# Patient Record
Sex: MALE | Race: WHITE | NOT HISPANIC OR LATINO | Employment: OTHER | URBAN - METROPOLITAN AREA
[De-identification: names, ages, dates, MRNs, and addresses within clinical notes are randomized per-mention and may not be internally consistent; named-entity substitution may affect disease eponyms.]

---

## 2017-02-09 PROBLEM — E11.9 CONTROLLED TYPE 2 DIABETES MELLITUS WITHOUT COMPLICATION: Status: ACTIVE | Noted: 2017-02-09

## 2017-02-09 PROBLEM — M19.072 DEGENERATIVE ARTHRITIS OF LEFT ANKLE: Status: ACTIVE | Noted: 2017-02-09

## 2017-02-09 PROBLEM — E78.2 MIXED HYPERLIPIDEMIA: Status: ACTIVE | Noted: 2017-02-09

## 2017-02-09 PROBLEM — I25.10 CORONARY ARTERY DISEASE INVOLVING NATIVE CORONARY ARTERY WITHOUT ANGINA PECTORIS: Status: ACTIVE | Noted: 2017-02-09

## 2017-02-09 PROBLEM — E03.9 HYPOTHYROIDISM: Status: ACTIVE | Noted: 2017-02-09
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
## 2017-07-10 PROBLEM — T17.320A CHOKING DUE TO FOOD (REGURGITATED): Status: ACTIVE | Noted: 2017-07-10

## 2017-07-10 PROBLEM — R05.9 COUGH: Status: ACTIVE | Noted: 2017-07-10

## 2017-08-24 PROBLEM — R13.12 OROPHARYNGEAL DYSPHAGIA: Status: ACTIVE | Noted: 2017-07-10

## 2017-08-24 PROBLEM — R54 ADVANCED AGE: Status: ACTIVE | Noted: 2017-08-24

## 2017-10-02 PROBLEM — G30.1 LATE ONSET ALZHEIMER'S DISEASE WITHOUT BEHAVIORAL DISTURBANCE: Status: ACTIVE | Noted: 2017-10-02

## 2017-10-02 PROBLEM — F02.80 LATE ONSET ALZHEIMER'S DISEASE WITHOUT BEHAVIORAL DISTURBANCE: Status: ACTIVE | Noted: 2017-10-02

## 2018-07-16 PROBLEM — I35.0 NONRHEUMATIC AORTIC VALVE STENOSIS: Status: ACTIVE | Noted: 2018-07-16

## 2018-07-26 PROBLEM — Z86.010 HISTORY OF COLON POLYPS: Status: ACTIVE | Noted: 2018-07-26

## 2018-08-31 PROBLEM — I35.0 CRITICAL AORTIC VALVE STENOSIS: Status: ACTIVE | Noted: 2018-08-31

## 2018-08-31 PROBLEM — I50.32 CHRONIC DIASTOLIC HEART FAILURE, NYHA CLASS 2: Status: ACTIVE | Noted: 2018-08-31

## 2018-10-30 PROBLEM — I35.0 NON-RHEUMATIC AORTIC STENOSIS: Status: ACTIVE | Noted: 2018-10-30

## 2019-03-06 PROBLEM — R91.1 SOLITARY PULMONARY NODULE: Status: ACTIVE | Noted: 2019-03-06

## 2020-03-09 PROBLEM — R91.8 PULMONARY NODULES: Status: ACTIVE | Noted: 2020-03-09

## 2020-03-16 PROBLEM — R91.1 LUNG NODULE: Status: ACTIVE | Noted: 2020-03-16

## 2020-06-12 PROBLEM — J69.1: Status: ACTIVE | Noted: 2020-06-12

## 2020-06-12 PROBLEM — R91.8 PULMONARY NODULES: Status: RESOLVED | Noted: 2020-03-09 | Resolved: 2020-06-12

## 2020-06-12 PROBLEM — R91.1 LUNG NODULE: Status: RESOLVED | Noted: 2020-03-16 | Resolved: 2020-06-12

## 2020-11-17 PROBLEM — I65.22 CAROTID STENOSIS, LEFT: Status: ACTIVE | Noted: 2020-11-17

## 2021-08-19 ENCOUNTER — TELEPHONE (OUTPATIENT)
Dept: OTOLARYNGOLOGY | Facility: CLINIC | Age: 86
End: 2021-08-19

## 2021-08-24 PROBLEM — I65.29 CAROTID ARTERY STENOSIS: Status: ACTIVE | Noted: 2021-08-24

## 2021-10-20 ENCOUNTER — OFFICE VISIT (OUTPATIENT)
Dept: OTOLARYNGOLOGY | Facility: CLINIC | Age: 86
End: 2021-10-20
Payer: MEDICARE

## 2021-10-20 ENCOUNTER — CLINICAL SUPPORT (OUTPATIENT)
Dept: AUDIOLOGY | Facility: CLINIC | Age: 86
End: 2021-10-20
Payer: MEDICARE

## 2021-10-20 VITALS — BODY MASS INDEX: 25.31 KG/M2 | WEIGHT: 161.63 LBS

## 2021-10-20 DIAGNOSIS — R42 DIZZINESS, NONSPECIFIC: ICD-10-CM

## 2021-10-20 DIAGNOSIS — H91.8X3 ASYMMETRICAL HEARING LOSS: Primary | ICD-10-CM

## 2021-10-20 DIAGNOSIS — H90.3 SENSORINEURAL HEARING LOSS, BILATERAL: Primary | ICD-10-CM

## 2021-10-20 PROCEDURE — 99999 PR PBB SHADOW E&M-EST. PATIENT-LVL III: ICD-10-PCS | Mod: PBBFAC,,, | Performed by: OTOLARYNGOLOGY

## 2021-10-20 PROCEDURE — 99213 OFFICE O/P EST LOW 20 MIN: CPT | Mod: PBBFAC | Performed by: OTOLARYNGOLOGY

## 2021-10-20 PROCEDURE — 92557 COMPREHENSIVE HEARING TEST: CPT | Mod: PBBFAC | Performed by: AUDIOLOGIST

## 2021-10-20 PROCEDURE — 99203 OFFICE O/P NEW LOW 30 MIN: CPT | Mod: S$PBB,,, | Performed by: OTOLARYNGOLOGY

## 2021-10-20 PROCEDURE — 92567 TYMPANOMETRY: CPT | Mod: PBBFAC | Performed by: AUDIOLOGIST

## 2021-10-20 PROCEDURE — 99999 PR PBB SHADOW E&M-EST. PATIENT-LVL III: CPT | Mod: PBBFAC,,, | Performed by: OTOLARYNGOLOGY

## 2021-10-20 PROCEDURE — 99203 PR OFFICE/OUTPT VISIT, NEW, LEVL III, 30-44 MIN: ICD-10-PCS | Mod: S$PBB,,, | Performed by: OTOLARYNGOLOGY

## 2021-11-08 ENCOUNTER — CLINICAL SUPPORT (OUTPATIENT)
Dept: AUDIOLOGY | Facility: CLINIC | Age: 86
End: 2021-11-08
Payer: MEDICARE

## 2021-11-08 ENCOUNTER — OFFICE VISIT (OUTPATIENT)
Dept: OTOLARYNGOLOGY | Facility: CLINIC | Age: 86
End: 2021-11-08
Payer: MEDICARE

## 2021-11-08 VITALS — WEIGHT: 164.44 LBS | BODY MASS INDEX: 25.76 KG/M2

## 2021-11-08 DIAGNOSIS — R94.113 NONSPECIFIC ABNORMAL FINDING ON OCULOMOTOR STUDY: Primary | ICD-10-CM

## 2021-11-08 DIAGNOSIS — I67.9 VERTIGO DUE TO CEREBROVASCULAR DISEASE: ICD-10-CM

## 2021-11-08 DIAGNOSIS — H91.8X3 ASYMMETRICAL HEARING LOSS: Primary | ICD-10-CM

## 2021-11-08 DIAGNOSIS — H81.8X9 OTHER DISORDERS OF VESTIBULAR FUNCTION, UNSPECIFIED EAR: ICD-10-CM

## 2021-11-08 DIAGNOSIS — R26.89 IMBALANCE: ICD-10-CM

## 2021-11-08 DIAGNOSIS — R42 VERTIGO DUE TO CEREBROVASCULAR DISEASE: ICD-10-CM

## 2021-11-08 PROCEDURE — 92540 BASIC VESTIBULAR EVALUATION: CPT | Mod: PBBFAC | Performed by: AUDIOLOGIST

## 2021-11-08 PROCEDURE — 92537 PR CALORIC VSTBLR TEST W/REC BITHERMAL: ICD-10-PCS | Mod: 26,52,S$PBB, | Performed by: AUDIOLOGIST

## 2021-11-08 PROCEDURE — 99213 OFFICE O/P EST LOW 20 MIN: CPT | Mod: PBBFAC,25 | Performed by: OTOLARYNGOLOGY

## 2021-11-08 PROCEDURE — 99999 PR PBB SHADOW E&M-EST. PATIENT-LVL III: CPT | Mod: PBBFAC,,, | Performed by: OTOLARYNGOLOGY

## 2021-11-08 PROCEDURE — 92540 PR VESTIBULAR EVAL NYSTAG FOVL&PERPH STIM OSCIL TRACKING: ICD-10-PCS | Mod: 26,S$PBB,, | Performed by: AUDIOLOGIST

## 2021-11-08 PROCEDURE — 99999 PR PBB SHADOW E&M-EST. PATIENT-LVL III: ICD-10-PCS | Mod: PBBFAC,,, | Performed by: OTOLARYNGOLOGY

## 2021-11-08 PROCEDURE — 92537 CALORIC VSTBLR TEST W/REC: CPT | Mod: 26,52,S$PBB, | Performed by: AUDIOLOGIST

## 2021-11-08 PROCEDURE — 99214 PR OFFICE/OUTPT VISIT, EST, LEVL IV, 30-39 MIN: ICD-10-PCS | Mod: S$PBB,,, | Performed by: OTOLARYNGOLOGY

## 2021-11-08 PROCEDURE — 99214 OFFICE O/P EST MOD 30 MIN: CPT | Mod: S$PBB,,, | Performed by: OTOLARYNGOLOGY

## 2021-11-08 PROCEDURE — 92540 BASIC VESTIBULAR EVALUATION: CPT | Mod: 26,S$PBB,, | Performed by: AUDIOLOGIST

## 2021-11-08 PROCEDURE — 92537 CALORIC VSTBLR TEST W/REC: CPT | Mod: 52,PBBFAC | Performed by: AUDIOLOGIST

## 2021-11-08 RX ORDER — SOLIFENACIN SUCCINATE 5 MG/1
5 TABLET, FILM COATED ORAL DAILY
COMMUNITY
Start: 2021-10-26 | End: 2022-01-18 | Stop reason: ALTCHOICE

## 2021-11-08 RX ORDER — MECLIZINE HCL 12.5 MG 12.5 MG/1
25 TABLET ORAL 3 TIMES DAILY
Qty: 45 TABLET | Refills: 3 | Status: ON HOLD | OUTPATIENT
Start: 2021-11-08 | End: 2023-01-01 | Stop reason: HOSPADM

## 2021-12-03 PROBLEM — Z98.890 S/P CAROTID ENDARTERECTOMY: Status: ACTIVE | Noted: 2021-12-03

## 2021-12-04 PROBLEM — R63.8 ALTERATION IN NUTRITION: Status: ACTIVE | Noted: 2021-12-04

## 2022-01-01 ENCOUNTER — HOSPITAL ENCOUNTER (OUTPATIENT)
Dept: RADIOLOGY | Facility: HOSPITAL | Age: 87
Discharge: HOME | End: 2022-09-21
Attending: FAMILY MEDICINE
Payer: MEDICARE

## 2022-01-01 ENCOUNTER — HOSPITAL ENCOUNTER (INPATIENT)
Facility: HOSPITAL | Age: 87
LOS: 13 days | Discharge: ANOTHER HOSPITAL | DRG: 056 | End: 2023-01-03
Attending: FAMILY MEDICINE | Admitting: STUDENT IN AN ORGANIZED HEALTH CARE EDUCATION/TRAINING PROGRAM
Payer: MEDICARE

## 2022-01-01 ENCOUNTER — HOSPITAL ENCOUNTER (INPATIENT)
Facility: HOSPITAL | Age: 87
LOS: 7 days | Discharge: NURSING, REHAB OR LONG-TERM CARE | DRG: 065 | End: 2022-12-21
Attending: EMERGENCY MEDICINE | Admitting: EMERGENCY MEDICINE
Payer: MEDICARE

## 2022-01-01 ENCOUNTER — TELEPHONE (OUTPATIENT)
Dept: PHARMACY | Facility: CLINIC | Age: 87
End: 2022-01-01
Payer: MEDICARE

## 2022-01-01 ENCOUNTER — HOSPITAL ENCOUNTER (OUTPATIENT)
Dept: RADIOLOGY | Facility: HOSPITAL | Age: 87
Discharge: HOME | End: 2022-12-05
Payer: MEDICARE

## 2022-01-01 ENCOUNTER — HOSPITAL ENCOUNTER (EMERGENCY)
Facility: HOSPITAL | Age: 87
Discharge: ANOTHER HOSPITAL | End: 2022-12-14
Attending: SURGERY
Payer: MEDICARE

## 2022-01-01 VITALS
BODY MASS INDEX: 23.54 KG/M2 | OXYGEN SATURATION: 98 % | TEMPERATURE: 98 F | HEIGHT: 67 IN | HEART RATE: 77 BPM | DIASTOLIC BLOOD PRESSURE: 79 MMHG | SYSTOLIC BLOOD PRESSURE: 140 MMHG | WEIGHT: 150 LBS | RESPIRATION RATE: 18 BRPM

## 2022-01-01 VITALS
HEART RATE: 76 BPM | HEIGHT: 67 IN | RESPIRATION RATE: 18 BRPM | WEIGHT: 152 LBS | SYSTOLIC BLOOD PRESSURE: 128 MMHG | DIASTOLIC BLOOD PRESSURE: 59 MMHG | BODY MASS INDEX: 23.86 KG/M2 | TEMPERATURE: 98 F | OXYGEN SATURATION: 90 %

## 2022-01-01 DIAGNOSIS — I63.9 STROKE: ICD-10-CM
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
## 2022-01-01 DIAGNOSIS — M54.50 LOW BACK PAIN: ICD-10-CM

## 2022-01-01 DIAGNOSIS — R53.1 LEFT-SIDED WEAKNESS: ICD-10-CM

## 2022-01-01 DIAGNOSIS — I63.311 CEREBROVASCULAR ACCIDENT (CVA) DUE TO THROMBOSIS OF RIGHT MIDDLE CEREBRAL ARTERY: Primary | ICD-10-CM

## 2022-01-01 DIAGNOSIS — M25.552 LEFT HIP PAIN: ICD-10-CM

## 2022-01-01 DIAGNOSIS — I63.9 CVA (CEREBRAL VASCULAR ACCIDENT): ICD-10-CM

## 2022-01-01 DIAGNOSIS — M51.36 OTHER INTERVERTEBRAL DISC DEGENERATION, LUMBAR REGION: ICD-10-CM

## 2022-01-01 DIAGNOSIS — I63.9 CEREBROVASCULAR ACCIDENT (CVA), UNSPECIFIED MECHANISM: Primary | ICD-10-CM

## 2022-01-01 LAB
ABO + RH BLD: NORMAL
ALBUMIN SERPL BCP-MCNC: 2.7 G/DL (ref 3.5–5.2)
ALBUMIN SERPL BCP-MCNC: 2.8 G/DL (ref 3.5–5.2)
ALBUMIN SERPL BCP-MCNC: 2.9 G/DL (ref 3.5–5.2)
ALBUMIN SERPL BCP-MCNC: 3 G/DL (ref 3.5–5.2)
ALBUMIN SERPL BCP-MCNC: 3.1 G/DL (ref 3.5–5.2)
ALBUMIN SERPL BCP-MCNC: 3.1 G/DL (ref 3.5–5.2)
ALBUMIN SERPL BCP-MCNC: 3.8 G/DL (ref 3.5–5.2)
ALP SERPL-CCNC: 69 U/L (ref 55–135)
ALP SERPL-CCNC: 72 U/L (ref 55–135)
ALP SERPL-CCNC: 76 U/L (ref 55–135)
ALP SERPL-CCNC: 77 U/L (ref 55–135)
ALP SERPL-CCNC: 78 U/L (ref 55–135)
ALP SERPL-CCNC: 79 U/L (ref 55–135)
ALP SERPL-CCNC: 81 U/L (ref 55–135)
ALP SERPL-CCNC: 82 U/L (ref 55–135)
ALP SERPL-CCNC: 83 U/L (ref 55–135)
ALP SERPL-CCNC: 83 U/L (ref 55–135)
ALP SERPL-CCNC: 86 U/L (ref 55–135)
ALP SERPL-CCNC: 87 U/L (ref 55–135)
ALP SERPL-CCNC: 92 U/L (ref 55–135)
ALT SERPL W/O P-5'-P-CCNC: 13 U/L (ref 10–44)
ALT SERPL W/O P-5'-P-CCNC: 14 U/L (ref 10–44)
ALT SERPL W/O P-5'-P-CCNC: 15 U/L (ref 10–44)
ALT SERPL W/O P-5'-P-CCNC: 16 U/L (ref 10–44)
ALT SERPL W/O P-5'-P-CCNC: 16 U/L (ref 10–44)
ALT SERPL W/O P-5'-P-CCNC: 17 U/L (ref 10–44)
ALT SERPL W/O P-5'-P-CCNC: 17 U/L (ref 10–44)
ALT SERPL W/O P-5'-P-CCNC: 20 U/L (ref 10–44)
ALT SERPL W/O P-5'-P-CCNC: 20 U/L (ref 10–44)
ALT SERPL W/O P-5'-P-CCNC: 21 U/L (ref 10–44)
ALT SERPL W/O P-5'-P-CCNC: 24 U/L (ref 10–44)
ALT SERPL W/O P-5'-P-CCNC: 34 U/L (ref 10–44)
ALT SERPL W/O P-5'-P-CCNC: 35 U/L (ref 10–44)
ANION GAP SERPL CALC-SCNC: 10 MMOL/L (ref 8–16)
ANION GAP SERPL CALC-SCNC: 11 MMOL/L (ref 8–16)
ANION GAP SERPL CALC-SCNC: 5 MMOL/L (ref 8–16)
ANION GAP SERPL CALC-SCNC: 6 MMOL/L (ref 8–16)
ANION GAP SERPL CALC-SCNC: 7 MMOL/L (ref 8–16)
ANION GAP SERPL CALC-SCNC: 7 MMOL/L (ref 8–16)
ANION GAP SERPL CALC-SCNC: 8 MMOL/L (ref 8–16)
ANION GAP SERPL CALC-SCNC: 8 MMOL/L (ref 8–16)
ANION GAP SERPL CALC-SCNC: 9 MMOL/L (ref 8–16)
ASCENDING AORTA: 2.66 CM
AST SERPL-CCNC: 10 U/L (ref 10–40)
AST SERPL-CCNC: 14 U/L (ref 10–40)
AST SERPL-CCNC: 16 U/L (ref 10–40)
AST SERPL-CCNC: 16 U/L (ref 10–40)
AST SERPL-CCNC: 17 U/L (ref 10–40)
AST SERPL-CCNC: 18 U/L (ref 10–40)
AST SERPL-CCNC: 21 U/L (ref 10–40)
AST SERPL-CCNC: 23 U/L (ref 10–40)
AST SERPL-CCNC: 27 U/L (ref 10–40)
AV INDEX (PROSTH): 0.52
AV MEAN GRADIENT: 8 MMHG
AV PEAK GRADIENT: 13 MMHG
AV VALVE AREA: 1.52 CM2
AV VELOCITY RATIO: 0.44
BASOPHILS # BLD AUTO: 0.01 K/UL (ref 0–0.2)
BASOPHILS NFR BLD: 0.1 % (ref 0–1.9)
BASOPHILS NFR BLD: 0.2 % (ref 0–1.9)
BILIRUB SERPL-MCNC: 0.6 MG/DL (ref 0.1–1)
BILIRUB SERPL-MCNC: 0.7 MG/DL (ref 0.1–1)
BILIRUB SERPL-MCNC: 0.8 MG/DL (ref 0.1–1)
BILIRUB SERPL-MCNC: 1 MG/DL (ref 0.1–1)
BILIRUB SERPL-MCNC: 1.2 MG/DL (ref 0.1–1)
BILIRUB SERPL-MCNC: 1.2 MG/DL (ref 0.1–1)
BLD GP AB SCN CELLS X3 SERPL QL: NORMAL
BNP SERPL-MCNC: 102 PG/ML (ref 0–99)
BSA FOR ECHO PROCEDURE: 1.81 M2
BUN SERPL-MCNC: 19 MG/DL (ref 8–23)
BUN SERPL-MCNC: 19 MG/DL (ref 8–23)
BUN SERPL-MCNC: 21 MG/DL (ref 8–23)
BUN SERPL-MCNC: 21 MG/DL (ref 8–23)
BUN SERPL-MCNC: 22 MG/DL (ref 8–23)
BUN SERPL-MCNC: 23 MG/DL (ref 8–23)
BUN SERPL-MCNC: 24 MG/DL (ref 8–23)
BUN SERPL-MCNC: 24 MG/DL (ref 8–23)
BUN SERPL-MCNC: 27 MG/DL (ref 8–23)
BUN SERPL-MCNC: 28 MG/DL (ref 8–23)
BUN SERPL-MCNC: 30 MG/DL (ref 8–23)
BUN SERPL-MCNC: 30 MG/DL (ref 8–23)
CALCIUM SERPL-MCNC: 8.5 MG/DL (ref 8.7–10.5)
CALCIUM SERPL-MCNC: 8.7 MG/DL (ref 8.7–10.5)
CALCIUM SERPL-MCNC: 8.7 MG/DL (ref 8.7–10.5)
CALCIUM SERPL-MCNC: 8.9 MG/DL (ref 8.7–10.5)
CALCIUM SERPL-MCNC: 9 MG/DL (ref 8.7–10.5)
CALCIUM SERPL-MCNC: 9.1 MG/DL (ref 8.7–10.5)
CALCIUM SERPL-MCNC: 9.1 MG/DL (ref 8.7–10.5)
CALCIUM SERPL-MCNC: 9.2 MG/DL (ref 8.7–10.5)
CALCIUM SERPL-MCNC: 9.2 MG/DL (ref 8.7–10.5)
CALCIUM SERPL-MCNC: 9.4 MG/DL (ref 8.7–10.5)
CALCIUM SERPL-MCNC: 9.5 MG/DL (ref 8.7–10.5)
CALCIUM SERPL-MCNC: 9.6 MG/DL (ref 8.7–10.5)
CHLORIDE SERPL-SCNC: 100 MMOL/L (ref 95–110)
CHLORIDE SERPL-SCNC: 102 MMOL/L (ref 95–110)
CHLORIDE SERPL-SCNC: 103 MMOL/L (ref 95–110)
CHLORIDE SERPL-SCNC: 103 MMOL/L (ref 95–110)
CHLORIDE SERPL-SCNC: 104 MMOL/L (ref 95–110)
CHLORIDE SERPL-SCNC: 104 MMOL/L (ref 95–110)
CHLORIDE SERPL-SCNC: 105 MMOL/L (ref 95–110)
CHLORIDE SERPL-SCNC: 106 MMOL/L (ref 95–110)
CHLORIDE SERPL-SCNC: 98 MMOL/L (ref 95–110)
CHLORIDE SERPL-SCNC: 98 MMOL/L (ref 95–110)
CHLORIDE SERPL-SCNC: 99 MMOL/L (ref 95–110)
CHOLEST SERPL-MCNC: 177 MG/DL (ref 120–199)
CHOLEST/HDLC SERPL: 5.5 {RATIO} (ref 2–5)
CO2 SERPL-SCNC: 25 MMOL/L (ref 23–29)
CO2 SERPL-SCNC: 26 MMOL/L (ref 23–29)
CO2 SERPL-SCNC: 27 MMOL/L (ref 23–29)
CO2 SERPL-SCNC: 28 MMOL/L (ref 23–29)
CO2 SERPL-SCNC: 31 MMOL/L (ref 23–29)
CREAT SERPL-MCNC: 1.3 MG/DL (ref 0.5–1.4)
CREAT SERPL-MCNC: 1.4 MG/DL (ref 0.5–1.4)
CREAT SERPL-MCNC: 1.5 MG/DL (ref 0.5–1.4)
CREAT SERPL-MCNC: 1.6 MG/DL (ref 0.5–1.4)
CREAT SERPL-MCNC: 1.8 MG/DL (ref 0.5–1.4)
CREAT SERPL-MCNC: 1.9 MG/DL (ref 0.5–1.4)
CV ECHO LV RWT: 0.5 CM
DIFFERENTIAL METHOD: ABNORMAL
DOP CALC AO PEAK VEL: 1.8 M/S
DOP CALC AO VTI: 33.26 CM
DOP CALC LVOT AREA: 2.9 CM2
DOP CALC LVOT DIAMETER: 1.92 CM
DOP CALC LVOT PEAK VEL: 0.79 M/S
DOP CALC LVOT STROKE VOLUME: 50.41 CM3
DOP CALCLVOT PEAK VEL VTI: 17.42 CM
E WAVE DECELERATION TIME: 424.89 MSEC
E/A RATIO: 0.61
E/E' RATIO: 19.2 M/S
ECHO LV POSTERIOR WALL: 1.06 CM (ref 0.6–1.1)
EJECTION FRACTION: 68 %
EOSINOPHIL # BLD AUTO: 0 K/UL (ref 0–0.5)
EOSINOPHIL # BLD AUTO: 0.1 K/UL (ref 0–0.5)
EOSINOPHIL # BLD AUTO: 0.2 K/UL (ref 0–0.5)
EOSINOPHIL # BLD AUTO: 0.2 K/UL (ref 0–0.5)
EOSINOPHIL # BLD AUTO: 0.3 K/UL (ref 0–0.5)
EOSINOPHIL NFR BLD: 0.6 % (ref 0–8)
EOSINOPHIL NFR BLD: 0.8 % (ref 0–8)
EOSINOPHIL NFR BLD: 0.9 % (ref 0–8)
EOSINOPHIL NFR BLD: 1.3 % (ref 0–8)
EOSINOPHIL NFR BLD: 1.4 % (ref 0–8)
EOSINOPHIL NFR BLD: 1.6 % (ref 0–8)
EOSINOPHIL NFR BLD: 1.6 % (ref 0–8)
EOSINOPHIL NFR BLD: 1.7 % (ref 0–8)
EOSINOPHIL NFR BLD: 2.4 % (ref 0–8)
EOSINOPHIL NFR BLD: 2.5 % (ref 0–8)
EOSINOPHIL NFR BLD: 3 % (ref 0–8)
EOSINOPHIL NFR BLD: 3.3 % (ref 0–8)
EOSINOPHIL NFR BLD: 4 % (ref 0–8)
EOSINOPHIL NFR BLD: 5.6 % (ref 0–8)
ERYTHROCYTE [DISTWIDTH] IN BLOOD BY AUTOMATED COUNT: 12.9 % (ref 11.5–14.5)
ERYTHROCYTE [DISTWIDTH] IN BLOOD BY AUTOMATED COUNT: 13 % (ref 11.5–14.5)
ERYTHROCYTE [DISTWIDTH] IN BLOOD BY AUTOMATED COUNT: 13 % (ref 11.5–14.5)
ERYTHROCYTE [DISTWIDTH] IN BLOOD BY AUTOMATED COUNT: 13.1 % (ref 11.5–14.5)
ERYTHROCYTE [DISTWIDTH] IN BLOOD BY AUTOMATED COUNT: 13.2 % (ref 11.5–14.5)
ERYTHROCYTE [DISTWIDTH] IN BLOOD BY AUTOMATED COUNT: 13.2 % (ref 11.5–14.5)
ERYTHROCYTE [DISTWIDTH] IN BLOOD BY AUTOMATED COUNT: 13.3 % (ref 11.5–14.5)
ERYTHROCYTE [DISTWIDTH] IN BLOOD BY AUTOMATED COUNT: 13.6 % (ref 11.5–14.5)
EST. GFR  (NO RACE VARIABLE): 34 ML/MIN/1.73 M^2
EST. GFR  (NO RACE VARIABLE): 36 ML/MIN/1.73 M^2
EST. GFR  (NO RACE VARIABLE): 41 ML/MIN/1.73 M^2
EST. GFR  (NO RACE VARIABLE): 44.8 ML/MIN/1.73 M^2
EST. GFR  (NO RACE VARIABLE): 45 ML/MIN/1.73 M^2
EST. GFR  (NO RACE VARIABLE): 45 ML/MIN/1.73 M^2
EST. GFR  (NO RACE VARIABLE): 48.6 ML/MIN/1.73 M^2
EST. GFR  (NO RACE VARIABLE): 49 ML/MIN/1.73 M^2
EST. GFR  (NO RACE VARIABLE): 49 ML/MIN/1.73 M^2
EST. GFR  (NO RACE VARIABLE): 53.2 ML/MIN/1.73 M^2
ESTIMATED AVG GLUCOSE: 146 MG/DL (ref 68–131)
FRACTIONAL SHORTENING: 31 % (ref 28–44)
GLUCOSE SERPL-MCNC: 112 MG/DL (ref 70–110)
GLUCOSE SERPL-MCNC: 117 MG/DL (ref 70–110)
GLUCOSE SERPL-MCNC: 146 MG/DL (ref 70–110)
GLUCOSE SERPL-MCNC: 147 MG/DL (ref 70–110)
GLUCOSE SERPL-MCNC: 149 MG/DL (ref 70–110)
GLUCOSE SERPL-MCNC: 162 MG/DL (ref 70–110)
GLUCOSE SERPL-MCNC: 172 MG/DL (ref 70–110)
GLUCOSE SERPL-MCNC: 183 MG/DL (ref 70–110)
GLUCOSE SERPL-MCNC: 204 MG/DL (ref 70–110)
GLUCOSE SERPL-MCNC: 205 MG/DL (ref 70–110)
GLUCOSE SERPL-MCNC: 210 MG/DL (ref 70–110)
GLUCOSE SERPL-MCNC: 212 MG/DL (ref 70–110)
GLUCOSE SERPL-MCNC: 85 MG/DL (ref 70–110)
GLUCOSE SERPL-MCNC: 87 MG/DL (ref 70–110)
GLUCOSE SERPL-MCNC: 96 MG/DL (ref 70–110)
HBA1C MFR BLD: 6.7 % (ref 4–5.6)
HCT VFR BLD AUTO: 31.8 % (ref 40–54)
HCT VFR BLD AUTO: 33 % (ref 40–54)
HCT VFR BLD AUTO: 34.5 % (ref 40–54)
HCT VFR BLD AUTO: 34.6 % (ref 40–54)
HCT VFR BLD AUTO: 34.9 % (ref 40–54)
HCT VFR BLD AUTO: 35.8 % (ref 40–54)
HCT VFR BLD AUTO: 37 % (ref 40–54)
HCT VFR BLD AUTO: 37.4 % (ref 40–54)
HCT VFR BLD AUTO: 37.6 % (ref 40–54)
HCT VFR BLD AUTO: 37.8 % (ref 40–54)
HCT VFR BLD AUTO: 38.6 % (ref 40–54)
HCT VFR BLD AUTO: 39.2 % (ref 40–54)
HCT VFR BLD AUTO: 40.8 % (ref 40–54)
HCT VFR BLD AUTO: 47.4 % (ref 40–54)
HDLC SERPL-MCNC: 32 MG/DL (ref 40–75)
HDLC SERPL: 18.1 % (ref 20–50)
HGB BLD-MCNC: 10.7 G/DL (ref 14–18)
HGB BLD-MCNC: 11 G/DL (ref 14–18)
HGB BLD-MCNC: 11.3 G/DL (ref 14–18)
HGB BLD-MCNC: 11.4 G/DL (ref 14–18)
HGB BLD-MCNC: 11.5 G/DL (ref 14–18)
HGB BLD-MCNC: 11.6 G/DL (ref 14–18)
HGB BLD-MCNC: 12.1 G/DL (ref 14–18)
HGB BLD-MCNC: 12.4 G/DL (ref 14–18)
HGB BLD-MCNC: 12.5 G/DL (ref 14–18)
HGB BLD-MCNC: 12.6 G/DL (ref 14–18)
HGB BLD-MCNC: 12.7 G/DL (ref 14–18)
HGB BLD-MCNC: 12.7 G/DL (ref 14–18)
HGB BLD-MCNC: 12.9 G/DL (ref 14–18)
HGB BLD-MCNC: 15.3 G/DL (ref 14–18)
IMM GRANULOCYTES # BLD AUTO: 0.02 K/UL (ref 0–0.04)
IMM GRANULOCYTES # BLD AUTO: 0.03 K/UL (ref 0–0.04)
IMM GRANULOCYTES # BLD AUTO: 0.04 K/UL (ref 0–0.04)
IMM GRANULOCYTES # BLD AUTO: 0.05 K/UL (ref 0–0.04)
IMM GRANULOCYTES # BLD AUTO: 0.06 K/UL (ref 0–0.04)
IMM GRANULOCYTES # BLD AUTO: 0.06 K/UL (ref 0–0.04)
IMM GRANULOCYTES # BLD AUTO: 0.07 K/UL (ref 0–0.04)
IMM GRANULOCYTES NFR BLD AUTO: 0.4 % (ref 0–0.5)
IMM GRANULOCYTES NFR BLD AUTO: 0.5 % (ref 0–0.5)
IMM GRANULOCYTES NFR BLD AUTO: 0.6 % (ref 0–0.5)
IMM GRANULOCYTES NFR BLD AUTO: 0.6 % (ref 0–0.5)
IMM GRANULOCYTES NFR BLD AUTO: 0.7 % (ref 0–0.5)
IMM GRANULOCYTES NFR BLD AUTO: 0.8 % (ref 0–0.5)
IMM GRANULOCYTES NFR BLD AUTO: 1 % (ref 0–0.5)
IMM GRANULOCYTES NFR BLD AUTO: 1 % (ref 0–0.5)
IMM GRANULOCYTES NFR BLD AUTO: 1.1 % (ref 0–0.5)
IMM GRANULOCYTES NFR BLD AUTO: 1.1 % (ref 0–0.5)
IMM GRANULOCYTES NFR BLD AUTO: 1.2 % (ref 0–0.5)
IMM GRANULOCYTES NFR BLD AUTO: 1.5 % (ref 0–0.5)
INR PPP: 1.1 (ref 0.8–1.2)
INTERVENTRICULAR SEPTUM: 1.29 CM (ref 0.6–1.1)
LA MAJOR: 5.71 CM
LA MINOR: 6.2 CM
LA WIDTH: 3.25 CM
LDLC SERPL CALC-MCNC: 128.6 MG/DL (ref 63–159)
LEFT ATRIUM SIZE: 3.44 CM
LEFT ATRIUM VOLUME INDEX: 31.4 ML/M2
LEFT ATRIUM VOLUME: 56.49 CM3
LEFT INTERNAL DIMENSION IN SYSTOLE: 2.9 CM (ref 2.1–4)
LEFT VENTRICLE DIASTOLIC VOLUME INDEX: 43.88 ML/M2
LEFT VENTRICLE DIASTOLIC VOLUME: 78.98 ML
LEFT VENTRICLE MASS INDEX: 96 G/M2
LEFT VENTRICLE SYSTOLIC VOLUME INDEX: 17.9 ML/M2
LEFT VENTRICLE SYSTOLIC VOLUME: 32.27 ML
LEFT VENTRICULAR INTERNAL DIMENSION IN DIASTOLE: 4.21 CM (ref 3.5–6)
LEFT VENTRICULAR MASS: 173.39 G
LV LATERAL E/E' RATIO: 19.2 M/S
LV SEPTAL E/E' RATIO: 19.2 M/S
LYMPHOCYTES # BLD AUTO: 0.5 K/UL (ref 1–4.8)
LYMPHOCYTES # BLD AUTO: 0.6 K/UL (ref 1–4.8)
LYMPHOCYTES # BLD AUTO: 0.7 K/UL (ref 1–4.8)
LYMPHOCYTES # BLD AUTO: 0.8 K/UL (ref 1–4.8)
LYMPHOCYTES # BLD AUTO: 0.8 K/UL (ref 1–4.8)
LYMPHOCYTES # BLD AUTO: 0.9 K/UL (ref 1–4.8)
LYMPHOCYTES NFR BLD: 10.5 % (ref 18–48)
LYMPHOCYTES NFR BLD: 10.8 % (ref 18–48)
LYMPHOCYTES NFR BLD: 10.8 % (ref 18–48)
LYMPHOCYTES NFR BLD: 11 % (ref 18–48)
LYMPHOCYTES NFR BLD: 11.1 % (ref 18–48)
LYMPHOCYTES NFR BLD: 12.2 % (ref 18–48)
LYMPHOCYTES NFR BLD: 12.8 % (ref 18–48)
LYMPHOCYTES NFR BLD: 13.3 % (ref 18–48)
LYMPHOCYTES NFR BLD: 13.5 % (ref 18–48)
LYMPHOCYTES NFR BLD: 15 % (ref 18–48)
LYMPHOCYTES NFR BLD: 15.8 % (ref 18–48)
LYMPHOCYTES NFR BLD: 17.5 % (ref 18–48)
LYMPHOCYTES NFR BLD: 9.7 % (ref 18–48)
LYMPHOCYTES NFR BLD: 9.8 % (ref 18–48)
MAGNESIUM SERPL-MCNC: 1.7 MG/DL (ref 1.6–2.6)
MAGNESIUM SERPL-MCNC: 1.8 MG/DL (ref 1.6–2.6)
MAGNESIUM SERPL-MCNC: 1.8 MG/DL (ref 1.6–2.6)
MAGNESIUM SERPL-MCNC: 1.9 MG/DL (ref 1.6–2.6)
MAGNESIUM SERPL-MCNC: 1.9 MG/DL (ref 1.6–2.6)
MAGNESIUM SERPL-MCNC: 2.1 MG/DL (ref 1.6–2.6)
MAGNESIUM SERPL-MCNC: 2.2 MG/DL (ref 1.6–2.6)
MCH RBC QN AUTO: 31.6 PG (ref 27–31)
MCH RBC QN AUTO: 32.1 PG (ref 27–31)
MCH RBC QN AUTO: 32.3 PG (ref 27–31)
MCH RBC QN AUTO: 32.3 PG (ref 27–31)
MCH RBC QN AUTO: 32.7 PG (ref 27–31)
MCH RBC QN AUTO: 32.8 PG (ref 27–31)
MCH RBC QN AUTO: 32.9 PG (ref 27–31)
MCH RBC QN AUTO: 33 PG (ref 27–31)
MCH RBC QN AUTO: 33.2 PG (ref 27–31)
MCH RBC QN AUTO: 33.3 PG (ref 27–31)
MCH RBC QN AUTO: 33.4 PG (ref 27–31)
MCH RBC QN AUTO: 33.4 PG (ref 27–31)
MCH RBC QN AUTO: 33.8 PG (ref 27–31)
MCH RBC QN AUTO: 34.3 PG (ref 27–31)
MCHC RBC AUTO-ENTMCNC: 31.6 G/DL (ref 32–36)
MCHC RBC AUTO-ENTMCNC: 31.6 G/DL (ref 32–36)
MCHC RBC AUTO-ENTMCNC: 32.3 G/DL (ref 32–36)
MCHC RBC AUTO-ENTMCNC: 32.4 G/DL (ref 32–36)
MCHC RBC AUTO-ENTMCNC: 32.6 G/DL (ref 32–36)
MCHC RBC AUTO-ENTMCNC: 32.7 G/DL (ref 32–36)
MCHC RBC AUTO-ENTMCNC: 33 G/DL (ref 32–36)
MCHC RBC AUTO-ENTMCNC: 33 G/DL (ref 32–36)
MCHC RBC AUTO-ENTMCNC: 33.1 G/DL (ref 32–36)
MCHC RBC AUTO-ENTMCNC: 33.2 G/DL (ref 32–36)
MCHC RBC AUTO-ENTMCNC: 33.3 G/DL (ref 32–36)
MCHC RBC AUTO-ENTMCNC: 33.5 G/DL (ref 32–36)
MCHC RBC AUTO-ENTMCNC: 33.6 G/DL (ref 32–36)
MCHC RBC AUTO-ENTMCNC: 33.8 G/DL (ref 32–36)
MCV RBC AUTO: 100 FL (ref 82–98)
MCV RBC AUTO: 100 FL (ref 82–98)
MCV RBC AUTO: 101 FL (ref 82–98)
MCV RBC AUTO: 102 FL (ref 82–98)
MCV RBC AUTO: 104 FL (ref 82–98)
MCV RBC AUTO: 98 FL (ref 82–98)
MCV RBC AUTO: 98 FL (ref 82–98)
MCV RBC AUTO: 99 FL (ref 82–98)
MONOCYTES # BLD AUTO: 0.6 K/UL (ref 0.3–1)
MONOCYTES # BLD AUTO: 0.6 K/UL (ref 0.3–1)
MONOCYTES # BLD AUTO: 0.7 K/UL (ref 0.3–1)
MONOCYTES # BLD AUTO: 0.8 K/UL (ref 0.3–1)
MONOCYTES # BLD AUTO: 0.9 K/UL (ref 0.3–1)
MONOCYTES NFR BLD: 10 % (ref 4–15)
MONOCYTES NFR BLD: 11.3 % (ref 4–15)
MONOCYTES NFR BLD: 12.4 % (ref 4–15)
MONOCYTES NFR BLD: 12.8 % (ref 4–15)
MONOCYTES NFR BLD: 13.3 % (ref 4–15)
MONOCYTES NFR BLD: 13.8 % (ref 4–15)
MONOCYTES NFR BLD: 13.9 % (ref 4–15)
MONOCYTES NFR BLD: 14.2 % (ref 4–15)
MONOCYTES NFR BLD: 14.6 % (ref 4–15)
MONOCYTES NFR BLD: 14.8 % (ref 4–15)
MONOCYTES NFR BLD: 15 % (ref 4–15)
MONOCYTES NFR BLD: 15.2 % (ref 4–15)
MONOCYTES NFR BLD: 16.5 % (ref 4–15)
MONOCYTES NFR BLD: 18 % (ref 4–15)
MV PEAK A VEL: 1.57 M/S
MV PEAK E VEL: 0.96 M/S
MV STENOSIS PRESSURE HALF TIME: 123.22 MS
MV VALVE AREA P 1/2 METHOD: 1.79 CM2
NEUTROPHILS # BLD AUTO: 2.6 K/UL (ref 1.8–7.7)
NEUTROPHILS # BLD AUTO: 3.1 K/UL (ref 1.8–7.7)
NEUTROPHILS # BLD AUTO: 3.1 K/UL (ref 1.8–7.7)
NEUTROPHILS # BLD AUTO: 3.2 K/UL (ref 1.8–7.7)
NEUTROPHILS # BLD AUTO: 3.4 K/UL (ref 1.8–7.7)
NEUTROPHILS # BLD AUTO: 3.4 K/UL (ref 1.8–7.7)
NEUTROPHILS # BLD AUTO: 3.5 K/UL (ref 1.8–7.7)
NEUTROPHILS # BLD AUTO: 3.6 K/UL (ref 1.8–7.7)
NEUTROPHILS # BLD AUTO: 3.7 K/UL (ref 1.8–7.7)
NEUTROPHILS # BLD AUTO: 3.8 K/UL (ref 1.8–7.7)
NEUTROPHILS # BLD AUTO: 4 K/UL (ref 1.8–7.7)
NEUTROPHILS # BLD AUTO: 4.5 K/UL (ref 1.8–7.7)
NEUTROPHILS # BLD AUTO: 4.8 K/UL (ref 1.8–7.7)
NEUTROPHILS # BLD AUTO: 5.2 K/UL (ref 1.8–7.7)
NEUTROPHILS NFR BLD: 59.8 % (ref 38–73)
NEUTROPHILS NFR BLD: 64.5 % (ref 38–73)
NEUTROPHILS NFR BLD: 66.4 % (ref 38–73)
NEUTROPHILS NFR BLD: 67.6 % (ref 38–73)
NEUTROPHILS NFR BLD: 67.9 % (ref 38–73)
NEUTROPHILS NFR BLD: 68.3 % (ref 38–73)
NEUTROPHILS NFR BLD: 68.7 % (ref 38–73)
NEUTROPHILS NFR BLD: 70.4 % (ref 38–73)
NEUTROPHILS NFR BLD: 73.8 % (ref 38–73)
NEUTROPHILS NFR BLD: 74.3 % (ref 38–73)
NEUTROPHILS NFR BLD: 74.4 % (ref 38–73)
NEUTROPHILS NFR BLD: 74.5 % (ref 38–73)
NEUTROPHILS NFR BLD: 76.9 % (ref 38–73)
NEUTROPHILS NFR BLD: 78.2 % (ref 38–73)
NONHDLC SERPL-MCNC: 145 MG/DL
NRBC BLD-RTO: 0 /100 WBC
PHOSPHATE SERPL-MCNC: 2.3 MG/DL (ref 2.7–4.5)
PHOSPHATE SERPL-MCNC: 2.5 MG/DL (ref 2.7–4.5)
PHOSPHATE SERPL-MCNC: 2.6 MG/DL (ref 2.7–4.5)
PHOSPHATE SERPL-MCNC: 3 MG/DL (ref 2.7–4.5)
PHOSPHATE SERPL-MCNC: 3 MG/DL (ref 2.7–4.5)
PHOSPHATE SERPL-MCNC: 3.2 MG/DL (ref 2.7–4.5)
PHOSPHATE SERPL-MCNC: 3.4 MG/DL (ref 2.7–4.5)
PISA TR MAX VEL: 3.61 M/S
PLATELET # BLD AUTO: 152 K/UL (ref 150–450)
PLATELET # BLD AUTO: 153 K/UL (ref 150–450)
PLATELET # BLD AUTO: 159 K/UL (ref 150–450)
PLATELET # BLD AUTO: 160 K/UL (ref 150–450)
PLATELET # BLD AUTO: 169 K/UL (ref 150–450)
PLATELET # BLD AUTO: 181 K/UL (ref 150–450)
PLATELET # BLD AUTO: 184 K/UL (ref 150–450)
PLATELET # BLD AUTO: 193 K/UL (ref 150–450)
PLATELET # BLD AUTO: 199 K/UL (ref 150–450)
PLATELET # BLD AUTO: 209 K/UL (ref 150–450)
PLATELET # BLD AUTO: 216 K/UL (ref 150–450)
PLATELET # BLD AUTO: 253 K/UL (ref 150–450)
PLATELET # BLD AUTO: 318 K/UL (ref 150–450)
PLATELET # BLD AUTO: 362 K/UL (ref 150–450)
PMV BLD AUTO: 10 FL (ref 9.2–12.9)
PMV BLD AUTO: 10.1 FL (ref 9.2–12.9)
PMV BLD AUTO: 10.1 FL (ref 9.2–12.9)
PMV BLD AUTO: 10.2 FL (ref 9.2–12.9)
PMV BLD AUTO: 10.3 FL (ref 9.2–12.9)
PMV BLD AUTO: 9.5 FL (ref 9.2–12.9)
PMV BLD AUTO: 9.5 FL (ref 9.2–12.9)
PMV BLD AUTO: 9.6 FL (ref 9.2–12.9)
PMV BLD AUTO: 9.7 FL (ref 9.2–12.9)
PMV BLD AUTO: 9.8 FL (ref 9.2–12.9)
PMV BLD AUTO: 9.8 FL (ref 9.2–12.9)
PMV BLD AUTO: 9.9 FL (ref 9.2–12.9)
POCT GLUCOSE: 111 MG/DL (ref 70–110)
POCT GLUCOSE: 128 MG/DL (ref 70–110)
POCT GLUCOSE: 133 MG/DL (ref 70–110)
POCT GLUCOSE: 133 MG/DL (ref 70–110)
POCT GLUCOSE: 134 MG/DL (ref 70–110)
POCT GLUCOSE: 136 MG/DL (ref 70–110)
POCT GLUCOSE: 136 MG/DL (ref 70–110)
POCT GLUCOSE: 138 MG/DL (ref 70–110)
POCT GLUCOSE: 139 MG/DL (ref 70–110)
POCT GLUCOSE: 141 MG/DL (ref 70–110)
POCT GLUCOSE: 146 MG/DL (ref 70–110)
POCT GLUCOSE: 151 MG/DL (ref 70–110)
POCT GLUCOSE: 152 MG/DL (ref 70–110)
POCT GLUCOSE: 156 MG/DL (ref 70–110)
POCT GLUCOSE: 158 MG/DL (ref 70–110)
POCT GLUCOSE: 159 MG/DL (ref 70–110)
POCT GLUCOSE: 171 MG/DL (ref 70–110)
POCT GLUCOSE: 175 MG/DL (ref 70–110)
POCT GLUCOSE: 175 MG/DL (ref 70–110)
POCT GLUCOSE: 177 MG/DL (ref 70–110)
POCT GLUCOSE: 179 MG/DL (ref 70–110)
POCT GLUCOSE: 180 MG/DL (ref 70–110)
POCT GLUCOSE: 180 MG/DL (ref 70–110)
POCT GLUCOSE: 182 MG/DL (ref 70–110)
POCT GLUCOSE: 185 MG/DL (ref 70–110)
POCT GLUCOSE: 189 MG/DL (ref 70–110)
POCT GLUCOSE: 189 MG/DL (ref 70–110)
POCT GLUCOSE: 192 MG/DL (ref 70–110)
POCT GLUCOSE: 193 MG/DL (ref 70–110)
POCT GLUCOSE: 198 MG/DL (ref 70–110)
POCT GLUCOSE: 199 MG/DL (ref 70–110)
POCT GLUCOSE: 202 MG/DL (ref 70–110)
POCT GLUCOSE: 205 MG/DL (ref 70–110)
POCT GLUCOSE: 209 MG/DL (ref 70–110)
POCT GLUCOSE: 209 MG/DL (ref 70–110)
POCT GLUCOSE: 214 MG/DL (ref 70–110)
POCT GLUCOSE: 214 MG/DL (ref 70–110)
POCT GLUCOSE: 232 MG/DL (ref 70–110)
POCT GLUCOSE: 237 MG/DL (ref 70–110)
POCT GLUCOSE: 243 MG/DL (ref 70–110)
POCT GLUCOSE: 244 MG/DL (ref 70–110)
POCT GLUCOSE: 247 MG/DL (ref 70–110)
POCT GLUCOSE: 257 MG/DL (ref 70–110)
POCT GLUCOSE: 264 MG/DL (ref 70–110)
POCT GLUCOSE: 278 MG/DL (ref 70–110)
POCT GLUCOSE: 280 MG/DL (ref 70–110)
POCT GLUCOSE: 282 MG/DL (ref 70–110)
POCT GLUCOSE: 284 MG/DL (ref 70–110)
POCT GLUCOSE: 285 MG/DL (ref 70–110)
POCT GLUCOSE: 286 MG/DL (ref 70–110)
POCT GLUCOSE: 288 MG/DL (ref 70–110)
POCT GLUCOSE: 290 MG/DL (ref 70–110)
POCT GLUCOSE: 291 MG/DL (ref 70–110)
POCT GLUCOSE: 293 MG/DL (ref 70–110)
POCT GLUCOSE: 300 MG/DL (ref 70–110)
POCT GLUCOSE: 305 MG/DL (ref 70–110)
POCT GLUCOSE: 322 MG/DL (ref 70–110)
POCT GLUCOSE: 327 MG/DL (ref 70–110)
POCT GLUCOSE: 342 MG/DL (ref 70–110)
POCT GLUCOSE: 77 MG/DL (ref 70–110)
POCT GLUCOSE: 78 MG/DL (ref 70–110)
POCT GLUCOSE: 91 MG/DL (ref 70–110)
POCT GLUCOSE: 92 MG/DL (ref 70–110)
POCT GLUCOSE: 96 MG/DL (ref 70–110)
POTASSIUM SERPL-SCNC: 3.8 MMOL/L (ref 3.5–5.1)
POTASSIUM SERPL-SCNC: 3.9 MMOL/L (ref 3.5–5.1)
POTASSIUM SERPL-SCNC: 3.9 MMOL/L (ref 3.5–5.1)
POTASSIUM SERPL-SCNC: 4 MMOL/L (ref 3.5–5.1)
POTASSIUM SERPL-SCNC: 4.1 MMOL/L (ref 3.5–5.1)
POTASSIUM SERPL-SCNC: 4.1 MMOL/L (ref 3.5–5.1)
POTASSIUM SERPL-SCNC: 4.4 MMOL/L (ref 3.5–5.1)
POTASSIUM SERPL-SCNC: 4.5 MMOL/L (ref 3.5–5.1)
POTASSIUM SERPL-SCNC: 4.6 MMOL/L (ref 3.5–5.1)
POTASSIUM SERPL-SCNC: 4.8 MMOL/L (ref 3.5–5.1)
POTASSIUM SERPL-SCNC: 4.9 MMOL/L (ref 3.5–5.1)
PROT SERPL-MCNC: 5.3 G/DL (ref 6–8.4)
PROT SERPL-MCNC: 5.3 G/DL (ref 6–8.4)
PROT SERPL-MCNC: 5.4 G/DL (ref 6–8.4)
PROT SERPL-MCNC: 5.7 G/DL (ref 6–8.4)
PROT SERPL-MCNC: 5.7 G/DL (ref 6–8.4)
PROT SERPL-MCNC: 5.8 G/DL (ref 6–8.4)
PROT SERPL-MCNC: 5.9 G/DL (ref 6–8.4)
PROT SERPL-MCNC: 5.9 G/DL (ref 6–8.4)
PROT SERPL-MCNC: 6 G/DL (ref 6–8.4)
PROT SERPL-MCNC: 6.1 G/DL (ref 6–8.4)
PROT SERPL-MCNC: 7.1 G/DL (ref 6–8.4)
PROTHROMBIN TIME: 11.4 SEC (ref 9–12.5)
RA MAJOR: 5.1 CM
RA PRESSURE: 3 MMHG
RA WIDTH: 3.15 CM
RBC # BLD AUTO: 3.2 M/UL (ref 4.6–6.2)
RBC # BLD AUTO: 3.33 M/UL (ref 4.6–6.2)
RBC # BLD AUTO: 3.47 M/UL (ref 4.6–6.2)
RBC # BLD AUTO: 3.51 M/UL (ref 4.6–6.2)
RBC # BLD AUTO: 3.52 M/UL (ref 4.6–6.2)
RBC # BLD AUTO: 3.55 M/UL (ref 4.6–6.2)
RBC # BLD AUTO: 3.63 M/UL (ref 4.6–6.2)
RBC # BLD AUTO: 3.64 M/UL (ref 4.6–6.2)
RBC # BLD AUTO: 3.71 M/UL (ref 4.6–6.2)
RBC # BLD AUTO: 3.76 M/UL (ref 4.6–6.2)
RBC # BLD AUTO: 3.8 M/UL (ref 4.6–6.2)
RBC # BLD AUTO: 3.93 M/UL (ref 4.6–6.2)
RBC # BLD AUTO: 4 M/UL (ref 4.6–6.2)
RBC # BLD AUTO: 4.84 M/UL (ref 4.6–6.2)
RIGHT VENTRICULAR END-DIASTOLIC DIMENSION: 3.42 CM
SINUS: 2.15 CM
SODIUM SERPL-SCNC: 135 MMOL/L (ref 136–145)
SODIUM SERPL-SCNC: 136 MMOL/L (ref 136–145)
SODIUM SERPL-SCNC: 137 MMOL/L (ref 136–145)
SODIUM SERPL-SCNC: 138 MMOL/L (ref 136–145)
SODIUM SERPL-SCNC: 139 MMOL/L (ref 136–145)
SODIUM SERPL-SCNC: 140 MMOL/L (ref 136–145)
SODIUM SERPL-SCNC: 144 MMOL/L (ref 136–145)
STJ: 2.4 CM
T4 FREE SERPL-MCNC: 0.95 NG/DL (ref 0.71–1.51)
TDI LATERAL: 0.05 M/S
TDI SEPTAL: 0.05 M/S
TDI: 0.05 M/S
TR MAX PG: 52 MMHG
TRIGL SERPL-MCNC: 82 MG/DL (ref 30–150)
TROPONIN I SERPL DL<=0.01 NG/ML-MCNC: 0.01 NG/ML (ref 0–0.03)
TSH SERPL DL<=0.005 MIU/L-ACNC: 4.4 UIU/ML (ref 0.4–4)
TV REST PULMONARY ARTERY PRESSURE: 55 MMHG
WBC # BLD AUTO: 4.05 K/UL (ref 3.9–12.7)
WBC # BLD AUTO: 4.52 K/UL (ref 3.9–12.7)
WBC # BLD AUTO: 4.74 K/UL (ref 3.9–12.7)
WBC # BLD AUTO: 4.84 K/UL (ref 3.9–12.7)
WBC # BLD AUTO: 4.86 K/UL (ref 3.9–12.7)
WBC # BLD AUTO: 4.99 K/UL (ref 3.9–12.7)
WBC # BLD AUTO: 5.07 K/UL (ref 3.9–12.7)
WBC # BLD AUTO: 5.08 K/UL (ref 3.9–12.7)
WBC # BLD AUTO: 5.14 K/UL (ref 3.9–12.7)
WBC # BLD AUTO: 5.35 K/UL (ref 3.9–12.7)
WBC # BLD AUTO: 5.6 K/UL (ref 3.9–12.7)
WBC # BLD AUTO: 6.04 K/UL (ref 3.9–12.7)
WBC # BLD AUTO: 6.2 K/UL (ref 3.9–12.7)
WBC # BLD AUTO: 6.67 K/UL (ref 3.9–12.7)

## 2022-01-01 PROCEDURE — 36415 COLL VENOUS BLD VENIPUNCTURE: CPT | Performed by: NURSE PRACTITIONER

## 2022-01-01 PROCEDURE — 92507 TX SP LANG VOICE COMM INDIV: CPT

## 2022-01-01 PROCEDURE — 99291 CRITICAL CARE FIRST HOUR: CPT

## 2022-01-01 PROCEDURE — 84100 ASSAY OF PHOSPHORUS: CPT | Performed by: STUDENT IN AN ORGANIZED HEALTH CARE EDUCATION/TRAINING PROGRAM

## 2022-01-01 PROCEDURE — 94761 N-INVAS EAR/PLS OXIMETRY MLT: CPT

## 2022-01-01 PROCEDURE — 97530 THERAPEUTIC ACTIVITIES: CPT

## 2022-01-01 PROCEDURE — 11000001 HC ACUTE MED/SURG PRIVATE ROOM

## 2022-01-01 PROCEDURE — 25000242 PHARM REV CODE 250 ALT 637 W/ HCPCS: Performed by: STUDENT IN AN ORGANIZED HEALTH CARE EDUCATION/TRAINING PROGRAM

## 2022-01-01 PROCEDURE — 99233 PR SUBSEQUENT HOSPITAL CARE,LEVL III: ICD-10-PCS | Mod: ,,, | Performed by: PSYCHIATRY & NEUROLOGY

## 2022-01-01 PROCEDURE — 25000003 PHARM REV CODE 250

## 2022-01-01 PROCEDURE — 25000003 PHARM REV CODE 250: Performed by: INTERNAL MEDICINE

## 2022-01-01 PROCEDURE — 99308 PR NURSING FAC CARE, SUBSEQ, MINOR COMPLIC: ICD-10-PCS | Mod: ,,, | Performed by: FAMILY MEDICINE

## 2022-01-01 PROCEDURE — 92526 ORAL FUNCTION THERAPY: CPT

## 2022-01-01 PROCEDURE — 97530 THERAPEUTIC ACTIVITIES: CPT | Mod: CQ

## 2022-01-01 PROCEDURE — 36415 COLL VENOUS BLD VENIPUNCTURE: CPT | Performed by: PHYSICIAN ASSISTANT

## 2022-01-01 PROCEDURE — 85025 COMPLETE CBC W/AUTO DIFF WBC: CPT

## 2022-01-01 PROCEDURE — 99308 SBSQ NF CARE LOW MDM 20: CPT | Mod: ,,, | Performed by: FAMILY MEDICINE

## 2022-01-01 PROCEDURE — 25000003 PHARM REV CODE 250: Performed by: FAMILY MEDICINE

## 2022-01-01 PROCEDURE — 99233 PR SUBSEQUENT HOSPITAL CARE,LEVL III: ICD-10-PCS | Mod: GC,,, | Performed by: PSYCHIATRY & NEUROLOGY

## 2022-01-01 PROCEDURE — 85025 COMPLETE CBC W/AUTO DIFF WBC: CPT | Performed by: STUDENT IN AN ORGANIZED HEALTH CARE EDUCATION/TRAINING PROGRAM

## 2022-01-01 PROCEDURE — 63600175 PHARM REV CODE 636 W HCPCS: Mod: JW | Performed by: SURGERY

## 2022-01-01 PROCEDURE — 25000003 PHARM REV CODE 250: Performed by: PHYSICIAN ASSISTANT

## 2022-01-01 PROCEDURE — 63600175 PHARM REV CODE 636 W HCPCS

## 2022-01-01 PROCEDURE — 99304 PR NURSING FACILITY CARE, INIT, LOW SEVERITY: ICD-10-PCS | Mod: AI,,, | Performed by: STUDENT IN AN ORGANIZED HEALTH CARE EDUCATION/TRAINING PROGRAM

## 2022-01-01 PROCEDURE — 84100 ASSAY OF PHOSPHORUS: CPT

## 2022-01-01 PROCEDURE — 11000004 HC SNF PRIVATE

## 2022-01-01 PROCEDURE — 25500020 PHARM REV CODE 255: Performed by: EMERGENCY MEDICINE

## 2022-01-01 PROCEDURE — 99900035 HC TECH TIME PER 15 MIN (STAT)

## 2022-01-01 PROCEDURE — 63600175 PHARM REV CODE 636 W HCPCS: Performed by: INTERNAL MEDICINE

## 2022-01-01 PROCEDURE — 94760 N-INVAS EAR/PLS OXIMETRY 1: CPT

## 2022-01-01 PROCEDURE — 99291 CRITICAL CARE FIRST HOUR: CPT | Mod: GC,,, | Performed by: INTERNAL MEDICINE

## 2022-01-01 PROCEDURE — 25000003 PHARM REV CODE 250: Performed by: STUDENT IN AN ORGANIZED HEALTH CARE EDUCATION/TRAINING PROGRAM

## 2022-01-01 PROCEDURE — 94640 AIRWAY INHALATION TREATMENT: CPT

## 2022-01-01 PROCEDURE — 27000221 HC OXYGEN, UP TO 24 HOURS

## 2022-01-01 PROCEDURE — 25000003 PHARM REV CODE 250: Performed by: NURSE PRACTITIONER

## 2022-01-01 PROCEDURE — 97162 PT EVAL MOD COMPLEX 30 MIN: CPT

## 2022-01-01 PROCEDURE — 97535 SELF CARE MNGMENT TRAINING: CPT | Mod: CO

## 2022-01-01 PROCEDURE — 72100 XR LUMBAR SPINE AP AND LATERAL: ICD-10-PCS | Mod: 26,,, | Performed by: RADIOLOGY

## 2022-01-01 PROCEDURE — 72100 X-RAY EXAM L-S SPINE 2/3 VWS: CPT | Mod: TC

## 2022-01-01 PROCEDURE — 83735 ASSAY OF MAGNESIUM: CPT

## 2022-01-01 PROCEDURE — 72158 MRI LUMBAR SPINE W WO CONTRAST: ICD-10-PCS | Mod: 26,,, | Performed by: RADIOLOGY

## 2022-01-01 PROCEDURE — 80053 COMPREHEN METABOLIC PANEL: CPT

## 2022-01-01 PROCEDURE — 97116 GAIT TRAINING THERAPY: CPT

## 2022-01-01 PROCEDURE — 36415 COLL VENOUS BLD VENIPUNCTURE: CPT

## 2022-01-01 PROCEDURE — 96375 TX/PRO/DX INJ NEW DRUG ADDON: CPT

## 2022-01-01 PROCEDURE — 85025 COMPLETE CBC W/AUTO DIFF WBC: CPT | Performed by: PHYSICIAN ASSISTANT

## 2022-01-01 PROCEDURE — 99291 PR CRITICAL CARE, E/M 30-74 MINUTES: ICD-10-PCS | Mod: ,,, | Performed by: PHYSICIAN ASSISTANT

## 2022-01-01 PROCEDURE — 99233 SBSQ HOSP IP/OBS HIGH 50: CPT | Mod: ,,, | Performed by: PSYCHIATRY & NEUROLOGY

## 2022-01-01 PROCEDURE — 97110 THERAPEUTIC EXERCISES: CPT | Mod: CO

## 2022-01-01 PROCEDURE — 80053 COMPREHEN METABOLIC PANEL: CPT | Performed by: PHYSICIAN ASSISTANT

## 2022-01-01 PROCEDURE — 82962 GLUCOSE BLOOD TEST: CPT | Mod: 91

## 2022-01-01 PROCEDURE — 99305 PR NURSING FACILITY CARE, INIT, MOD SEVERITY: ICD-10-PCS | Mod: ,,, | Performed by: INTERNAL MEDICINE

## 2022-01-01 PROCEDURE — 80048 BASIC METABOLIC PNL TOTAL CA: CPT | Performed by: NURSE PRACTITIONER

## 2022-01-01 PROCEDURE — 20000000 HC ICU ROOM

## 2022-01-01 PROCEDURE — 99291 PR CRITICAL CARE, E/M 30-74 MINUTES: ICD-10-PCS | Mod: GC,,, | Performed by: INTERNAL MEDICINE

## 2022-01-01 PROCEDURE — G0508 CRIT CARE TELEHEA CONSULT 60: HCPCS | Mod: 95,G0,, | Performed by: PSYCHIATRY & NEUROLOGY

## 2022-01-01 PROCEDURE — 99291 CRITICAL CARE FIRST HOUR: CPT | Mod: ,,, | Performed by: PHYSICIAN ASSISTANT

## 2022-01-01 PROCEDURE — 97165 OT EVAL LOW COMPLEX 30 MIN: CPT

## 2022-01-01 PROCEDURE — 92523 SPEECH SOUND LANG COMPREHEN: CPT

## 2022-01-01 PROCEDURE — 97535 SELF CARE MNGMENT TRAINING: CPT

## 2022-01-01 PROCEDURE — 99233 PR SUBSEQUENT HOSPITAL CARE,LEVL III: ICD-10-PCS | Mod: ,,, | Performed by: STUDENT IN AN ORGANIZED HEALTH CARE EDUCATION/TRAINING PROGRAM

## 2022-01-01 PROCEDURE — 83880 ASSAY OF NATRIURETIC PEPTIDE: CPT | Performed by: SURGERY

## 2022-01-01 PROCEDURE — 80061 LIPID PANEL: CPT | Performed by: SURGERY

## 2022-01-01 PROCEDURE — 99304 1ST NF CARE SF/LOW MDM 25: CPT | Mod: AI,,, | Performed by: STUDENT IN AN ORGANIZED HEALTH CARE EDUCATION/TRAINING PROGRAM

## 2022-01-01 PROCEDURE — 80053 COMPREHEN METABOLIC PANEL: CPT | Performed by: STUDENT IN AN ORGANIZED HEALTH CARE EDUCATION/TRAINING PROGRAM

## 2022-01-01 PROCEDURE — 80053 COMPREHEN METABOLIC PANEL: CPT | Performed by: SURGERY

## 2022-01-01 PROCEDURE — G0508 PR CRITICAL CARE TELEHLTH INITIAL CONSULT 60MIN: ICD-10-PCS | Mod: 95,G0,, | Performed by: PSYCHIATRY & NEUROLOGY

## 2022-01-01 PROCEDURE — 84484 ASSAY OF TROPONIN QUANT: CPT | Performed by: SURGERY

## 2022-01-01 PROCEDURE — 97116 GAIT TRAINING THERAPY: CPT | Mod: CQ

## 2022-01-01 PROCEDURE — 99233 SBSQ HOSP IP/OBS HIGH 50: CPT | Mod: GC,,, | Performed by: PSYCHIATRY & NEUROLOGY

## 2022-01-01 PROCEDURE — 83735 ASSAY OF MAGNESIUM: CPT | Performed by: STUDENT IN AN ORGANIZED HEALTH CARE EDUCATION/TRAINING PROGRAM

## 2022-01-01 PROCEDURE — 63600175 PHARM REV CODE 636 W HCPCS: Performed by: PHYSICIAN ASSISTANT

## 2022-01-01 PROCEDURE — 99233 SBSQ HOSP IP/OBS HIGH 50: CPT | Mod: ,,, | Performed by: STUDENT IN AN ORGANIZED HEALTH CARE EDUCATION/TRAINING PROGRAM

## 2022-01-01 PROCEDURE — 36415 COLL VENOUS BLD VENIPUNCTURE: CPT | Performed by: SURGERY

## 2022-01-01 PROCEDURE — 51798 US URINE CAPACITY MEASURE: CPT

## 2022-01-01 PROCEDURE — 97530 THERAPEUTIC ACTIVITIES: CPT | Mod: CO

## 2022-01-01 PROCEDURE — 93010 ELECTROCARDIOGRAM REPORT: CPT | Mod: ,,, | Performed by: INTERNAL MEDICINE

## 2022-01-01 PROCEDURE — 93010 EKG 12-LEAD: ICD-10-PCS | Mod: ,,, | Performed by: INTERNAL MEDICINE

## 2022-01-01 PROCEDURE — 84443 ASSAY THYROID STIM HORMONE: CPT | Performed by: SURGERY

## 2022-01-01 PROCEDURE — 99305 1ST NF CARE MODERATE MDM 35: CPT | Mod: ,,, | Performed by: INTERNAL MEDICINE

## 2022-01-01 PROCEDURE — 97110 THERAPEUTIC EXERCISES: CPT | Mod: CQ

## 2022-01-01 PROCEDURE — 99309 PR NURSING FAC CARE, SUBSEQ, SIGNIF COMPLIC: ICD-10-PCS | Mod: ,,, | Performed by: INTERNAL MEDICINE

## 2022-01-01 PROCEDURE — 99309 SBSQ NF CARE MODERATE MDM 30: CPT | Mod: ,,, | Performed by: INTERNAL MEDICINE

## 2022-01-01 PROCEDURE — 25000242 PHARM REV CODE 250 ALT 637 W/ HCPCS

## 2022-01-01 PROCEDURE — A9585 GADOBUTROL INJECTION: HCPCS

## 2022-01-01 PROCEDURE — 86850 RBC ANTIBODY SCREEN: CPT | Performed by: STUDENT IN AN ORGANIZED HEALTH CARE EDUCATION/TRAINING PROGRAM

## 2022-01-01 PROCEDURE — 85610 PROTHROMBIN TIME: CPT | Performed by: SURGERY

## 2022-01-01 PROCEDURE — 85025 COMPLETE CBC W/AUTO DIFF WBC: CPT | Performed by: SURGERY

## 2022-01-01 PROCEDURE — 82962 GLUCOSE BLOOD TEST: CPT

## 2022-01-01 PROCEDURE — 96374 THER/PROPH/DIAG INJ IV PUSH: CPT

## 2022-01-01 PROCEDURE — 93005 ELECTROCARDIOGRAM TRACING: CPT

## 2022-01-01 PROCEDURE — 99233 SBSQ HOSP IP/OBS HIGH 50: CPT | Mod: GC,,, | Performed by: STUDENT IN AN ORGANIZED HEALTH CARE EDUCATION/TRAINING PROGRAM

## 2022-01-01 PROCEDURE — 99285 EMERGENCY DEPT VISIT HI MDM: CPT | Mod: 25

## 2022-01-01 PROCEDURE — 99900031 HC PATIENT EDUCATION (STAT)

## 2022-01-01 PROCEDURE — 84439 ASSAY OF FREE THYROXINE: CPT | Performed by: SURGERY

## 2022-01-01 PROCEDURE — 72158 MRI LUMBAR SPINE W/O & W/DYE: CPT | Mod: 26,,, | Performed by: RADIOLOGY

## 2022-01-01 PROCEDURE — 72158 MRI LUMBAR SPINE W/O & W/DYE: CPT | Mod: TC

## 2022-01-01 PROCEDURE — 63600175 PHARM REV CODE 636 W HCPCS: Performed by: STUDENT IN AN ORGANIZED HEALTH CARE EDUCATION/TRAINING PROGRAM

## 2022-01-01 PROCEDURE — 92610 EVALUATE SWALLOWING FUNCTION: CPT

## 2022-01-01 PROCEDURE — 83036 HEMOGLOBIN GLYCOSYLATED A1C: CPT | Performed by: INTERNAL MEDICINE

## 2022-01-01 PROCEDURE — 99238 HOSP IP/OBS DSCHRG MGMT 30/<: CPT | Mod: ,,, | Performed by: PSYCHIATRY & NEUROLOGY

## 2022-01-01 PROCEDURE — 72100 X-RAY EXAM L-S SPINE 2/3 VWS: CPT | Mod: 26,,, | Performed by: RADIOLOGY

## 2022-01-01 PROCEDURE — 25500020 PHARM REV CODE 255

## 2022-01-01 PROCEDURE — 99233 PR SUBSEQUENT HOSPITAL CARE,LEVL III: ICD-10-PCS | Mod: GC,,, | Performed by: STUDENT IN AN ORGANIZED HEALTH CARE EDUCATION/TRAINING PROGRAM

## 2022-01-01 PROCEDURE — 97166 OT EVAL MOD COMPLEX 45 MIN: CPT

## 2022-01-01 PROCEDURE — 99238 PR HOSPITAL DISCHARGE DAY,<30 MIN: ICD-10-PCS | Mod: ,,, | Performed by: PSYCHIATRY & NEUROLOGY

## 2022-01-01 PROCEDURE — 85025 COMPLETE CBC W/AUTO DIFF WBC: CPT | Mod: 91 | Performed by: STUDENT IN AN ORGANIZED HEALTH CARE EDUCATION/TRAINING PROGRAM

## 2022-01-01 RX ORDER — LACTULOSE 10 G/15ML
20 SOLUTION ORAL DAILY
Status: DISCONTINUED | OUTPATIENT
Start: 2022-01-01 | End: 2022-01-01

## 2022-01-01 RX ORDER — IPRATROPIUM BROMIDE AND ALBUTEROL SULFATE 2.5; .5 MG/3ML; MG/3ML
3 SOLUTION RESPIRATORY (INHALATION) EVERY 6 HOURS
Status: DISCONTINUED | OUTPATIENT
Start: 2022-01-01 | End: 2022-01-01

## 2022-01-01 RX ORDER — ELECTROLYTES/DEXTROSE
SOLUTION, ORAL ORAL 2 TIMES DAILY
Status: DISCONTINUED | OUTPATIENT
Start: 2022-01-01 | End: 2023-01-01 | Stop reason: HOSPADM

## 2022-01-01 RX ORDER — SODIUM,POTASSIUM PHOSPHATES 280-250MG
2 POWDER IN PACKET (EA) ORAL
Status: DISCONTINUED | OUTPATIENT
Start: 2022-01-01 | End: 2022-01-01

## 2022-01-01 RX ORDER — AMOXICILLIN 250 MG
1 CAPSULE ORAL 2 TIMES DAILY
Status: DISCONTINUED | OUTPATIENT
Start: 2022-01-01 | End: 2022-01-01 | Stop reason: HOSPADM

## 2022-01-01 RX ORDER — MORPHINE SULFATE 2 MG/ML
2 INJECTION, SOLUTION INTRAMUSCULAR; INTRAVENOUS
Status: COMPLETED | OUTPATIENT
Start: 2022-01-01 | End: 2022-01-01

## 2022-01-01 RX ORDER — LACTULOSE 10 G/15ML
20 SOLUTION ORAL DAILY
Status: DISCONTINUED | OUTPATIENT
Start: 2022-01-01 | End: 2023-01-01

## 2022-01-01 RX ORDER — MEMANTINE HYDROCHLORIDE 10 MG/1
10 TABLET ORAL DAILY
Status: DISCONTINUED | OUTPATIENT
Start: 2022-01-01 | End: 2022-01-01 | Stop reason: HOSPADM

## 2022-01-01 RX ORDER — NAPROXEN SODIUM 220 MG/1
81 TABLET, FILM COATED ORAL DAILY
Status: CANCELLED | OUTPATIENT
Start: 2022-01-01

## 2022-01-01 RX ORDER — NAPROXEN SODIUM 220 MG/1
81 TABLET, FILM COATED ORAL DAILY
Refills: 0
Start: 2022-01-01 | End: 2023-12-22

## 2022-01-01 RX ORDER — NAPROXEN SODIUM 220 MG/1
81 TABLET, FILM COATED ORAL DAILY
Status: DISCONTINUED | OUTPATIENT
Start: 2022-01-01 | End: 2022-01-01 | Stop reason: HOSPADM

## 2022-01-01 RX ORDER — IBUPROFEN 200 MG
24 TABLET ORAL
Status: DISCONTINUED | OUTPATIENT
Start: 2022-01-01 | End: 2023-01-01 | Stop reason: HOSPADM

## 2022-01-01 RX ORDER — SODIUM CHLORIDE 0.9 % (FLUSH) 0.9 %
10 SYRINGE (ML) INJECTION
Status: DISCONTINUED | OUTPATIENT
Start: 2022-01-01 | End: 2022-01-01 | Stop reason: HOSPADM

## 2022-01-01 RX ORDER — IBUPROFEN 200 MG
24 TABLET ORAL
Status: DISCONTINUED | OUTPATIENT
Start: 2022-01-01 | End: 2022-01-01 | Stop reason: SDUPTHER

## 2022-01-01 RX ORDER — CLOPIDOGREL BISULFATE 75 MG/1
75 TABLET ORAL DAILY
Status: DISCONTINUED | OUTPATIENT
Start: 2022-01-01 | End: 2022-01-01 | Stop reason: HOSPADM

## 2022-01-01 RX ORDER — LACTULOSE 10 G/15ML
20 SOLUTION ORAL ONCE
Status: COMPLETED | OUTPATIENT
Start: 2022-01-01 | End: 2022-01-01

## 2022-01-01 RX ORDER — ATORVASTATIN CALCIUM 40 MG/1
40 TABLET, FILM COATED ORAL DAILY
Qty: 90 TABLET | Refills: 3
Start: 2022-01-01 | End: 2023-12-22

## 2022-01-01 RX ORDER — SODIUM,POTASSIUM PHOSPHATES 280-250MG
2 POWDER IN PACKET (EA) ORAL EVERY 4 HOURS
Status: COMPLETED | OUTPATIENT
Start: 2022-01-01 | End: 2022-01-01

## 2022-01-01 RX ORDER — CLOPIDOGREL BISULFATE 75 MG/1
75 TABLET ORAL DAILY
Status: DISCONTINUED | OUTPATIENT
Start: 2022-01-01 | End: 2023-01-01 | Stop reason: HOSPADM

## 2022-01-01 RX ORDER — ENOXAPARIN SODIUM 100 MG/ML
30 INJECTION SUBCUTANEOUS EVERY 24 HOURS
Status: DISCONTINUED | OUTPATIENT
Start: 2022-01-01 | End: 2023-01-01

## 2022-01-01 RX ORDER — HEPARIN SODIUM 5000 [USP'U]/ML
5000 INJECTION, SOLUTION INTRAVENOUS; SUBCUTANEOUS EVERY 8 HOURS
Status: DISCONTINUED | OUTPATIENT
Start: 2022-01-01 | End: 2022-01-01 | Stop reason: HOSPADM

## 2022-01-01 RX ORDER — MUPIROCIN 20 MG/G
OINTMENT TOPICAL 2 TIMES DAILY
Status: CANCELLED | OUTPATIENT
Start: 2022-01-01 | End: 2022-01-01

## 2022-01-01 RX ORDER — TORSEMIDE 5 MG/1
10 TABLET ORAL DAILY
Status: DISCONTINUED | OUTPATIENT
Start: 2022-01-01 | End: 2022-01-01

## 2022-01-01 RX ORDER — MUPIROCIN 20 MG/G
OINTMENT TOPICAL DAILY
Status: DISCONTINUED | OUTPATIENT
Start: 2022-01-01 | End: 2022-01-01 | Stop reason: HOSPADM

## 2022-01-01 RX ORDER — NAPROXEN SODIUM 220 MG/1
81 TABLET, FILM COATED ORAL DAILY
Status: DISCONTINUED | OUTPATIENT
Start: 2022-01-01 | End: 2023-01-01 | Stop reason: HOSPADM

## 2022-01-01 RX ORDER — INSULIN ASPART 100 [IU]/ML
0-5 INJECTION, SOLUTION INTRAVENOUS; SUBCUTANEOUS
Status: DISCONTINUED | OUTPATIENT
Start: 2022-01-01 | End: 2022-01-01 | Stop reason: HOSPADM

## 2022-01-01 RX ORDER — GLUCAGON 1 MG
1 KIT INJECTION
Status: DISCONTINUED | OUTPATIENT
Start: 2022-01-01 | End: 2022-01-01 | Stop reason: HOSPADM

## 2022-01-01 RX ORDER — SODIUM CHLORIDE 9 MG/ML
INJECTION, SOLUTION INTRAVENOUS CONTINUOUS
Status: DISCONTINUED | OUTPATIENT
Start: 2022-01-01 | End: 2022-01-01

## 2022-01-01 RX ORDER — METOPROLOL TARTRATE 25 MG/1
12.5 TABLET ORAL EVERY 12 HOURS
Status: DISCONTINUED | OUTPATIENT
Start: 2022-01-01 | End: 2022-01-01

## 2022-01-01 RX ORDER — TAMSULOSIN HYDROCHLORIDE 0.4 MG/1
0.4 CAPSULE ORAL DAILY
Status: DISCONTINUED | OUTPATIENT
Start: 2022-01-01 | End: 2023-01-01 | Stop reason: HOSPADM

## 2022-01-01 RX ORDER — TAMSULOSIN HYDROCHLORIDE 0.4 MG/1
0.4 CAPSULE ORAL DAILY
Status: DISCONTINUED | OUTPATIENT
Start: 2022-01-01 | End: 2022-01-01

## 2022-01-01 RX ORDER — CLOPIDOGREL BISULFATE 75 MG/1
75 TABLET ORAL DAILY
Qty: 30 TABLET | Refills: 0
Start: 2022-01-01 | End: 2023-12-22

## 2022-01-01 RX ORDER — MEMANTINE HYDROCHLORIDE 10 MG/1
10 TABLET ORAL DAILY
Status: DISCONTINUED | OUTPATIENT
Start: 2022-01-01 | End: 2023-01-01 | Stop reason: HOSPADM

## 2022-01-01 RX ORDER — ACETAMINOPHEN 325 MG/1
650 TABLET ORAL EVERY 6 HOURS PRN
Status: DISCONTINUED | OUTPATIENT
Start: 2022-01-01 | End: 2022-01-01

## 2022-01-01 RX ORDER — ATORVASTATIN CALCIUM 20 MG/1
40 TABLET, FILM COATED ORAL DAILY
Status: CANCELLED | OUTPATIENT
Start: 2022-01-01

## 2022-01-01 RX ORDER — ACETAMINOPHEN 325 MG/1
650 TABLET ORAL EVERY 6 HOURS PRN
Status: DISCONTINUED | OUTPATIENT
Start: 2022-01-01 | End: 2023-01-01

## 2022-01-01 RX ORDER — HYDROCODONE BITARTRATE AND ACETAMINOPHEN 5; 325 MG/1; MG/1
1 TABLET ORAL EVERY 4 HOURS PRN
Status: DISCONTINUED | OUTPATIENT
Start: 2022-01-01 | End: 2023-01-01

## 2022-01-01 RX ORDER — TORSEMIDE 10 MG/1
10 TABLET ORAL DAILY
Status: ON HOLD
Start: 2022-01-01 | End: 2023-01-01 | Stop reason: HOSPADM

## 2022-01-01 RX ORDER — POLYETHYLENE GLYCOL 3350 17 G/17G
17 POWDER, FOR SOLUTION ORAL 2 TIMES DAILY
Status: DISCONTINUED | OUTPATIENT
Start: 2022-01-01 | End: 2022-01-01 | Stop reason: HOSPADM

## 2022-01-01 RX ORDER — MORPHINE SULFATE 2 MG/ML
1 INJECTION, SOLUTION INTRAMUSCULAR; INTRAVENOUS EVERY 4 HOURS PRN
Status: DISCONTINUED | OUTPATIENT
Start: 2022-01-01 | End: 2022-01-01

## 2022-01-01 RX ORDER — IBUPROFEN 200 MG
16 TABLET ORAL
Status: DISCONTINUED | OUTPATIENT
Start: 2022-01-01 | End: 2022-01-01 | Stop reason: SDUPTHER

## 2022-01-01 RX ORDER — HYDRALAZINE HYDROCHLORIDE 20 MG/ML
10 INJECTION INTRAMUSCULAR; INTRAVENOUS EVERY 6 HOURS PRN
Status: DISCONTINUED | OUTPATIENT
Start: 2022-01-01 | End: 2022-01-01

## 2022-01-01 RX ORDER — CLOPIDOGREL BISULFATE 75 MG/1
75 TABLET ORAL DAILY
Status: CANCELLED | OUTPATIENT
Start: 2022-01-01

## 2022-01-01 RX ORDER — TAMSULOSIN HYDROCHLORIDE 0.4 MG/1
0.4 CAPSULE ORAL DAILY
Status: CANCELLED | OUTPATIENT
Start: 2022-01-01

## 2022-01-01 RX ORDER — ATORVASTATIN CALCIUM 40 MG/1
40 TABLET, FILM COATED ORAL DAILY
Status: DISCONTINUED | OUTPATIENT
Start: 2022-01-01 | End: 2023-01-01 | Stop reason: HOSPADM

## 2022-01-01 RX ORDER — IBUPROFEN 200 MG
16 TABLET ORAL
Status: DISCONTINUED | OUTPATIENT
Start: 2022-01-01 | End: 2023-01-01 | Stop reason: HOSPADM

## 2022-01-01 RX ORDER — METOPROLOL SUCCINATE 25 MG/1
25 TABLET, EXTENDED RELEASE ORAL NIGHTLY
Status: ON HOLD
Start: 2022-01-01 | End: 2023-01-01 | Stop reason: HOSPADM

## 2022-01-01 RX ORDER — ATORVASTATIN CALCIUM 20 MG/1
40 TABLET, FILM COATED ORAL DAILY
Status: DISCONTINUED | OUTPATIENT
Start: 2022-01-01 | End: 2022-01-01 | Stop reason: HOSPADM

## 2022-01-01 RX ORDER — LANOLIN ALCOHOL/MO/W.PET/CERES
800 CREAM (GRAM) TOPICAL
Status: DISCONTINUED | OUTPATIENT
Start: 2022-01-01 | End: 2022-01-01

## 2022-01-01 RX ORDER — ENOXAPARIN SODIUM 100 MG/ML
40 INJECTION SUBCUTANEOUS EVERY 24 HOURS
Status: DISCONTINUED | OUTPATIENT
Start: 2022-01-01 | End: 2022-01-01

## 2022-01-01 RX ORDER — MUPIROCIN 20 MG/G
OINTMENT TOPICAL 2 TIMES DAILY
Status: DISCONTINUED | OUTPATIENT
Start: 2022-01-01 | End: 2022-01-01 | Stop reason: HOSPADM

## 2022-01-01 RX ORDER — INSULIN ASPART 100 [IU]/ML
1-10 INJECTION, SOLUTION INTRAVENOUS; SUBCUTANEOUS
Status: DISCONTINUED | OUTPATIENT
Start: 2022-01-01 | End: 2022-01-01 | Stop reason: SDUPTHER

## 2022-01-01 RX ORDER — GADOBUTROL 604.72 MG/ML
7 INJECTION INTRAVENOUS
Status: COMPLETED | OUTPATIENT
Start: 2022-01-01 | End: 2022-01-01

## 2022-01-01 RX ORDER — INSULIN ASPART 100 [IU]/ML
0-5 INJECTION, SOLUTION INTRAVENOUS; SUBCUTANEOUS
Status: DISCONTINUED | OUTPATIENT
Start: 2022-01-01 | End: 2023-01-01 | Stop reason: HOSPADM

## 2022-01-01 RX ORDER — ACETAMINOPHEN 325 MG/1
650 TABLET ORAL EVERY 6 HOURS PRN
Status: DISCONTINUED | OUTPATIENT
Start: 2022-01-01 | End: 2022-01-01 | Stop reason: HOSPADM

## 2022-01-01 RX ORDER — LABETALOL HCL 20 MG/4 ML
10 SYRINGE (ML) INTRAVENOUS EVERY 6 HOURS PRN
Status: DISCONTINUED | OUTPATIENT
Start: 2022-01-01 | End: 2022-01-01

## 2022-01-01 RX ORDER — POLYETHYLENE GLYCOL 3350 17 G/17G
17 POWDER, FOR SOLUTION ORAL 2 TIMES DAILY PRN
Status: DISCONTINUED | OUTPATIENT
Start: 2022-01-01 | End: 2022-01-01

## 2022-01-01 RX ORDER — TAMSULOSIN HYDROCHLORIDE 0.4 MG/1
0.4 CAPSULE ORAL DAILY
Status: DISCONTINUED | OUTPATIENT
Start: 2022-01-01 | End: 2022-01-01 | Stop reason: HOSPADM

## 2022-01-01 RX ORDER — GLUCAGON 1 MG
1 KIT INJECTION
Status: DISCONTINUED | OUTPATIENT
Start: 2022-01-01 | End: 2023-01-01 | Stop reason: HOSPADM

## 2022-01-01 RX ORDER — POLYETHYLENE GLYCOL 3350 17 G/17G
17 POWDER, FOR SOLUTION ORAL DAILY
Status: DISCONTINUED | OUTPATIENT
Start: 2022-01-01 | End: 2023-01-01 | Stop reason: HOSPADM

## 2022-01-01 RX ORDER — GLUCAGON 1 MG
1 KIT INJECTION
Status: DISCONTINUED | OUTPATIENT
Start: 2022-01-01 | End: 2022-01-01 | Stop reason: SDUPTHER

## 2022-01-01 RX ORDER — ATORVASTATIN CALCIUM 40 MG/1
40 TABLET, FILM COATED ORAL DAILY
Status: DISCONTINUED | OUTPATIENT
Start: 2022-01-01 | End: 2022-01-01

## 2022-01-01 RX ORDER — ONDANSETRON 2 MG/ML
4 INJECTION INTRAMUSCULAR; INTRAVENOUS
Status: COMPLETED | OUTPATIENT
Start: 2022-01-01 | End: 2022-01-01

## 2022-01-01 RX ORDER — MEMANTINE HYDROCHLORIDE 10 MG/1
10 TABLET ORAL DAILY
Status: CANCELLED | OUTPATIENT
Start: 2022-01-01

## 2022-01-01 RX ADMIN — INSULIN ASPART 4 UNITS: 100 INJECTION, SOLUTION INTRAVENOUS; SUBCUTANEOUS at 11:12

## 2022-01-01 RX ADMIN — SODIUM CHLORIDE 500 ML: 0.9 INJECTION, SOLUTION INTRAVENOUS at 08:12

## 2022-01-01 RX ADMIN — MEMANTINE 10 MG: 10 TABLET ORAL at 09:12

## 2022-01-01 RX ADMIN — ATORVASTATIN CALCIUM 40 MG: 40 TABLET, FILM COATED ORAL at 08:12

## 2022-01-01 RX ADMIN — MEMANTINE 10 MG: 10 TABLET ORAL at 08:12

## 2022-01-01 RX ADMIN — ENOXAPARIN SODIUM 40 MG: 40 INJECTION SUBCUTANEOUS at 05:12

## 2022-01-01 RX ADMIN — IPRATROPIUM BROMIDE AND ALBUTEROL SULFATE 3 ML: 2.5; .5 SOLUTION RESPIRATORY (INHALATION) at 07:12

## 2022-01-01 RX ADMIN — CLOPIDOGREL BISULFATE 75 MG: 75 TABLET ORAL at 09:12

## 2022-01-01 RX ADMIN — METOPROLOL TARTRATE 12.5 MG: 25 TABLET, FILM COATED ORAL at 08:12

## 2022-01-01 RX ADMIN — TAMSULOSIN HYDROCHLORIDE 0.4 MG: 0.4 CAPSULE ORAL at 08:12

## 2022-01-01 RX ADMIN — HEPARIN SODIUM 5000 UNITS: 5000 INJECTION INTRAVENOUS; SUBCUTANEOUS at 09:12

## 2022-01-01 RX ADMIN — HYDROCORTISONE: 1 CREAM TOPICAL at 08:12

## 2022-01-01 RX ADMIN — HYDROCODONE BITARTRATE AND ACETAMINOPHEN 1 TABLET: 5; 325 TABLET ORAL at 08:12

## 2022-01-01 RX ADMIN — INSULIN ASPART 2 UNITS: 100 INJECTION, SOLUTION INTRAVENOUS; SUBCUTANEOUS at 08:12

## 2022-01-01 RX ADMIN — INSULIN DETEMIR 10 UNITS: 100 INJECTION, SOLUTION SUBCUTANEOUS at 09:12

## 2022-01-01 RX ADMIN — INSULIN ASPART 3 UNITS: 100 INJECTION, SOLUTION INTRAVENOUS; SUBCUTANEOUS at 04:12

## 2022-01-01 RX ADMIN — POLYETHYLENE GLYCOL 3350 17 G: 17 POWDER, FOR SOLUTION ORAL at 08:12

## 2022-01-01 RX ADMIN — HEPARIN SODIUM 5000 UNITS: 5000 INJECTION INTRAVENOUS; SUBCUTANEOUS at 05:12

## 2022-01-01 RX ADMIN — POLYETHYLENE GLYCOL 3350 17 G: 17 POWDER, FOR SOLUTION ORAL at 09:12

## 2022-01-01 RX ADMIN — INSULIN ASPART 2 UNITS: 100 INJECTION, SOLUTION INTRAVENOUS; SUBCUTANEOUS at 06:12

## 2022-01-01 RX ADMIN — TAMSULOSIN HYDROCHLORIDE 0.4 MG: 0.4 CAPSULE ORAL at 10:12

## 2022-01-01 RX ADMIN — LACTULOSE 20 G: 20 SOLUTION ORAL at 08:12

## 2022-01-01 RX ADMIN — HEPARIN SODIUM 5000 UNITS: 5000 INJECTION INTRAVENOUS; SUBCUTANEOUS at 02:12

## 2022-01-01 RX ADMIN — POLYETHYLENE GLYCOL (3350) 17 G: 17 POWDER, FOR SOLUTION ORAL at 08:12

## 2022-01-01 RX ADMIN — HYDROCODONE BITARTRATE AND ACETAMINOPHEN 1 TABLET: 5; 325 TABLET ORAL at 09:12

## 2022-01-01 RX ADMIN — ATORVASTATIN CALCIUM 40 MG: 40 TABLET, FILM COATED ORAL at 09:12

## 2022-01-01 RX ADMIN — POTASSIUM & SODIUM PHOSPHATES POWDER PACK 280-160-250 MG 2 PACKET: 280-160-250 PACK at 06:12

## 2022-01-01 RX ADMIN — CLOPIDOGREL BISULFATE 75 MG: 75 TABLET ORAL at 08:12

## 2022-01-01 RX ADMIN — INSULIN ASPART 3 UNITS: 100 INJECTION, SOLUTION INTRAVENOUS; SUBCUTANEOUS at 11:12

## 2022-01-01 RX ADMIN — IPRATROPIUM BROMIDE AND ALBUTEROL SULFATE 3 ML: 2.5; .5 SOLUTION RESPIRATORY (INHALATION) at 01:12

## 2022-01-01 RX ADMIN — HYDROCORTISONE: 1 CREAM TOPICAL at 09:12

## 2022-01-01 RX ADMIN — SENNOSIDES AND DOCUSATE SODIUM 1 TABLET: 50; 8.6 TABLET ORAL at 08:12

## 2022-01-01 RX ADMIN — ASPIRIN 81 MG: 81 TABLET, CHEWABLE ORAL at 08:12

## 2022-01-01 RX ADMIN — MUPIROCIN: 20 OINTMENT TOPICAL at 09:12

## 2022-01-01 RX ADMIN — IPRATROPIUM BROMIDE AND ALBUTEROL SULFATE 3 ML: 2.5; .5 SOLUTION RESPIRATORY (INHALATION) at 12:12

## 2022-01-01 RX ADMIN — INSULIN ASPART 1 UNITS: 100 INJECTION, SOLUTION INTRAVENOUS; SUBCUTANEOUS at 09:12

## 2022-01-01 RX ADMIN — TORSEMIDE 10 MG: 5 TABLET ORAL at 11:12

## 2022-01-01 RX ADMIN — TAMSULOSIN HYDROCHLORIDE 0.4 MG: 0.4 CAPSULE ORAL at 09:12

## 2022-01-01 RX ADMIN — INSULIN ASPART 3 UNITS: 100 INJECTION, SOLUTION INTRAVENOUS; SUBCUTANEOUS at 12:12

## 2022-01-01 RX ADMIN — MORPHINE SULFATE 2 MG: 2 INJECTION, SOLUTION INTRAMUSCULAR; INTRAVENOUS at 01:12

## 2022-01-01 RX ADMIN — IPRATROPIUM BROMIDE AND ALBUTEROL SULFATE 3 ML: 2.5; .5 SOLUTION RESPIRATORY (INHALATION) at 08:12

## 2022-01-01 RX ADMIN — HYDROCODONE BITARTRATE AND ACETAMINOPHEN 1 TABLET: 5; 325 TABLET ORAL at 11:12

## 2022-01-01 RX ADMIN — HEPARIN SODIUM 5000 UNITS: 5000 INJECTION INTRAVENOUS; SUBCUTANEOUS at 10:12

## 2022-01-01 RX ADMIN — ASPIRIN 81 MG: 81 TABLET, CHEWABLE ORAL at 09:12

## 2022-01-01 RX ADMIN — ACETAMINOPHEN 650 MG: 325 TABLET ORAL at 04:12

## 2022-01-01 RX ADMIN — IOHEXOL 100 ML: 350 INJECTION, SOLUTION INTRAVENOUS at 12:12

## 2022-01-01 RX ADMIN — INSULIN ASPART 3 UNITS: 100 INJECTION, SOLUTION INTRAVENOUS; SUBCUTANEOUS at 08:12

## 2022-01-01 RX ADMIN — SODIUM CHLORIDE 250 ML: 9 INJECTION, SOLUTION INTRAVENOUS at 08:12

## 2022-01-01 RX ADMIN — Medication 1 ENEMA: at 07:12

## 2022-01-01 RX ADMIN — ENOXAPARIN SODIUM 30 MG: 30 INJECTION SUBCUTANEOUS at 04:12

## 2022-01-01 RX ADMIN — LACTULOSE 20 G: 20 SOLUTION ORAL at 11:12

## 2022-01-01 RX ADMIN — IPRATROPIUM BROMIDE AND ALBUTEROL SULFATE 3 ML: 2.5; .5 SOLUTION RESPIRATORY (INHALATION) at 02:12

## 2022-01-01 RX ADMIN — METOPROLOL TARTRATE 12.5 MG: 25 TABLET, FILM COATED ORAL at 09:12

## 2022-01-01 RX ADMIN — ENOXAPARIN SODIUM 30 MG: 30 INJECTION SUBCUTANEOUS at 05:12

## 2022-01-01 RX ADMIN — INSULIN ASPART 3 UNITS: 100 INJECTION, SOLUTION INTRAVENOUS; SUBCUTANEOUS at 05:12

## 2022-01-01 RX ADMIN — HEPARIN SODIUM 5000 UNITS: 5000 INJECTION INTRAVENOUS; SUBCUTANEOUS at 01:12

## 2022-01-01 RX ADMIN — ENOXAPARIN SODIUM 40 MG: 40 INJECTION SUBCUTANEOUS at 04:12

## 2022-01-01 RX ADMIN — ACETAMINOPHEN 650 MG: 325 TABLET ORAL at 08:12

## 2022-01-01 RX ADMIN — HYDROCODONE BITARTRATE AND ACETAMINOPHEN 1 TABLET: 5; 325 TABLET ORAL at 02:12

## 2022-01-01 RX ADMIN — HYDROCORTISONE: 1 CREAM TOPICAL at 11:12

## 2022-01-01 RX ADMIN — POTASSIUM & SODIUM PHOSPHATES POWDER PACK 280-160-250 MG 2 PACKET: 280-160-250 PACK at 11:12

## 2022-01-01 RX ADMIN — GADOBUTROL 7 ML: 604.72 INJECTION INTRAVENOUS at 12:12

## 2022-01-01 RX ADMIN — HEPARIN SODIUM 5000 UNITS: 5000 INJECTION INTRAVENOUS; SUBCUTANEOUS at 08:12

## 2022-01-01 RX ADMIN — POTASSIUM & SODIUM PHOSPHATES POWDER PACK 280-160-250 MG 2 PACKET: 280-160-250 PACK at 12:12

## 2022-01-01 RX ADMIN — SENNOSIDES AND DOCUSATE SODIUM 1 TABLET: 50; 8.6 TABLET ORAL at 09:12

## 2022-01-01 RX ADMIN — INSULIN DETEMIR 10 UNITS: 100 INJECTION, SOLUTION SUBCUTANEOUS at 08:12

## 2022-01-01 RX ADMIN — TORSEMIDE 10 MG: 5 TABLET ORAL at 08:12

## 2022-01-01 RX ADMIN — INSULIN ASPART 2 UNITS: 100 INJECTION, SOLUTION INTRAVENOUS; SUBCUTANEOUS at 12:12

## 2022-01-01 RX ADMIN — INSULIN ASPART 1 UNITS: 100 INJECTION, SOLUTION INTRAVENOUS; SUBCUTANEOUS at 08:12

## 2022-01-01 RX ADMIN — ASPIRIN 81 MG CHEWABLE TABLET 81 MG: 81 TABLET CHEWABLE at 08:12

## 2022-01-01 RX ADMIN — HEPARIN SODIUM 5000 UNITS: 5000 INJECTION INTRAVENOUS; SUBCUTANEOUS at 06:12

## 2022-01-01 RX ADMIN — HYDROCODONE BITARTRATE AND ACETAMINOPHEN 1 TABLET: 5; 325 TABLET ORAL at 12:12

## 2022-01-01 RX ADMIN — MEMANTINE 10 MG: 10 TABLET ORAL at 10:12

## 2022-01-01 RX ADMIN — INSULIN ASPART 2 UNITS: 100 INJECTION, SOLUTION INTRAVENOUS; SUBCUTANEOUS at 02:12

## 2022-01-01 RX ADMIN — ONDANSETRON 4 MG: 2 INJECTION INTRAMUSCULAR; INTRAVENOUS at 01:12

## 2022-01-01 RX ADMIN — MUPIROCIN: 20 OINTMENT TOPICAL at 11:12

## 2022-01-01 RX ADMIN — TAMSULOSIN HYDROCHLORIDE 0.4 MG: 0.4 CAPSULE ORAL at 03:12

## 2022-01-01 RX ADMIN — MEMANTINE 10 MG: 10 TABLET ORAL at 03:12

## 2022-01-01 RX ADMIN — POTASSIUM & SODIUM PHOSPHATES POWDER PACK 280-160-250 MG 2 PACKET: 280-160-250 PACK at 03:12

## 2022-01-01 RX ADMIN — Medication 17 MG: at 10:12

## 2022-01-01 RX ADMIN — CLOPIDOGREL BISULFATE 75 MG: 75 TABLET ORAL at 10:12

## 2022-01-01 RX ADMIN — HYDROCODONE BITARTRATE AND ACETAMINOPHEN 1 TABLET: 5; 325 TABLET ORAL at 05:12

## 2022-01-01 RX ADMIN — ASPIRIN 81 MG CHEWABLE TABLET 81 MG: 81 TABLET CHEWABLE at 09:12

## 2022-01-01 RX ADMIN — INSULIN ASPART 2 UNITS: 100 INJECTION, SOLUTION INTRAVENOUS; SUBCUTANEOUS at 09:12

## 2022-01-01 RX ADMIN — ATORVASTATIN CALCIUM 40 MG: 40 TABLET, FILM COATED ORAL at 10:12

## 2022-01-01 RX ADMIN — MUPIROCIN: 20 OINTMENT TOPICAL at 08:12

## 2022-01-01 RX ADMIN — ASPIRIN 81 MG: 81 TABLET, CHEWABLE ORAL at 10:12

## 2022-01-01 RX ADMIN — LACTULOSE 20 G: 20 SOLUTION ORAL at 07:12

## 2022-01-01 RX ADMIN — ACETAMINOPHEN 650 MG: 325 TABLET ORAL at 09:12

## 2022-01-01 RX ADMIN — SODIUM CHLORIDE: 0.9 INJECTION, SOLUTION INTRAVENOUS at 09:12

## 2022-01-01 RX ADMIN — ATORVASTATIN CALCIUM 40 MG: 40 TABLET, FILM COATED ORAL at 03:12

## 2022-01-01 RX ADMIN — INSULIN ASPART 2 UNITS: 100 INJECTION, SOLUTION INTRAVENOUS; SUBCUTANEOUS at 05:12

## 2022-01-01 RX ADMIN — INSULIN ASPART 2 UNITS: 100 INJECTION, SOLUTION INTRAVENOUS; SUBCUTANEOUS at 07:12

## 2022-01-01 RX ADMIN — HEPARIN SODIUM 5000 UNITS: 5000 INJECTION INTRAVENOUS; SUBCUTANEOUS at 04:12

## 2022-01-01 RX ADMIN — POLYETHYLENE GLYCOL (3350) 17 G: 17 POWDER, FOR SOLUTION ORAL at 12:12

## 2022-01-01 RX ADMIN — INSULIN ASPART 2 UNITS: 100 INJECTION, SOLUTION INTRAVENOUS; SUBCUTANEOUS at 04:12

## 2022-01-01 RX ADMIN — HEPARIN SODIUM 5000 UNITS: 5000 INJECTION INTRAVENOUS; SUBCUTANEOUS at 03:12

## 2022-01-01 RX ADMIN — LACTULOSE 20 G: 20 SOLUTION ORAL at 09:12

## 2022-10-06 PROBLEM — C82.90 FOLLICULAR LYMPHOMA: Status: ACTIVE | Noted: 2022-01-01

## 2022-10-06 PROBLEM — Z71.89 ENCOUNTER TO DISCUSS TREATMENT OPTIONS: Status: ACTIVE | Noted: 2022-01-01

## 2022-10-06 PROBLEM — Z71.2 ENCOUNTER TO DISCUSS TEST RESULTS: Status: ACTIVE | Noted: 2022-01-01

## 2022-10-19 PROBLEM — D63.0 ANEMIA IN NEOPLASTIC DISEASE: Status: ACTIVE | Noted: 2022-01-01

## 2022-10-19 PROBLEM — Z51.11 ENCOUNTER FOR ANTINEOPLASTIC CHEMOTHERAPY: Status: ACTIVE | Noted: 2022-01-01

## 2022-12-14 PROBLEM — I63.9 ACUTE ISCHEMIC STROKE: Status: ACTIVE | Noted: 2022-01-01

## 2022-12-14 PROBLEM — C82.90 FOLLICULAR LYMPHOMA: Chronic | Status: ACTIVE | Noted: 2022-01-01

## 2022-12-14 PROBLEM — N40.0 BPH (BENIGN PROSTATIC HYPERPLASIA): Status: ACTIVE | Noted: 2022-01-01

## 2022-12-14 PROBLEM — E78.2 MIXED HYPERLIPIDEMIA: Status: RESOLVED | Noted: 2017-02-09 | Resolved: 2022-01-01

## 2022-12-14 PROBLEM — I35.0 NONRHEUMATIC AORTIC VALVE STENOSIS: Status: RESOLVED | Noted: 2018-07-16 | Resolved: 2022-01-01

## 2022-12-14 PROBLEM — N40.0 BPH (BENIGN PROSTATIC HYPERPLASIA): Chronic | Status: ACTIVE | Noted: 2022-01-01

## 2022-12-14 PROBLEM — E11.9 CONTROLLED TYPE 2 DIABETES MELLITUS WITHOUT COMPLICATION: Chronic | Status: ACTIVE | Noted: 2017-02-09

## 2022-12-14 PROBLEM — I50.32 CHRONIC DIASTOLIC HEART FAILURE, NYHA CLASS 2: Chronic | Status: ACTIVE | Noted: 2018-08-31

## 2022-12-14 PROBLEM — F03.90 DEMENTIA: Chronic | Status: ACTIVE | Noted: 2022-01-01

## 2022-12-14 PROBLEM — E03.9 HYPOTHYROIDISM: Chronic | Status: ACTIVE | Noted: 2017-02-09

## 2022-12-14 NOTE — ED NOTES
PT belongings bag with shirt and wallet handed to Rekha Garza pt aware of belongings given to Medic. Pt alert and oriented x4, even and unlabored breathing, NADN.

## 2022-12-14 NOTE — ASSESSMENT & PLAN NOTE
Stroke risk factor    H/o high grade stenosis in the left internal carotid artery (now s/p L carotid endarterectomy in 12/3/2021; Carotid US 12/22/2021 without evidence of hemodynamically significant stenosis)

## 2022-12-14 NOTE — ASSESSMENT & PLAN NOTE
Recent Labs     12/14/22  1009   CREATININE 1.9*     -- monitor creatinine  -- avoid nephrotoxic agents

## 2022-12-14 NOTE — SUBJECTIVE & OBJECTIVE
Woke up with symptoms?: no    Recent bleeding noted: no  Does the patient take any Blood Thinners? no  Medications: Antiplatelets:  aspirin and clopidogrel/Plavix    Past Medical History:   Past Medical History:   Diagnosis Date    Abnormal barium swallow     Alzheimer's dementia, late onset     Anal stenosis     Anticoagulant long-term use     Aortic stenosis     Arthritis     Aspiration pneumonitis     Benign prostatic hyperplasia     Carotid artery disease     CHF (congestive heart failure)     Chronic diastolic heart failure     Chronic kidney disease (CKD), stage III (moderate)     CKD (chronic kidney disease)     Colon polyp     COPD (chronic obstructive pulmonary disease)     Coronary artery disease     Diabetes mellitus, type 2     Diverticulosis     Dysphagia     Hearing aid worn 01/23/2020    Received bilateral hearing aides today    Heart disease     History of CEA (carotid endarterectomy)     [place] (hard of hearing)     Hyperlipidemia     Hypertension     Hypertensive heart disease     Hypothyroidism     Joint disease     Memory loss     PAD (peripheral artery disease)     Presence of drug coated stent in right coronary artery     Pulmonary hypertension     S/P TAVR (transcatheter aortic valve replacement)     Small bowel obstruction     Wears dentures     UPPER AND LOWER    Wears glasses        Past Surgical History: no major surgeries within the last 2 weeks    Family History: no relevant history    Social History: no smoking, no drinking, no drugs    Allergies: Sulfur  Penicillins No relevant allergies    Review of Systems   Constitutional: Negative for appetite change, chills and fever.   HENT: Negative for congestion and sore throat.    Eyes: Negative for discharge and itching.   Respiratory: Negative for apnea and shortness of breath.    Cardiovascular: Negative for chest pain and palpitations.   Gastrointestinal: Negative for abdominal pain and anal bleeding.    Endocrine: Negative for cold intolerance and polydipsia.   Genitourinary: Negative for dysuria and hematuria.   Musculoskeletal: Negative for joint swelling and myalgias.   Skin: Negative for color change and rash.   Neurological: Negative for tremors.   Psychiatric/Behavioral: Negative for hallucinations and self-injury.     Objective:   Vitals:  /101 mmHg RR 15, HR 75, O2 98    CT READ: Yes  No hemmorhage. No mass effect. No early infarct signs.     Physical Exam  Constitutional:       General: He is not in acute distress.     Appearance: He is well-nourished.   HENT:      Head: Atraumatic.      Right Ear: External ear normal.      Left Ear: External ear normal.   Eyes:      General: No scleral icterus.     Conjunctiva/sclera: Conjunctivae normal.   Pulmonary:      Effort: Pulmonary effort is normal.   Abdominal:      General: There is no distension.      Tenderness: There is no guarding.   Musculoskeletal:         General: No deformity. Normal range of motion.      Cervical back: Normal range of motion.   Skin:     General: Skin is warm and dry.   Neurological:      Mental Status: He is alert.   Psychiatric:         Mood and Affect: Mood and affect normal.

## 2022-12-14 NOTE — ASSESSMENT & PLAN NOTE
Baseline Cr 1.5-1.9  - monitor Cr  - dose medications for renal clearance  - avoid nephrotoxic medication

## 2022-12-14 NOTE — ED TRIAGE NOTES
Vega Kohler, a 87 y.o. male presents to the ED as a transfer from [facility] for neuro/ stroke consult. Pt sustained a fall this morning around 8am, uses a cane to ambulate. Drift noted on left arm, pt is AAOx4    Triage note:  Chief Complaint   Patient presents with    Cerebrovascular Accident     Transfer from [facility]     Review of patient's allergies indicates:   Allergen Reactions    Sulfur Itching    Penicillins Swelling and Rash     Past Medical History:   Diagnosis Date    Abnormal barium swallow     Alzheimer's dementia, late onset     Anal stenosis     Anticoagulant long-term use     Aortic stenosis     Arthritis     Aspiration pneumonitis     Benign prostatic hyperplasia     Carotid artery disease     CHF (congestive heart failure)     Chronic diastolic heart failure     Chronic kidney disease (CKD), stage III (moderate)     CKD (chronic kidney disease)     Colon polyp     COPD (chronic obstructive pulmonary disease)     Coronary artery disease     Diabetes mellitus, type 2     Diverticulosis     Dysphagia     Hearing aid worn 01/23/2020    Received bilateral hearing aides today    Heart disease     History of CEA (carotid endarterectomy)     [place] (hard of hearing)     Hyperlipidemia     Hypertension     Hypertensive heart disease     Hypothyroidism     Joint disease     Memory loss     PAD (peripheral artery disease)     Presence of drug coated stent in right coronary artery     Pulmonary hypertension     S/P TAVR (transcatheter aortic valve replacement)     Small bowel obstruction     Wears dentures     UPPER AND LOWER    Wears glasses

## 2022-12-14 NOTE — HPI
Mr Kolher is a pleasant 86 yo M for whom vascular neurology is consulted for ischemic stroke with initial CC disabling left hemiplegia s/p TNK. Patient was in his normal state of health when he woke up today. He was watching a game when he decided to go to the bathroom today 12/14/2022 @ 8:30 AM at which time he noted difficulty using the left side of his body. He managed to use a walker to ambulate to the bathroom, but on the way back, he had a fall, without head injury or LOC. CTH at OSH was negative for acute intracranial evidence of stroke or bleed, however, given clinical evidence of stroke, TNK was administered at 10:19 AM 12/14 and pt transferred to [facility] for post-thrombolytic care.    He has a PMHx of high grade stenosis in the left internal carotid artery (now s/p L carotid endarterectomy in 12/3/2021; CTA 12/14/2022 without evidence of significant carotid stenosis), follicular lymphoma, alzheimers, COPD, aortic valve stenosis, heart failure, renal insufficiency, PVD, DM, pulmonary HTN, dysphagia, lipoid pneumonia.     Initial telestroke NIHSS 9 prior to arrival; On arrival, post-TNK NIHSS of 1 on my evaluation for LUE drift. Patient is independent at baseline with mRS 0.

## 2022-12-14 NOTE — ED NOTES
Pt brought via [facility] - Stroke Code initiated. Pt brought straight to CT scan with EMS & RN present.

## 2022-12-14 NOTE — ED PROVIDER NOTES
Encounter Date: 12/14/2022       History     Chief Complaint   Patient presents with    Cerebrovascular Accident     Transfer from [facility]     The history is provided by the patient, the EMS personnel and medical records.     Vega Kohler is a 87 y.o. male with medical history of T2DM, Hypothyroidism, HLD, CAD, COPD, Diastolic HF, Plavix therapy presenting to the ED via stroke transfer from [place].     Difficult historian due to dementia. Reportedly woke up normal today and experienced a fall around 8:30am after standing up from a chair due to left sided weakness. Seen at OSH and administered tenecteplase for stroke concerns. Transferred to [facility] [place] evaluation. Reports his strength in his left arm has improved since ED arrival. Reports having left gluteal pain on exam that he contributes to his fall. Denies headache, neck pain, chest pain, abdominal pain, numbness.       Review of patient's allergies indicates:   Allergen Reactions    Sulfur Itching    Penicillins Swelling and Rash     Past Medical History:   Diagnosis Date    Abnormal barium swallow     Alzheimer's dementia, late onset     Anal stenosis     Anticoagulant long-term use     Aortic stenosis     Arthritis     Aspiration pneumonitis     Benign prostatic hyperplasia     Carotid artery disease     CHF (congestive heart failure)     Chronic diastolic heart failure     Chronic kidney disease (CKD), stage III (moderate)     CKD (chronic kidney disease)     Colon polyp     COPD (chronic obstructive pulmonary disease)     Coronary artery disease     Diabetes mellitus, type 2     Diverticulosis     Dysphagia     Hearing aid worn 01/23/2020    Received bilateral hearing aides today    Heart disease     History of CEA (carotid endarterectomy)     [place] IRA (hard of hearing)     Hyperlipidemia     Hypertension     Hypertensive heart disease     Hypothyroidism     Joint disease     Memory loss     PAD (peripheral artery disease)     Presence of drug  coated stent in right coronary artery     Pulmonary hypertension     S/P TAVR (transcatheter aortic valve replacement)     Small bowel obstruction     Wears dentures     UPPER AND LOWER    Wears glasses      Past Surgical History:   Procedure Laterality Date    anal fissure repair      ANKLE FUSION Left 08/15/2016    X 2    APPENDECTOMY      BACK SURGERY      X 4    CAROTID ENDARTERECTOMY Right     CAROTID ENDARTERECTOMY Left     CAROTID ENDARTERECTOMY Left 12/3/2021    Procedure: ENDARTERECTOMY-CAROTID;  Surgeon: Tim Castillo MD;  Location: [facility] OR;  Service: Cardiology;  Laterality: Left;  pt unsure if he stopped plavix, Dr. Castillo ok to proceed    CARPAL TUNNEL RELEASE Right     CHOLECYSTECTOMY      COLONOSCOPY N/A 7/26/2018    Procedure: HIGH RISK SCREENING COLONOSCOPY;  Surgeon: Connor Murrell MD;  Location: [facility] ENDO;  Service: Endoscopy;  Laterality: N/A;    CORONARY ANGIOPLASTY WITH STENT PLACEMENT      ELBOW SURGERY Bilateral     EXCISIONAL HEMORRHOIDECTOMY      x3    EXPLORATORY LAPAROTOMY W/ BOWEL RESECTION  11/17/2011    EYE SURGERY      cataract bilaterally    history of bowel resection      KNEE SURGERY Left     LAMINECTOMY AND MICRODISCECTOMY LUMBAR SPINE  07/21/2011    L5-S1    LEFT HEART CATHETERIZATION Left 8/31/2018    Procedure: Left heart cath;  Surgeon: Rex Chris MD;  Location: [facility] CATH;  Service: Cardiology;  Laterality: Left;    LUMBAR FUSION  11/09/2011    Anterior approach--L5-S1    LUNG BIOPSY Right 07/21/2009    VATS with wedge ofr right lower lobe    LUNG BIOPSY Left 3/16/2020    Procedure: BIOPSY, LUNG;  Surgeon: [facility];  Location: [facility] OR;  Service: General;  Laterality: Left;    PERCUTANEOUS TRANSCATHETER AORTIC VALVE REPLACEMENT (TAVR) Left 10/30/2018    Procedure: REPLACEMENT, AORTIC VALVE, PERCUTANEOUS, TRANSCATHETER;  Surgeon: Rex Chris MD;  Location: [facility] OR;  Service: Cardiology;  Laterality: Left;    PERCUTANEOUS  TRANSCATHETER AORTIC VALVE REPLACEMENT (TAVR)  10/30/2018    Procedure: REPLACEMENT, AORTIC VALVE, PERCUTANEOUS, TRANSCATHETER;  Surgeon: Tim Castillo MD;  Location: [facility] OR;  Service: Cardiovascular;;    RIGHT HEART CATHETERIZATION Right 2018    Procedure: INSERTION, CATHETER, RIGHT HEART;  Surgeon: Rex Chris MD;  Location: [facility] CATH;  Service: Cardiology;  Laterality: Right;    ROTATOR CUFF REPAIR Left     SINUS SURGERY      SPINE SURGERY      back surgery    TENDON RELEASE Bilateral     TONSILLECTOMY       Family History   Problem Relation Age of Onset    Cancer Mother     Heart disease Father     Heart disease Brother     Diabetes Brother      Social History     Tobacco Use    Smoking status: Former     Packs/day: 1.50     Years: 20.00     Pack years: 30.00     Types: Cigarettes     Start date:      Quit date:      Years since quittin.9    Smokeless tobacco: Never   Substance Use Topics    Alcohol use: No    Drug use: No     Review of Systems   Constitutional:  Negative for fever.   HENT:  Negative for sore throat.    Eyes:  Negative for pain.   Respiratory:  Negative for shortness of breath.    Cardiovascular:  Negative for chest pain.   Gastrointestinal:  Negative for nausea.   Genitourinary:  Negative for dysuria.   Musculoskeletal:  Positive for arthralgias. Negative for back pain.   Skin:  Negative for rash.   Neurological:  Positive for weakness.   Hematological:  Does not bruise/bleed easily.     Physical Exam     Initial Vitals   BP Pulse Resp Temp SpO2   22 1331 22 1331 22 1331 22 1442 22 1331   (!) 141/72 93 16 97.9 °F (36.6 °C) (!) 85 %      MAP       --                Physical Exam    Constitutional: He appears well-developed and well-nourished. He is not diaphoretic. He is easily aroused.   HENT:   Head: Normocephalic and atraumatic.   Mouth/Throat: Oropharynx is clear and moist. No oropharyngeal exudate.   Eyes: EOM and lids are  normal. Pupils are equal, round, and reactive to light. No scleral icterus.   Neck: Phonation normal. Neck supple. No stridor present.   Normal range of motion.  Cardiovascular:  Normal rate, regular rhythm and intact distal pulses.           +1 DP pulses BLE. Intact doppler PT and DP signals BLE   Pulmonary/Chest: Breath sounds normal. No stridor. No respiratory distress. He has no wheezes. He has no rales.   Abdominal: Abdomen is soft. He exhibits no distension. There is no abdominal tenderness. There is no rebound.   Musculoskeletal:         General: Tenderness present. No edema. Normal range of motion.      Cervical back: Normal range of motion and neck supple.      Comments: TTP L gluteal region and L lateral thigh  No midline spinal tenderness.     Neurological: He is alert, oriented to person, place, and time and easily aroused. He has normal strength. No sensory deficit.   4/5 strength LUE/LLE   Skin: Skin is warm and dry. No rash noted. No erythema.   Psychiatric: He has a normal mood and affect. His speech is normal.       ED Course   Critical Care    Date/Time: 12/14/2022 2:57 PM  Performed by: Reji Springer PA-C  Authorized by: Félix Dubose MD   Direct patient critical care time: 15 minutes  Additional history critical care time: 6 minutes  Ordering / reviewing critical care time: 6 minutes  Documentation critical care time: 5 minutes  Consulting other physicians critical care time: 4 minutes  Total critical care time (exclusive of procedural time) : 36 minutes  Critical care was necessary to treat or prevent imminent or life-threatening deterioration of the following conditions: CNS failure or compromise.  Critical care was time spent personally by me on the following activities: discussions with consultants, interpretation of cardiac output measurements, evaluation of patient's response to treatment, examination of patient, obtaining history from patient or surrogate, ordering and performing  treatments and interventions, ordering and review of radiographic studies, re-evaluation of patient's condition and review of old charts.      Labs Reviewed   HEMOGLOBIN A1C   TYPE & SCREEN   POCT GLUCOSE   POCT GLUCOSE MONITORING CONTINUOUS     EKG Readings: (Independently Interpreted)   Initial Reading: No STEMI. Rhythm: Normal Sinus Rhythm. Heart Rate: 98. Conduction: RBBB. Clinical Impression: Normal Sinus Rhythm     Imaging Results              X-Ray Femur Ap/Lat Left (Final result)  Result time 12/14/22 14:09:37      Final result by Talat Ponce MD (12/14/22 14:09:37)                   Impression:      No fracture dislocation.      Electronically signed by: Talat Ponce MD  Date:    12/14/2022  Time:    14:09               Narrative:    EXAMINATION:  XR FEMUR 2 VIEW LEFT    CLINICAL HISTORY:  Pain in left hip    TECHNIQUE:  AP and lateral views of the left femur were performed.    COMPARISON:  None}    FINDINGS:  Residual contrast within bladder from previous intravenous contrast injection same day, postop surgical changes S1-S2, mild DJD left hip joint.  Hypertrophic change greater trochanter chondrocalcinosis, tiny joint foreign bodies posterior to knee joint, mild tricompartment DJD change particularly medial compartment.                                       X-Ray Chest AP Portable (Final result)  Result time 12/14/22 14:01:25      Final result by Haroldo Jackson MD (12/14/22 14:01:25)                   Impression:      See above      Electronically signed by: Haroldo Jackson MD  Date:    12/14/2022  Time:    14:01               Narrative:    EXAMINATION:  XR CHEST AP PORTABLE    CLINICAL HISTORY:  Pain in left hip    TECHNIQUE:  Single frontal view of the chest was performed.    COMPARISON:  None 12/14/2022    FINDINGS:  Mild cardiomegaly.  Accentuation of the basilar interstitial markings can not subsegmental atelectatic changes at the lung bases.  The right hemidiaphragm is slightly  elevated.  No significant pleural effusion.  The lung apices are clear.  Postoperative changes noted in the cervical spine                                       X-Ray Pelvis Routine AP (Final result)  Result time 12/14/22 14:06:56   Procedure changed from X-Ray Hip 2 or 3 views Left (with Pelvis when performed)     Final result by Haroldo Jackson MD (12/14/22 14:06:56)                   Impression:      See above      Electronically signed by: Haroldo Jackson MD  Date:    12/14/2022  Time:    14:06               Narrative:    EXAMINATION:  XR PELVIS ROUTINE AP    CLINICAL HISTORY:  FALL;  Pain in left hip    TECHNIQUE:  AP view of the pelvis was performed.    COMPARISON:  None.    FINDINGS:  Mild DJD.  No fracture or dislocation.  No bone destruction identified.  Contrast material identified in the urinary bladder.  Postoperative changes noted in the lower lumbar spine and sacrum.                                       CTA STROKE MULTI-PHASE (Final result)  Result time 12/14/22 13:38:58      Final result by Chris Dugan MD (12/14/22 13:38:58)                   Impression:      CT head shows no acute intracranial abnormalities.    CTA head neck shows no intracranial large vessel occlusion or aneurysm.    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    Atherosclerosis at the carotid bifurcations with evidence of prior endarterectomy.  No high-grade stenosis.      Electronically signed by: Chris Dugan MD  Date:    12/14/2022  Time:    13:38               Narrative:    EXAMINATION:  CTA STROKE MULTI-PHASE    CLINICAL HISTORY:  Neuro deficit, acute, stroke suspected;    TECHNIQUE:  Non contrast low dose axial images were obtained thought the head. CT angiogram was performed from the level of the scarlett to the top of the head following the IV administration of 100mL of Omnipaque 350.   Sagittal and coronal reconstructions and maximum intensity projection  reconstructions were performed. Arterial stenosis percentages are based on NASCET measurement criteria.  Two additional phases of immediate post-contrast CTA images were performed through the head alone.    COMPARISON:  CT head earlier in the day of 12/14/2022    FINDINGS:  CT head:    No midline shift, hydrocephalus or mass effect.  There are chronic microvascular ischemic changes.  Remote infarct in the right cerebellum.  No acute intracranial hemorrhage or CT evidence of acute major vascular territory infarct.  No abnormal extra-axial fluid collections.    CTA head neck: Aortic arch calcifications.  Major aortic branch vessels are patent.  Postop changes suggesting prior carotid endarterectomy.  There is atherosclerosis at the carotid bifurcation.  There is beam hardening artifact from adjacent spinal hardware.  Mild stenosis at the left internal carotid artery.  No high-grade stenosis.  There are calcifications within the bilateral cavernous segments without significant stenosis.  Mild irregularity of the intracranial vasculature suggesting atherosclerotic disease.  No large vessel occlusion.    Right vertebral artery is dominant and patent throughout its course.  Left vertebral artery is not well visualized proximally and may be chronically occluded with reconstitution in the V4 segment.  Basilar and major posterior cerebral arteries are patent.  Left P1 segment is hypoplastic with a dominant left posterior communicating artery.    Nonvascular structures: Paranasal sinuses and mastoid air cells are essentially clear.  Bones show degenerative changes of the cervical spine with postop changes of anterior cervical fusion.  Lung apices show emphysematous changes.  Visualized soft tissue structures in the neck show no significant abnormalities.                                       Medications   sodium chloride 0.9% flush 10 mL (has no administration in time range)   senna-docusate 8.6-50 mg per tablet 1 tablet (has  no administration in time range)   polyethylene glycol packet 17 g (has no administration in time range)   atorvastatin tablet 40 mg (has no administration in time range)   memantine tablet 10 mg (has no administration in time range)   metoprolol succinate (TOPROL-XL) 24 hr tablet 25 mg (has no administration in time range)   tamsulosin 24 hr capsule 0.4 mg (has no administration in time range)   albuterol-ipratropium 2.5 mg-0.5 mg/3 mL nebulizer solution 3 mL (3 mLs Nebulization Given 12/14/22 1350)   labetalol 20 mg/4 mL (5 mg/mL) IV syring (has no administration in time range)   hydrALAZINE injection 10 mg (has no administration in time range)   insulin aspart U-100 pen 0-5 Units (has no administration in time range)   glucagon (human recombinant) injection 1 mg (has no administration in time range)   dextrose 10% bolus 125 mL 125 mL (has no administration in time range)   dextrose 10% bolus 250 mL 250 mL (has no administration in time range)   iohexoL (OMNIPAQUE 350) injection 100 mL (100 mLs Intravenous Given 12/14/22 1255)   morphine injection 2 mg (2 mg Intravenous Given 12/14/22 1359)   ondansetron injection 4 mg (4 mg Intravenous Given 12/14/22 1359)     Medical Decision Making:   History:   Old Medical Records: I decided to obtain old medical records.  Old Records Summarized: records from clinic visits and records from previous admission(s).  Independently Interpreted Test(s):   I have ordered and independently interpreted EKG Reading(s) - see summary below       <> Summary of EKG Reading(s): Sinus rhythm 98 bpm. RBBB similar to previous. No STEMI  Clinical Tests:   Lab Tests: Ordered and Reviewed  Radiological Study: Ordered and Reviewed  Medical Tests: Ordered and Reviewed  Other:   I have discussed this case with another health care provider.       <> Summary of the Discussion: Vascular Neurology, Neuro ICU     APC / Resident Notes:   87 y.o. male with medical history of T2DM, Hypothyroidism, HLD, CAD,  COPD, Diastolic HF, Plavix therapy presenting to the ED via stroke transfer from [place] s/p tenecteplase. Fell from a chair due to L-sided weakness around 8:30am today. DDx includes but not limited to ischemic stroke, TIA, intracranial hemorrhage, SDH, brain mass. Stroke team and Neuro ICU notified of patient's arrival. Will check x-rays for L gluteal pain to evaluate for fracture. Intact distal pulses. No abdominal pain or tenderness on exam. Do not suspect acute aortic injury or lower extremity arterial occlusion.     CTA without acute occlusion or aneurysm. No infarction. X-rays negative for fracture. Degenerative changes noted. Stroke team and Neuro ICU evaluated at bedside. Not an IR candidate. Patient admitted to Neuro ICU for ongoing management. Patient expresses understanding and agreeable to the plan. I have discussed the care of this patient with my supervising physician.      Attending Attestation:     Physician Attestation Statement for NP/PA:   I have conducted a face to face encounter with this patient in addition to the NP/PA, due to Medical Complexity    Other NP/PA Attestation Additions:      Medical Decision Makin yo female transferred from outside hospital for vascular neurology evaluation.  Received tenecteplase.   Reports left-sided weakness this morning around 8:30 a.m. that resulted in a fall.  Patient reports his left-sided weakness is improved after tonight to place, but he is experiencing left hip pain.    No spinal TTP, left hip w/o expanding hematoma or instability.  No chest wall or abdominal TTP.   Low oxygen saturation noted, patient is on home oxygen and was placed on oxygen, denies shortness of breath    Intact distal pulses  Considered aortic pathology, but lower clinical suspicion given intact and equal distal pulses.  Appreciate vascular neurology evaluation of the patient.  Patient admitted to neuro intensive care unit for further management.  MRI and CT csp pending at  admission.                        Clinical Impression:   Final diagnoses:  [M25.552] Left hip pain  [W19.XXXA] Fall, initial encounter  [I63.9] Cerebrovascular accident (CVA), unspecified mechanism (Primary)  [R53.1] Left-sided weakness        ED Disposition Condition    Admit                 Reji Springer PA-C  12/14/22 [id number]       Félix Dubose MD  12/14/22 [id number]

## 2022-12-14 NOTE — CONSULTS
[facility]  Vascular Neurology  Comprehensive Stroke Center  TeleVascular Neurology Acute Consultation Note      Consult to Telemedicine - Acute Stroke  Consult performed by: Anshul Sam MD  Consult ordered by: Alexei Hunt MD        Consulting Provider: ALEXEI HUNT.  Current Providers  No providers found    Patient Location:  [facility] EMERGENCY DEPARTMENT Emergency Department  Spoke hospital nurse at bedside with patient assisting consultant.     Patient information was obtained from patient and EMS personnel.         Assessment/Plan:       Diagnoses:   Acute ischemic stroke  Acute ischemic stroke present, severe disabling left hemiplegia within window and no clear contraindications to lytic therapy. Hx of lymphoma however PET CT completed in 10/2022 w/o brain involvement, CT scan not showing any lesions or high risk features for lytic therapy.    Antithrombotics for secondary stroke prevention: Antiplatelets: None: Hold all Antithrombotics x 24 hours after IV Thrombolytic therapy administration    Statins for secondary stroke prevention and hyperlipidemia, if present:   Statins: Atorvastatin- 40 mg daily    Aggressive risk factor modification: HTN     Rehab efforts: The patient has been evaluated by a stroke team provider and the therapy needs have been fully considered based off the presenting complaints and exam findings. The following therapy evaluations are needed: PT evaluate and treat, OT evaluate and treat, SLP evaluate and treat    Diagnostics ordered/pending: CTA Head to assess vasculature , CTA Neck/Arch to assess vasculature, HgbA1C to assess blood glucose levels, Lipid Profile to assess cholesterol levels, MRI head without contrast to assess brain parenchyma, TTE to assess cardiac function/status     VTE prophylaxis: None: Reason for No Pharmacological VTE Prophylaxis: Holding x 24 hours s/p treatment with Thrombolytic therapy    BP parameters: Infarct: Post  Thrombolytic therapy, SBP <180            STROKE DOCUMENTATION     Acute Stroke Times:   Acute Stroke Times   Last Known Normal Date: 12/14/22  Last Known Normal Time: 0830  Stroke Team Called Time: 0948  Stroke Team Arrival Time: 0953  CT Interpretation Time: 0953  Thrombolytic Therapy Recommended: Yes  Decision to Treat Time for Tenecteplase: 1003    NIH Scale:  1a. Level of Consciousness: 0-->Alert, keenly responsive  1b. LOC Questions: 0-->Answers both questions correctly  1c. LOC Commands: 0-->Performs both tasks correctly  2. Best Gaze: 0-->Normal  3. Visual: 0-->No visual loss  4. Facial Palsy: 2-->Partial paralysis (total or near-total paralysis of lower face)  5a. Motor Arm, Left: 3-->No effort against gravity, limb falls  5b. Motor Arm, Right: 0-->No drift, limb holds 90 (or 45) degrees for full 10 secs  6a. Motor Leg, Left: 3-->No effort against gravity, leg falls to bed immediately  6b. Motor Leg, Right: 0-->No drift, leg holds 30 degree position for full 5 secs  7. Limb Ataxia: 0-->Absent  8. Sensory: 0-->Normal, no sensory loss  9. Best Language: 0-->No aphasia, normal  10. Dysarthria: 1-->Mild-to-moderate dysarthria, patient slurs at least some words and, at worst, can be understood with some difficulty  11. Extinction and Inattention (formerly Neglect): 0-->No abnormality  Total (NIH Stroke Scale): 9     Modified [place]    Martinez Coma Scale:    ABCD2 Score:    [id number] Score:   HAS -BLED Score:   ICH Score:   Hunt & Rae Classification:       There were no vitals taken for this visit.  Eligible for thrombolytic therapy?: Yes  Thrombolytic therapy recomended: Recommend IV Tenecteplase 0.25mg/kg IV push (max dose 25mg); If Tenecteplase is not available use Alteplase 0.9mg/kg IV bolus followed by infusion (max dose 90mg)     Additional Recommendations:   Neurological assessment and vital signs (except temperature) every 15 minutes x 2 hours, then every 30 minutes x 6 hours, then every hour x 16  hours..  Frequency of BP assessments may need to be increased if systolic BP stays >= 180 mm Hg or diastolic BP stays >= 105 mm Hg. Administer antihypertensive meds as ordered  Temperature every 4 hours or as required.  Follow hospital protocol for further orders re: post thrombolytic therapy patient management.  No antithrombotics or anticoagulants (including but not limited to: heparin, warfarin, aspirin, clopidigrel, or dipyridamole) for 24 hours, then start antithrombotics as ordered by treating physician    Adapted from the American Heart Association/American Stroke Association (AHA/ASA) and American Association of Neuroscience Nurses (AANN) Guidelines.   Possible Interventional Revascularization Candidate? No; at this time symptoms not suggestive of large vessel occlusion    Disposition Recommendation: transfer to [facility] by  ground  stat    Subjective:     History of Present Illness:  87M w/ symptoms of left sided weakness, fall from chair at 0830 this AM. Woke up normal      Woke up with symptoms?: no    Recent bleeding noted: no  Does the patient take any Blood Thinners? no  Medications: Antiplatelets:  aspirin and clopidogrel/Plavix    Past Medical History:   Past Medical History:   Diagnosis Date    Abnormal barium swallow     Alzheimer's dementia, late onset     Anal stenosis     Anticoagulant long-term use     Aortic stenosis     Arthritis     Aspiration pneumonitis     Benign prostatic hyperplasia     Carotid artery disease     CHF (congestive heart failure)     Chronic diastolic heart failure     Chronic kidney disease (CKD), stage III (moderate)     CKD (chronic kidney disease)     Colon polyp     COPD (chronic obstructive pulmonary disease)     Coronary artery disease     Diabetes mellitus, type 2     Diverticulosis     Dysphagia     Hearing aid worn 01/23/2020    Received bilateral hearing aides today    Heart disease     History of CEA (carotid endarterectomy)     [place] (hard of hearing)      Hyperlipidemia     Hypertension     Hypertensive heart disease     Hypothyroidism     Joint disease     Memory loss     PAD (peripheral artery disease)     Presence of drug coated stent in right coronary artery     Pulmonary hypertension     S/P TAVR (transcatheter aortic valve replacement)     Small bowel obstruction     Wears dentures     UPPER AND LOWER    Wears glasses        Past Surgical History: no major surgeries within the last 2 weeks    Family History: no relevant history    Social History: no smoking, no drinking, no drugs    Allergies: Sulfur  Penicillins No relevant allergies    Review of Systems   Constitutional: Negative for appetite change, chills and fever.   HENT: Negative for congestion and sore throat.    Eyes: Negative for discharge and itching.   Respiratory: Negative for apnea and shortness of breath.    Cardiovascular: Negative for chest pain and palpitations.   Gastrointestinal: Negative for abdominal pain and anal bleeding.   Endocrine: Negative for cold intolerance and polydipsia.   Genitourinary: Negative for dysuria and hematuria.   Musculoskeletal: Negative for joint swelling and myalgias.   Skin: Negative for color change and rash.   Neurological: Negative for tremors.   Psychiatric/Behavioral: Negative for hallucinations and self-injury.     Objective:   Vitals:  /101 mmHg RR 15, HR 75, O2 98    CT READ: Yes  No hemmorhage. No mass effect. No early infarct signs.     Physical Exam  Constitutional:       General: He is not in acute distress.     Appearance: He is well-nourished.   HENT:      Head: Atraumatic.      Right Ear: External ear normal.      Left Ear: External ear normal.   Eyes:      General: No scleral icterus.     Conjunctiva/sclera: Conjunctivae normal.   Pulmonary:      Effort: Pulmonary effort is normal.   Abdominal:      General: There is no distension.      Tenderness: There is no guarding.   Musculoskeletal:         General: No deformity. Normal range of  motion.      Cervical back: Normal range of motion.   Skin:     General: Skin is warm and dry.   Neurological:      Mental Status: He is alert.   Psychiatric:         Mood and Affect: Mood and affect normal.             Recommended the emergency room physician to have a brief discussion with the patient and/or family if available regarding the  risks and benefits of treatment, and to briefly document the occurrence of that discussion in his clinical encounter note.     The attending portion of this evaluation, treatment, and documentation was performed per Anshul Sam MD via audiovisual.    Billing code:  (time dependent stroke, complex case, unstable patient, hemorrhages, any intervention, some mimics)    This patient has a very critical neurological condition/illness, with very high morbidity and mortality.  There is a very high probability for acute neurological change leading to clinical and possibly life-threatening deterioration requiring highest level of physician preparedness for urgent intervention.  There is possibility that this condition will require treatment with high risk medications as quickly as possible.  There is also a possibility that the patient may benefit from further, more advance complex therapies (e.g. endovascular therapy) that will require prompt diagnosis and care.  Care was coordinated with other physicians involved in the patient's care.  Radiologic studies and laboratory data were reviewed and interpreted, and plan of care was re-assessed based on the results.  Diagnosis, treatment options and prognosis may have been discussed with the patient and/or family members or caregiver.  Further advanced medical management and further evaluation is warranted for his care.    In your opinion, this was a: Tier 1 Van Negative    Consult End Time: 10:04 AM     Anshul Sam MD  [facility] Stroke Center  Vascular Neurology   [facility]

## 2022-12-14 NOTE — PROGRESS NOTES
"Admitted From: [facility] s/p TNK > EMS > [facility] ED > [facility]    Admit Time/Date: 2022 at 1800    Admit Diagnosis: Acute Ischemic Stroke s/p TNK    TNK End Time: 1019    Temperature: 98.1 (oral)    CB    Weight: 69.2kg (bed scale)    Height: 5'7" (stated)    Wounds: None observed    Skin Assessment/Interventions: HAPI in place    Personal Belongings: White briefs, gold colored watch, black life alert with cord around neck, brown slippers, white undershirt, blue pants, tan colored shirt, , black wallet    NCC called  and MD Teodoro notified     Bed in lowest position, wheels locked, call light in reach, bed alarm set, will continue to monitor.        "

## 2022-12-14 NOTE — ASSESSMENT & PLAN NOTE
88 yo M with initial CC disabling left hemiplegia now  s/p TNK. Patient was in his normal state of health when he woke up today. He was watching a game when he decided to go to the bathroom today 12/14/2022 @ 8:30 AM at which time he noted difficulty using the left side of his body. He managed to use a walker to ambulate to the bathroom, but on the way back, he had a fall, without head injury or LOC. CTH at OSH was negative for acute intracranial evidence of stroke or bleed, however, given clinical evidence of stroke, TNK was administered at 10:19 AM 12/14 and [place] [facility] [place].    Initial telestroke NIHSS 9 prior to arrival; On arrival, post-TNK NIHSS of 1 on my evaluation for LUE drift. Patient is independent at baseline with mRS 0.    Etiology: ESUS    Antithrombotics for secondary stroke prevention: HOLD for 24hrs post-thrombolysis;  Antiplatelets: Aspirin: 81 mg daily  Clopidogrel: 75 mg daily    Statins for secondary stroke prevention and hyperlipidemia, if present:   Statins: Atorvastatin- 40 mg daily    Aggressive risk factor modification: HTN, HLD, CAD, Carotid artery disease     Rehab efforts: The patient has been evaluated by a stroke team provider and the therapy needs have been fully considered based off the presenting complaints and exam findings. The following therapy evaluations are needed: PT evaluate and treat, OT evaluate and treat, SLP evaluate and treat, PM&R evaluate for appropriate placement    Diagnostics ordered/pending: HgbA1C to assess blood glucose levels, TTE to assess cardiac function/status     VTE prophylaxis: Mechanical prophylaxis: Place SCDs  None: Reason for No Pharmacological VTE Prophylaxis: Holding x 24 hours s/p treatment with Thrombolytic therapy    BP parameters: Infarct: Post Thrombolytic therapy, SBP <180

## 2022-12-14 NOTE — HPI
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 Woke up 12/14 without symptoms around 0700. Sat down to watch TV, around 0830 noticed that he had difficulty extending his left fingers and then difficulty walking to the bathroom due to left sided weakness with subsequent fall. Tenecteplase started at 1019 prior to transfer. Did not undergo thrombectomy. On initial evaluation, patient reports that his left sided weakness has improved about "50%." Reports some left hip/buttock pain related to fall. Has chronic L ankle/lower leg pain due to injury in his 20s.    At baseline ambulates with cane/walker. Lives alone per patient.  "

## 2022-12-14 NOTE — H&P
"[facility] - Emergency Dept  Neurocritical Care  History & Physical    Admit Date: 12/14/2022  Service Date: 12/14/2022  Length of Stay: 0    Subjective:     Chief Complaint: Acute ischemic stroke    History of Present Illness: Vega Kohler is an 87 year old man with history of HTN, T2DM, HLD, CAD, COPD, CKD, CHF, Alzheimer's, follicular lymphoma on chemo (last rituxan 11/17) transferred to [facility] for post-thrombolytic monitoring. Woke up 12/14 without symptoms around 0700. Sat down to watch TV, around 0830 noticed that he had difficulty extending his left fingers and then difficulty walking to the bathroom due to left sided weakness with subsequent fall. Tenecteplase started at 1019 prior to transfer. Did not undergo thrombectomy. On initial evaluation, patient reports that his left sided weakness has improved about "50%." Reports some left hip/buttock pain related to fall. Has chronic L ankle/lower leg pain due to injury in his 20s.    At baseline ambulates with cane/walker. Lives alone per patient.      Past Medical History:   Diagnosis Date    Abnormal barium swallow     Alzheimer's dementia, late onset     Anal stenosis     Anticoagulant long-term use     Aortic stenosis     Arthritis     Aspiration pneumonitis     Benign prostatic hyperplasia     Carotid artery disease     CHF (congestive heart failure)     Chronic diastolic heart failure     Chronic kidney disease (CKD), stage III (moderate)     CKD (chronic kidney disease)     Colon polyp     COPD (chronic obstructive pulmonary disease)     Coronary artery disease     Diabetes mellitus, type 2     Diverticulosis     Dysphagia     Hearing aid worn 01/23/2020    Received bilateral hearing aides today    Heart disease     History of CEA (carotid endarterectomy)     [place] (hard of hearing)     Hyperlipidemia     Hypertension     Hypertensive heart disease     Hypothyroidism     Joint disease     Memory loss     PAD (peripheral artery " disease)     Presence of drug coated stent in right coronary artery     Pulmonary hypertension     S/P TAVR (transcatheter aortic valve replacement)     Small bowel obstruction     Wears dentures     UPPER AND LOWER    Wears glasses      Past Surgical History:   Procedure Laterality Date    anal fissure repair      ANKLE FUSION Left 08/15/2016    X 2    APPENDECTOMY      BACK SURGERY      X 4    CAROTID ENDARTERECTOMY Right     CAROTID ENDARTERECTOMY Left     CAROTID ENDARTERECTOMY Left 12/3/2021    Procedure: ENDARTERECTOMY-CAROTID;  Surgeon: Tim Castillo MD;  Location: [facility] OR;  Service: Cardiology;  Laterality: Left;  pt unsure if he stopped plavix, Dr. Castillo ok to proceed    CARPAL TUNNEL RELEASE Right     CHOLECYSTECTOMY      COLONOSCOPY N/A 7/26/2018    Procedure: HIGH RISK SCREENING COLONOSCOPY;  Surgeon: Connor Murrell MD;  Location: [facility] ENDO;  Service: Endoscopy;  Laterality: N/A;    CORONARY ANGIOPLASTY WITH STENT PLACEMENT      ELBOW SURGERY Bilateral     EXCISIONAL HEMORRHOIDECTOMY      x3    EXPLORATORY LAPAROTOMY W/ BOWEL RESECTION  11/17/2011    EYE SURGERY      cataract bilaterally    history of bowel resection      KNEE SURGERY Left     LAMINECTOMY AND MICRODISCECTOMY LUMBAR SPINE  07/21/2011    L5-S1    LEFT HEART CATHETERIZATION Left 8/31/2018    Procedure: Left heart cath;  Surgeon: Rex Chris MD;  Location: [facility] CATH;  Service: Cardiology;  Laterality: Left;    LUMBAR FUSION  11/09/2011    Anterior approach--L5-S1    LUNG BIOPSY Right 07/21/2009    VATS with wedge ofr right lower lobe    LUNG BIOPSY Left 3/16/2020    Procedure: BIOPSY, LUNG;  Surgeon: [facility] Diagnostic Provider;  Location: [facility] OR;  Service: General;  Laterality: Left;    PERCUTANEOUS TRANSCATHETER AORTIC VALVE REPLACEMENT (TAVR) Left 10/30/2018    Procedure: REPLACEMENT, AORTIC VALVE, PERCUTANEOUS, TRANSCATHETER;  Surgeon: Rex Chris MD;  Location: [facility] OR;   Service: Cardiology;  Laterality: Left;    PERCUTANEOUS TRANSCATHETER AORTIC VALVE REPLACEMENT (TAVR)  10/30/2018    Procedure: REPLACEMENT, AORTIC VALVE, PERCUTANEOUS, TRANSCATHETER;  Surgeon: Tim Castillo MD;  Location: [facility] OR;  Service: Cardiovascular;;    RIGHT HEART CATHETERIZATION Right 8/31/2018    Procedure: INSERTION, CATHETER, RIGHT HEART;  Surgeon: Rex Chris MD;  Location: [facility] CATH;  Service: Cardiology;  Laterality: Right;    ROTATOR CUFF REPAIR Left     SINUS SURGERY      SPINE SURGERY      back surgery    TENDON RELEASE Bilateral     TONSILLECTOMY        Current Facility-Administered Medications on File Prior to Encounter   Medication Dose Route Frequency Provider Last Rate Last Admin    [COMPLETED] tenecteplase (TNKase) IV KIT 17 mg  0.25 mg/kg Intravenous ED 1 Time Alexei Blackwell MD   17 mg at 12/14/22 1019     Current Outpatient Medications on File Prior to Encounter   Medication Sig Dispense Refill    albuterol (VENTOLIN HFA) 90 mcg/actuation inhaler Inhale 1-2 puffs into the lungs every 6 (six) hours as needed for Wheezing or Shortness of Breath. Rescue 18 g 1    aspirin (ECOTRIN) 81 MG EC tablet Take 1 tablet by mouth every morning.       atorvastatin (LIPITOR) 40 MG tablet Take 40 mg by mouth every evening.      clopidogreL (PLAVIX) 75 mg tablet Take 1 tablet by mouth every morning.       ipratropium (ATROVENT) 42 mcg (0.06 %) nasal spray ipratropium bromide 42 mcg (0.06 %) nasal spray      meclizine (ANTIVERT) 12.5 mg tablet Take 2 tablets (25 mg total) by mouth 3 (three) times daily. 45 tablet 3    memantine (NAMENDA) 10 MG Tab Take 10 mg by mouth once daily.      metoprolol succinate (TOPROL-XL) 25 MG 24 hr tablet Take 25 mg by mouth every evening.      MYRBETRIQ 25 mg Tb24 ER tablet Take 25 mg by mouth once daily. 11/23/20      nitroGLYCERIN (NITROSTAT) 0.4 MG SL tablet Place 1 tablet (0.4 mg total) under the tongue every 5 (five) minutes as needed  "for Chest pain. 60 tablet 3    ondansetron (ZOFRAN-ODT) 8 MG TbDL Take 1 tablet (8 mg total) by mouth 3 (three) times daily as needed (for nausea). 40 tablet 2    pen needle, diabetic 32 gauge x 5/32" Ndle BD Adrienne 2nd Gen Pen Needle 32 gauge x 5/32"   USE 1 PEN NEEDLE ONCE DAILY USE AS DIRECTED      SOLIQUA 100/33 100 unit-33 mcg/mL InPn pen INJECT 30 UNITS SUBCUTANEOUSLY ONCE DAILY 3 pen 3    tamsulosin (FLOMAX) 0.4 mg Cap Take 0.4 mg by mouth once daily.      torsemide (DEMADEX) 10 MG Tab Take 10 mg by mouth once daily.      UNABLE TO FIND medication name: prevagen extra strenght daily        Allergies: Sulfur and Penicillins    Family History   Problem Relation Age of Onset    Cancer Mother     Heart disease Father     Heart disease Brother     Diabetes Brother      Social History     Tobacco Use    Smoking status: Former     Packs/day: 1.50     Years: 20.00     Pack years: 30.00     Types: Cigarettes     Start date:      Quit date:      Years since quittin.9    Smokeless tobacco: Never   Substance Use Topics    Alcohol use: No    Drug use: No     Review of Systems   Constitutional:  Negative for chills and fever.   HENT:  Negative for rhinorrhea and sneezing.    Eyes:  Negative for pain and redness.   Respiratory:  Negative for cough and shortness of breath.    Gastrointestinal:  Negative for abdominal pain and nausea.   Genitourinary:  Negative for difficulty urinating and dysuria.   Musculoskeletal:  Positive for arthralgias, gait problem and myalgias. Negative for back pain and neck pain.   Neurological:  Positive for weakness. Negative for tremors and speech difficulty.   Objective:     Vitals:    Pulse: 93  BP: (!) 141/72  MAP (mmHg): 101  Resp: 16  SpO2: 95 %    Temp  Min: 97.8 °F (36.6 °C)  Max: 97.8 °F (36.6 °C)  Pulse  Min: 77  Max: 93  BP  Min: 140/79  Max: 180/81  MAP (mmHg)  Min: 96  Max: 149  Resp  Min: 12  Max: 20  SpO2  Min: 85 %  Max: 100 %    No intake/output data " recorded.           Physical Exam  Vitals and nursing note reviewed.   Constitutional:       General: He is not in acute distress.  HENT:      Head: Normocephalic and atraumatic.      Ears:      Comments: Hard of hearing     Nose: Nose normal. No rhinorrhea.   Eyes:      Conjunctiva/sclera: Conjunctivae normal.      Pupils: Pupils are equal, round, and reactive to light.   Cardiovascular:      Rate and Rhythm: Normal rate and regular rhythm.      Heart sounds: No murmur heard.  Pulmonary:      Effort: Pulmonary effort is normal. No respiratory distress.      Breath sounds: Normal breath sounds.   Abdominal:      Palpations: Abdomen is soft.      Tenderness: There is no abdominal tenderness.   Musculoskeletal:         General: Tenderness (Left hip) present.      Right lower leg: No edema.      Left lower leg: No edema.   Skin:     General: Skin is warm and dry.      Capillary Refill: Capillary refill takes less than 2 seconds.   Neurological:      Mental Status: He is alert.      Comments: A&Ox3  No facial weakness, sensory deficit, or visual field deficit. Somewhat dysarthric speech, which per patient is close to baseline.  Strength 5/5 in BUE, 2/5 in LLE. LUE drift present. Sensation in extremities equal bilaterally.         Today I personally reviewed pertinent medications, lines/drains/airways, imaging, cardiology results, laboratory results, microbiology results, notably:  CTA no LVO  Cr at baseline      Assessment/Plan:     Neuro  * Acute ischemic stroke  87 year old man with HTN, HLD, T2DM, CKD, CAD, s/p TAVR for aortic stenosis, s/p bilateral CEA for carotid stenosis (left 12/2021), COPD, dementia, BPH, follicular lymphoma on chemo admitted to [facility] for post-thrombolytic monitoring. Initially presented to OSH for left sided weakness. CTH negative for acute process,  received tenecteplase at 1019 on 12/14. CTA on arrival negative for LVO, no IR intervention.    - neuro checks q1h  - goal SBP <180  - PRN  hydralazine, labetalol  - TTE, A1c  - MRI Brain w/o contrast tomorrow  - DVT ppx held x24 hrs s/p TNK  - PT/OT/SLP evaluation    Dementia  - continue home memantine 10 mg QHS    Pulmonary  COPD, moderate  Per Pulmonology note 9/2022, sats 81% on room air and has home oxygen, however does not use it.  - scheduled duonebs  - PRN O2 with goal SpO2 88-92%    Cardiac/Vascular  Aortic stenosis s/p TAVR  TAVR in 10/2018.    Chronic diastolic heart failure, NYHA class 2  Taking torsemide 10 mg QD and metoprolol succinate 25 mg QHS prior to admission  - continue metoprolol  - hold torsemide for now, restart as appropriate    Hypertension  Not on anti-hypertensive prior to admission.  - SBP goal <180  - PRN labetalol, hydralazine    Carotid artery disease  S/p PCI and MILI in 2018  - ASA 81 mg and clopidogrel per Cardiology (last evaluation 12/2021)    Hyperlipidemia    - continue atorvastatin 40 mg    Renal/  BPH (benign prostatic hyperplasia)  - continue home tamsulosin    Chronic kidney disease, stage 3 (moderate)  Baseline Cr 1.5-1.9  - monitor Cr  - dose medications for renal clearance  - avoid nephrotoxic medication    Oncology  Follicular lymphoma  Stage I grade 3a follicular lymphoma. Diagnosed in 9/2022. Has been receiving rituxan, last dose 11/17/22.    Endocrine  Hypothyroidism  Not on levothyroxine prior to admission. TSH 4.4 with fT4 WNL.    Type 2 diabetes mellitus  - check A1c  - goal inpatient -180  - LDSSI and accuchecks  - diabetic diet when appropriate          The patient is being Prophylaxed for:  Venous Thromboembolism with: None - <24 hrs from tenecteplase  Stress Ulcer with: Not Applicable   Ventilator Pneumonia with: not applicable    Activity Orders          Turn patient starting at 12/14 1400    Elevate HOB starting at 12/14 1334    Diet NPO: NPO starting at 12/14 1334        Full Code    Mishel Valerio MD  Neurocritical Care  [facility] - Emergency Dept

## 2022-12-14 NOTE — SUBJECTIVE & OBJECTIVE
Past Medical History:   Diagnosis Date    Abnormal barium swallow     Alzheimer's dementia, late onset     Anal stenosis     Anticoagulant long-term use     Aortic stenosis     Arthritis     Aspiration pneumonitis     Benign prostatic hyperplasia     Carotid artery disease     CHF (congestive heart failure)     Chronic diastolic heart failure     Chronic kidney disease (CKD), stage III (moderate)     CKD (chronic kidney disease)     Colon polyp     COPD (chronic obstructive pulmonary disease)     Coronary artery disease     Diabetes mellitus, type 2     Diverticulosis     Dysphagia     Hearing aid worn 01/23/2020    Received bilateral hearing aides today    Heart disease     History of CEA (carotid endarterectomy)     [place] (hard of hearing)     Hyperlipidemia     Hypertension     Hypertensive heart disease     Hypothyroidism     Joint disease     Memory loss     PAD (peripheral artery disease)     Presence of drug coated stent in right coronary artery     Pulmonary hypertension     S/P TAVR (transcatheter aortic valve replacement)     Small bowel obstruction     Wears dentures     UPPER AND LOWER    Wears glasses      Past Surgical History:   Procedure Laterality Date    anal fissure repair      ANKLE FUSION Left 08/15/2016    X 2    APPENDECTOMY      BACK SURGERY      X 4    CAROTID ENDARTERECTOMY Right     CAROTID ENDARTERECTOMY Left     CAROTID ENDARTERECTOMY Left 12/3/2021    Procedure: ENDARTERECTOMY-CAROTID;  Surgeon: Tim Castillo MD;  Location: [facility] OR;  Service: Cardiology;  Laterality: Left;  pt unsure if he stopped plavix, Dr. Castillo ok to proceed    CARPAL TUNNEL RELEASE Right     CHOLECYSTECTOMY      COLONOSCOPY N/A 7/26/2018    Procedure: HIGH RISK SCREENING COLONOSCOPY;  Surgeon: Connor Murrell MD;  Location: [facility] ENDO;  Service: Endoscopy;  Laterality: N/A;    CORONARY ANGIOPLASTY WITH STENT PLACEMENT      ELBOW SURGERY Bilateral     EXCISIONAL HEMORRHOIDECTOMY      x3    EXPLORATORY  LAPAROTOMY W/ BOWEL RESECTION  11/17/2011    EYE SURGERY      cataract bilaterally    history of bowel resection      KNEE SURGERY Left     LAMINECTOMY AND MICRODISCECTOMY LUMBAR SPINE  07/21/2011    L5-S1    LEFT HEART CATHETERIZATION Left 8/31/2018    Procedure: Left heart cath;  Surgeon: Rex Chris MD;  Location: [facility] CATH;  Service: Cardiology;  Laterality: Left;    LUMBAR FUSION  11/09/2011    Anterior approach--L5-S1    LUNG BIOPSY Right 07/21/2009    VATS with wedge ofr right lower lobe    LUNG BIOPSY Left 3/16/2020    Procedure: BIOPSY, LUNG;  Surgeon: [facility] Diagnostic Provider;  Location: [facility] OR;  Service: General;  Laterality: Left;    PERCUTANEOUS TRANSCATHETER AORTIC VALVE REPLACEMENT (TAVR) Left 10/30/2018    Procedure: REPLACEMENT, AORTIC VALVE, PERCUTANEOUS, TRANSCATHETER;  Surgeon: Rex Chris MD;  Location: [facility] OR;  Service: Cardiology;  Laterality: Left;    PERCUTANEOUS TRANSCATHETER AORTIC VALVE REPLACEMENT (TAVR)  10/30/2018    Procedure: REPLACEMENT, AORTIC VALVE, PERCUTANEOUS, TRANSCATHETER;  Surgeon: Tim Castillo MD;  Location: [facility] OR;  Service: Cardiovascular;;    RIGHT HEART CATHETERIZATION Right 8/31/2018    Procedure: INSERTION, CATHETER, RIGHT HEART;  Surgeon: Rex Chris MD;  Location: [facility] CATH;  Service: Cardiology;  Laterality: Right;    ROTATOR CUFF REPAIR Left     SINUS SURGERY      SPINE SURGERY      back surgery    TENDON RELEASE Bilateral     TONSILLECTOMY        Current Facility-Administered Medications on File Prior to Encounter   Medication Dose Route Frequency Provider Last Rate Last Admin    [COMPLETED] tenecteplase (TNKase) IV KIT 17 mg  0.25 mg/kg Intravenous ED 1 Time Alexei Blackwell MD   17 mg at 12/14/22 1019     Current Outpatient Medications on File Prior to Encounter   Medication Sig Dispense Refill    albuterol (VENTOLIN HFA) 90 mcg/actuation inhaler Inhale 1-2 puffs into the lungs every 6 (six) hours as needed for Wheezing or  "Shortness of Breath. Rescue 18 g 1    aspirin (ECOTRIN) 81 MG EC tablet Take 1 tablet by mouth every morning.       atorvastatin (LIPITOR) 40 MG tablet Take 40 mg by mouth every evening.      clopidogreL (PLAVIX) 75 mg tablet Take 1 tablet by mouth every morning.       ipratropium (ATROVENT) 42 mcg (0.06 %) nasal spray ipratropium bromide 42 mcg (0.06 %) nasal spray      meclizine (ANTIVERT) 12.5 mg tablet Take 2 tablets (25 mg total) by mouth 3 (three) times daily. 45 tablet 3    memantine (NAMENDA) 10 MG Tab Take 10 mg by mouth once daily.      metoprolol succinate (TOPROL-XL) 25 MG 24 hr tablet Take 25 mg by mouth every evening.      MYRBETRIQ 25 mg Tb24 ER tablet Take 25 mg by mouth once daily. 20      nitroGLYCERIN (NITROSTAT) 0.4 MG SL tablet Place 1 tablet (0.4 mg total) under the tongue every 5 (five) minutes as needed for Chest pain. 60 tablet 3    ondansetron (ZOFRAN-ODT) 8 MG TbDL Take 1 tablet (8 mg total) by mouth 3 (three) times daily as needed (for nausea). 40 tablet 2    pen needle, diabetic 32 gauge x 5/32" Ndle BD Adrienne 2nd Gen Pen Needle 32 gauge x 5/32"   USE 1 PEN NEEDLE ONCE DAILY USE AS DIRECTED      SOLIQUA 100/33 100 unit-33 mcg/mL InPn pen INJECT 30 UNITS SUBCUTANEOUSLY ONCE DAILY 3 pen 3    tamsulosin (FLOMAX) 0.4 mg Cap Take 0.4 mg by mouth once daily.      torsemide (DEMADEX) 10 MG Tab Take 10 mg by mouth once daily.      UNABLE TO FIND medication name: prevagen extra strenght daily        Allergies: Sulfur and Penicillins    Family History   Problem Relation Age of Onset    Cancer Mother     Heart disease Father     Heart disease Brother     Diabetes Brother      Social History     Tobacco Use    Smoking status: Former     Packs/day: 1.50     Years: 20.00     Pack years: 30.00     Types: Cigarettes     Start date:      Quit date:      Years since quittin.9    Smokeless tobacco: Never   Substance Use Topics    Alcohol use: No    Drug use: No     Review of Systems "   Constitutional:  Negative for chills and fever.   HENT:  Negative for rhinorrhea and sneezing.    Eyes:  Negative for pain and redness.   Respiratory:  Negative for cough and shortness of breath.    Gastrointestinal:  Negative for abdominal pain and nausea.   Genitourinary:  Negative for difficulty urinating and dysuria.   Musculoskeletal:  Positive for arthralgias, gait problem and myalgias. Negative for back pain and neck pain.   Neurological:  Positive for weakness. Negative for tremors and speech difficulty.   Objective:     Vitals:    Pulse: 93  BP: (!) 141/72  MAP (mmHg): 101  Resp: 16  SpO2: 95 %    Temp  Min: 97.8 °F (36.6 °C)  Max: 97.8 °F (36.6 °C)  Pulse  Min: 77  Max: 93  BP  Min: 140/79  Max: 180/81  MAP (mmHg)  Min: 96  Max: 149  Resp  Min: 12  Max: 20  SpO2  Min: 85 %  Max: 100 %    No intake/output data recorded.           Physical Exam  Vitals and nursing note reviewed.   Constitutional:       General: He is not in acute distress.  HENT:      Head: Normocephalic and atraumatic.      Ears:      Comments: Hard of hearing     Nose: Nose normal. No rhinorrhea.   Eyes:      Conjunctiva/sclera: Conjunctivae normal.      Pupils: Pupils are equal, round, and reactive to light.   Cardiovascular:      Rate and Rhythm: Normal rate and regular rhythm.      Heart sounds: No murmur heard.  Pulmonary:      Effort: Pulmonary effort is normal. No respiratory distress.      Breath sounds: Normal breath sounds.   Abdominal:      Palpations: Abdomen is soft.      Tenderness: There is no abdominal tenderness.   Musculoskeletal:         General: Tenderness (Left hip) present.      Right lower leg: No edema.      Left lower leg: No edema.   Skin:     General: Skin is warm and dry.      Capillary Refill: Capillary refill takes less than 2 seconds.   Neurological:      Mental Status: He is alert.      Comments: A&Ox3  No facial weakness, sensory deficit, or visual field deficit. Somewhat dysarthric speech, which per  patient is close to baseline.  Strength 5/5 in BUE, 2/5 in LLE. LUE drift present. Sensation in extremities equal bilaterally.         Today I personally reviewed pertinent medications, lines/drains/airways, imaging, cardiology results, laboratory results, microbiology results, notably:  CTA no LVO  Cr at baseline

## 2022-12-14 NOTE — ED NOTES
MD at bedside, verbal consent given for tenecteplase. MD states to give tenecteplase now - pt does take plavix. MD aware as well as vascular neurologist. Witnessed by MELISSA Murphy RN.

## 2022-12-14 NOTE — ASSESSMENT & PLAN NOTE
Taking torsemide 10 mg QD and metoprolol succinate 25 mg QHS prior to admission  - continue metoprolol  - hold torsemide for now, restart as appropriate

## 2022-12-14 NOTE — ASSESSMENT & PLAN NOTE
Stage I grade 3a follicular lymphoma. Diagnosed in 9/2022. Has been receiving rituxan, last dose 11/17/22.

## 2022-12-14 NOTE — ASSESSMENT & PLAN NOTE
Acute ischemic stroke present, severe disabling left hemiplegia within window and no clear contraindications to lytic therapy. Hx of lymphoma however PET CT completed in 10/2022 w/o brain involvement, CT scan not showing any lesions or high risk features for lytic therapy.    Antithrombotics for secondary stroke prevention: Antiplatelets: None: Hold all Antithrombotics x 24 hours after IV Thrombolytic therapy administration    Statins for secondary stroke prevention and hyperlipidemia, if present:   Statins: Atorvastatin- 40 mg daily    Aggressive risk factor modification: HTN     Rehab efforts: The patient has been evaluated by a stroke team provider and the therapy needs have been fully considered based off the presenting complaints and exam findings. The following therapy evaluations are needed: PT evaluate and treat, OT evaluate and treat, SLP evaluate and treat    Diagnostics ordered/pending: CTA Head to assess vasculature , CTA Neck/Arch to assess vasculature, HgbA1C to assess blood glucose levels, Lipid Profile to assess cholesterol levels, MRI head without contrast to assess brain parenchyma, TTE to assess cardiac function/status     VTE prophylaxis: None: Reason for No Pharmacological VTE Prophylaxis: Holding x 24 hours s/p treatment with Thrombolytic therapy    BP parameters: Infarct: Post Thrombolytic therapy, SBP <180

## 2022-12-14 NOTE — ASSESSMENT & PLAN NOTE
H/o follicular lymphoma per chart (grade 3a, stage 1); receiving rituxan (cycle 3 in 11/2022), 10/2022 PET scan positive for 3 cm lymphadenopathy in left axillary region otherwise HUSSEIN.

## 2022-12-14 NOTE — SUBJECTIVE & OBJECTIVE
Past Medical History:   Diagnosis Date    Abnormal barium swallow     Alzheimer's dementia, late onset     Anal stenosis     Anticoagulant long-term use     Aortic stenosis     Arthritis     Aspiration pneumonitis     Benign prostatic hyperplasia     Carotid artery disease     CHF (congestive heart failure)     Chronic diastolic heart failure     Chronic kidney disease (CKD), stage III (moderate)     CKD (chronic kidney disease)     Colon polyp     COPD (chronic obstructive pulmonary disease)     Coronary artery disease     Diabetes mellitus, type 2     Diverticulosis     Dysphagia     Hearing aid worn 01/23/2020    Received bilateral hearing aides today    Heart disease     History of CEA (carotid endarterectomy)     [place] (hard of hearing)     Hyperlipidemia     Hypertension     Hypertensive heart disease     Hypothyroidism     Joint disease     Memory loss     PAD (peripheral artery disease)     Presence of drug coated stent in right coronary artery     Pulmonary hypertension     S/P TAVR (transcatheter aortic valve replacement)     Small bowel obstruction     Wears dentures     UPPER AND LOWER    Wears glasses      Past Surgical History:   Procedure Laterality Date    anal fissure repair      ANKLE FUSION Left 08/15/2016    X 2    APPENDECTOMY      BACK SURGERY      X 4    CAROTID ENDARTERECTOMY Right     CAROTID ENDARTERECTOMY Left     CAROTID ENDARTERECTOMY Left 12/3/2021    Procedure: ENDARTERECTOMY-CAROTID;  Surgeon: Tim Castillo MD;  Location: [facility] OR;  Service: Cardiology;  Laterality: Left;  pt unsure if he stopped plavix, Dr. Castillo ok to proceed    CARPAL TUNNEL RELEASE Right     CHOLECYSTECTOMY      COLONOSCOPY N/A 7/26/2018    Procedure: HIGH RISK SCREENING COLONOSCOPY;  Surgeon: Connor Murrell MD;  Location: [facility] ENDO;  Service: Endoscopy;  Laterality: N/A;    CORONARY ANGIOPLASTY WITH STENT PLACEMENT      ELBOW SURGERY Bilateral     EXCISIONAL HEMORRHOIDECTOMY      x3     EXPLORATORY LAPAROTOMY W/ BOWEL RESECTION  2011    EYE SURGERY      cataract bilaterally    history of bowel resection      KNEE SURGERY Left     LAMINECTOMY AND MICRODISCECTOMY LUMBAR SPINE  2011    L5-S1    LEFT HEART CATHETERIZATION Left 2018    Procedure: Left heart cath;  Surgeon: Rex Chris MD;  Location: [facility] CATH;  Service: Cardiology;  Laterality: Left;    LUMBAR FUSION  2011    Anterior approach--L5-S1    LUNG BIOPSY Right 2009    VATS with wedge ofr right lower lobe    LUNG BIOPSY Left 3/16/2020    Procedure: BIOPSY, LUNG;  Surgeon: [facility] Diagnostic Provider;  Location: [facility] OR;  Service: General;  Laterality: Left;    PERCUTANEOUS TRANSCATHETER AORTIC VALVE REPLACEMENT (TAVR) Left 10/30/2018    Procedure: REPLACEMENT, AORTIC VALVE, PERCUTANEOUS, TRANSCATHETER;  Surgeon: Rex Chris MD;  Location: [facility] OR;  Service: Cardiology;  Laterality: Left;    PERCUTANEOUS TRANSCATHETER AORTIC VALVE REPLACEMENT (TAVR)  10/30/2018    Procedure: REPLACEMENT, AORTIC VALVE, PERCUTANEOUS, TRANSCATHETER;  Surgeon: Tim Castillo MD;  Location: [facility] OR;  Service: Cardiovascular;;    RIGHT HEART CATHETERIZATION Right 2018    Procedure: INSERTION, CATHETER, RIGHT HEART;  Surgeon: Rex Chris MD;  Location: [facility] CATH;  Service: Cardiology;  Laterality: Right;    ROTATOR CUFF REPAIR Left     SINUS SURGERY      SPINE SURGERY      back surgery    TENDON RELEASE Bilateral     TONSILLECTOMY       Family History   Problem Relation Age of Onset    Cancer Mother     Heart disease Father     Heart disease Brother     Diabetes Brother      Social History     Tobacco Use    Smoking status: Former     Packs/day: 1.50     Years: 20.00     Pack years: 30.00     Types: Cigarettes     Start date:      Quit date:      Years since quittin.9    Smokeless tobacco: Never   Substance Use Topics    Alcohol use: No    Drug use: No     Review of patient's allergies  indicates:   Allergen Reactions    Sulfur Itching    Penicillins Swelling and Rash       Medications: I have reviewed the current medication administration record.    (Not in a hospital admission)      Review of Systems   Constitutional:  Negative for chills and fever.   HENT:  Negative for rhinorrhea and sneezing.    Eyes:  Negative for pain and redness.   Respiratory:  Negative for cough and shortness of breath.    Gastrointestinal:  Negative for abdominal pain and nausea.   Genitourinary:  Negative for difficulty urinating and dysuria.   Musculoskeletal:  Positive for arthralgias, gait problem and myalgias. Negative for back pain and neck pain.   Neurological:  Positive for weakness. Negative for tremors and speech difficulty.   Objective:     Vital Signs (Most Recent):  Pulse: 86 (12/14/22 1350)  Resp: 18 (12/14/22 1359)  BP: (!) 165/69 (12/14/22 1350)  SpO2: 95 % (12/14/22 1350)    Vital Signs Range (Last 24H):  Temp:  [97.8 °F (36.6 °C)]   Pulse:  [77-93]   Resp:  [12-20]   BP: (140-180)/()   SpO2:  [85 %-100 %]     Physical Exam  Vitals and nursing note reviewed.   Constitutional:       General: He is not in acute distress.  HENT:      Head: Normocephalic and atraumatic.      Nose: Nose normal. No rhinorrhea.   Eyes:      Conjunctiva/sclera: Conjunctivae normal.      Pupils: Pupils are equal, round, and reactive to light.   Cardiovascular:      Rate and Rhythm: Normal rate and regular rhythm.      Heart sounds: No murmur heard.  Pulmonary:      Effort: Pulmonary effort is normal. No respiratory distress.      Breath sounds: Normal breath sounds.   Abdominal:      Palpations: Abdomen is soft.      Tenderness: There is no abdominal tenderness.   Musculoskeletal:         General: Tenderness (LLE above the knee) present.      Right lower leg: No edema.      Left lower leg: No edema.   Skin:     General: Skin is warm and dry.      Capillary Refill: Capillary refill takes less than 2 seconds.   Neurological:       Mental Status: He is alert.       Neurological Exam:   LOC: alert  Attention Span: Good   Language: No aphasia  Articulation: No dysarthria  Orientation: Person, Place, Time   Visual Fields: Full  EOM (CN III, IV, VI): Full/intact  Pupils (CN II, III): PERRL  Facial Sensation (CN V): Normal  Facial Movement (CN VII): Symmetric facial expression    Reflexes: 2+ throughout  Motor: Arm left  Normal 5/5  Leg left  Normal 5/5  Arm right  Normal 5/5  Leg right Paresis: 4-/5  Cerebellum: No evidence of appendicular or axial ataxia  Sensation: Tactile extinction to bilateral simultaneous stimulation   Tone: Normal tone throughout        Laboratory:  CMP:   Recent Labs   Lab 12/14/22  1009   CALCIUM 9.6   ALBUMIN 3.8   PROT 7.1      K 4.8   CO2 31*      BUN 23   CREATININE 1.9*   ALKPHOS 92   ALT 34   AST 23   BILITOT 0.6     CBC:   Recent Labs   Lab 12/14/22  1009   WBC 6.67   RBC 4.84   HGB 15.3   HCT 47.4      MCV 98   MCH 31.6*   MCHC 32.3     Lipid Panel:   Recent Labs   Lab 12/14/22  1009   CHOL 177   LDLCALC 128.6   HDL 32*   TRIG 82     Coagulation:   Recent Labs   Lab 12/14/22  1009   INR 1.1     Hgb A1C: No results for input(s): HGBA1C in the last 168 hours.  TSH:   Recent Labs   Lab 12/14/22  1009   TSH 4.401*       Diagnostic Results:      Brain imaging:    CT Head 12/14/2022   No acute intracranial findings.     Age-appropriate cerebral volume loss with moderate patchy decreased attenuation supratentorial white matter while nonspecific suggestive for chronic ischemic change.     No evidence for acute intracranial hemorrhage.  Clinical correlation and further evaluation as warranted.      Vessel Imaging:    CTA multiphase 12/14/2022   CT head shows no acute intracranial abnormalities.     CTA head neck shows no intracranial large vessel occlusion or aneurysm.     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     Atherosclerosis  at the carotid bifurcations with evidence of prior endarterectomy.  No high-grade stenosis.      Cardiac Evaluation:     Echo 10/06/2022  1. The study quality is below average.   2. The left ventricle is decreased in size. Global left ventricular   systolic function is normal. The left ventricular ejection fraction is   63%. Noted left ventricular hypertrophy. It is mild.     3. A bioprosthetic vavle is noted in the aortic position.  No   significant central or paravalular AI.   4. Mild calcification of the mitral valve is noted.   5. Mild (1+) tricuspid regurgitation.

## 2022-12-14 NOTE — ASSESSMENT & PLAN NOTE
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 Initially presented to OSH for left sided weakness. CTH negative for acute process,  received tenecteplase at 1019 on 12/14. CTA on arrival negative for LVO, no IR intervention.    - neuro checks q1h  - goal SBP <180  - PRN hydralazine, labetalol  - TTE, A1c  - MRI Brain w/o contrast tomorrow  - DVT ppx held x24 hrs s/p TNK  - PT/OT/SLP evaluation

## 2022-12-14 NOTE — ASSESSMENT & PLAN NOTE
Per Pulmonology note 9/2022, sats 81% on room air and has home oxygen, however does not use it.  - scheduled duonebs  - PRN O2 with goal SpO2 88-92%

## 2022-12-14 NOTE — ED PROVIDER NOTES
Encounter Date: 12/14/2022       History     Chief Complaint   Patient presents with    Extremity Weakness     Pt arrives per [facility] with c/o left arm weakness that started this am. Pt also c/o fall from chair this am. Pt denies LOC.     87-year-old male presents with left-sided weakness for last hour prior to arrival  Patient with stroke-like symptoms with previous CVA noted in his chart on review  Patient has Alzheimer's & is a poor historian, had carotid surgery in 2021 on chart  Patient immediately came with obvious stroke-like symptoms, CT scanner ASAP  Patient with immediate tele stroke Neurology consult for stroke intervention      Review of patient's allergies indicates:   Allergen Reactions    Sulfur Itching    Penicillins Swelling and Rash     Past Medical History:   Diagnosis Date    Abnormal barium swallow     Alzheimer's dementia, late onset     Anal stenosis     Anticoagulant long-term use     Aortic stenosis     Arthritis     Aspiration pneumonitis     Benign prostatic hyperplasia     Carotid artery disease     CHF (congestive heart failure)     Chronic diastolic heart failure     Chronic kidney disease (CKD), stage III (moderate)     CKD (chronic kidney disease)     Colon polyp     COPD (chronic obstructive pulmonary disease)     Coronary artery disease     Diabetes mellitus, type 2     Diverticulosis     Dysphagia     Hearing aid worn 01/23/2020    Received bilateral hearing aides today    Heart disease     History of CEA (carotid endarterectomy)     [place] (hard of hearing)     Hyperlipidemia     Hypertension     Hypertensive heart disease     Hypothyroidism     Joint disease     Memory loss     PAD (peripheral artery disease)     Presence of drug coated stent in right coronary artery     Pulmonary hypertension     S/P TAVR (transcatheter aortic valve replacement)     Small bowel obstruction     Wears dentures     UPPER AND LOWER    Wears glasses      Past Surgical History:   Procedure  Laterality Date    anal fissure repair      ANKLE FUSION Left 08/15/2016    X 2    APPENDECTOMY      BACK SURGERY      X 4    CAROTID ENDARTERECTOMY Right     CAROTID ENDARTERECTOMY Left     CAROTID ENDARTERECTOMY Left 12/3/2021    Procedure: ENDARTERECTOMY-CAROTID;  Surgeon: Tim Castillo MD;  Location: [facility] OR;  Service: Cardiology;  Laterality: Left;  pt unsure if he stopped plavix, Dr. Castillo ok to proceed    CARPAL TUNNEL RELEASE Right     CHOLECYSTECTOMY      COLONOSCOPY N/A 7/26/2018    Procedure: HIGH RISK SCREENING COLONOSCOPY;  Surgeon: Connor Murrell MD;  Location: [facility] ENDO;  Service: Endoscopy;  Laterality: N/A;    CORONARY ANGIOPLASTY WITH STENT PLACEMENT      ELBOW SURGERY Bilateral     EXCISIONAL HEMORRHOIDECTOMY      x3    EXPLORATORY LAPAROTOMY W/ BOWEL RESECTION  11/17/2011    EYE SURGERY      cataract bilaterally    history of bowel resection      KNEE SURGERY Left     LAMINECTOMY AND MICRODISCECTOMY LUMBAR SPINE  07/21/2011    L5-S1    LEFT HEART CATHETERIZATION Left 8/31/2018    Procedure: Left heart cath;  Surgeon: Rex Chris MD;  Location: [facility] CATH;  Service: Cardiology;  Laterality: Left;    LUMBAR FUSION  11/09/2011    Anterior approach--L5-S1    LUNG BIOPSY Right 07/21/2009    VATS with wedge ofr right lower lobe    LUNG BIOPSY Left 3/16/2020    Procedure: BIOPSY, LUNG;  Surgeon: [facility] Diagnostic Provider;  Location: [facility] OR;  Service: General;  Laterality: Left;    PERCUTANEOUS TRANSCATHETER AORTIC VALVE REPLACEMENT (TAVR) Left 10/30/2018    Procedure: REPLACEMENT, AORTIC VALVE, PERCUTANEOUS, TRANSCATHETER;  Surgeon: Rex Chris MD;  Location: [facility] OR;  Service: Cardiology;  Laterality: Left;    PERCUTANEOUS TRANSCATHETER AORTIC VALVE REPLACEMENT (TAVR)  10/30/2018    Procedure: REPLACEMENT, AORTIC VALVE, PERCUTANEOUS, TRANSCATHETER;  Surgeon: Tim Castillo MD;  Location: [facility] OR;  Service: Cardiovascular;;    RIGHT HEART CATHETERIZATION Right  2018    Procedure: INSERTION, CATHETER, RIGHT HEART;  Surgeon: Rex Chris MD;  Location: [facility] CATH;  Service: Cardiology;  Laterality: Right;    ROTATOR CUFF REPAIR Left     SINUS SURGERY      SPINE SURGERY      back surgery    TENDON RELEASE Bilateral     TONSILLECTOMY       Family History   Problem Relation Age of Onset    Cancer Mother     Heart disease Father     Heart disease Brother     Diabetes Brother      Social History     Tobacco Use    Smoking status: Former     Packs/day: 1.50     Years: 20.00     Pack years: 30.00     Types: Cigarettes     Start date:      Quit date:      Years since quittin.9    Smokeless tobacco: Never   Substance Use Topics    Alcohol use: No    Drug use: No     Review of Systems   Unable to perform ROS: Acuity of condition   Constitutional:  Negative for fever.   HENT:  Negative for sore throat.    Respiratory:  Negative for shortness of breath.    Cardiovascular:  Negative for chest pain.   Gastrointestinal:  Negative for nausea.   Genitourinary:  Negative for dysuria.   Musculoskeletal:  Negative for back pain.   Skin:  Negative for rash.   Neurological:  Negative for weakness.        (+) weakness in the left upper extremity, 1/5 strength with no profound    Hematological:  Does not bruise/bleed easily.     Physical Exam     Initial Vitals   BP Pulse Resp Temp SpO2   22 0953 22 0953 22 0953 22 0955 22 0953   (!) 180/81 82 20 97.8 °F (36.6 °C) 98 %      MAP       --                Physical Exam    Nursing note and vitals reviewed.  Constitutional: Vital signs are normal. He appears well-developed and well-nourished. He is cooperative.   HENT:   Head: Normocephalic and atraumatic.   Right Ear: Hearing, tympanic membrane, external ear and ear canal normal.   Left Ear: Hearing, tympanic membrane, external ear and ear canal normal.   Nose: Nose normal.   Mouth/Throat: Uvula is midline, oropharynx is clear and moist and  mucous membranes are normal.   Eyes: Conjunctivae, EOM and lids are normal. Pupils are equal, round, and reactive to light.   Neck: Neck supple. No JVD present.   Normal range of motion.   Full passive range of motion without pain.     Cardiovascular:  Normal rate, regular rhythm, S1 normal, S2 normal, normal heart sounds, intact distal pulses and normal pulses.           Pulmonary/Chest: Effort normal and breath sounds normal.   Abdominal: Abdomen is soft and flat. Bowel sounds are normal.   Musculoskeletal:         General: Normal range of motion.      Cervical back: Full passive range of motion without pain, normal range of motion and neck supple.     Neurological:   Neurologic deficits left upper & lower extremity   Skin: Skin is warm, dry and intact. Capillary refill takes less than 2 seconds.       ED Course   Procedures  Labs Reviewed   CBC W/ AUTO DIFFERENTIAL - Abnormal; Notable for the following components:       Result Value    MCH 31.6 (*)     Immature Granulocytes 0.7 (*)     Immature Grans (Abs) 0.05 (*)     Lymph # 0.7 (*)     Gran % 78.2 (*)     Lymph % 9.7 (*)     All other components within normal limits   COMPREHENSIVE METABOLIC PANEL - Abnormal; Notable for the following components:    CO2 31 (*)     Creatinine 1.9 (*)     Anion Gap 7 (*)     eGFR 34 (*)     All other components within normal limits   LIPID PANEL - Abnormal; Notable for the following components:    HDL 32 (*)     HDL/Cholesterol Ratio 18.1 (*)     Total Cholesterol/HDL Ratio 5.5 (*)     All other components within normal limits   B-TYPE NATRIURETIC PEPTIDE - Abnormal; Notable for the following components:     (*)     All other components within normal limits   POCT GLUCOSE, HAND-HELD DEVICE - Normal   PROTIME-INR   TROPONIN I   TSH   POCT GLUCOSE     EKG Readings: (Independently Interpreted)   No STEMI  Normal sinus rhythm  No ectopy  Normal conduction  Normal ST segments  Normal T-wave  Normal axis  Heart rate in the 70s    ECG Results              ECG 12 lead (Final result)  Result time 12/14/22 10:58:32      Final result by Interface, Lab In [facility] (12/14/22 10:58:32)                   Narrative:    Test Reason : I63.9    Vent. Rate : 098 BPM     Atrial Rate : 083 BPM     P-R Int : 194 ms          QRS Dur : 130 ms      QT Int : 410 ms       P-R-T Axes : 042 036 031 degrees     QTc Int : 523 ms    Sinus rhythm  Right bundle branch block  Abnormal ECG  When compared with ECG of 02-DEC-2021 12:33,    Vent. rate has increased BY  33 BPM  Confirmed by ANURADHA MORALES MD ([id number]) on 12/14/2022 10:58:26 AM    Referred By: [email or web address]   SELF           Confirmed By:ANURADHA MORALES MD                                  Imaging Results              X-Ray Chest 1 View (Final result)  Result time 12/14/22 10:36:28      Final result by Stephanie Stock MD (12/14/22 10:36:28)                   Impression:      As above.      Electronically signed by: Stephanie Stock MD  Date:    12/14/2022  Time:    10:36               Narrative:    EXAMINATION:  XR CHEST 1 VIEW    CLINICAL HISTORY:  fatigue;    TECHNIQUE:  Single frontal view of the chest was performed.    COMPARISON:  12/03/2021    FINDINGS:  The heart is at the upper limits of normal in size.  Coarse interstitial prominence throughout the lungs suggesting chronic lung change.  Left basilar subsegmental atelectasis.  No consolidation or pleural effusion suspected.  Skeletal structures are intact.                                       CT Head Without Contrast (Final result)  Result time 12/14/22 10:09:58      Final result by Stephanie Stock MD (12/14/22 10:09:58)                   Impression:      No acute intracranial findings.    Age-appropriate cerebral volume loss with moderate patchy decreased attenuation supratentorial white matter while nonspecific suggestive for chronic ischemic change.    No evidence for acute intracranial hemorrhage.  Clinical correlation and further evaluation as  warranted.    Rapid AI was used to detect hemorrhage. Agree with rapid AI findings.      Electronically signed by: Stephanie Stock MD  Date:    12/14/2022  Time:    10:09               Narrative:    EXAMINATION:  CT HEAD WITHOUT CONTRAST    CLINICAL HISTORY:  Neuro deficit, acute, stroke suspected;    TECHNIQUE:  Multiple sequential 5 mm axial images of the head without contrast.  Coronal and sagittal reformatted imaging from the axial acquisition.    COMPARISON:  None    FINDINGS:  There is mild age-appropriate generalized cerebral volume loss.  Compensatory enlargement of the ventricle sulci and cisterns without hydrocephalus.  There is no midline shift or mass effect.  There is moderate ill-defined decreased attenuation in the supratentorial white matter while nonspecific suggestive for chronic ischemic change.  There is no evidence for acute intracranial hemorrhage or sulcal effacement.  There is no midline shift or mass effect.  Prominent vascular calcifications.  Visualized paranasal sinuses and mastoid air cells are clear..                                       Medications   tenecteplase (TNKase) IV KIT 17 mg (17 mg Intravenous Given 12/14/22 1019)     Medical Decision Making:   Acute left-sided weakness 1 hour prior to arrival per ER interview today  Differential includes mechanical fall versus stroke versus other versus encephalopathy  Immediate telemedicine consult with obvious stroke, thrombolytics ordered ASAP  Patient with good airway, answers all questions, no obvious facial droop noted  Metabolic workup, telemedicine recommendation for transfer immediately  Dr. Sam suggest transfer to Stroke Center for further evaluation immediately    Critical Care ED Physician Time (minutes):  -- Performed by: Alexei Blackwell M.D.  -- Date/Time: 11:31 AM 12/14/2022   -- Direct Patient Care (Face Time): 5  -- Additional History from Records or Taking Additional History: 5  -- Ordering, Reviewing, and Interpreting  Diagnostic Studies: 5  -- Total Time in Documentation: 11  -- Consultation with Other Physicians: 5  -- Consultation with Family Related to Condition: 0  -- Total Critical Care Time: 31  -- Critical care was necessary to treat Stroke  -- Critical care was time spent personally by me on the following activities:   -- examination of patient, ordering and performing treatments   -- review of radiographic studies, re-evaluation of pt's condition  -- review of labs and evaluation of response to treatment                           Clinical Impression:   Final diagnoses:  [I63.9] Stroke        ED Disposition Condition    Transfer to Another Facility Stable                     Alexei Blackwell MD  12/30/22 0917

## 2022-12-14 NOTE — CONSULTS
[facility] Dept  Vascular Neurology  [facility]  Consult Note    Inpatient consult to Vascular (Stroke) Neurology  Consult performed by: Jose Mendez MD  Consult ordered by: Reji Springer PA-C      Assessment/Plan:     Patient is a 87 y.o. year old male with:    * Cerebrovascular accident (CVA) due to thrombosis of right middle cerebral artery  86 yo M with initial CC disabling left hemiplegia now  s/p TNK. Patient was in his normal state of health when he woke up today. He was watching a game when he decided to go to the bathroom today 12/14/2022 @ 8:30 AM at which time he noted difficulty using the left side of his body. He managed to use a walker to ambulate to the bathroom, but on the way back, he had a fall, without head injury or LOC. CTH at OSH was negative for acute intracranial evidence of stroke or bleed, however, given clinical evidence of stroke, TNK was administered at 10:19 AM 12/14 and [place] [facility] [place].    Initial telestroke NIHSS 9 prior to arrival; On arrival, post-TNK NIHSS of 1 on my evaluation for LUE drift. Patient is independent at baseline with mRS 0.    Etiology: ESUS    Antithrombotics for secondary stroke prevention: HOLD for 24hrs post-thrombolysis;  Antiplatelets: Aspirin: 81 mg daily  Clopidogrel: 75 mg daily    Statins for secondary stroke prevention and hyperlipidemia, if present:   Statins: Atorvastatin- 40 mg daily    Aggressive risk factor modification: HTN, HLD, CAD, Carotid artery disease     Rehab efforts: The patient has been evaluated by a stroke team provider and the therapy needs have been fully considered based off the presenting complaints and exam findings. The following therapy evaluations are needed: PT evaluate and treat, OT evaluate and treat, SLP evaluate and treat, PM&R evaluate for appropriate placement    Diagnostics ordered/pending: HgbA1C to assess blood glucose levels, TTE to assess cardiac  function/status     VTE prophylaxis: Mechanical prophylaxis: Place SCDs  None: Reason for No Pharmacological VTE Prophylaxis: Holding x 24 hours s/p treatment with Thrombolytic therapy    BP parameters: Infarct: Post Thrombolytic therapy, SBP <180        Follicular lymphoma  H/o follicular lymphoma per chart (grade 3a, stage 1); receiving rituxan (cycle 3 in 11/2022), 10/2022 PET scan positive for 3 cm lymphadenopathy in left axillary region otherwise HUSSEIN.    Aortic stenosis s/p TAVR  History of aortic stenosis    Chronic kidney disease, stage 3 (moderate)  Recent Labs     12/14/22  1009   CREATININE 1.9*     -- monitor creatinine  -- avoid nephrotoxic agents    Hypertension  Stroke risk factor    -- SBP goal < 180  -- on PRN meds    Carotid artery disease  Stroke risk factor    H/o high grade stenosis in the left internal carotid artery (now s/p L carotid endarterectomy in 12/3/2021; Carotid US 12/22/2021 without evidence of hemodynamically significant stenosis)    Hyperlipidemia  Stroke risk factor      - atorvastatin 40mg    Hypothyroidism  Subclinical hypothyroidism; TSH 4.4; fT4 0.95 12/14/2021    Type 2 diabetes mellitus  Stroke risk factor    -- Repeat A1C  -- LDSSI & accuchecks        STROKE DOCUMENTATION     Acute Stroke Times   Last Known Normal Date: 12/14/22  Last Known Normal Time: 0700  Symptom Onset Date: 12/14/22  Symptom Onset Time: 0830  Stroke Team Called Date: 12/14/22  Stroke Team Called Time: 1240  Stroke Team Arrival Date: 12/14/22  Stroke Team Arrival Time: 1243  CTA Interpretation Time: 1314  Thrombectomy Recommended: No    NIH Scale:  Interval: baseline  1a. Level of Consciousness: 0-->Alert, keenly responsive  1b. LOC Questions: 0-->Answers both questions correctly  1c. LOC Commands: 0-->Performs both tasks correctly  2. Best Gaze: 0-->Normal  3. Visual: 0-->No visual loss  4. Facial Palsy: 0-->Normal symmetrical movements  5a. Motor Arm, Left: 1-->Drift, limb holds 90 (or 45)  degrees, but drifts down before full 10 seconds, does not hit bed or other support  5b. Motor Arm, Right: 0-->No drift, limb holds 90 (or 45) degrees for full 10 secs  6a. Motor Leg, Left: 0-->No drift, leg holds 30 degree position for full 5 secs  6b. Motor Leg, Right: 0-->No drift, leg holds 30 degree position for full 5 secs  7. Limb Ataxia: 0-->Absent  8. Sensory: 0-->Normal, no sensory loss  9. Best Language: 0-->No aphasia, normal  10. Dysarthria: 0-->Normal  11. Extinction and Inattention (formerly Neglect): 0-->No abnormality  Total (NIH Stroke Scale): 1    Modified [place] Score: 0  [place] Coma Scale:15   ABCD2 Score:    [id number] Score:   HAS -BLED Score:   ICH Score:   Hunt & Rae Classification:       Thrombolysis Candidate? Yes, given prior to arrival at outside hospital    Delays to Thrombolysis?  No    Interventional Revascularization Candidate?   Is the patient eligible for mechanical endovascular reperfusion (BALA)?  No; No large vessel occlusion identified on imaging  and No; no significant neurologic deficit (NIHSS <6)     Delays to Thrombectomy? Not Applicable    Hemorrhagic change of an Ischemic Stroke: Does this patient have an ischemic stroke with hemorrhagic changes? No     Subjective:     History of Present Illness:  Mr Kohler is a pleasant 88 yo M for whom vascular neurology is consulted for ischemic stroke with initial CC disabling left hemiplegia s/p TNK. Patient was in his normal state of health when he woke up today. He was watching a game when he decided to go to the bathroom today 12/14/2022 @ 8:30 AM at which time he noted difficulty using the left side of his body. He managed to use a walker to ambulate to the bathroom, but on the way back, he had a fall, without head injury or LOC. CTH at OSH was negative for acute intracranial evidence of stroke or bleed, however, given clinical evidence of stroke, TNK was administered at 10:19 AM 12/14 and pt transferred [place] [facility] [place]  post-thrombolytic care.    He has a PMHx of high grade stenosis in the left internal carotid artery (now s/p L carotid endarterectomy in 12/3/2021; CTA 12/14/2022 without evidence of significant carotid stenosis), follicular lymphoma, alzheimers, COPD, aortic valve stenosis, heart failure, renal insufficiency, PVD, DM, pulmonary HTN, dysphagia, lipoid pneumonia.     Initial telestroke NIHSS 9 prior to arrival; On arrival, post-TNK NIHSS of 1 on my evaluation for LUE drift. Patient is independent at baseline with mRS 0.            Past Medical History:   Diagnosis Date    Abnormal barium swallow     Alzheimer's dementia, late onset     Anal stenosis     Anticoagulant long-term use     Aortic stenosis     Arthritis     Aspiration pneumonitis     Benign prostatic hyperplasia     Carotid artery disease     CHF (congestive heart failure)     Chronic diastolic heart failure     Chronic kidney disease (CKD), stage III (moderate)     CKD (chronic kidney disease)     Colon polyp     COPD (chronic obstructive pulmonary disease)     Coronary artery disease     Diabetes mellitus, type 2     Diverticulosis     Dysphagia     Hearing aid worn 01/23/2020    Received bilateral hearing aides today    Heart disease     History of CEA (carotid endarterectomy)     [place] (hard of hearing)     Hyperlipidemia     Hypertension     Hypertensive heart disease     Hypothyroidism     Joint disease     Memory loss     PAD (peripheral artery disease)     Presence of drug coated stent in right coronary artery     Pulmonary hypertension     S/P TAVR (transcatheter aortic valve replacement)     Small bowel obstruction     Wears dentures     UPPER AND LOWER    Wears glasses      Past Surgical History:   Procedure Laterality Date    anal fissure repair      ANKLE FUSION Left 08/15/2016    X 2    APPENDECTOMY      BACK SURGERY      X 4    CAROTID ENDARTERECTOMY Right     CAROTID ENDARTERECTOMY Left     CAROTID ENDARTERECTOMY Left 12/3/2021    Procedure:  ENDARTERECTOMY-CAROTID;  Surgeon: Tim Castillo MD;  Location: [facility] OR;  Service: Cardiology;  Laterality: Left;  pt unsure if he stopped plavix, Dr. Castillo ok to proceed    CARPAL TUNNEL RELEASE Right     CHOLECYSTECTOMY      COLONOSCOPY N/A 7/26/2018    Procedure: HIGH RISK SCREENING COLONOSCOPY;  Surgeon: Connor Murrell MD;  Location: [facility] ENDO;  Service: Endoscopy;  Laterality: N/A;    CORONARY ANGIOPLASTY WITH STENT PLACEMENT      ELBOW SURGERY Bilateral     EXCISIONAL HEMORRHOIDECTOMY      x3    EXPLORATORY LAPAROTOMY W/ BOWEL RESECTION  11/17/2011    EYE SURGERY      cataract bilaterally    history of bowel resection      KNEE SURGERY Left     LAMINECTOMY AND MICRODISCECTOMY LUMBAR SPINE  07/21/2011    L5-S1    LEFT HEART CATHETERIZATION Left 8/31/2018    Procedure: Left heart cath;  Surgeon: Rex Chris MD;  Location: [facility] CATH;  Service: Cardiology;  Laterality: Left;    LUMBAR FUSION  11/09/2011    Anterior approach--L5-S1    LUNG BIOPSY Right 07/21/2009    VATS with wedge ofr right lower lobe    LUNG BIOPSY Left 3/16/2020    Procedure: BIOPSY, LUNG;  Surgeon: [facility] Diagnostic Provider;  Location: [facility] OR;  Service: General;  Laterality: Left;    PERCUTANEOUS TRANSCATHETER AORTIC VALVE REPLACEMENT (TAVR) Left 10/30/2018    Procedure: REPLACEMENT, AORTIC VALVE, PERCUTANEOUS, TRANSCATHETER;  Surgeon: Rex Chris MD;  Location: [facility] OR;  Service: Cardiology;  Laterality: Left;    PERCUTANEOUS TRANSCATHETER AORTIC VALVE REPLACEMENT (TAVR)  10/30/2018    Procedure: REPLACEMENT, AORTIC VALVE, PERCUTANEOUS, TRANSCATHETER;  Surgeon: Tim Castillo MD;  Location: [facility] OR;  Service: Cardiovascular;;    RIGHT HEART CATHETERIZATION Right 8/31/2018    Procedure: INSERTION, CATHETER, RIGHT HEART;  Surgeon: Rex Chris MD;  Location: [facility] CATH;  Service: Cardiology;  Laterality: Right;    ROTATOR CUFF REPAIR Left     SINUS SURGERY      SPINE SURGERY      back surgery     TENDON RELEASE Bilateral     TONSILLECTOMY       Family History   Problem Relation Age of Onset    Cancer Mother     Heart disease Father     Heart disease Brother     Diabetes Brother      Social History     Tobacco Use    Smoking status: Former     Packs/day: 1.50     Years: 20.00     Pack years: 30.00     Types: Cigarettes     Start date:      Quit date:      Years since quittin.9    Smokeless tobacco: Never   Substance Use Topics    Alcohol use: No    Drug use: No     Review of patient's allergies indicates:   Allergen Reactions    Sulfur Itching    Penicillins Swelling and Rash       Medications: I have reviewed the current medication administration record.    (Not in a hospital admission)      Review of Systems   Constitutional:  Negative for chills and fever.   HENT:  Negative for rhinorrhea and sneezing.    Eyes:  Negative for pain and redness.   Respiratory:  Negative for cough and shortness of breath.    Gastrointestinal:  Negative for abdominal pain and nausea.   Genitourinary:  Negative for difficulty urinating and dysuria.   Musculoskeletal:  Positive for arthralgias, gait problem and myalgias. Negative for back pain and neck pain.   Neurological:  Positive for weakness. Negative for tremors and speech difficulty.   Objective:     Vital Signs (Most Recent):  Pulse: 86 (22 1350)  Resp: 18 (22 1359)  BP: (!) 165/69 (22 1350)  SpO2: 95 % (22 1350)    Vital Signs Range (Last 24H):  Temp:  [97.8 °F (36.6 °C)]   Pulse:  [77-93]   Resp:  [12-20]   BP: (140-180)/()   SpO2:  [85 %-100 %]     Physical Exam  Vitals and nursing note reviewed.   Constitutional:       General: He is not in acute distress.  HENT:      Head: Normocephalic and atraumatic.      Nose: Nose normal. No rhinorrhea.   Eyes:      Conjunctiva/sclera: Conjunctivae normal.      Pupils: Pupils are equal, round, and reactive to light.   Cardiovascular:      Rate and Rhythm: Normal rate and regular rhythm.       Heart sounds: No murmur heard.  Pulmonary:      Effort: Pulmonary effort is normal. No respiratory distress.      Breath sounds: Normal breath sounds.   Abdominal:      Palpations: Abdomen is soft.      Tenderness: There is no abdominal tenderness.   Musculoskeletal:         General: Tenderness (LLE above the knee) present.      Right lower leg: No edema.      Left lower leg: No edema.   Skin:     General: Skin is warm and dry.      Capillary Refill: Capillary refill takes less than 2 seconds.   Neurological:      Mental Status: He is alert.       Neurological Exam:   LOC: alert  Attention Span: Good   Language: No aphasia  Articulation: No dysarthria  Orientation: Person, Place, Time   Visual Fields: Full  EOM (CN III, IV, VI): Full/intact  Pupils (CN II, III): PERRL  Facial Sensation (CN V): Normal  Facial Movement (CN VII): Symmetric facial expression    Reflexes: 2+ throughout  Motor: Arm left  Normal 5/5  Leg left  Normal 5/5  Arm right  Normal 5/5  Leg right Paresis: 4-/5  Cerebellum: No evidence of appendicular or axial ataxia  Sensation: Tactile extinction to bilateral simultaneous stimulation   Tone: Normal tone throughout        Laboratory:  CMP:   Recent Labs   Lab 12/14/22  1009   CALCIUM 9.6   ALBUMIN 3.8   PROT 7.1      K 4.8   CO2 31*      BUN 23   CREATININE 1.9*   ALKPHOS 92   ALT 34   AST 23   BILITOT 0.6     CBC:   Recent Labs   Lab 12/14/22  1009   WBC 6.67   RBC 4.84   HGB 15.3   HCT 47.4      MCV 98   MCH 31.6*   MCHC 32.3     Lipid Panel:   Recent Labs   Lab 12/14/22  1009   CHOL 177   LDLCALC 128.6   HDL 32*   TRIG 82     Coagulation:   Recent Labs   Lab 12/14/22  1009   INR 1.1     Hgb A1C: No results for input(s): HGBA1C in the last 168 hours.  TSH:   Recent Labs   Lab 12/14/22  1009   TSH 4.401*       Diagnostic Results:      Brain imaging:    CT Head 12/14/2022   No acute intracranial findings.     Age-appropriate cerebral volume loss with moderate patchy decreased  attenuation supratentorial white matter while nonspecific suggestive for chronic ischemic change.     No evidence for acute intracranial hemorrhage.  Clinical correlation and further evaluation as warranted.      Vessel Imaging:    CTA multiphase 12/14/2022   CT head shows no acute intracranial abnormalities.     CTA head neck shows no intracranial large vessel occlusion or aneurysm.     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     Atherosclerosis at the carotid bifurcations with evidence of prior endarterectomy.  No high-grade stenosis.      Cardiac Evaluation:     Echo 10/06/2022  1. The study quality is below average.   2. The left ventricle is decreased in size. Global left ventricular   systolic function is normal. The left ventricular ejection fraction is   63%. Noted left ventricular hypertrophy. It is mild.     3. A bioprosthetic vavle is noted in the aortic position.  No   significant central or paravalular AI.   4. Mild calcification of the mitral valve is noted.   5. Mild (1+) tricuspid regurgitation.          Jose Mendez MD  [facility]  Department of Vascular Neurology   [facility] Dept

## 2022-12-15 PROBLEM — I63.311 CEREBROVASCULAR ACCIDENT (CVA) DUE TO THROMBOSIS OF RIGHT MIDDLE CEREBRAL ARTERY: Status: ACTIVE | Noted: 2022-01-01

## 2022-12-15 PROBLEM — I63.411 CEREBROVASCULAR ACCIDENT (CVA) DUE TO EMBOLISM OF RIGHT MIDDLE CEREBRAL ARTERY: Status: ACTIVE | Noted: 2022-01-01

## 2022-12-15 NOTE — PT/OT/SLP EVAL
Physical Therapy Evaluation and Treatment    Patient Name: Vega Kohler   MRN: [id number]  Recent Surgery: * No surgery found *    Co-evaluation w/ OT 2/2 suspected pt complexity and requirement of assistance from 2 skilled therapists to maximize treatment potential and maintain pt safety  Recommendations:     Discharge Recommendations: nursing facility, skilled (SNF vs HHPT w/ 24/7 supervision)   Discharge Equipment Recommendations: bedside commode, bath bench   Barriers to discharge: Increased level of assist, Inaccessible home, and Decreased caregiver support    Assessment:     Vega Kohler is a 87 y.o. 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 He presents with the following impairments/functional limitations: weakness, impaired self care skills, impaired functional mobility, impaired sensation, gait instability, impaired balance, decreased upper extremity function, decreased lower extremity function, decreased coordination, impaired cognition, decreased safety awareness, pain, impaired cardiopulmonary response to activity. Pt AO x4 w/ slight confusion intermittently during session and dec strength in LLE. Pt w/ good use of RW for transfers and ambulation, w/ no cueing required for hand placement or sequencing w/ AD. Pt w/ dec mobility and memory deficits, unsafe to return home independently 2/2 living alone, would benefit from SNF vs HHPT w/ 24/7 supervision pending progress w/ therapy and pt/family preference.    Rehab Prognosis: Fair; patient would benefit from acute skilled PT services to address these deficits and reach maximum level of function.  Recent Surgery: * No surgery found *      Plan:     During this hospitalization, patient to be seen 3 x/week to address the identified rehab impairments via gait training, therapeutic activities, therapeutic exercises, neuromuscular re-education and progress toward the following  goals:    Plan of Care Expires: 01/13/23    Subjective     Chief Complaint: pain in back and L hip  Patient/Family Comments/Goals: pt wants to get back home  Pain/Comfort:  Pain Rating 1: 7/10  Location - Side 1: Left  Location 1: hip  Pain Addressed 1: Reposition, Distraction, Cessation of Activity  Pain Rating Post-Intervention 1: 4/10    Patients cultural, spiritual, Yazdanism conflicts given the current situation: no    Social History:  Living Environment: Patient lives alone in a trailer, number of outside stairs: 4 B rails, tub-shower combo.  Prior Level of Function: Prior to admission, patient was independent with ADLs, using rolling walker for ambulation.  DME owned (not currently used): none  Assistance Upon Discharge:  pt's sons live next door but work during the day, they can check in on him but not stay with him/assist regularly    Objective:     Communicated with RN prior to session. Patient found HOB elevated with telemetry, SCD, bed alarm, pulse ox (continuous), blood pressure cuff, oxygen upon PT entry to room.    General Precautions: Standard, aspiration, fall   Orthopedic Precautions:    Braces: N/A  Respiratory Status: Nasal cannula, flow 2 L/min    Exams:  Cognitive Exam: Patient is oriented to Person, Place, Time, Situation, follows commands 100% of the time  RLE ROM: WFL  RLE Strength: WFL, grossly 4/5  LLE ROM: WFL  LLE Strength:  3-/5 grossly  Sensation: Intact light touch to BLEs except for intermittent tingling in L hip 2/2 pain  Coordination: Impaired finger to nose     Functional Mobility:  Bed Mobility:  Supine to Sit: minimum assistance  Transfers:  Sit to Stand: contact guard assistance with rolling walker  Bed to Chair: contact guard assistance with rolling walker using Step Transfer  Gait:   Patient ambulated 20' with rolling walker and contact guard assistance. Patient demonstrates decreased step length, wide base of support, decreased foot clearance, and flexed posture.  Balance:    Static Sitting: stand by assistance at EOB   Dynamic Sitting: stand by assistance at EOB  Static Standing: stand by assistance with rolling walker  Dynamic Standing: contact guard assistance with rolling walker    Therapeutic Activities and Exercises:  Patient educated on role of acute care PT and PT POC, safety while in hospital including calling nurse for mobility, and call light usage  Patient educated about importance of OOB mobility  Dynamic sitting balance x10min w/ SBA while performing ADLs w/ OT w/ min cueing for posture  Ambulated 20' x1 and 8' x1 w/ RW CGA w/ min cueing for safety awareness w/ lines/tubes  Pt educated on PT POC/goals, d/c recs, and continued treatment. All questions answered and pt in agreement w/ POC.    Patient clear to ambulate to/from bathroom with RN/PCT, assist x1 w/ RW .    AM-PAC 6 CLICK MOBILITY  Turning over in bed (including adjusting bedclothes, sheets and blankets)?: 4  Sitting down on and standing up from a chair with arms (e.g., wheelchair, bedside commode, etc.): 3  Moving from lying on back to sitting on the side of the bed?: 3  Moving to and from a bed to a chair (including a wheelchair)?: 3  Need to walk in hospital room?: 3  Climbing 3-5 steps with a railing?: 2  Basic Mobility Total Score: 18     Patient left up in chair with all lines intact, call button in reach, RN notified, and chair alarm on.    GOALS:   Multidisciplinary Problems       Physical Therapy Goals          Problem: Physical Therapy    Goal Priority Disciplines Outcome Goal Variances Interventions   Physical Therapy Goal     PT, PT/OT Ongoing, Progressing     Description: Goals to be complete by 12/30/22    Pt will perform sup<>sit transfers w/ independence  Pt will have sufficient dynamic balance to sit EOB while performing ADLs/therex w/ independence  PT will be able to stand up from EOB w/ supervision using RW  Pt will ambulate 100 feet w/ RW supervision   Pt will be independent w/ HEP therex on  BLE w/ good form and ROM                          History:     Past Medical History:   Diagnosis Date    Abnormal barium swallow     Alzheimer's dementia, late onset     Anal stenosis     Anticoagulant long-term use     Aortic stenosis     Arthritis     Aspiration pneumonitis     Benign prostatic hyperplasia     Carotid artery disease     CHF (congestive heart failure)     Chronic diastolic heart failure     Chronic kidney disease (CKD), stage III (moderate)     CKD (chronic kidney disease)     Colon polyp     COPD (chronic obstructive pulmonary disease)     Coronary artery disease     Diabetes mellitus, type 2     Diverticulosis     Dysphagia     Hearing aid worn 01/23/2020    Received bilateral hearing aides today    Heart disease     History of CEA (carotid endarterectomy)     [place] (hard of hearing)     Hyperlipidemia     Hypertension     Hypertensive heart disease     Hypothyroidism     Joint disease     Memory loss     PAD (peripheral artery disease)     Presence of drug coated stent in right coronary artery     Pulmonary hypertension     S/P TAVR (transcatheter aortic valve replacement)     Small bowel obstruction     Wears dentures     UPPER AND LOWER    Wears glasses        Past Surgical History:   Procedure Laterality Date    anal fissure repair      ANKLE FUSION Left 08/15/2016    X 2    APPENDECTOMY      BACK SURGERY      X 4    CAROTID ENDARTERECTOMY Right     CAROTID ENDARTERECTOMY Left     CAROTID ENDARTERECTOMY Left 12/3/2021    Procedure: ENDARTERECTOMY-CAROTID;  Surgeon: Tim Castillo MD;  Location: [facility] OR;  Service: Cardiology;  Laterality: Left;  pt unsure if he stopped plavix, Dr. Castillo ok to proceed    CARPAL TUNNEL RELEASE Right     CHOLECYSTECTOMY      COLONOSCOPY N/A 7/26/2018    Procedure: HIGH RISK SCREENING COLONOSCOPY;  Surgeon: Connor Murrell MD;  Location: [facility] ENDO;  Service: Endoscopy;  Laterality: N/A;    CORONARY ANGIOPLASTY WITH STENT PLACEMENT      ELBOW SURGERY  Bilateral     EXCISIONAL HEMORRHOIDECTOMY      x3    EXPLORATORY LAPAROTOMY W/ BOWEL RESECTION  11/17/2011    EYE SURGERY      cataract bilaterally    history of bowel resection      KNEE SURGERY Left     LAMINECTOMY AND MICRODISCECTOMY LUMBAR SPINE  07/21/2011    L5-S1    LEFT HEART CATHETERIZATION Left 8/31/2018    Procedure: Left heart cath;  Surgeon: Rex Chris MD;  Location: [facility] CATH;  Service: Cardiology;  Laterality: Left;    LUMBAR FUSION  11/09/2011    Anterior approach--L5-S1    LUNG BIOPSY Right 07/21/2009    VATS with wedge ofr right lower lobe    LUNG BIOPSY Left 3/16/2020    Procedure: BIOPSY, LUNG;  Surgeon: [facility] Diagnostic Provider;  Location: [facility] OR;  Service: General;  Laterality: Left;    PERCUTANEOUS TRANSCATHETER AORTIC VALVE REPLACEMENT (TAVR) Left 10/30/2018    Procedure: REPLACEMENT, AORTIC VALVE, PERCUTANEOUS, TRANSCATHETER;  Surgeon: Rex Chris MD;  Location: [facility] OR;  Service: Cardiology;  Laterality: Left;    PERCUTANEOUS TRANSCATHETER AORTIC VALVE REPLACEMENT (TAVR)  10/30/2018    Procedure: REPLACEMENT, AORTIC VALVE, PERCUTANEOUS, TRANSCATHETER;  Surgeon: Tim Castillo MD;  Location: [facility] OR;  Service: Cardiovascular;;    RIGHT HEART CATHETERIZATION Right 8/31/2018    Procedure: INSERTION, CATHETER, RIGHT HEART;  Surgeon: Rex Chris MD;  Location: [facility] CATH;  Service: Cardiology;  Laterality: Right;    ROTATOR CUFF REPAIR Left     SINUS SURGERY      SPINE SURGERY      back surgery    TENDON RELEASE Bilateral     TONSILLECTOMY         Time Tracking:     PT Received On: 12/15/22  PT Start Time: 1258  PT Stop Time: 1331  PT Total Time (min): 33 min     Billable Minutes: Evaluation 10, Gait Training 15, and Therapeutic Activity 8    12/15/2022

## 2022-12-15 NOTE — ASSESSMENT & PLAN NOTE
Recent Labs     12/14/22  1009 12/15/22  0034   CREATININE 1.9* 1.4     -- monitor creatinine  -- avoid nephrotoxic agents

## 2022-12-15 NOTE — PT/OT/SLP EVAL
Speech Language Pathology Evaluation  Cognitive/Bedside Swallow    Patient Name:  Vega Kohler   MRN:  [id number]  Admitting Diagnosis: Cerebrovascular accident (CVA) due to embolism of right middle cerebral artery    Recommendations:                  General Recommendations:  Dysphagia therapy and Cognitive-linguistic therapy  Diet recommendations:  Regular, Thin   Aspiration Precautions: 1 bite/sip at a time, Alternating bites/sips, Eliminate distractions, Feed only when awake/alert, HOB to 90 degrees, Meds whole 1 at a time, Small bites/sips, and Strict aspiration precautions   General Precautions: Standard, aspiration, fall  Communication strategies:   loud vocal intensity, pt [place]    History:     Past Medical History:   Diagnosis Date    Abnormal barium swallow     Alzheimer's dementia, late onset     Anal stenosis     Anticoagulant long-term use     Aortic stenosis     Arthritis     Aspiration pneumonitis     Benign prostatic hyperplasia     Carotid artery disease     CHF (congestive heart failure)     Chronic diastolic heart failure     Chronic kidney disease (CKD), stage III (moderate)     CKD (chronic kidney disease)     Colon polyp     COPD (chronic obstructive pulmonary disease)     Coronary artery disease     Diabetes mellitus, type 2     Diverticulosis     Dysphagia     Hearing aid worn 01/23/2020    Received bilateral hearing aides today    Heart disease     History of CEA (carotid endarterectomy)     [place] (hard of hearing)     Hyperlipidemia     Hypertension     Hypertensive heart disease     Hypothyroidism     Joint disease     Memory loss     PAD (peripheral artery disease)     Presence of drug coated stent in right coronary artery     Pulmonary hypertension     S/P TAVR (transcatheter aortic valve replacement)     Small bowel obstruction     Wears dentures     UPPER AND LOWER    Wears glasses        Past Surgical History:   Procedure Laterality Date    anal fissure repair      ANKLE FUSION Left  08/15/2016    X 2    APPENDECTOMY      BACK SURGERY      X 4    CAROTID ENDARTERECTOMY Right     CAROTID ENDARTERECTOMY Left     CAROTID ENDARTERECTOMY Left 12/3/2021    Procedure: ENDARTERECTOMY-CAROTID;  Surgeon: Tim Castillo MD;  Location: [facility] OR;  Service: Cardiology;  Laterality: Left;  pt unsure if he stopped plavix, Dr. Castillo ok to proceed    CARPAL TUNNEL RELEASE Right     CHOLECYSTECTOMY      COLONOSCOPY N/A 7/26/2018    Procedure: HIGH RISK SCREENING COLONOSCOPY;  Surgeon: Connor Murrell MD;  Location: [facility] ENDO;  Service: Endoscopy;  Laterality: N/A;    CORONARY ANGIOPLASTY WITH STENT PLACEMENT      ELBOW SURGERY Bilateral     EXCISIONAL HEMORRHOIDECTOMY      x3    EXPLORATORY LAPAROTOMY W/ BOWEL RESECTION  11/17/2011    EYE SURGERY      cataract bilaterally    history of bowel resection      KNEE SURGERY Left     LAMINECTOMY AND MICRODISCECTOMY LUMBAR SPINE  07/21/2011    L5-S1    LEFT HEART CATHETERIZATION Left 8/31/2018    Procedure: Left heart cath;  Surgeon: Rex Chris MD;  Location: [facility] CATH;  Service: Cardiology;  Laterality: Left;    LUMBAR FUSION  11/09/2011    Anterior approach--L5-S1    LUNG BIOPSY Right 07/21/2009    VATS with wedge ofr right lower lobe    LUNG BIOPSY Left 3/16/2020    Procedure: BIOPSY, LUNG;  Surgeon: [facility] Diagnostic Provider;  Location: [facility] OR;  Service: General;  Laterality: Left;    PERCUTANEOUS TRANSCATHETER AORTIC VALVE REPLACEMENT (TAVR) Left 10/30/2018    Procedure: REPLACEMENT, AORTIC VALVE, PERCUTANEOUS, TRANSCATHETER;  Surgeon: Rex Chris MD;  Location: [facility] OR;  Service: Cardiology;  Laterality: Left;    PERCUTANEOUS TRANSCATHETER AORTIC VALVE REPLACEMENT (TAVR)  10/30/2018    Procedure: REPLACEMENT, AORTIC VALVE, PERCUTANEOUS, TRANSCATHETER;  Surgeon: Tim Castillo MD;  Location: [facility] OR;  Service: Cardiovascular;;    RIGHT HEART CATHETERIZATION Right 8/31/2018    Procedure: INSERTION, CATHETER, RIGHT HEART;   "Surgeon: Rex Chris MD;  Location: [facility] CATH;  Service: Cardiology;  Laterality: Right;    ROTATOR CUFF REPAIR Left     SINUS SURGERY      SPINE SURGERY      back surgery    TENDON RELEASE Bilateral     TONSILLECTOMY         Social History: Patient reports he lives alone and was IND.  PMHx includes Alzheimer's dementia though pt denied any difficulty with memory or completing ADL's.  He states he manages his own finances and medications and drives.  He denied h/o dysphagia though Epic with documented MBSS in 2017 with silent aspiration of thin liquids with recs for soft diet with nectar thick liquids.  Pt reports reg diet/thin liquids, denied any PNA in recent years.  No family present to confirm social history.     Prior Intubation HX: none this admission    Chest X-Rays: 12/14: "The heart is at the upper limits of normal in size.  Coarse interstitial prominence throughout the lungs suggesting chronic lung change.  Left basilar subsegmental atelectasis.  No consolidation or pleural effusion suspected.  Skeletal structures are intact."    Subjective     "I hit my hip when I fell."    Nurse cleared for evaluation.     Pain/Comfort:  Pain Rating 1: 6/10  Location 1: hip  Pain Addressed 1: Distraction, Nurse notified, Reposition  Pain Rating Post-Intervention 1: 6/10    Respiratory Status: Nasal cannula    Objective:     Cognitive Status:    Attention Sustained attention deficit    Orientation Oriented x4  Memory Immediate Recall 100% and Delayed0/3 unassociated words recalled after delay  Problem Solving Categories 50%, Solutions 100%, and Compare/contrast 50%       Receptive Language:   Comprehension:   WFL    Expressive Language:  Verbal:    Pt fluent in conversation with occasional prolonged pauses.  Confrontation naming completed with 80% accy (paper towel/straw stated for cup), responsive naming completed with 100% accy        Motor Speech:  WFL    Voice:   Intermittently mildly " wet    Visual-Spatial:  tba    Reading:   tba      Written Expression:   tba    Oral Musculature Evaluation  Oral Musculature: WFL  Dentition: upper and lower dentures  Secretion Management: adequate  Mucosal Quality: adequate  Mandibular Strength and Mobility: WFL  Oral Labial Strength and Mobility: WFL  Lingual Strength and Mobility: WFL  Buccal Strength and Mobility: WFL  Volitional Cough: WFL  Volitional Swallow: WFL  Voice Prior to PO Intake: clear,    Bedside Swallow Eval:   Consistencies Assessed:  Thin liquids    Puree    Solids        Oral Phase:   Occasional oral hold    Pharyngeal Phase:   no overt clinical signs/symptoms of aspiration    Compensatory Strategies  None    Treatment: Education provided re: role of SLP, diet recs, swallow precs, s/s aspiration, and POC.  Pt verbalized understanding and agreement.     Assessment:     Vega Kohler is a 87 y.o. male with an SLP diagnosis of Cognitive-Linguistic Impairment.  He presents with unknown cognitive baseline and h/o dysphagia.  SLP to f/u.     Goals:   Multidisciplinary Problems       SLP Goals          Problem: SLP    Goal Priority Disciplines Outcome   SLP Goal     SLP Ongoing, Progressing   Description: Speech Language Pathology Goals  Goals expected to be met by 12/22  1. Pt will tolerate regular diet with thin liquids without overt s/s aspiration.   2. Pt will complete simple reasoning/problem solving tasks with 70% accy with min cues.   3. Pt will complete assessment of reading, writing, visual spatial skills to determine need for tx.                              Plan:     Patient to be seen:  3 x/week   Plan of Care expires:  01/14/23  Plan of Care reviewed with:  patient   SLP Follow-Up:  Yes       Discharge recommendations:  Discharge Facility/Level of Care Needs:  (tbd)   Barriers to Discharge:  Level of Skilled Assistance Needed      Time Tracking:     SLP Treatment Date:   12/15/22  Speech Start Time:  0710  Speech Stop Time:  0728      Speech Total Time (min):  18 min    Billable Minutes: Eval 10  and Eval Swallow and Oral Function 8    12/15/2022

## 2022-12-15 NOTE — HOSPITAL COURSE
Patient admitted for post TNK care. Exam improved following TNK. MRI with R MCA infarct. Etiology likely . Patient stepped down to NPU to await SNF placement. Metoprolol originally started but held given etiology of stroke and risk of expansion with hypoperfusion. Will plan to resume meds this Saturday and then remainder of home BP meds on Monday. Patient will be discharged on DAPT with outpatient follow up.     12/15/2022 Neuro exam stable; NIHSS 3 for mild drowsiness, LUE/LLE drift. AAOx4, CT head negative for interval changes or superimposed acute intracranial process.  2022 Neuro exam stable; NIHSS 0. No longer having LUE/LLE drift. AAOx4. Pt to be stepped down to NPU.  2022 Remains in NCC pending step down, NAEO. Neuro exam unchanged. PT/OT recommending SNF vs HHPT with 24/7 care, will plan for SNF at time time given decreased caregiver support at home.   2022 Stepped down to NPU overnight.  NAEO.  Neuro exam is stable.  Awaiting SNF placement.  2022 Patient remains with LUE drift. Speech at baseline per patient. SBP < 120, will d/c metoprolol at this time to maintain BP/perfusion given small vessel stroke. Patient is medically ready. Pending SNF v/ HH with 24/7 supervision (son is trying to set this up as patient lives in mobile home alone). Soap enema ordered if patient unable to have BM by the end of the day.   2022 No acute events overnight. SBP goal < 180, remains at goal. Medically ready, awaiting SNF placement.   2022 Patient remains neurologically unchanged. SBP drop < 120 but no changes on exam. Last BM yesterday. Pending SNF placement.

## 2022-12-15 NOTE — PLAN OF CARE
Problem: Occupational Therapy  Goal: Occupational Therapy Goal  Description: Goals to be met by: 12/29/22     Patient will increase functional independence with ADLs by performing:    UE Dressing with Supervision.  LE Dressing with Supervision.  Grooming while standing at sink with Supervision.  Toileting from toilet with Supervision for hygiene and clothing management.   Toilet transfer to toilet with Supervision.  Functional mobility of household and community distance with  supervision and AD as needed      Outcome: Ongoing, Progressing

## 2022-12-15 NOTE — SUBJECTIVE & OBJECTIVE
Interval History:  As above    Review of Systems   Constitutional:  Negative for activity change, appetite change, chills, fatigue, fever and unexpected weight change.   HENT:  Negative for trouble swallowing and voice change.    Eyes: Negative.    Respiratory:  Negative for cough, chest tightness, shortness of breath and wheezing.    Cardiovascular:  Negative for chest pain, palpitations and leg swelling.   Gastrointestinal:  Negative for abdominal distention, abdominal pain, anal bleeding, blood in stool, constipation, diarrhea, nausea, rectal pain and vomiting.   Endocrine: Negative.    Genitourinary: Negative.    Musculoskeletal:  Positive for back pain. Negative for neck pain and neck stiffness.   Skin:  Negative for pallor, rash and wound.   Allergic/Immunologic: Negative.    Neurological:  Positive for weakness. Negative for dizziness, seizures, syncope, light-headedness, numbness and headaches.   Hematological: Negative.    Psychiatric/Behavioral:  Negative for confusion and sleep disturbance. The patient is not nervous/anxious.    All other systems reviewed and are negative.    Objective:     Vitals:  Temp: 98.5 °F (36.9 °C)  Pulse: 72  Rhythm: normal sinus rhythm  BP: (!) 114/54  MAP (mmHg): 78  Resp: 15  SpO2: 100 %    Temp  Min: 97.9 °F (36.6 °C)  Max: 98.8 °F (37.1 °C)  Pulse  Min: 67  Max: 107  BP  Min: 65/43  Max: 165/69  MAP (mmHg)  Min: 50  Max: 101  Resp  Min: 9  Max: 20  SpO2  Min: 85 %  Max: 100 %    12/14 0701 - 12/15 0700  In: 1159.4 [I.V.:408.5]  Out: 770 [Urine:770]           Physical Exam  Vitals and nursing note reviewed.   Constitutional:       General: He is not in acute distress.     Appearance: Normal appearance. He is not ill-appearing or toxic-appearing.   HENT:      Head: Normocephalic and atraumatic.      Nose: Nose normal.      Mouth/Throat:      Mouth: Mucous membranes are moist.      Pharynx: Oropharynx is clear.   Eyes:      General: No scleral icterus.     Extraocular  Movements: Extraocular movements intact.      Conjunctiva/sclera: Conjunctivae normal.      Pupils: Pupils are equal, round, and reactive to light.   Cardiovascular:      Rate and Rhythm: Normal rate and regular rhythm.      Pulses: Normal pulses.      Heart sounds: Normal heart sounds. No murmur heard.  Pulmonary:      Effort: Pulmonary effort is normal. No respiratory distress.      Breath sounds: Normal breath sounds. No stridor. No wheezing.   Abdominal:      General: Abdomen is flat. Bowel sounds are normal. There is no distension.      Palpations: Abdomen is soft.      Tenderness: There is no abdominal tenderness. There is no guarding or rebound.   Musculoskeletal:      Cervical back: Normal range of motion and neck supple. No rigidity.   Lymphadenopathy:      Cervical: No cervical adenopathy.   Skin:     General: Skin is warm and dry.      Capillary Refill: Capillary refill takes less than 2 seconds.      Coloration: Skin is not jaundiced or pale.      Findings: No bruising or rash.   Neurological:      Mental Status: He is alert and oriented to person, place, and time. Mental status is at baseline.      Sensory: No sensory deficit.      Motor: Weakness (4/5 LLE) present.      Comments: Following commands  Sensation intact  PERRLA  Mild tremor in hands bilaterally  No pronator drift     Psychiatric:         Mood and Affect: Mood normal.         Behavior: Behavior normal.         Medications:  Continuoussodium chloride 0.9%, Last Rate: 50 mL/hr at 12/15/22 0619    Scheduledalbuterol-ipratropium, 3 mL, Q6H  atorvastatin, 40 mg, Daily  memantine, 10 mg, Daily  metoprolol tartrate, 12.5 mg, Q12H  senna-docusate 8.6-50 mg, 1 tablet, BID  tamsulosin, 0.4 mg, Daily    PRNdextrose 10%, 12.5 g, PRN  dextrose 10%, 25 g, PRN  glucagon (human recombinant), 1 mg, PRN  hydrALAZINE, 10 mg, Q6H PRN  insulin aspart U-100, 0-5 Units, QID (AC + HS) PRN  labetalol, 10 mg, Q6H PRN  polyethylene glycol, 17 g, BID PRN  sodium  chloride 0.9%, 10 mL, PRN      Today I personally reviewed pertinent medications, lines/drains/airways, imaging, cardiology results, laboratory results, notably:    Diet  Diet diabetic [facility]; 2000 Calorie; Cardiac (Low Na/Chol); Thin

## 2022-12-15 NOTE — PLAN OF CARE
PT Eval complete, appropriate goals created     Problem: Physical Therapy  Goal: Physical Therapy Goal  Description: Goals to be complete by 12/30/22    Pt will perform sup<>sit transfers w/ independence  Pt will have sufficient dynamic balance to sit EOB while performing ADLs/therex w/ independence  PT will be able to stand up from EOB w/ supervision using RW  Pt will ambulate 100 feet w/ RW supervision   Pt will be independent w/ HEP therex on BLE w/ good form and ROM     Outcome: Ongoing, Progressing

## 2022-12-15 NOTE — HOSPITAL COURSE
12/15/2022 Pt with mild back pain. Strength returning to normal. Residual weakness to the LLE.   12/16/2022 Restart DAAPT. Pt stable for stepdown to vasc neuro  12/17/2022 [facility] to [facility]

## 2022-12-15 NOTE — PROGRESS NOTES
"[facility]  Neurocritical Care  Progress Note    Admit Date: 12/14/2022  Service Date: 12/15/2022  Length of Stay: 1    Subjective:     Chief Complaint: Cerebrovascular accident (CVA) due to embolism of right middle cerebral artery    History of Present Illness: Vega Kohler is an 87 year old man with history of HTN, T2DM, HLD, CAD, COPD, CKD, CHF, Alzheimer's, follicular lymphoma on chemo (last rituxan 11/17) transferred to [facility] for post-thrombolytic monitoring. Woke up 12/14 without symptoms around 0700. Sat down to watch TV, around 0830 noticed that he had difficulty extending his left fingers and then difficulty walking to the bathroom due to left sided weakness with subsequent fall. Tenecteplase started at 1019 prior to transfer. Did not undergo thrombectomy. On initial evaluation, patient reports that his left sided weakness has improved about "50%." Reports some left hip/buttock pain related to fall. Has chronic L ankle/lower leg pain due to injury in his 20s.    At baseline ambulates with cane/walker. Lives alone per patient.      Hospital Course: 12/15/2022 Pt with mild back pain. Strength returning to normal. Residual weakness to the LLE.       Interval History:  As above    Review of Systems   Constitutional:  Negative for activity change, appetite change, chills, fatigue, fever and unexpected weight change.   HENT:  Negative for trouble swallowing and voice change.    Eyes: Negative.    Respiratory:  Negative for cough, chest tightness, shortness of breath and wheezing.    Cardiovascular:  Negative for chest pain, palpitations and leg swelling.   Gastrointestinal:  Negative for abdominal distention, abdominal pain, anal bleeding, blood in stool, constipation, diarrhea, nausea, rectal pain and vomiting.   Endocrine: Negative.    Genitourinary: Negative.    Musculoskeletal:  Positive for back pain. Negative for neck pain and neck stiffness.   Skin:  Negative for pallor, rash and " wound.   Allergic/Immunologic: Negative.    Neurological:  Positive for weakness. Negative for dizziness, seizures, syncope, light-headedness, numbness and headaches.   Hematological: Negative.    Psychiatric/Behavioral:  Negative for confusion and sleep disturbance. The patient is not nervous/anxious.    All other systems reviewed and are negative.    Objective:     Vitals:  Temp: 98.5 °F (36.9 °C)  Pulse: 72  Rhythm: normal sinus rhythm  BP: (!) 114/54  MAP (mmHg): 78  Resp: 15  SpO2: 100 %    Temp  Min: 97.9 °F (36.6 °C)  Max: 98.8 °F (37.1 °C)  Pulse  Min: 67  Max: 107  BP  Min: 65/43  Max: 165/69  MAP (mmHg)  Min: 50  Max: 101  Resp  Min: 9  Max: 20  SpO2  Min: 85 %  Max: 100 %    12/14 0701 - 12/15 0700  In: 1159.4 [I.V.:408.5]  Out: 770 [Urine:770]           Physical Exam  Vitals and nursing note reviewed.   Constitutional:       General: He is not in acute distress.     Appearance: Normal appearance. He is not ill-appearing or toxic-appearing.   HENT:      Head: Normocephalic and atraumatic.      Nose: Nose normal.      Mouth/Throat:      Mouth: Mucous membranes are moist.      Pharynx: Oropharynx is clear.   Eyes:      General: No scleral icterus.     Extraocular Movements: Extraocular movements intact.      Conjunctiva/sclera: Conjunctivae normal.      Pupils: Pupils are equal, round, and reactive to light.   Cardiovascular:      Rate and Rhythm: Normal rate and regular rhythm.      Pulses: Normal pulses.      Heart sounds: Normal heart sounds. No murmur heard.  Pulmonary:      Effort: Pulmonary effort is normal. No respiratory distress.      Breath sounds: Normal breath sounds. No stridor. No wheezing.   Abdominal:      General: Abdomen is flat. Bowel sounds are normal. There is no distension.      Palpations: Abdomen is soft.      Tenderness: There is no abdominal tenderness. There is no guarding or rebound.   Musculoskeletal:      Cervical back: Normal range of motion and neck supple. No rigidity.    Lymphadenopathy:      Cervical: No cervical adenopathy.   Skin:     General: Skin is warm and dry.      Capillary Refill: Capillary refill takes less than 2 seconds.      Coloration: Skin is not jaundiced or pale.      Findings: No bruising or rash.   Neurological:      Mental Status: He is alert and oriented to person, place, and time. Mental status is at baseline.      Sensory: No sensory deficit.      Motor: Weakness (4/5 LLE) present.      Comments: Following commands  Sensation intact  PERRLA  Mild tremor in hands bilaterally  No pronator drift     Psychiatric:         Mood and Affect: Mood normal.         Behavior: Behavior normal.         Medications:  Continuoussodium chloride 0.9%, Last Rate: 50 mL/hr at 12/15/22 0619    Scheduledalbuterol-ipratropium, 3 mL, Q6H  atorvastatin, 40 mg, Daily  memantine, 10 mg, Daily  metoprolol tartrate, 12.5 mg, Q12H  senna-docusate 8.6-50 mg, 1 tablet, BID  tamsulosin, 0.4 mg, Daily    PRNdextrose 10%, 12.5 g, PRN  dextrose 10%, 25 g, PRN  glucagon (human recombinant), 1 mg, PRN  hydrALAZINE, 10 mg, Q6H PRN  insulin aspart U-100, 0-5 Units, QID (AC + HS) PRN  labetalol, 10 mg, Q6H PRN  polyethylene glycol, 17 g, BID PRN  sodium chloride 0.9%, 10 mL, PRN      Today I personally reviewed pertinent medications, lines/drains/airways, imaging, cardiology results, laboratory results, notably:    Diet  Diet diabetic [facility]; 2000 Calorie; Cardiac (Low Na/Chol); Thin        Assessment/Plan:     Neuro  * Cerebrovascular accident (CVA) due to embolism of right middle cerebral artery  87 year old man with HTN, HLD, T2DM, CKD, CAD, s/p TAVR for aortic stenosis, s/p bilateral CEA for carotid stenosis (left 12/2021), COPD, dementia, BPH, follicular lymphoma on chemo admitted to [facility] for post-thrombolytic monitoring. Initially presented to OSH for left sided weakness. CTH negative for acute process,  received tenecteplase at 1019 on 12/14. CTA on arrival negative for LVO, no IR  intervention.    - neuro checks q1h  - goal SBP <180  - PRN hydralazine, labetalol  - TTE, A1c  - MRI Brain w/o contrast tomorrow  - DVT ppx held x24 hrs s/p TNK  -Restart DAAPT when appropriate  - PT/OT/SLP evaluation    Dementia  - continue home memantine 10 mg QHS    Pulmonary  COPD, moderate  Per Pulmonology note 9/2022, sats 81% on room air and has home oxygen, however does not use it.  - scheduled duonebs  - PRN O2 with goal SpO2 88-92%    Cardiac/Vascular  Aortic stenosis s/p TAVR  TAVR in 10/2018.    Chronic diastolic heart failure, NYHA class 2  Taking torsemide 10 mg QD and metoprolol succinate 25 mg QHS prior to admission  - continue metoprolol  - hold torsemide for now, restart as appropriate    Hypertension  Not on anti-hypertensive prior to admission.  - SBP goal <180  - PRN labetalol, hydralazine    Carotid artery disease  S/p PCI and MILI in 2018  - ASA 81 mg and clopidogrel per Cardiology (last evaluation 12/2021)    Hyperlipidemia    - continue atorvastatin 40 mg    Renal/  BPH (benign prostatic hyperplasia)  - continue home tamsulosin    Chronic kidney disease, stage 3 (moderate)  Baseline Cr 1.5-1.9  - monitor Cr  - dose medications for renal clearance  - avoid nephrotoxic medication    Oncology  Follicular lymphoma  Stage I grade 3a follicular lymphoma. Diagnosed in 9/2022. Has been receiving rituxan, last dose 11/17/22.    Endocrine  Hypothyroidism  Not on levothyroxine prior to admission. TSH 4.4 with fT4 WNL.    Type 2 diabetes mellitus  - check A1c  - goal inpatient -180  - LDSSI and accuchecks  - diabetic diet when appropriate          The patient is being Prophylaxed for:  Venous Thromboembolism with: Mechanical  Stress Ulcer with: H2B  Ventilator Pneumonia with: not applicable    Activity Orders          Diet diabetic [facility]; 2000 Calorie; Cardiac (Low Na/Chol); Thin: Diabetic starting at 12/15 0932    Turn patient starting at 12/14 1400    Elevate HOB starting at  12/14 1334        Full Code    Montana Cox MD  Neurocritical Care  [facility] - Neuro Critical Care

## 2022-12-15 NOTE — PLAN OF CARE
[facility] Care Plan    POC reviewed with Vega Kohler  at 0300. Pt verbalized understanding but is confused and needs reinforcement.    - Pt received total bolus of 750 ccs of normal saline.One time cbc drawn (see results tab). Provider notified of results. No obvious signs of bleeding.  - Pt passed [place] screen. Po senna given. Pt reports no bm for a week. Bowel sounds audible in all four quadrants. Pt denies tenderness.  - Metoprolol held by provider order for hypotension.  - IVF normal saline started at 50 ccs/hr.  - 2L nasal cannula, hx of copd  - Bath and complete linen change  - Pt orientation labile. Hx of dementia per chart. Pt became increasingly confused. Pt believed he was at home when he previously knew he was at [facility] in [place]. Stat CT ordered. Stable per read.  - q6 accucheck, no coverage needed.  - Tenderness to left hip and leg from fall.    Is this a stroke patient? yes- Stroke booklet reviewed with patient, risk factors identified for patient and stroke booklet remains at bedside for ongoing education.     Questions and concerns addressed. Pt progressing toward goals. Will continue to monitor. See below and flowsheets for full assessment and VS info.     Neuro:  Animas Coma Scale  Best Eye Response: 4-->(E4) spontaneous  Best Motor Response: 6-->(M6) obeys commands  Best Verbal Response: 5-->(V5) oriented  Animas Coma Scale Score: 15  Assessment Qualifiers: no eye obstruction present, patient not sedated/intubated  Pupil PERRLA: yes     24hr Temp:  [97.8 °F (36.6 °C)-98.8 °F (37.1 °C)]     CV:   Rhythm: normal sinus rhythm  BP goals:   SBP < 180  MAP > 65    Resp:           Plan: N/A    GI/:     Diet/Nutrition Received: NPO  Last Bowel Movement: 12/07/22 (pt states it has been about a week since bm. poor appetite. states he took multiple otc meds and suppositories at home)  Voiding Characteristics: external catheter    Intake/Output Summary (Last 24 hours) at 12/15/2022 0440  Last data  filed at 12/15/2022 0419  Gross per 24 hour   Intake 1059.75 ml   Output 740 ml   Net 319.75 ml          Labs/Accuchecks:  Recent Labs   Lab 12/15/22  0034   WBC 5.35   RBC 3.63*   HGB 12.1*   HCT 37.0*         Recent Labs   Lab 12/15/22  0034      K 4.6   CO2 25      BUN 19   CREATININE 1.4   ALKPHOS 78   ALT 20   AST 18   BILITOT 0.7      Recent Labs   Lab 12/14/22  1009   INR 1.1      Recent Labs   Lab 12/14/22  1009   TROPONINI 0.008       Electrolytes: N/A - electrolytes WDL  Accuchecks: Q6H    Gtts:   sodium chloride 0.9% Stopped (12/15/22 0342)       LDA/Wounds:  Lines/Drains/Airways       Drain  Duration             Male External Urinary Catheter 12/14/22 1919 Medium <1 day              Peripheral Intravenous Line  Duration                  Peripheral IV - Single Lumen 12/14/22 1300 18 G Left Antecubital <1 day         Peripheral IV - Single Lumen 12/14/22 1300 20 G Anterior;Right Hand <1 day                  Wounds: No  Wound care consulted: No

## 2022-12-15 NOTE — SUBJECTIVE & OBJECTIVE
Neurologic Chief Complaint: Clinical evidence of R MCA thrombosis    Subjective:     Interval History: Patient is seen for follow-up neurological assessment and treatment recommendations:     Neuro exam stable; NIHSS 3 for mild drowsiness, LUE/LLE drift. AAOx4, CT head negative for interval changes or superimposed acute intracranial process.    HPI, Past Medical, Family, and Social History remains the same as documented in the initial encounter.     Review of Systems   Constitutional:  Negative for chills and fever.   HENT:  Negative for rhinorrhea and sneezing.    Eyes:  Negative for pain and redness.   Respiratory:  Negative for cough and shortness of breath.    Gastrointestinal:  Negative for abdominal pain and nausea.   Genitourinary:  Negative for difficulty urinating and dysuria.   Musculoskeletal:  Positive for arthralgias, gait problem and myalgias. Negative for back pain and neck pain.   Neurological:  Positive for weakness. Negative for tremors and speech difficulty.   Scheduled Meds:   albuterol-ipratropium  3 mL Nebulization Q6H    atorvastatin  40 mg Oral Daily    memantine  10 mg Oral Daily    metoprolol tartrate  12.5 mg Oral Q12H    senna-docusate 8.6-50 mg  1 tablet Oral BID    tamsulosin  0.4 mg Oral Daily     Continuous Infusions:   sodium chloride 0.9% 50 mL/hr at 12/15/22 1200     PRN Meds:dextrose 10%, dextrose 10%, glucagon (human recombinant), hydrALAZINE, insulin aspart U-100, labetalol, polyethylene glycol, sodium chloride 0.9%    Objective:     Vital Signs (Most Recent):  Temp: 98.2 °F (36.8 °C) (12/15/22 1100)  Pulse: 81 (12/15/22 1400)  Resp: 20 (12/15/22 1400)  BP: (!) 97/52 (12/15/22 1400)  SpO2: 98 % (12/15/22 1400)  BP Location: Right arm    Vital Signs Range (Last 24H):  Temp:  [98.1 °F (36.7 °C)-98.8 °F (37.1 °C)]   Pulse:  []   Resp:  [9-24]   BP: ()/(43-68)   SpO2:  [90 %-100 %]   BP Location: Right arm    Physical Exam  Vitals and nursing note reviewed.    Constitutional:       General: He is not in acute distress.  HENT:      Head: Normocephalic and atraumatic.      Nose: Nose normal. No rhinorrhea.   Eyes:      Conjunctiva/sclera: Conjunctivae normal.      Pupils: Pupils are equal, round, and reactive to light.   Cardiovascular:      Rate and Rhythm: Normal rate and regular rhythm.      Heart sounds: No murmur heard.  Pulmonary:      Effort: Pulmonary effort is normal. No respiratory distress.      Breath sounds: Normal breath sounds.   Abdominal:      Palpations: Abdomen is soft.      Tenderness: There is no abdominal tenderness.   Musculoskeletal:         General: Tenderness (LLE above the knee) present.      Right lower leg: No edema.      Left lower leg: No edema.   Skin:     General: Skin is warm and dry.      Capillary Refill: Capillary refill takes less than 2 seconds.   Neurological:      Mental Status: He is alert.       Neurological Exam:   LOC: alert  Attention Span: Good   Language: No aphasia  Articulation: No dysarthria  Orientation: Person, Place, Time   Visual Fields: Full  EOM (CN III, IV, VI): Full/intact  Pupils (CN II, III): PERRL  Facial Sensation (CN V): Normal  Facial Movement (CN VII): Symmetric facial expression    Reflexes: 2+ throughout  Motor: Arm left  Normal 5/5, mild drift  Leg left  Normal 5/5  Arm right  Normal 5/5  Leg right Paresis: 4-/5 2/2 pain  Cerebellum: No evidence of appendicular or axial ataxia  Sensation: Tactile extinction to bilateral simultaneous stimulation   Tone: Normal tone throughout    Laboratory:  CMP:   Recent Labs   Lab 12/15/22  0034   CALCIUM 8.5*   ALBUMIN 2.9*   PROT 5.3*      K 4.6   CO2 25      BUN 19   CREATININE 1.4   ALKPHOS 78   ALT 20   AST 18   BILITOT 0.7     BMP:   Recent Labs   Lab 12/15/22  0034      K 4.6      CO2 25   BUN 19   CREATININE 1.4   CALCIUM 8.5*     CBC:   Recent Labs   Lab 12/15/22  0034   WBC 5.35   RBC 3.63*   HGB 12.1*   HCT 37.0*      *    MCH 33.3*   MCHC 32.7     Lipid Panel:   Recent Labs   Lab 12/14/22  1009   CHOL 177   LDLCALC 128.6   HDL 32*   TRIG 82     Coagulation:   Recent Labs   Lab 12/14/22  1009   INR 1.1     Platelet Aggregation Study: No results for input(s): PLTAGG, PLTAGINTERP, PLTAGREGLACO, ADPPLTAGGREG in the last 168 hours.  Hgb A1C:   Recent Labs   Lab 12/14/22  1552   HGBA1C 6.7*     TSH:   Recent Labs   Lab 12/14/22  1009   TSH 4.401*       Diagnostic Results     Brain imaging:    CT Head 12/15/2022   FINDINGS:  The ventricular system, sulcal pattern and parenchymal attenuation characteristics are consistent with chronic change and appears stable when compared to the prior examination.  There is no evidence for superimposed acute process.  There is no evidence for intracranial mass, mass effect or midline shift.  There is no evidence for acute intracranial hemorrhage.  Appropriate CSF spaces are seen at the skull base.     The orbits demonstrate chronic change.  The mastoid air cells appear appropriate when accounting for averaging, mild paranasal sinus mucosal thickening is noted.  The osseous structures appear intact.     Impression:     Chronic change noted, there is no evidence for superimposed acute intracranial process.    CT Head 12/14/2022   No acute intracranial findings.     Age-appropriate cerebral volume loss with moderate patchy decreased attenuation supratentorial white matter while nonspecific suggestive for chronic ischemic change.     No evidence for acute intracranial hemorrhage.  Clinical correlation and further evaluation as warranted.        Vessel Imaging:     CTA multiphase 12/14/2022   CT head shows no acute intracranial abnormalities.     CTA head neck shows no intracranial large vessel occlusion or aneurysm.     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     Atherosclerosis at the carotid bifurcations with evidence of prior  endarterectomy.  No high-grade stenosis.        Cardiac Evaluation:      Echo 10/06/2022  1. The study quality is below average.   2. The left ventricle is decreased in size. Global left ventricular   systolic function is normal. The left ventricular ejection fraction is   63%. Noted left ventricular hypertrophy. It is mild.     3. A bioprosthetic vavle is noted in the aortic position.  No   significant central or paravalular AI.   4. Mild calcification of the mitral valve is noted.   5. Mild (1+) tricuspid regurgitation.

## 2022-12-15 NOTE — ASSESSMENT & PLAN NOTE
87 year old man with HTN, HLD, T2DM, CKD, CAD, s/p TAVR for aortic stenosis, s/p bilateral CEA for carotid stenosis (left 12/2021), COPD, dementia, BPH, follicular lymphoma on chemo admitted to [facility] for post-thrombolytic monitoring. Initially presented to OSH for left sided weakness. CTH negative for acute process,  received tenecteplase at 1019 on 12/14. CTA on arrival negative for LVO, no IR intervention.    - neuro checks q1h  - goal SBP <180  - PRN hydralazine, labetalol  - TTE, A1c  - MRI Brain w/o contrast tomorrow  - DVT ppx held x24 hrs s/p TNK  -Restart DAAPT when appropriate  - PT/OT/SLP evaluation

## 2022-12-15 NOTE — PLAN OF CARE
Problem: SLP  Goal: SLP Goal  Description: Speech Language Pathology Goals  Goals expected to be met by 12/22  1. Pt will tolerate regular diet with thin liquids without overt s/s aspiration.   2. Pt will complete simple reasoning/problem solving tasks with 70% accy with min cues.   3. Pt will complete assessment of reading, writing, visual spatial skills to determine need for tx.     Outcome: Ongoing, Progressing    Evaluation completed. Rec regular diet with thin liquids with strict aspiration precautions.

## 2022-12-15 NOTE — PROGRESS NOTES
Patient traveled to MRI via wheelchair connected to portable monitor and 2LPM NC with ambubag at side. Traveled with RN x1. Vitals stable during transport. Will continue to monitor during imaging.

## 2022-12-15 NOTE — CONSULTS
Inpatient consult to Physical Medicine Rehab  Consult performed by: Monique Wilson NP  Consult ordered by: Mishel Valerio MD      Consult received.  Reviewed patient history and current admission.  PM&R following. Will follow up with pt once medically stable able to participate with therapies.     Monique Wilson NP  Physical Medicine & Rehabilitation   12/15/2022

## 2022-12-15 NOTE — PLAN OF CARE
[facility]  Initial Discharge Assessment       Primary Care Provider: Fabricio Mckeon MD    Admission Diagnosis: Stroke [I63.9]  Left hip pain [M25.552]  Left-sided weakness [R53.1]  Fall, initial encounter [W19.XXXA]  Cerebrovascular accident (CVA), unspecified mechanism [I63.9]    Admission Date: 12/14/2022  Expected Discharge Date: 12/20/2022    Discharge Barriers Identified: No family/friends to help    Payor: MEDICARE / Plan: MEDICARE PART A & B / Product Type: Government /     Extended Emergency Contact Information  Primary Emergency Contact: Eliseo Kohler   [place]  Mobile Phone: [phone]  Relation: Son  Secondary Emergency Contact: Sandie Kohler  Mobile Phone: [phone]  Relation: Relative    Discharge Plan A: Skilled Nursing Facility  Discharge Plan B: [facility] [address] [place] [id number] [facility] [address] [id number] [facility] [address]  [place] [address]  Phone: [phone] Fax: [phone]      Transferred from:  [facility]    Past Medical History:   Diagnosis Date    Abnormal barium swallow     Alzheimer's dementia, late onset     Anal stenosis     Anticoagulant long-term use     Aortic stenosis     Arthritis     Aspiration pneumonitis     Benign prostatic hyperplasia     Carotid artery disease     CHF (congestive heart failure)     Chronic diastolic heart failure     Chronic kidney disease (CKD), stage III (moderate)     CKD (chronic kidney disease)     Colon polyp     COPD (chronic obstructive pulmonary disease)     Coronary artery disease     Diabetes mellitus, type 2     Diverticulosis     Dysphagia     Hearing aid worn 01/23/2020    Received bilateral hearing aides today    Heart disease     History of CEA (carotid endarterectomy)     [place] (hard of hearing)     Hyperlipidemia     Hypertension     Hypertensive heart disease     Hypothyroidism     Joint disease     Memory loss     PAD (peripheral artery disease)     Presence of drug coated stent in right coronary  artery     Pulmonary hypertension     S/P TAVR (transcatheter aortic valve replacement)     Small bowel obstruction     Wears dentures     UPPER AND LOWER    Wears glasses          CM met with patient in room for Discharge Planning Assessment.  Patient is able to answer questions.  Per patient, he lives alone in a mobile home with 4 step(s) to enter and B/L rails.   Per patient, he was independent with ADLS and used a rolling walker for ambulation.  Patient will have limited assistance upon discharge.   Discharge Planning Booklet given to patient and discussed.  All questions addressed.  CM will follow for needs.  Referral to Medicare Sitter faxed  Referral to Pharmacy Assistance     Initial Assessment (most recent)       Adult Discharge Assessment - 12/15/22 4785          Discharge Assessment    Assessment Type Discharge Planning Assessment     Confirmed/corrected address, phone number and insurance Yes     Confirmed Demographics Correct on Facesheet     Source of Information patient     Communicated JOSE A with patient/caregiver Date not available/Unable to determine     Reason For Admission [facility] Stroke     People in Home alone     Facility Arrived From: [facility]     Do you expect to return to your current living situation? Yes     Do you have help at home or someone to help you manage your care at home? No     Prior to hospitilization cognitive status: Alert/Oriented     Current cognitive status: Alert/Oriented     Walking or Climbing Stairs ambulation difficulty, requires equipment     Mobility Management Rolling walker     Dressing/Bathing --   independent    Home Accessibility stairs to enter home     Number of Stairs, Main Entrance four     Stair Railings, Main Entrance railings on both sides of stairs     Home Layout Able to live on 1st floor     Equipment Currently Used at Home walker, rolling;cane, straight;bedside commode;shower chair   Simultaneous filing. User may not have seen previous data.     Readmission within 30 days? No     Patient currently being followed by outpatient case management? No     Do you currently have service(s) that help you manage your care at home? No     Do you take prescription medications? Yes     Do you have prescription coverage? Yes     Coverage BCBS     Do you have any problems affording any of your prescribed medications? Yes     If yes, what medications? all     Is the patient taking medications as prescribed? yes     Who is going to help you get home at discharge? son     How do you get to doctors appointments? car, drives self     Are you on dialysis? No     Do you take coumadin? No     Discharge Plan A Skilled Nursing Facility     Discharge Plan B Home Health     DME Needed Upon Discharge  none     Discharge Plan discussed with: Patient     Discharge Barriers Identified No family/friends to help                      Nicol Landers RN, CCRN-K, [facility]  Neuro-Critical Care   [phone]

## 2022-12-15 NOTE — PROGRESS NOTES
[facility] Critical [facility]  Vascular Neurology  Comprehensive Stroke Center  Progress Note    Assessment/Plan:     * Cerebrovascular accident (CVA) due to thrombosis of right middle cerebral artery  86 yo M with initial CC disabling left hemiplegia now  s/p TNK. Patient was in his normal state of health when he woke up today. He was watching a game when he decided to go to the bathroom today 12/14/2022 @ 8:30 AM at which time he noted difficulty using the left side of his body. He managed to use a walker to ambulate to the bathroom, but on the way back, he had a fall, without head injury or LOC. CTH at OSH was negative for acute intracranial evidence of stroke or bleed, however, given clinical evidence of stroke, TNK was administered at 10:19 AM 12/14 and pt transferred to [facility] for post-thrombolytic care.    Initial telestroke NIHSS 9 prior to arrival; On arrival, post-TNK NIHSS of 1 on my evaluation for LUE drift. Patient is independent at baseline with mRS 0.    12/15 neuro exam stable; NIHSS 3 for mild drowsiness, LUE/LLE drift. AAOx4, CT head negative for interval changes or superimposed acute intracranial process.    Etiology: ESUS    Antithrombotics for secondary stroke prevention: Antiplatelets: Aspirin: 81 mg daily  Clopidogrel: 75 mg daily    Statins for secondary stroke prevention and hyperlipidemia, if present:   Statins: Atorvastatin- 40 mg daily    Aggressive risk factor modification: HTN, HLD, CAD, Carotid artery disease     Rehab efforts: The patient has been evaluated by a stroke team provider and the therapy needs have been fully considered based off the presenting complaints and exam findings. The following therapy evaluations are needed: PT evaluate and treat, OT evaluate and treat, SLP evaluate and treat, PM&R evaluate for appropriate placement    Diagnostics ordered/pending: TTE to assess cardiac function/status     VTE prophylaxis: Mechanical prophylaxis: Place SCDs  None: Reason for No  Pharmacological VTE Prophylaxis: Holding x 24 hours s/p treatment with Thrombolytic therapy    BP parameters: Infarct: Post Thrombolytic therapy, SBP <180        Follicular lymphoma  H/o follicular lymphoma per chart (grade 3a, stage 1); receiving rituxan (cycle 3 in 11/2022), 10/2022 PET scan positive for 3 cm lymphadenopathy in left axillary region otherwise HUSSEIN.    Aortic stenosis s/p TAVR  History of aortic stenosis    Chronic kidney disease, stage 3 (moderate)  Recent Labs     12/14/22  1009 12/15/22  0034   CREATININE 1.9* 1.4     -- monitor creatinine  -- avoid nephrotoxic agents    Hypertension  Stroke risk factor    -- SBP goal < 180  -- on PRN meds    Carotid artery disease  Stroke risk factor    H/o high grade stenosis in the left internal carotid artery (now s/p L carotid endarterectomy in 12/3/2021; Carotid US 12/22/2021 without evidence of hemodynamically significant stenosis)    Hyperlipidemia  Stroke risk factor      - atorvastatin 40mg    Hypothyroidism  Subclinical hypothyroidism; TSH 4.4; fT4 0.95 12/14/2021    Type 2 diabetes mellitus  Stroke risk factor    -- A1C 6.7  -- LDSSI & accuchecks         12/15/2022 Neuro exam stable; NIHSS 3 for mild drowsiness, LUE/LLE drift. AAOx4, CT head negative for interval changes or superimposed acute intracranial process.      STROKE DOCUMENTATION   Acute Stroke Times   Last Known Normal Date: 12/14/22  Last Known Normal Time: 0700  Symptom Onset Date: 12/14/22  Symptom Onset Time: 0830  Stroke Team Called Date: 12/14/22  Stroke Team Called Time: 1240  Stroke Team Arrival Date: 12/14/22  Stroke Team Arrival Time: 1243  CTA Interpretation Time: 1314  Thrombectomy Recommended: No    NIH Scale:  1a. Level of Consciousness: 1-->Not alert, but arousable by minor stimulation to obey, answer, or respond  1b. LOC Questions: 0-->Answers both questions correctly  1c. LOC Commands: 0-->Performs both tasks correctly  2. Best Gaze: 0-->Normal  3. Visual: 0-->No  visual loss  4. Facial Palsy: 0-->Normal symmetrical movements  5a. Motor Arm, Left: 1-->Drift, limb holds 90 (or 45) degrees, but drifts down before full 10 seconds, does not hit bed or other support  5b. Motor Arm, Right: 0-->No drift, limb holds 90 (or 45) degrees for full 10 secs  6a. Motor Leg, Left: 1-->Drift, leg falls by the end of the 5-sec period but does not hit bed  6b. Motor Leg, Right: 0-->No drift, leg holds 30 degree position for full 5 secs  7. Limb Ataxia: 0-->Absent  8. Sensory: 0-->Normal, no sensory loss  9. Best Language: 0-->No aphasia, normal  10. Dysarthria: 0-->Normal  11. Extinction and Inattention (formerly Neglect): 0-->No abnormality  Total (NIH Stroke Scale): 3       Modified Lavon Score: 0  Jody Coma Scale:    ABCD2 Score:    [id number] Score:   HAS -BLED Score:   ICH Score:   Hunt & Rae Classification:      Hemorrhagic change of an Ischemic Stroke: Does this patient have an ischemic stroke with hemorrhagic changes? No     Neurologic Chief Complaint: Clinical evidence of R MCA thrombosis    Subjective:     Interval History: Patient is seen for follow-up neurological assessment and treatment recommendations:     Neuro exam stable; NIHSS 3 for mild drowsiness, LUE/LLE drift. AAOx4, CT head negative for interval changes or superimposed acute intracranial process.    HPI, Past Medical, Family, and Social History remains the same as documented in the initial encounter.     Review of Systems   Constitutional:  Negative for chills and fever.   HENT:  Negative for rhinorrhea and sneezing.    Eyes:  Negative for pain and redness.   Respiratory:  Negative for cough and shortness of breath.    Gastrointestinal:  Negative for abdominal pain and nausea.   Genitourinary:  Negative for difficulty urinating and dysuria.   Musculoskeletal:  Positive for arthralgias, gait problem and myalgias. Negative for back pain and neck pain.   Neurological:  Positive for weakness. Negative for tremors and  speech difficulty.   Scheduled Meds:   albuterol-ipratropium  3 mL Nebulization Q6H    atorvastatin  40 mg Oral Daily    memantine  10 mg Oral Daily    metoprolol tartrate  12.5 mg Oral Q12H    senna-docusate 8.6-50 mg  1 tablet Oral BID    tamsulosin  0.4 mg Oral Daily     Continuous Infusions:   sodium chloride 0.9% 50 mL/hr at 12/15/22 1200     PRN Meds:dextrose 10%, dextrose 10%, glucagon (human recombinant), hydrALAZINE, insulin aspart U-100, labetalol, polyethylene glycol, sodium chloride 0.9%    Objective:     Vital Signs (Most Recent):  Temp: 98.2 °F (36.8 °C) (12/15/22 1100)  Pulse: 81 (12/15/22 1400)  Resp: 20 (12/15/22 1400)  BP: (!) 97/52 (12/15/22 1400)  SpO2: 98 % (12/15/22 1400)  BP Location: Right arm    Vital Signs Range (Last 24H):  Temp:  [98.1 °F (36.7 °C)-98.8 °F (37.1 °C)]   Pulse:  []   Resp:  [9-24]   BP: ()/(43-68)   SpO2:  [90 %-100 %]   BP Location: Right arm    Physical Exam  Vitals and nursing note reviewed.   Constitutional:       General: He is not in acute distress.  HENT:      Head: Normocephalic and atraumatic.      Nose: Nose normal. No rhinorrhea.   Eyes:      Conjunctiva/sclera: Conjunctivae normal.      Pupils: Pupils are equal, round, and reactive to light.   Cardiovascular:      Rate and Rhythm: Normal rate and regular rhythm.      Heart sounds: No murmur heard.  Pulmonary:      Effort: Pulmonary effort is normal. No respiratory distress.      Breath sounds: Normal breath sounds.   Abdominal:      Palpations: Abdomen is soft.      Tenderness: There is no abdominal tenderness.   Musculoskeletal:         General: Tenderness (LLE above the knee) present.      Right lower leg: No edema.      Left lower leg: No edema.   Skin:     General: Skin is warm and dry.      Capillary Refill: Capillary refill takes less than 2 seconds.   Neurological:      Mental Status: He is alert.       Neurological Exam:   LOC: alert  Attention Span: Good   Language: No  aphasia  Articulation: No dysarthria  Orientation: Person, Place, Time   Visual Fields: Full  EOM (CN III, IV, VI): Full/intact  Pupils (CN II, III): PERRL  Facial Sensation (CN V): Normal  Facial Movement (CN VII): Symmetric facial expression    Reflexes: 2+ throughout  Motor: Arm left  Normal 5/5, mild drift  Leg left  Normal 5/5  Arm right  Normal 5/5  Leg right Paresis: 4-/5 2/2 pain  Cerebellum: No evidence of appendicular or axial ataxia  Sensation: Tactile extinction to bilateral simultaneous stimulation   Tone: Normal tone throughout    Laboratory:  CMP:   Recent Labs   Lab 12/15/22  0034   CALCIUM 8.5*   ALBUMIN 2.9*   PROT 5.3*      K 4.6   CO2 25      BUN 19   CREATININE 1.4   ALKPHOS 78   ALT 20   AST 18   BILITOT 0.7     BMP:   Recent Labs   Lab 12/15/22  0034      K 4.6      CO2 25   BUN 19   CREATININE 1.4   CALCIUM 8.5*     CBC:   Recent Labs   Lab 12/15/22  0034   WBC 5.35   RBC 3.63*   HGB 12.1*   HCT 37.0*      *   MCH 33.3*   MCHC 32.7     Lipid Panel:   Recent Labs   Lab 12/14/22  1009   CHOL 177   LDLCALC 128.6   HDL 32*   TRIG 82     Coagulation:   Recent Labs   Lab 12/14/22  1009   INR 1.1     Platelet Aggregation Study: No results for input(s): PLTAGG, PLTAGINTERP, PLTAGREGLACO, ADPPLTAGGREG in the last 168 hours.  Hgb A1C:   Recent Labs   Lab 12/14/22  1552   HGBA1C 6.7*     TSH:   Recent Labs   Lab 12/14/22  1009   TSH 4.401*       Diagnostic Results     Brain imaging:    CT Head 12/15/2022   FINDINGS:  The ventricular system, sulcal pattern and parenchymal attenuation characteristics are consistent with chronic change and appears stable when compared to the prior examination.  There is no evidence for superimposed acute process.  There is no evidence for intracranial mass, mass effect or midline shift.  There is no evidence for acute intracranial hemorrhage.  Appropriate CSF spaces are seen at the skull base.     The orbits demonstrate chronic change.   The mastoid air cells appear appropriate when accounting for averaging, mild paranasal sinus mucosal thickening is noted.  The osseous structures appear intact.     Impression:     Chronic change noted, there is no evidence for superimposed acute intracranial process.    CT Head 12/14/2022   No acute intracranial findings.     Age-appropriate cerebral volume loss with moderate patchy decreased attenuation supratentorial white matter while nonspecific suggestive for chronic ischemic change.     No evidence for acute intracranial hemorrhage.  Clinical correlation and further evaluation as warranted.        Vessel Imaging:     CTA multiphase 12/14/2022   CT head shows no acute intracranial abnormalities.     CTA head neck shows no intracranial large vessel occlusion or aneurysm.     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     Atherosclerosis at the carotid bifurcations with evidence of prior endarterectomy.  No high-grade stenosis.        Cardiac Evaluation:      Echo 10/06/2022  1. The study quality is below average.   2. The left ventricle is decreased in size. Global left ventricular   systolic function is normal. The left ventricular ejection fraction is   63%. Noted left ventricular hypertrophy. It is mild.     3. A bioprosthetic vavle is noted in the aortic position.  No   significant central or paravalular AI.   4. Mild calcification of the mitral valve is noted.   5. Mild (1+) tricuspid regurgitation.        Jose Mendez MD  Comprehensive Stroke Center  Department of Vascular Neurology   [facility]

## 2022-12-15 NOTE — PROGRESS NOTES
"During bedside handoff, BP cuff recycled and BP 65/43 (50). BP cuff repositioned and recycled at 75/50 (59).     YAMILET Collier at bedside for assessment. HOB lowered and legs elevated. Patient states that his BP usually runs "on the lower side".    BP recycled at this time and 97/53.    250mL NS bolus ordered at this time per Angel, NP    Bolus currently infusing and /61 (80)    "

## 2022-12-15 NOTE — ASSESSMENT & PLAN NOTE
88 yo M with initial CC disabling left hemiplegia now  s/p TNK. Patient was in his normal state of health when he woke up today. He was watching a game when he decided to go to the bathroom today 12/14/2022 @ 8:30 AM at which time he noted difficulty using the left side of his body. He managed to use a walker to ambulate to the bathroom, but on the way back, he had a fall, without head injury or LOC. CTH at OSH was negative for acute intracranial evidence of stroke or bleed, however, given clinical evidence of stroke, TNK was administered at 10:19 AM 12/14 and pt transferred to [facility] for post-thrombolytic care.    Initial telestroke NIHSS 9 prior to arrival; On arrival, post-TNK NIHSS of 1 on my evaluation for LUE drift. Patient is independent at baseline with mRS 0.    12/15 neuro exam stable; NIHSS 3 for mild drowsiness, LUE/LLE drift. AAOx4, CT head negative for interval changes or superimposed acute intracranial process.    Etiology: ESUS    Antithrombotics for secondary stroke prevention: Antiplatelets: Aspirin: 81 mg daily  Clopidogrel: 75 mg daily    Statins for secondary stroke prevention and hyperlipidemia, if present:   Statins: Atorvastatin- 40 mg daily    Aggressive risk factor modification: HTN, HLD, CAD, Carotid artery disease     Rehab efforts: The patient has been evaluated by a stroke team provider and the therapy needs have been fully considered based off the presenting complaints and exam findings. The following therapy evaluations are needed: PT evaluate and treat, OT evaluate and treat, SLP evaluate and treat, PM&R evaluate for appropriate placement    Diagnostics ordered/pending: TTE to assess cardiac function/status     VTE prophylaxis: Mechanical prophylaxis: Place SCDs  None: Reason for No Pharmacological VTE Prophylaxis: Holding x 24 hours s/p treatment with Thrombolytic therapy    BP parameters: Infarct: Post Thrombolytic therapy, SBP <180

## 2022-12-15 NOTE — NURSING
At 3am neuro check pt is increasingly confused. Not oriented to place or situation. Provider notified. Stat CT to be ordered.

## 2022-12-15 NOTE — PT/OT/SLP EVAL
Occupational Therapy Co-Evaluation and Treatment    Patient Name: Vega Kohler   MRN: [id number]  Admitting Diagnosis: Cerebrovascular accident (CVA) due to thrombosis of right middle cerebral artery   Recent Surgery: * No surgery found *      Recommendations:     Discharge Recommendations: nursing facility, skilled  Discharge Equipment Recommendations: bedside commode, bath bench  Barriers to discharge: Decreased caregiver support    Assessment:     Vega Kohler is a 87 y.o. male with a medical diagnosis of Cerebrovascular accident (CVA) due to thrombosis of right middle cerebral artery. He presents with performance deficits affecting function including weakness, impaired endurance, impaired self care skills, impaired functional mobility, gait instability, impaired balance, decreased coordination, decreased safety awareness, impaired cardiopulmonary response to activity, pain, decreased lower extremity function, decreased upper extremity function.     Pt agreeable to therapy and tolerated the session well. Pt limited by poor safety awareness, pain, and weakness. Pt demonstrating increased return on the L side post TNK administration. Pt lives alone and is a high fall risk at this time. He was able to ambulate with CGA and RW this date. He performed oral care sitting EOB with SBA for safety. Pt with Alzheimer's at baseline and currently lives alone. Pt would benefit from continued skilled acute OT services in order to maximize independence and safety with ADLs and functional mobility to ensure safe return to PLOF in the least restrictive environment. OT recommending SNF once pt is medically appropriate for d/c.     Rehab Prognosis: Good; patient would benefit from acute skilled OT services to address these deficits and reach maximum level of function.     Plan:     Patient to be seen 3 x/week to address the above listed problems via self-care/home management, therapeutic activities, therapeutic exercises,  "neuromuscular re-education  Plan of Care Expires: 01/15/23  Plan of Care Reviewed with: patient    Subjective     "That medication worked"     Chief Complaint: Cerebrovascular Accident (Transfer from [facility])    Patient Comments/Goals: To return to PLOF  Pain/Comfort:  Pain Rating 1: 7/10  Location - Side 1: Left  Location - Orientation 1: generalized  Location 1: leg  Pain Addressed 1: Reposition, Distraction  Pain Rating Post-Intervention 1: 4/10    Patients cultural, spiritual, [employer]al conflicts given the current situation: no    Social History:  Living Environment: Patient lives alone in a mobile home, number of outside stairs: 4. He has a tub/shower combo .  Prior Level of Function: Prior to admission, patient was independent with ADLs, using rolling walker for ambulation.  Roles and Routines: Pt reports that he cuts his own grass and performs all IADLs. Unsure of accuracy,   Equipment Used at Home: walker,rolling   DME owned (not currently used): none  Upon discharge, patient will have assistance from family- Pt's sons live next door, but both work during the day and are unable to provide 24/7 supervision     Objective:     Co-evaluation/treatment performed due to patient's multiple deficits requiring two skilled therapists to appropriately and safely assess patient's strength and endurance while facilitating functional tasks in addition to accommodating for patient's activity tolerance.     Communicated with RN prior to session. Patient found HOB elevated with telemetry, SCD, pulse ox (continuous), bed alarm, oxygen, blood pressure cuff upon OT entry to room.    General Precautions: Standard, fall, aspiration   Orthopedic Precautions:N/A   Braces: N/A   Respiratory Status: Nasal cannula, flow 2 L/min    Cognitive and Psychosocial Function:  Oriented to: Person, Place, Time, Situation   Follows Commands/Attention: follows multi-step commands  Communication: clear/fluent  Memory: No Deficits " noted  Safety Awareness/Insight to Disability: impaired   Mood/Affect/Coping skills/Emotional Control: Appropriate to situation    Hearing: [place]    Vision: Intact visual fields    Physical Exam:  Postural examination/scapula alignment:    -       No postural abnormalities identified  Skin integrity: Visible skin intact  Hand dominance: Right     Left UE Right UE   UE Edema None noted None noted   UE ROM AROM WFL except decreased Sh flexion AROM WFL except decreased Sh flexion    UE Strength 4/5 4/5    Strength grasp WFL grasp WFL   Sensation LUE INTACT:light/touch RUE INTACT: light/touch   Fine Motor Coordination:  LUE INTACT: hand, finger to nose and hand thumb/finger opposition skills  RUE INTACT: hand, finger to nose and hand thumb/finger opposition skills    Gross Motor Coordination: LUE INTACT: WFL RUE INTACT:WFL     Occupational Performance    Bed Mobility:   Rolling/Turning to Right with minimum assistance  Scooting anteriorly to EOB to have both feet planted on floor: minimum assistance  Supine to sit from left side of bed with minimum assistance    Functional Mobility/Transfers:  Static Sitting EOB: stand by assistance  Dynamic Sitting EOB: stand by assistance  Static Standing Balance: contact guard assistance  Dynamic Standing Balance: contact guard assistance  Sit <> Stand Transfer with contact guard assistance with rolling walker  Bed <> Chair Transfer using Step Transfer technique with contact guard assistance with rolling walker  Functional Mobility: Pt engaging in functional mobility to simulate household/community distances with CGA and utilizing RW in order to maximize functional activity tolerance and standing balance required for engagement in occupations of choice.    Activities of Daily Living:  Grooming: stand by assistance : To perform oral care sitting EOB. Pt also performed abbreviated bed bath and applied deodorant.     AMPAC 6 Click ADL:  AMPAC Total Score: 20    Treatment &  Education:  Therapist provided facilitation and instruction of proper body mechanics, energy conservation, and fall prevention strategies during tasks listed above.  Patient educated on role of OT, POC and goals for therapy  Patient educated on importance of OOB activities with staff member assistance and sitting OOB majority of the day.     Patient clear to ambulate to/from bathroom with RN/PCT, assist x1 .    Patient left up in chair with all lines intact, call button in reach, RN notified, and chair alarm on.    GOALS:   Multidisciplinary Problems       Occupational Therapy Goals          Problem: Occupational Therapy    Goal Priority Disciplines Outcome Interventions   Occupational Therapy Goal     OT, PT/OT Ongoing, Progressing    Description: Goals to be met by: 12/29/22     Patient will increase functional independence with ADLs by performing:    UE Dressing with Supervision.  LE Dressing with Supervision.  Grooming while standing at sink with Supervision.  Toileting from toilet with Supervision for hygiene and clothing management.   Toilet transfer to toilet with Supervision.  Functional mobility of household and community distance with  supervision and AD as needed                           History:     Past Medical History:   Diagnosis Date    Abnormal barium swallow     Alzheimer's dementia, late onset     Anal stenosis     Anticoagulant long-term use     Aortic stenosis     Arthritis     Aspiration pneumonitis     Benign prostatic hyperplasia     Carotid artery disease     CHF (congestive heart failure)     Chronic diastolic heart failure     Chronic kidney disease (CKD), stage III (moderate)     CKD (chronic kidney disease)     Colon polyp     COPD (chronic obstructive pulmonary disease)     Coronary artery disease     Diabetes mellitus, type 2     Diverticulosis     Dysphagia     Hearing aid worn 01/23/2020    Received bilateral hearing aides today    Heart disease     History of CEA (carotid  endarterectomy)     [place] (hard of hearing)     Hyperlipidemia     Hypertension     Hypertensive heart disease     Hypothyroidism     Joint disease     Memory loss     PAD (peripheral artery disease)     Presence of drug coated stent in right coronary artery     Pulmonary hypertension     S/P TAVR (transcatheter aortic valve replacement)     Small bowel obstruction     Wears dentures     UPPER AND LOWER    Wears glasses          Past Surgical History:   Procedure Laterality Date    anal fissure repair      ANKLE FUSION Left 08/15/2016    X 2    APPENDECTOMY      BACK SURGERY      X 4    CAROTID ENDARTERECTOMY Right     CAROTID ENDARTERECTOMY Left     CAROTID ENDARTERECTOMY Left 12/3/2021    Procedure: ENDARTERECTOMY-CAROTID;  Surgeon: Tim Castillo MD;  Location: [facility] OR;  Service: Cardiology;  Laterality: Left;  pt unsure if he stopped plavix, Dr. Castillo ok to proceed    CARPAL TUNNEL RELEASE Right     CHOLECYSTECTOMY      COLONOSCOPY N/A 7/26/2018    Procedure: HIGH RISK SCREENING COLONOSCOPY;  Surgeon: Connor Murrell MD;  Location: [facility] ENDO;  Service: Endoscopy;  Laterality: N/A;    CORONARY ANGIOPLASTY WITH STENT PLACEMENT      ELBOW SURGERY Bilateral     EXCISIONAL HEMORRHOIDECTOMY      x3    EXPLORATORY LAPAROTOMY W/ BOWEL RESECTION  11/17/2011    EYE SURGERY      cataract bilaterally    history of bowel resection      KNEE SURGERY Left     LAMINECTOMY AND MICRODISCECTOMY LUMBAR SPINE  07/21/2011    L5-S1    LEFT HEART CATHETERIZATION Left 8/31/2018    Procedure: Left heart cath;  Surgeon: Rex Chris MD;  Location: [facility] CATH;  Service: Cardiology;  Laterality: Left;    LUMBAR FUSION  11/09/2011    Anterior approach--L5-S1    LUNG BIOPSY Right 07/21/2009    VATS with wedge ofr right lower lobe    LUNG BIOPSY Left 3/16/2020    Procedure: BIOPSY, LUNG;  Surgeon: Everett Diagnostic Provider;  Location: [facility] OR;  Service: General;  Laterality: Left;    PERCUTANEOUS TRANSCATHETER AORTIC VALVE  REPLACEMENT (TAVR) Left 10/30/2018    Procedure: REPLACEMENT, AORTIC VALVE, PERCUTANEOUS, TRANSCATHETER;  Surgeon: Rex Chris MD;  Location: [facility] OR;  Service: Cardiology;  Laterality: Left;    PERCUTANEOUS TRANSCATHETER AORTIC VALVE REPLACEMENT (TAVR)  10/30/2018    Procedure: REPLACEMENT, AORTIC VALVE, PERCUTANEOUS, TRANSCATHETER;  Surgeon: Tim Castillo MD;  Location: [facility] OR;  Service: Cardiovascular;;    RIGHT HEART CATHETERIZATION Right 8/31/2018    Procedure: INSERTION, CATHETER, RIGHT HEART;  Surgeon: Rex Chris MD;  Location: [facility] CATH;  Service: Cardiology;  Laterality: Right;    ROTATOR CUFF REPAIR Left     SINUS SURGERY      SPINE SURGERY      back surgery    TENDON RELEASE Bilateral     TONSILLECTOMY         Time Tracking:     OT Date of Treatment: 12/15/22  OT Start Time: 1258  OT Stop Time: 1330  OT Total Time (min): 32 min    Billable Minutes: Evaluation 8, Self Care/Home Management 10, and Therapeutic Activity 14    12/15/2022

## 2022-12-16 NOTE — SUBJECTIVE & OBJECTIVE
Neurologic Chief Complaint: Clinical evidence of R MCA thrombosis    Subjective:     Interval History: Patient is seen for follow-up neurological assessment and treatment recommendations:     Neuro exam stable; NIHSS 0. No longer having LUE/LLE drift. AAOx4. Pt to be stepped down to NPU.    HPI, Past Medical, Family, and Social History remains the same as documented in the initial encounter.     Review of Systems   Constitutional:  Negative for chills and fever.   HENT:  Negative for rhinorrhea and sneezing.    Eyes:  Negative for pain and redness.   Respiratory:  Negative for cough and shortness of breath.    Gastrointestinal:  Negative for abdominal pain and nausea.   Genitourinary:  Negative for difficulty urinating and dysuria.   Musculoskeletal:  Positive for arthralgias, gait problem and myalgias. Negative for back pain and neck pain.   Neurological:  Positive for weakness. Negative for tremors and speech difficulty.   Scheduled Meds:   albuterol-ipratropium  3 mL Nebulization Q6H    aspirin  81 mg Oral Daily    atorvastatin  40 mg Oral Daily    clopidogreL  75 mg Oral Daily    heparin (porcine)  5,000 Units Subcutaneous Q8H    memantine  10 mg Oral Daily    metoprolol tartrate  12.5 mg Oral Q12H    polyethylene glycol  17 g Oral BID    senna-docusate 8.6-50 mg  1 tablet Oral BID    tamsulosin  0.4 mg Oral Daily     Continuous Infusions:      PRN Meds:acetaminophen, dextrose 10%, dextrose 10%, glucagon (human recombinant), hydrALAZINE, insulin aspart U-100, labetalol, morphine, sodium chloride 0.9%    Objective:     Vital Signs (Most Recent):  Temp: 97.9 °F (36.6 °C) (12/16/22 1501)  Pulse: 74 (12/16/22 1701)  Resp: (!) 21 (12/16/22 1701)  BP: 133/63 (12/16/22 1701)  SpO2: (!) 94 % (12/16/22 1701)  BP Location: Right arm    Vital Signs Range (Last 24H):  Temp:  [97.9 °F (36.6 °C)-98.9 °F (37.2 °C)]   Pulse:  [66-82]   Resp:  [13-21]   BP: (100-142)/(56-65)   SpO2:  [94 %-100 %]   BP Location: Right  arm    Physical Exam  Vitals and nursing note reviewed.   Constitutional:       General: He is not in acute distress.  HENT:      Head: Normocephalic and atraumatic.      Nose: Nose normal. No rhinorrhea.   Eyes:      Conjunctiva/sclera: Conjunctivae normal.      Pupils: Pupils are equal, round, and reactive to light.   Cardiovascular:      Rate and Rhythm: Normal rate and regular rhythm.      Heart sounds: No murmur heard.  Pulmonary:      Effort: Pulmonary effort is normal. No respiratory distress.      Breath sounds: Normal breath sounds.   Abdominal:      Palpations: Abdomen is soft.      Tenderness: There is no abdominal tenderness.   Musculoskeletal:         General: Tenderness (LLE above the knee) present.      Right lower leg: No edema.      Left lower leg: No edema.   Skin:     General: Skin is warm and dry.      Capillary Refill: Capillary refill takes less than 2 seconds.   Neurological:      Mental Status: He is alert.       Neurological Exam:   LOC: alert  Attention Span: Good   Language: No aphasia  Articulation: No dysarthria  Orientation: Person, Place, Time   Visual Fields: Full  EOM (CN III, IV, VI): Full/intact  Pupils (CN II, III): PERRL  Facial Sensation (CN V): Normal  Facial Movement (CN VII): Symmetric facial expression    Reflexes: 2+ throughout  Motor: Arm left  Normal 5/5, mild drift  Leg left  Normal 5/5  Arm right  Normal 5/5  Leg right Paresis: 4-/5 2/2 pain  Cerebellum: No evidence of appendicular or axial ataxia  Sensation: Tactile extinction to bilateral simultaneous stimulation   Tone: Normal tone throughout    Laboratory:  CMP:   Recent Labs   Lab 12/16/22  0016   CALCIUM 8.7   ALBUMIN 2.8*   PROT 5.3*      K 4.6   CO2 27      BUN 24*   CREATININE 1.4   ALKPHOS 81   ALT 16   AST 17   BILITOT 0.8     BMP:   Recent Labs   Lab 12/16/22  0016      K 4.6      CO2 27   BUN 24*   CREATININE 1.4   CALCIUM 8.7     CBC:   Recent Labs   Lab 12/16/22 0016   WBC 4.99    RBC 3.52*   HGB 11.3*   HCT 35.8*      *   MCH 32.1*   MCHC 31.6*     Lipid Panel:   Recent Labs   Lab 12/14/22  1009   CHOL 177   LDLCALC 128.6   HDL 32*   TRIG 82     Coagulation:   Recent Labs   Lab 12/14/22  1009   INR 1.1     Platelet Aggregation Study: No results for input(s): PLTAGG, PLTAGINTERP, PLTAGREGLACO, ADPPLTAGGREG in the last 168 hours.  Hgb A1C:   Recent Labs   Lab 12/14/22  1552   HGBA1C 6.7*     TSH:   Recent Labs   Lab 12/14/22  1009   TSH 4.401*       Diagnostic Results     Brain imaging:    CT Head 12/15/2022   FINDINGS:  The ventricular system, sulcal pattern and parenchymal attenuation characteristics are consistent with chronic change and appears stable when compared to the prior examination.  There is no evidence for superimposed acute process.  There is no evidence for intracranial mass, mass effect or midline shift.  There is no evidence for acute intracranial hemorrhage.  Appropriate CSF spaces are seen at the skull base.     The orbits demonstrate chronic change.  The mastoid air cells appear appropriate when accounting for averaging, mild paranasal sinus mucosal thickening is noted.  The osseous structures appear intact.     Impression:     Chronic change noted, there is no evidence for superimposed acute intracranial process.    MRI 12/14/2022  An acute deep white matter infarct on the right is identified measuring 13 mm.  No associated hemorrhage or mass effect.     There is no evidence of hydrocephalus or mass effect with central volume loss stable..     A small chronic right cerebellar infarct is however new from 2019.  Chronic punctate hemosiderin staining left parietal subcortical white matter.     Foci of increased signal are noted within the periventricular and subcortical white matter, nonspecific but may reflect mild chronic small vessel ischemic change.  Normal arterial flow voids are preserved at the skull base.     The visualized sinuses and mastoid air  cells are clear.     Impression:     Acute deep white matter infarct on the right without associated hemorrhage or mass effect.  Mild underlying chronic ischemic changes.    CT Head 12/14/2022   No acute intracranial findings.     Age-appropriate cerebral volume loss with moderate patchy decreased attenuation supratentorial white matter while nonspecific suggestive for chronic ischemic change.     No evidence for acute intracranial hemorrhage.  Clinical correlation and further evaluation as warranted.        Vessel Imaging:     CTA multiphase 12/14/2022   CT head shows no acute intracranial abnormalities.     CTA head neck shows no intracranial large vessel occlusion or aneurysm.     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     Atherosclerosis at the carotid bifurcations with evidence of prior endarterectomy.  No high-grade stenosis.        Cardiac Evaluation:      Echo 10/06/2022  1. The study quality is below average.   2. The left ventricle is decreased in size. Global left ventricular   systolic function is normal. The left ventricular ejection fraction is   63%. Noted left ventricular hypertrophy. It is mild.     3. A bioprosthetic vavle is noted in the aortic position.  No   significant central or paravalular AI.   4. Mild calcification of the mitral valve is noted.   5. Mild (1+) tricuspid regurgitation.

## 2022-12-16 NOTE — PLAN OF CARE
12/16/22 1641   Post-Acute Status   Post-Acute Authorization Placement   Post-Acute Placement Status Patient List Provided  (SNF vs HH)     SW met with Pt at bedside. Discussed therapy recs for SNF vs HH with 24 hour care/provided lists for both. Addressed questions and Pt reported his son is coming later tonight, he will discuss the options including hiring sitters in order to go home.    Iman Campa LCSW  Neurocritical Care   [facility]  [id number]

## 2022-12-16 NOTE — ASSESSMENT & PLAN NOTE
88 yo M with initial CC disabling left hemiplegia now  s/p TNK. Patient was in his normal state of health when he woke up today. He was watching a game when he decided to go to the bathroom today 12/14/2022 @ 8:30 AM at which time he noted difficulty using the left side of his body. He managed to use a walker to ambulate to the bathroom, but on the way back, he had a fall, without head injury or LOC. CTH at OSH was negative for acute intracranial evidence of stroke or bleed, however, given clinical evidence of stroke, TNK was administered at 10:19 AM 12/14 and pt transferred to [facility] for post-thrombolytic care.    Initial telestroke NIHSS 9 prior to arrival; On arrival, post-TNK NIHSS of 1 on my evaluation for LUE drift. Patient is independent at baseline with mRS 0.    Neuro exam stable; NIHSS 0 now. No longer having LUE/LLE drift. AAOx4. Pt to be stepped down to NPU.    Etiology: Thought to be thrombotic stroke in the R MCA distribution    Antithrombotics for secondary stroke prevention: Antiplatelets: Aspirin: 81 mg daily  Clopidogrel: 75 mg daily    Statins for secondary stroke prevention and hyperlipidemia, if present:   Statins: Atorvastatin- 40 mg daily    Aggressive risk factor modification: HTN, HLD, CAD, Carotid artery disease     Rehab efforts: The patient has been evaluated by a stroke team provider and the therapy needs have been fully considered based off the presenting complaints and exam findings. The following therapy evaluations are needed: PT evaluate and treat, OT evaluate and treat, SLP evaluate and treat, PM&R evaluate for appropriate placement    Diagnostics ordered/pending: TTE to assess cardiac function/status     VTE prophylaxis: Mechanical prophylaxis: Place SCDs  None: Reason for No Pharmacological VTE Prophylaxis: Holding x 24 hours s/p treatment with Thrombolytic therapy    BP parameters: Infarct: Post Thrombolytic therapy, SBP <180

## 2022-12-16 NOTE — ASSESSMENT & PLAN NOTE
Recent Labs     12/14/22  1009 12/15/22  0034 12/16/22  0016   CREATININE 1.9* 1.4 1.4     -- monitor creatinine  -- avoid nephrotoxic agents

## 2022-12-16 NOTE — SUBJECTIVE & OBJECTIVE
Interval History:  As above    Review of Systems   Constitutional:  Negative for activity change, appetite change, chills, fatigue, fever and unexpected weight change.   HENT:  Negative for trouble swallowing and voice change.    Eyes: Negative.    Respiratory:  Negative for cough, chest tightness, shortness of breath and wheezing.    Cardiovascular:  Negative for chest pain, palpitations and leg swelling.   Gastrointestinal:  Negative for abdominal distention, abdominal pain, anal bleeding, blood in stool, constipation, diarrhea, nausea, rectal pain and vomiting.   Endocrine: Negative.    Genitourinary: Negative.    Musculoskeletal:  Positive for back pain. Negative for neck pain and neck stiffness.   Skin:  Negative for pallor, rash and wound.   Allergic/Immunologic: Negative.    Neurological:  Positive for weakness. Negative for dizziness, seizures, syncope, light-headedness, numbness and headaches.   Hematological: Negative.    Psychiatric/Behavioral:  Negative for confusion and sleep disturbance. The patient is not nervous/anxious.    All other systems reviewed and are negative.    Objective:     Vitals:  Temp: 97.9 °F (36.6 °C)  Pulse: 69  Rhythm: normal sinus rhythm  BP: (!) 142/64  MAP (mmHg): 92  Resp: 15  SpO2: 95 %    Temp  Min: 97.9 °F (36.6 °C)  Max: 98.9 °F (37.2 °C)  Pulse  Min: 66  Max: 82  BP  Min: 95/55  Max: 142/64  MAP (mmHg)  Min: 71  Max: 92  Resp  Min: 13  Max: 21  SpO2  Min: 94 %  Max: 100 %    12/15 0701 - 12/16 0700  In: 1280.9 [P.O.:240; I.V.:1040.9]  Out: 850 [Urine:850]   Unmeasured Output  Urine Occurrence: (P) 1  Stool Occurrence: 1       Physical Exam  Vitals and nursing note reviewed.   Constitutional:       General: He is not in acute distress.     Appearance: Normal appearance. He is not ill-appearing or toxic-appearing.   HENT:      Head: Normocephalic and atraumatic.      Nose: Nose normal.      Mouth/Throat:      Mouth: Mucous membranes are moist.      Pharynx: Oropharynx is  clear.   Eyes:      General: No scleral icterus.     Extraocular Movements: Extraocular movements intact.      Conjunctiva/sclera: Conjunctivae normal.      Pupils: Pupils are equal, round, and reactive to light.   Cardiovascular:      Rate and Rhythm: Normal rate and regular rhythm.      Pulses: Normal pulses.      Heart sounds: Normal heart sounds. No murmur heard.  Pulmonary:      Effort: Pulmonary effort is normal. No respiratory distress.      Breath sounds: Normal breath sounds. No stridor. No wheezing.   Abdominal:      General: Abdomen is flat. Bowel sounds are normal. There is no distension.      Palpations: Abdomen is soft.      Tenderness: There is no abdominal tenderness. There is no guarding or rebound.   Musculoskeletal:      Cervical back: Normal range of motion and neck supple. No rigidity.   Lymphadenopathy:      Cervical: No cervical adenopathy.   Skin:     General: Skin is warm and dry.      Capillary Refill: Capillary refill takes less than 2 seconds.      Coloration: Skin is not jaundiced or pale.      Findings: No bruising or rash.   Neurological:      Mental Status: He is alert and oriented to person, place, and time. Mental status is at baseline.      Sensory: No sensory deficit.      Motor: Weakness (4/5 LLE) present.      Comments: Following commands  Sensation intact  PERRLA  Mild tremor in hands bilaterally  No pronator drift     Psychiatric:         Mood and Affect: Mood normal.         Behavior: Behavior normal.         Medications:  Continuous Scheduledalbuterol-ipratropium, 3 mL, Q6H  aspirin, 81 mg, Daily  atorvastatin, 40 mg, Daily  clopidogreL, 75 mg, Daily  heparin (porcine), 5,000 Units, Q8H  memantine, 10 mg, Daily  metoprolol tartrate, 12.5 mg, Q12H  polyethylene glycol, 17 g, BID  senna-docusate 8.6-50 mg, 1 tablet, BID  tamsulosin, 0.4 mg, Daily    PRNacetaminophen, 650 mg, Q6H PRN  dextrose 10%, 12.5 g, PRN  dextrose 10%, 25 g, PRN  glucagon (human recombinant), 1 mg,  PRN  hydrALAZINE, 10 mg, Q6H PRN  insulin aspart U-100, 0-5 Units, QID (AC + HS) PRN  labetalol, 10 mg, Q6H PRN  morphine, 1 mg, Q4H PRN  sodium chloride 0.9%, 10 mL, PRN      Today I personally reviewed pertinent medications, lines/drains/airways, imaging, cardiology results, laboratory results, notably:    Diet  Diet diabetic [facility]; 2000 Calorie; Cardiac (Low Na/Chol); Thin

## 2022-12-16 NOTE — PT/OT/SLP PROGRESS
Speech Language Pathology Treatment    Patient Name:  Vega Kohler   MRN:  [id number]  Admitting Diagnosis: Cerebrovascular accident (CVA) due to thrombosis of right middle cerebral artery    Recommendations:                 General Recommendations:  Dysphagia therapy and Speech/language therapy  Diet recommendations:  Regular, Liquid Diet Level: Thin   Aspiration Precautions: 1 bite/sip at a time, HOB to 90 degrees, No straws, Small bites/sips, and Strict aspiration precautions   General Precautions: Standard, aspiration, fall  Communication strategies:  none    Subjective     SLP communicated with RN prior to entry and received clearance for therapy this date.   Pt awake and alert upon ST entry. Pt agreeable to participate with ST.    Pain/Comfort:  Pain Rating 1: 0/10  Pain Rating Post-Intervention 1: 0/10    Respiratory Status: nasal cannula     Objective:     Has the patient been evaluated by SLP for swallowing?   Yes  Keep patient NPO? No     Pt seen bedside for ongoing SLP services. Pt seen eating from breakfast meal tray upon SLP entry. He reported no difficulty with po intake and consumed several bites of scrambled eggs and straw sips water without overt s/s aspiration or airway compromise. He participated in reading, writing, and visuospatial skills without evidence of impairment. SLP offered education re: current impressions, current recs, and ongoing SLP POC to which he expressed agreement. Continue ST per POC.    Assessment:     Vega Kohler is a 87 y.o. male with an SLP diagnosis of Cognitive-Linguistic Impairment.     Goals:   Multidisciplinary Problems       SLP Goals          Problem: SLP    Goal Priority Disciplines Outcome   SLP Goal     SLP Ongoing, Progressing   Description: Speech Language Pathology Goals  Goals expected to be met by 12/22  1. Pt will tolerate regular diet with thin liquids without overt s/s aspiration.   2. Pt will complete simple reasoning/problem solving tasks with 70% accy  with min cues.   3. Pt will complete assessment of reading, writing, visual spatial skills to determine need for tx.                              Plan:     Patient to be seen:  3 x/week   Plan of Care expires:  01/14/23  Plan of Care reviewed with:  patient   SLP Follow-Up:  Yes       Discharge recommendations:  nursing facility, skilled     Time Tracking:     SLP Treatment Date:   12/16/22  Speech Start Time:  0920  Speech Stop Time:  0933     Speech Total Time (min):  13 min    Billable Minutes: Treatment Swallowing Dysfunction 5 and Self Care/Home Management Training 8  12/16/2022

## 2022-12-16 NOTE — ASSESSMENT & PLAN NOTE
87 year old man with HTN, HLD, T2DM, CKD, CAD, s/p TAVR for aortic stenosis, s/p bilateral CEA for carotid stenosis (left 12/2021), COPD, dementia, BPH, follicular lymphoma on chemo admitted to [facility] for post-thrombolytic monitoring. Initially presented to OSH for left sided weakness. CTH negative for acute process,  received tenecteplase at 1019 on 12/14. CTA on arrival negative for LVO, no IR intervention.    Stepdown to [facility] neuro  - goal SBP <180  - PRN hydralazine, labetalol  -Restart DAAPT   - PT/OT/SLP evaluation

## 2022-12-16 NOTE — PROGRESS NOTES
[facility] - Neuro Critical Care  Vascular Neurology  Comprehensive Stroke Center  Progress Note    Assessment/Plan:     * Cerebrovascular accident (CVA) due to thrombosis of right middle cerebral artery  88 yo M with initial CC disabling left hemiplegia now  s/p TNK. Patient was in his normal state of health when he woke up today. He was watching a game when he decided to go to the bathroom today 12/14/2022 @ 8:30 AM at which time he noted difficulty using the left side of his body. He managed to use a walker to ambulate to the bathroom, but on the way back, he had a fall, without head injury or LOC. CTH at OSH was negative for acute intracranial evidence of stroke or bleed, however, given clinical evidence of stroke, TNK was administered at 10:19 AM 12/14 and pt transferred to [facility] for post-thrombolytic care.    Initial telestroke NIHSS 9 prior to arrival; On arrival, post-TNK NIHSS of 1 on my evaluation for LUE drift. Patient is independent at baseline with mRS 0.    Neuro exam stable; NIHSS 0 now. No longer having LUE/LLE drift. AAOx4. Pt to be stepped down to NPU.    Etiology: Thought to be thrombotic stroke in the R MCA distribution    Antithrombotics for secondary stroke prevention: Antiplatelets: Aspirin: 81 mg daily  Clopidogrel: 75 mg daily    Statins for secondary stroke prevention and hyperlipidemia, if present:   Statins: Atorvastatin- 40 mg daily    Aggressive risk factor modification: HTN, HLD, CAD, Carotid artery disease     Rehab efforts: The patient has been evaluated by a stroke team provider and the therapy needs have been fully considered based off the presenting complaints and exam findings. The following therapy evaluations are needed: PT evaluate and treat, OT evaluate and treat, SLP evaluate and treat, PM&R evaluate for appropriate placement    Diagnostics ordered/pending: TTE to assess cardiac function/status     VTE prophylaxis: Mechanical prophylaxis: Place SCDs  None: Reason for No  Pharmacological VTE Prophylaxis: Holding x 24 hours s/p treatment with Thrombolytic therapy    BP parameters: Infarct: Post Thrombolytic therapy, SBP <180        Follicular lymphoma  H/o follicular lymphoma per chart (grade 3a, stage 1); receiving rituxan (cycle 3 in 11/2022), 10/2022 PET scan positive for 3 cm lymphadenopathy in left axillary region otherwise HUSSEIN.    Aortic stenosis s/p TAVR  History of aortic stenosis    Chronic kidney disease, stage 3 (moderate)  Recent Labs     12/14/22  1009 12/15/22  0034 12/16/22  0016   CREATININE 1.9* 1.4 1.4     -- monitor creatinine  -- avoid nephrotoxic agents    Hypertension  Stroke risk factor    -- SBP goal < 180  -- on PRN meds    Carotid artery disease  Stroke risk factor    H/o high grade stenosis in the left internal carotid artery (now s/p L carotid endarterectomy in 12/3/2021; Carotid US 12/22/2021 without evidence of hemodynamically significant stenosis)    Hyperlipidemia  Stroke risk factor      - atorvastatin 40mg    Hypothyroidism  Subclinical hypothyroidism; TSH 4.4; fT4 0.95 12/14/2021    Type 2 diabetes mellitus  Stroke risk factor    -- A1C 6.7  -- LDSSI & accuchecks         12/15/2022 Neuro exam stable; NIHSS 3 for mild drowsiness, LUE/LLE drift. AAOx4, CT head negative for interval changes or superimposed acute intracranial process.  12/16/2022 Neuro exam stable; NIHSS 0. No longer having LUE/LLE drift. AAOx4. Pt to be stepped down to NPU.      STROKE DOCUMENTATION   Acute Stroke Times   Last Known Normal Date: 12/14/22  Last Known Normal Time: 0700  Symptom Onset Date: 12/14/22  Symptom Onset Time: 0830  Stroke Team Called Date: 12/14/22  Stroke Team Called Time: 1240  Stroke Team Arrival Date: 12/14/22  Stroke Team Arrival Time: 1243  CTA Interpretation Time: 1314  Thrombectomy Recommended: No    NIH Scale:  1a. Level of Consciousness: 0-->Alert, keenly responsive  1b. LOC Questions: 0-->Answers both questions correctly  1c. LOC Commands:  0-->Performs both tasks correctly  2. Best Gaze: 0-->Normal  3. Visual: 0-->No visual loss  4. Facial Palsy: 0-->Normal symmetrical movements  5a. Motor Arm, Left: 0-->No drift, limb holds 90 (or 45) degrees for full 10 secs  5b. Motor Arm, Right: 0-->No drift, limb holds 90 (or 45) degrees for full 10 secs  6a. Motor Leg, Left: 0-->No drift, leg holds 30 degree position for full 5 secs  6b. Motor Leg, Right: 0-->No drift, leg holds 30 degree position for full 5 secs  7. Limb Ataxia: 0-->Absent  8. Sensory: 0-->Normal, no sensory loss  9. Best Language: 0-->No aphasia, normal  10. Dysarthria: 0-->Normal  11. Extinction and Inattention (formerly Neglect): 0-->No abnormality  Total (NIH Stroke Scale): 0       Modified [place] Score: 0  [place] Coma Scale:    ABCD2 Score:    [id number] Score:   HAS -BLED Score:   ICH Score:   Hunt & Rae Classification:      Hemorrhagic change of an Ischemic Stroke: Does this patient have an ischemic stroke with hemorrhagic changes? No     Neurologic Chief Complaint: Clinical evidence of R MCA thrombosis    Subjective:     Interval History: Patient is seen for follow-up neurological assessment and treatment recommendations:     Neuro exam stable; NIHSS 0. No longer having LUE/LLE drift. AAOx4. Pt to be stepped down to NPU.    HPI, Past Medical, Family, and Social History remains the same as documented in the initial encounter.     Review of Systems   Constitutional:  Negative for chills and fever.   HENT:  Negative for rhinorrhea and sneezing.    Eyes:  Negative for pain and redness.   Respiratory:  Negative for cough and shortness of breath.    Gastrointestinal:  Negative for abdominal pain and nausea.   Genitourinary:  Negative for difficulty urinating and dysuria.   Musculoskeletal:  Positive for arthralgias, gait problem and myalgias. Negative for back pain and neck pain.   Neurological:  Positive for weakness. Negative for tremors and speech difficulty.   Scheduled Meds:    albuterol-ipratropium  3 mL Nebulization Q6H    aspirin  81 mg Oral Daily    atorvastatin  40 mg Oral Daily    clopidogreL  75 mg Oral Daily    heparin (porcine)  5,000 Units Subcutaneous Q8H    memantine  10 mg Oral Daily    metoprolol tartrate  12.5 mg Oral Q12H    polyethylene glycol  17 g Oral BID    senna-docusate 8.6-50 mg  1 tablet Oral BID    tamsulosin  0.4 mg Oral Daily     Continuous Infusions:      PRN Meds:acetaminophen, dextrose 10%, dextrose 10%, glucagon (human recombinant), hydrALAZINE, insulin aspart U-100, labetalol, morphine, sodium chloride 0.9%    Objective:     Vital Signs (Most Recent):  Temp: 97.9 °F (36.6 °C) (12/16/22 1501)  Pulse: 74 (12/16/22 1701)  Resp: (!) 21 (12/16/22 1701)  BP: 133/63 (12/16/22 1701)  SpO2: (!) 94 % (12/16/22 1701)  BP Location: Right arm    Vital Signs Range (Last 24H):  Temp:  [97.9 °F (36.6 °C)-98.9 °F (37.2 °C)]   Pulse:  [66-82]   Resp:  [13-21]   BP: (100-142)/(56-65)   SpO2:  [94 %-100 %]   BP Location: Right arm    Physical Exam  Vitals and nursing note reviewed.   Constitutional:       General: He is not in acute distress.  HENT:      Head: Normocephalic and atraumatic.      Nose: Nose normal. No rhinorrhea.   Eyes:      Conjunctiva/sclera: Conjunctivae normal.      Pupils: Pupils are equal, round, and reactive to light.   Cardiovascular:      Rate and Rhythm: Normal rate and regular rhythm.      Heart sounds: No murmur heard.  Pulmonary:      Effort: Pulmonary effort is normal. No respiratory distress.      Breath sounds: Normal breath sounds.   Abdominal:      Palpations: Abdomen is soft.      Tenderness: There is no abdominal tenderness.   Musculoskeletal:         General: Tenderness (LLE above the knee) present.      Right lower leg: No edema.      Left lower leg: No edema.   Skin:     General: Skin is warm and dry.      Capillary Refill: Capillary refill takes less than 2 seconds.   Neurological:      Mental Status: He is alert.       Neurological  Exam:   LOC: alert  Attention Span: Good   Language: No aphasia  Articulation: No dysarthria  Orientation: Person, Place, Time   Visual Fields: Full  EOM (CN III, IV, VI): Full/intact  Pupils (CN II, III): PERRL  Facial Sensation (CN V): Normal  Facial Movement (CN VII): Symmetric facial expression    Reflexes: 2+ throughout  Motor: Arm left  Normal 5/5, mild drift  Leg left  Normal 5/5  Arm right  Normal 5/5  Leg right Paresis: 4-/5 2/2 pain  Cerebellum: No evidence of appendicular or axial ataxia  Sensation: Tactile extinction to bilateral simultaneous stimulation   Tone: Normal tone throughout    Laboratory:  CMP:   Recent Labs   Lab 12/16/22  0016   CALCIUM 8.7   ALBUMIN 2.8*   PROT 5.3*      K 4.6   CO2 27      BUN 24*   CREATININE 1.4   ALKPHOS 81   ALT 16   AST 17   BILITOT 0.8     BMP:   Recent Labs   Lab 12/16/22  0016      K 4.6      CO2 27   BUN 24*   CREATININE 1.4   CALCIUM 8.7     CBC:   Recent Labs   Lab 12/16/22  0016   WBC 4.99   RBC 3.52*   HGB 11.3*   HCT 35.8*      *   MCH 32.1*   MCHC 31.6*     Lipid Panel:   Recent Labs   Lab 12/14/22  1009   CHOL 177   LDLCALC 128.6   HDL 32*   TRIG 82     Coagulation:   Recent Labs   Lab 12/14/22  1009   INR 1.1     Platelet Aggregation Study: No results for input(s): PLTAGG, PLTAGINTERP, PLTAGREGLACO, ADPPLTAGGREG in the last 168 hours.  Hgb A1C:   Recent Labs   Lab 12/14/22  1552   HGBA1C 6.7*     TSH:   Recent Labs   Lab 12/14/22  1009   TSH 4.401*       Diagnostic Results     Brain imaging:    CT Head 12/15/2022   FINDINGS:  The ventricular system, sulcal pattern and parenchymal attenuation characteristics are consistent with chronic change and appears stable when compared to the prior examination.  There is no evidence for superimposed acute process.  There is no evidence for intracranial mass, mass effect or midline shift.  There is no evidence for acute intracranial hemorrhage.  Appropriate CSF spaces are seen at  the skull base.     The orbits demonstrate chronic change.  The mastoid air cells appear appropriate when accounting for averaging, mild paranasal sinus mucosal thickening is noted.  The osseous structures appear intact.     Impression:     Chronic change noted, there is no evidence for superimposed acute intracranial process.    MRI 12/14/2022  An acute deep white matter infarct on the right is identified measuring 13 mm.  No associated hemorrhage or mass effect.     There is no evidence of hydrocephalus or mass effect with central volume loss stable..     A small chronic right cerebellar infarct is however new from 2019.  Chronic punctate hemosiderin staining left parietal subcortical white matter.     Foci of increased signal are noted within the periventricular and subcortical white matter, nonspecific but may reflect mild chronic small vessel ischemic change.  Normal arterial flow voids are preserved at the skull base.     The visualized sinuses and mastoid air cells are clear.     Impression:     Acute deep white matter infarct on the right without associated hemorrhage or mass effect.  Mild underlying chronic ischemic changes.    CT Head 12/14/2022   No acute intracranial findings.     Age-appropriate cerebral volume loss with moderate patchy decreased attenuation supratentorial white matter while nonspecific suggestive for chronic ischemic change.     No evidence for acute intracranial hemorrhage.  Clinical correlation and further evaluation as warranted.        Vessel Imaging:     CTA multiphase 12/14/2022   CT head shows no acute intracranial abnormalities.     CTA head neck shows no intracranial large vessel occlusion or aneurysm.     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     Atherosclerosis at the carotid bifurcations with evidence of prior endarterectomy.  No high-grade stenosis.        Cardiac Evaluation:      Echo 10/06/2022  1. The  study quality is below average.   2. The left ventricle is decreased in size. Global left ventricular   systolic function is normal. The left ventricular ejection fraction is   63%. Noted left ventricular hypertrophy. It is mild.     3. A bioprosthetic vavle is noted in the aortic position.  No   significant central or paravalular AI.   4. Mild calcification of the mitral valve is noted.   5. Mild (1+) tricuspid regurgitation.      Jose Mendez MD  [facility]  Department of Vascular Neurology   [facility] - Neuro Critical Care

## 2022-12-16 NOTE — PROGRESS NOTES
"[facility] - Neuro Critical Care  Neurocritical Care  Progress Note    Admit Date: 12/14/2022  Service Date: 12/16/2022  Length of Stay: 2    Subjective:     Chief Complaint: Cerebrovascular accident (CVA) due to thrombosis of right middle cerebral artery    History of Present Illness: Vega Kohler is an 87 year old man with history of HTN, T2DM, HLD, CAD, COPD, CKD, CHF, Alzheimer's, follicular lymphoma on chemo (last rituxan 11/17) transferred to [facility] for post-thrombolytic monitoring. Woke up 12/14 without symptoms around 0700. Sat down to watch TV, around 0830 noticed that he had difficulty extending his left fingers and then difficulty walking to the bathroom due to left sided weakness with subsequent fall. Tenecteplase started at 1019 prior to transfer. Did not undergo thrombectomy. On initial evaluation, patient reports that his left sided weakness has improved about "50%." Reports some left hip/buttock pain related to fall. Has chronic L ankle/lower leg pain due to injury in his 20s.    At baseline ambulates with cane/walker. Lives alone per patient.      Hospital Course: 12/15/2022 Pt with mild back pain. Strength returning to normal. Residual weakness to the LLE.   12/16/2022 Restart DAAPT. Pt stable for stepdown to [facility] neuro      Interval History:  As above    Review of Systems   Constitutional:  Negative for activity change, appetite change, chills, fatigue, fever and unexpected weight change.   HENT:  Negative for trouble swallowing and voice change.    Eyes: Negative.    Respiratory:  Negative for cough, chest tightness, shortness of breath and wheezing.    Cardiovascular:  Negative for chest pain, palpitations and leg swelling.   Gastrointestinal:  Negative for abdominal distention, abdominal pain, anal bleeding, blood in stool, constipation, diarrhea, nausea, rectal pain and vomiting.   Endocrine: Negative.    Genitourinary: Negative.    Musculoskeletal:  Positive for back pain. Negative for neck " pain and neck stiffness.   Skin:  Negative for pallor, rash and wound.   Allergic/Immunologic: Negative.    Neurological:  Positive for weakness. Negative for dizziness, seizures, syncope, light-headedness, numbness and headaches.   Hematological: Negative.    Psychiatric/Behavioral:  Negative for confusion and sleep disturbance. The patient is not nervous/anxious.    All other systems reviewed and are negative.    Objective:     Vitals:  Temp: 97.9 °F (36.6 °C)  Pulse: 69  Rhythm: normal sinus rhythm  BP: (!) 142/64  MAP (mmHg): 92  Resp: 15  SpO2: 95 %    Temp  Min: 97.9 °F (36.6 °C)  Max: 98.9 °F (37.2 °C)  Pulse  Min: 66  Max: 82  BP  Min: 95/55  Max: 142/64  MAP (mmHg)  Min: 71  Max: 92  Resp  Min: 13  Max: 21  SpO2  Min: 94 %  Max: 100 %    12/15 0701 - 12/16 0700  In: 1280.9 [P.O.:240; I.V.:1040.9]  Out: 850 [Urine:850]   Unmeasured Output  Urine Occurrence: (P) 1  Stool Occurrence: 1       Physical Exam  Vitals and nursing note reviewed.   Constitutional:       General: He is not in acute distress.     Appearance: Normal appearance. He is not ill-appearing or toxic-appearing.   HENT:      Head: Normocephalic and atraumatic.      Nose: Nose normal.      Mouth/Throat:      Mouth: Mucous membranes are moist.      Pharynx: Oropharynx is clear.   Eyes:      General: No scleral icterus.     Extraocular Movements: Extraocular movements intact.      Conjunctiva/sclera: Conjunctivae normal.      Pupils: Pupils are equal, round, and reactive to light.   Cardiovascular:      Rate and Rhythm: Normal rate and regular rhythm.      Pulses: Normal pulses.      Heart sounds: Normal heart sounds. No murmur heard.  Pulmonary:      Effort: Pulmonary effort is normal. No respiratory distress.      Breath sounds: Normal breath sounds. No stridor. No wheezing.   Abdominal:      General: Abdomen is flat. Bowel sounds are normal. There is no distension.      Palpations: Abdomen is soft.      Tenderness: There is no abdominal  tenderness. There is no guarding or rebound.   Musculoskeletal:      Cervical back: Normal range of motion and neck supple. No rigidity.   Lymphadenopathy:      Cervical: No cervical adenopathy.   Skin:     General: Skin is warm and dry.      Capillary Refill: Capillary refill takes less than 2 seconds.      Coloration: Skin is not jaundiced or pale.      Findings: No bruising or rash.   Neurological:      Mental Status: He is alert and oriented to person, place, and time. Mental status is at baseline.      Sensory: No sensory deficit.      Motor: Weakness (4/5 LLE) present.      Comments: Following commands  Sensation intact  PERRLA  Mild tremor in hands bilaterally  No pronator drift     Psychiatric:         Mood and Affect: Mood normal.         Behavior: Behavior normal.         Medications:  Continuous Scheduledalbuterol-ipratropium, 3 mL, Q6H  aspirin, 81 mg, Daily  atorvastatin, 40 mg, Daily  clopidogreL, 75 mg, Daily  heparin (porcine), 5,000 Units, Q8H  memantine, 10 mg, Daily  metoprolol tartrate, 12.5 mg, Q12H  polyethylene glycol, 17 g, BID  senna-docusate 8.6-50 mg, 1 tablet, BID  tamsulosin, 0.4 mg, Daily    PRNacetaminophen, 650 mg, Q6H PRN  dextrose 10%, 12.5 g, PRN  dextrose 10%, 25 g, PRN  glucagon (human recombinant), 1 mg, PRN  hydrALAZINE, 10 mg, Q6H PRN  insulin aspart U-100, 0-5 Units, QID (AC + HS) PRN  labetalol, 10 mg, Q6H PRN  morphine, 1 mg, Q4H PRN  sodium chloride 0.9%, 10 mL, PRN      Today I personally reviewed pertinent medications, lines/drains/airways, imaging, cardiology results, laboratory results, notably:    Diet  Diet diabetic [facility]; 2000 Calorie; Cardiac (Low Na/Chol); Thin        Assessment/Plan:     Neuro  * Cerebrovascular accident (CVA) due to thrombosis of right middle cerebral artery  87 year old man with HTN, HLD, T2DM, CKD, CAD, s/p TAVR for aortic stenosis, s/p bilateral CEA for carotid stenosis (left 12/2021), COPD, dementia, BPH, follicular lymphoma on  chemo admitted to [facility] for post-thrombolytic monitoring. Initially presented to OSH for left sided weakness. CTH negative for acute process,  received tenecteplase at 1019 on 12/14. CTA on arrival negative for LVO, no IR intervention.    Stepdown to [facility] neuro  - goal SBP <180  - PRN hydralazine, labetalol  -Restart DAAPT   - PT/OT/SLP evaluation    Dementia  - continue home memantine 10 mg QHS    Pulmonary  COPD, moderate  Per Pulmonology note 9/2022, sats 81% on room air and has home oxygen, however does not use it.  - scheduled duonebs  - PRN O2 with goal SpO2 88-92%    Cardiac/Vascular  Aortic stenosis s/p TAVR  TAVR in 10/2018.    Chronic diastolic heart failure, NYHA class 2  Taking torsemide 10 mg QD and metoprolol succinate 25 mg QHS prior to admission  - continue metoprolol  - hold torsemide for now, restart as appropriate    Hypertension  Not on anti-hypertensive prior to admission.  - SBP goal <180  - PRN labetalol, hydralazine    Carotid artery disease  S/p PCI and MILI in 2018  - ASA 81 mg and clopidogrel per Cardiology (last evaluation 12/2021)    Hyperlipidemia    - continue atorvastatin 40 mg    Renal/  BPH (benign prostatic hyperplasia)  - continue home tamsulosin    Chronic kidney disease, stage 3 (moderate)  Baseline Cr 1.5-1.9  - monitor Cr  - dose medications for renal clearance  - avoid nephrotoxic medication    Oncology  Follicular lymphoma  Stage I grade 3a follicular lymphoma. Diagnosed in 9/2022. Has been receiving rituxan, last dose 11/17/22.    Endocrine  Hypothyroidism  Not on levothyroxine prior to admission. TSH 4.4 with fT4 WNL.    Type 2 diabetes mellitus  - check A1c  - goal inpatient -180  - LDSSI and accuchecks  - diabetic diet when appropriate          The patient is being Prophylaxed for:  Venous Thromboembolism with: Mechanical  Stress Ulcer with: H2B  Ventilator Pneumonia with: not applicable    Activity Orders          Diet diabetic [facility]; 2000  Calorie; Cardiac (Low Na/Chol); Thin: Diabetic starting at 12/15 0932    Turn patient starting at 12/14 1400    Elevate HOB starting at 12/14 1334        Full Code    Montana Cox MD  Neurocritical Care  [facility] - Neuro Critical Care

## 2022-12-16 NOTE — PLAN OF CARE
[facility] Care Plan    POC reviewed with Vega Kohler and family at 0300. Pt verbalized understanding. Questions and concerns addressed. No acute events overnight. Pt progressing toward goals. Will continue to monitor. See below and flowsheets for full assessment and VS info.     - Pt AAOx4 and spontaneous in all extremities. Minimal left sided drift upper and moderate lower left side drift.  -Prn miralax given in addition to scheduled senna, KUB performed but not read yet.  - AM labs WDL.  - NS infusing at 50 ccs/hr. Left AC draws back.  - Condom cath in place  - 1 L NC titrate 02 per md order. Pt has hx of copd.  - Pt complains of left hip & left leg pain from fall. Prn tylenol and morphine ordered by provider. Pt refusing pain medication at this time.  -Bed bath and linen change.  - Pt participated in education with teach back.        Is this a stroke patient? yes- Stroke booklet reviewed with patient, risk factors identified for patient and stroke booklet remains at bedside for ongoing education.     Neuro:  Jody Coma Scale  Best Eye Response: 4-->(E4) spontaneous  Best Motor Response: 6-->(M6) obeys commands  Best Verbal Response: 5-->(V5) oriented  Jody Coma Scale Score: 15  Assessment Qualifiers: no eye obstruction present, patient not sedated/intubated  Pupil PERRLA: yes     24hr Temp:  [98.2 °F (36.8 °C)-98.7 °F (37.1 °C)]     CV:   Rhythm: normal sinus rhythm  BP goals:   SBP < 180  MAP > 65    Resp:           Plan: N/A    GI/:     Diet/Nutrition Received: regular  Last Bowel Movement: 12/15/22 (pt states it has been about a week sincehad full bm. small bm today per chart. Pt states he took multiple otc meds and suppositories at home)  Voiding Characteristics: external catheter    Intake/Output Summary (Last 24 hours) at 12/16/2022 0449  Last data filed at 12/16/2022 0405  Gross per 24 hour   Intake 1294.69 ml   Output 455 ml   Net 839.69 ml     Unmeasured Output  Urine Occurrence: (P) 1  Stool  Occurrence: 1    Labs/Accuchecks:  Recent Labs   Lab 12/16/22  0016   WBC 4.99   RBC 3.52*   HGB 11.3*   HCT 35.8*         Recent Labs   Lab 12/16/22  0016      K 4.6   CO2 27      BUN 24*   CREATININE 1.4   ALKPHOS 81   ALT 16   AST 17   BILITOT 0.8      Recent Labs   Lab 12/14/22  1009   INR 1.1      Recent Labs   Lab 12/14/22  1009   TROPONINI 0.008       Electrolytes: N/A - electrolytes WDL  Accuchecks: Q6H    Gtts:   sodium chloride 0.9% 50 mL/hr at 12/16/22 0405       LDA/Wounds:  Lines/Drains/Airways       Drain  Duration             Male External Urinary Catheter 12/14/22 1919 Medium 1 day              Peripheral Intravenous Line  Duration                  Peripheral IV - Single Lumen 12/14/22 1300 18 G Left Antecubital 1 day         Peripheral IV - Single Lumen 12/14/22 1300 20 G Anterior;Right Hand 1 day                  Wounds: No  Wound care consulted: No

## 2022-12-16 NOTE — PLAN OF CARE
[facility] Care Plan    POC reviewed with Vega Kohler and family at 1500. Pt verbalized understanding. Questions and concerns addressed. No acute events today. Pt progressing toward goals. Will continue to monitor. See below and flowsheets for full assessment and VS info.     -D/c IV normal saline.  -Weaned O2 nasal cannula to 0.5L.  -Started aspirin and Plavix.  -Has transfer order.        Is this a stroke patient? yes- Stroke booklet reviewed with patient, risk factors identified for patient and stroke booklet remains at bedside for ongoing education.     Neuro:  Jody Coma Scale  Best Eye Response: 4-->(E4) spontaneous  Best Motor Response: 6-->(M6) obeys commands  Best Verbal Response: 5-->(V5) oriented  [place] Coma Scale Score: 15  Assessment Qualifiers: patient not sedated/intubated  Pupil PERRLA: yes     24 hr Temp:  [97.9 °F (36.6 °C)-98.9 °F (37.2 °C)]     CV:   Rhythm: normal sinus rhythm  BP goals:   SBP < 180  MAP > 65    Resp:           Plan:  wean from nasal cannula    GI/:     Diet/Nutrition Received: regular  Last Bowel Movement: 12/15/22  Voiding Characteristics: external catheter    Intake/Output Summary (Last 24 hours) at 12/16/2022 1740  Last data filed at 12/16/2022 1601  Gross per 24 hour   Intake 1143.68 ml   Output 1555 ml   Net -411.32 ml     Unmeasured Output  Urine Occurrence: (P) 1  Stool Occurrence: 1    Labs/Accuchecks:  Recent Labs   Lab 12/16/22  0016   WBC 4.99   RBC 3.52*   HGB 11.3*   HCT 35.8*         Recent Labs   Lab 12/16/22  0016      K 4.6   CO2 27      BUN 24*   CREATININE 1.4   ALKPHOS 81   ALT 16   AST 17   BILITOT 0.8      Recent Labs   Lab 12/14/22  1009   INR 1.1      Recent Labs   Lab 12/14/22  1009   TROPONINI 0.008       Electrolytes: N/A - electrolytes WDL  Accuchecks: ACHS    Gtts:      LDA/Wounds:  Lines/Drains/Airways       Drain  Duration             Male External Urinary Catheter 12/14/22 1919 Medium 1 day              Peripheral  Intravenous Line  Duration                  Peripheral IV - Single Lumen 12/14/22 1300 18 G Left Antecubital 2 days         Peripheral IV - Single Lumen 12/14/22 1300 20 G Anterior;Right Hand 2 days                  Wounds: No  Wound care consulted: No

## 2022-12-16 NOTE — PROGRESS NOTES
Patient returned back to room 9086 via MRI. Vitals stable during imaging and transport. Bed locked, wheels in lowest position, call light in reach.

## 2022-12-17 NOTE — ASSESSMENT & PLAN NOTE
sCr 1.9 on admission, baseline 1.5-1.9  --Creatinine stable, 1.3 today  --Avoid nephrotoxic agents, renally dose meds when possible  --Monitor daily CMP

## 2022-12-17 NOTE — NURSING
Nursing Transfer Note       Transfer To 921, From 9086    Transfer via bed    Transfer with 0.5L to O2, cardiac monitoring    Transported by SONIA Brown    Medicines sent: yes    Chart sent with patient: Yes    Belongings sent with patient: White briefs, gold colored watch, black life alert with cord around neck, brown slippers, white undershirt, blue pants, tan colored shirt, , black wallet, cough drops, diet coke.    Notified: son    Bedside Neuro assessment performed: Yes    Bedside Handoff given to: SONIA Greene.    Upon arrival to floor: cardiac monitor applied, patient oriented to room, call bell in reach, and bed in lowest position

## 2022-12-17 NOTE — SUBJECTIVE & OBJECTIVE
Interval History:  As above    Review of Systems   Constitutional:  Negative for activity change, appetite change, chills, fatigue, fever and unexpected weight change.   HENT:  Negative for trouble swallowing and voice change.    Eyes: Negative.    Respiratory:  Negative for cough, chest tightness, shortness of breath and wheezing.    Cardiovascular:  Negative for chest pain, palpitations and leg swelling.   Gastrointestinal:  Negative for abdominal distention, abdominal pain, anal bleeding, blood in stool, constipation, diarrhea, nausea, rectal pain and vomiting.   Endocrine: Negative.    Genitourinary: Negative.    Musculoskeletal:  Positive for back pain. Negative for neck pain and neck stiffness.   Skin:  Negative for pallor, rash and wound.   Allergic/Immunologic: Negative.    Neurological:  Positive for weakness. Negative for dizziness, seizures, syncope, light-headedness, numbness and headaches.   Hematological: Negative.    Psychiatric/Behavioral:  Negative for confusion and sleep disturbance. The patient is not nervous/anxious.    All other systems reviewed and are negative.    Objective:     Vitals:  Temp: 98.6 °F (37 °C)  Pulse: 70  Rhythm: normal sinus rhythm  BP: (!) 118/56  MAP (mmHg): 81  Resp: 13  SpO2: 97 %    Temp  Min: 97.7 °F (36.5 °C)  Max: 98.8 °F (37.1 °C)  Pulse  Min: 64  Max: 80  BP  Min: 114/67  Max: 158/71  MAP (mmHg)  Min: 81  Max: 106  Resp  Min: 11  Max: 29  SpO2  Min: 91 %  Max: 99 %    12/16 0701 - 12/17 0700  In: 386.4 [P.O.:240; I.V.:146.4]  Out: 2145 [Urine:2145]   Unmeasured Output  Urine Occurrence: (P) 1  Stool Occurrence: 1       Physical Exam  Vitals and nursing note reviewed.   Constitutional:       General: He is not in acute distress.     Appearance: Normal appearance. He is not ill-appearing or toxic-appearing.   HENT:      Head: Normocephalic and atraumatic.      Nose: Nose normal.      Mouth/Throat:      Mouth: Mucous membranes are moist.      Pharynx: Oropharynx is  clear.   Eyes:      General: No scleral icterus.     Extraocular Movements: Extraocular movements intact.      Conjunctiva/sclera: Conjunctivae normal.      Pupils: Pupils are equal, round, and reactive to light.   Cardiovascular:      Rate and Rhythm: Normal rate and regular rhythm.      Pulses: Normal pulses.      Heart sounds: Normal heart sounds. No murmur heard.  Pulmonary:      Effort: Pulmonary effort is normal. No respiratory distress.      Breath sounds: Normal breath sounds. No stridor. No wheezing.   Abdominal:      General: Abdomen is flat. Bowel sounds are normal. There is no distension.      Palpations: Abdomen is soft.      Tenderness: There is no abdominal tenderness. There is no guarding or rebound.   Musculoskeletal:      Cervical back: Normal range of motion and neck supple. No rigidity.   Lymphadenopathy:      Cervical: No cervical adenopathy.   Skin:     General: Skin is warm and dry.      Capillary Refill: Capillary refill takes less than 2 seconds.      Coloration: Skin is not jaundiced or pale.      Findings: No bruising or rash.   Neurological:      Mental Status: He is alert and oriented to person, place, and time. Mental status is at baseline.      Sensory: No sensory deficit.      Motor: Weakness (4/5 LLE) present.      Comments: Following commands  Sensation intact  PERRLA  Mild tremor in hands bilaterally  No pronator drift     Psychiatric:         Mood and Affect: Mood normal.         Behavior: Behavior normal.         Medications:  Continuous Scheduledalbuterol-ipratropium, 3 mL, Q6H  aspirin, 81 mg, Daily  atorvastatin, 40 mg, Daily  clopidogreL, 75 mg, Daily  heparin (porcine), 5,000 Units, Q8H  memantine, 10 mg, Daily  metoprolol tartrate, 12.5 mg, Q12H  mupirocin, , Daily  polyethylene glycol, 17 g, BID  senna-docusate 8.6-50 mg, 1 tablet, BID  tamsulosin, 0.4 mg, Daily    PRNacetaminophen, 650 mg, Q6H PRN  dextrose 10%, 12.5 g, PRN  dextrose 10%, 25 g, PRN  glucagon (human  recombinant), 1 mg, PRN  hydrALAZINE, 10 mg, Q6H PRN  insulin aspart U-100, 0-5 Units, QID (AC + HS) PRN  labetalol, 10 mg, Q6H PRN  magnesium oxide, 800 mg, PRN  magnesium oxide, 800 mg, PRN  morphine, 1 mg, Q4H PRN  potassium bicarbonate, 35 mEq, PRN  potassium bicarbonate, 50 mEq, PRN  potassium bicarbonate, 60 mEq, PRN  potassium, sodium phosphates, 2 packet, PRN  potassium, sodium phosphates, 2 packet, PRN  potassium, sodium phosphates, 2 packet, PRN  sodium chloride 0.9%, 10 mL, PRN      Today I personally reviewed pertinent medications, lines/drains/airways, imaging, laboratory results, notably:    Diet  Diet diabetic [facility]; 2000 Calorie; Cardiac (Low Na/Chol); Thin

## 2022-12-17 NOTE — PLAN OF CARE
Problem: Adult Inpatient Plan of Care  Goal: Plan of Care Review  Outcome: Ongoing, Progressing  Goal: Patient-Specific Goal (Individualized)  Outcome: Ongoing, Progressing  Goal: Absence of Hospital-Acquired Illness or Injury  Outcome: Ongoing, Progressing  Goal: Optimal Comfort and Wellbeing  Outcome: Ongoing, Progressing  Goal: Readiness for Transition of Care  Outcome: Ongoing, Progressing     Problem: Adjustment to Illness (Stroke, Ischemic/Transient Ischemic Attack)  Goal: Optimal Coping  Outcome: Ongoing, Progressing     Problem: Fall Injury Risk  Goal: Absence of Fall and Fall-Related Injury  Outcome: Ongoing, Progressing     Problem: Diabetes Comorbidity  Goal: Blood Glucose Level Within Targeted Range  Outcome: Ongoing, Progressing

## 2022-12-17 NOTE — ASSESSMENT & PLAN NOTE
Stroke risk factor  --S/p L carotid endarterectomy in 12/3/202  --Continue ASA, Plavix, and statin

## 2022-12-17 NOTE — ASSESSMENT & PLAN NOTE
Per 9/2022 Pulm note: sats 81% on RA and is noncompliant with home oxygen  SpO2 goal 88-92%  Supplemental O2 PRN   Scheduled duonebs

## 2022-12-17 NOTE — PROGRESS NOTES
Lazaro Brooke - Neurosurgery ([facility])  Vascular Neurology  Comprehensive Stroke Center  Progress Note    Assessment/Plan:     * Cerebrovascular accident (CVA) due to thrombosis of right middle cerebral artery  88 yo M with initial CC disabling left hemiplegia now  s/p TNK. Patient was in his normal state of health when he woke up today. He was watching a game when he decided to go to the bathroom today 2022 @ 8:30 AM at which time he noted difficulty using the left side of his body. He managed to use a walker to ambulate to the bathroom, but on the way back, he had a fall, without head injury or LOC. Initial telestroke NIHSS 9, CTH at OSH was negative for acute intracranial evidence of stroke or bleed, however, given clinical evidence of stroke, TNK was administered at 10:19 AM  and pt transferred to [facility] for post-thrombolytic care. On arrival, post-TNK NIHSS of 1 on my evaluation for LUE drift. Patient is independent at baseline with mRS 0. Suspect etiology likely .    Neuro exam remains stable, stepping down to NPU this afternoon. PT/OT evals recommending SNF vs HHPT, will pursue dispo planning for SNF at this time due to decreased caregiver support at home    Antithrombotics for secondary stroke prevention: Antiplatelets: Aspirin: 81 mg daily  Clopidogrel: 75 mg daily    Statins for secondary stroke prevention and hyperlipidemia, if present:   Statins: Atorvastatin- 40 mg daily     Aggressive risk factor modification: HTN, HLD, CAD, Carotid artery disease     Rehab efforts: SNF    Diagnostics ordered/pending: None     VTE prophylaxis: Heparin 5000 units SQ every 8 hours  Mechanical prophylaxis: Place SCDs    BP parameters: SBP <180      Follicular lymphoma  H/o follicular lymphoma per chart (grade 3a, stage 1); receiving rituxan (cycle 3 in 2022), 10/2022 PET scan positive for 3 cm lymphadenopathy in left axillary region otherwise HUSSEIN.    Aortic stenosis s/p TAVR  History of aortic  stenosis    Chronic kidney disease, stage 3 (moderate)  sCr 1.9 on admission, baseline 1.5-1.9  --Creatinine stable, 1.3 today  --Avoid nephrotoxic agents, renally dose meds when possible  --Monitor daily CMP    Hypertension  Stroke risk factor  SBP <180, MAP >65  Stable, continue lopressor    COPD, moderate  Per 9/2022 Pulm note: sats 81% on RA and is noncompliant with home oxygen  SpO2 goal 88-92%  Supplemental O2 PRN   Scheduled duonebs    Carotid artery disease  Stroke risk factor  --S/p L carotid endarterectomy in 12/3/202  --Continue ASA, Plavix, and statin    Hyperlipidemia  Stroke risk factor  , goal <70  Atorvastatin 40mg daily    Hypothyroidism  Subclinical hypothyroidism; TSH 4.4; fT4 0.95 12/14/2021    Type 2 diabetes mellitus  Stroke risk factor  A1c 6.7  BG goal while inpatient 140-180  LDSSI ACHS PRN         12/15/2022 Neuro exam stable; NIHSS 3 for mild drowsiness, LUE/LLE drift. AAOx4, CT head negative for interval changes or superimposed acute intracranial process.  12/16/2022 Neuro exam stable; NIHSS 0. No longer having LUE/LLE drift. AAOx4. Pt to be stepped down to NPU.  12/17/2022 Remains in NCC pending step down, NAEO. Neuro exam unchanged. PT/OT recommending SNF vs HHPT with 24/7 care, will plan for SNF at time time given decreased caregiver support at home.         STROKE DOCUMENTATION   Acute Stroke Times   Last Known Normal Date: 12/14/22  Last Known Normal Time: 0700  Symptom Onset Date: 12/14/22  Symptom Onset Time: 0830  Stroke Team Called Date: 12/14/22  Stroke Team Called Time: 1240  Stroke Team Arrival Date: 12/14/22  Stroke Team Arrival Time: 1243  CTA Interpretation Time: 1314  Thrombectomy Recommended: No    NIH Scale:  1a. Level of Consciousness: 0-->Alert, keenly responsive  1b. LOC Questions: 0-->Answers both questions correctly  1c. LOC Commands: 0-->Performs both tasks correctly  2. Best Gaze: 0-->Normal  3. Visual: 0-->No visual loss  4. Facial Palsy: 0-->Normal  symmetrical movements  5a. Motor Arm, Left: 0-->No drift, limb holds 90 (or 45) degrees for full 10 secs  5b. Motor Arm, Right: 0-->No drift, limb holds 90 (or 45) degrees for full 10 secs  6a. Motor Leg, Left: 0-->No drift, leg holds 30 degree position for full 5 secs  6b. Motor Leg, Right: 0-->No drift, leg holds 30 degree position for full 5 secs  7. Limb Ataxia: 0-->Absent  8. Sensory: 0-->Normal, no sensory loss  9. Best Language: 0-->No aphasia, normal  10. Dysarthria: 0-->Normal  11. Extinction and Inattention (formerly Neglect): 0-->No abnormality  Total (NIH Stroke Scale): 0       Modified Lavon Score: 0  [place] Coma Scale:    ABCD2 Score:    [id number] Score:   HAS -BLED Score:   ICH Score:   Hunt & Rae Classification:      Hemorrhagic change of an Ischemic Stroke: Does this patient have an ischemic stroke with hemorrhagic changes? No     Neurologic Chief Complaint: R MCA syndrome    Subjective:     Interval History: Patient is seen for follow-up neurological assessment and treatment recommendations:   Remains in NCC pending step down, NAEO. Neuro exam unchanged. PT/OT recommending SNF vs HHPT with 24/7 care, will plan for SNF at time time given decreased caregiver support at home.     HPI, Past Medical, Family, and Social History remains the same as documented in the initial encounter.     Review of Systems   Constitutional:  Negative for chills and fever.   HENT:  Negative for rhinorrhea and sneezing.    Eyes:  Negative for pain and redness.   Respiratory:  Negative for cough and shortness of breath.    Gastrointestinal:  Negative for abdominal pain and nausea.   Genitourinary:  Negative for difficulty urinating and dysuria.   Musculoskeletal:  Positive for myalgias. Negative for back pain and neck pain.   Neurological:  Positive for weakness (resolved). Negative for tremors and speech difficulty.   Scheduled Meds:   albuterol-ipratropium  3 mL Nebulization Q6H    aspirin  81 mg Oral Daily     atorvastatin  40 mg Oral Daily    clopidogreL  75 mg Oral Daily    heparin (porcine)  5,000 Units Subcutaneous Q8H    memantine  10 mg Oral Daily    metoprolol tartrate  12.5 mg Oral Q12H    mupirocin   Topical (Top) Daily    polyethylene glycol  17 g Oral BID    senna-docusate 8.6-50 mg  1 tablet Oral BID    tamsulosin  0.4 mg Oral Daily     Continuous Infusions:  PRN Meds:acetaminophen, dextrose 10%, dextrose 10%, glucagon (human recombinant), hydrALAZINE, insulin aspart U-100, labetalol, magnesium oxide, magnesium oxide, morphine, potassium bicarbonate, potassium bicarbonate, potassium bicarbonate, potassium, sodium phosphates, potassium, sodium phosphates, potassium, sodium phosphates, sodium chloride 0.9%    Objective:     Vital Signs (Most Recent):  Temp: 98.6 °F (37 °C) (12/17/22 1101)  Pulse: 71 (12/17/22 1101)  Resp: 16 (12/17/22 1101)  BP: 138/61 (12/17/22 1101)  SpO2: 96 % (12/17/22 1101)  BP Location: Right arm    Vital Signs Range (Last 24H):  Temp:  [97.7 °F (36.5 °C)-98.8 °F (37.1 °C)]   Pulse:  [64-82]   Resp:  [11-29]   BP: (100-158)/(56-74)   SpO2:  [91 %-99 %]   BP Location: Right arm    Physical Exam  Vitals and nursing note reviewed.   Constitutional:       General: He is not in acute distress.  HENT:      Head: Normocephalic and atraumatic.      Nose: Nose normal. No rhinorrhea.   Eyes:      Conjunctiva/sclera: Conjunctivae normal.   Cardiovascular:      Rate and Rhythm: Normal rate and regular rhythm.      Heart sounds: No murmur heard.  Pulmonary:      Effort: Pulmonary effort is normal. No respiratory distress.      Breath sounds: Normal breath sounds.   Abdominal:      Palpations: Abdomen is soft.      Tenderness: There is no abdominal tenderness.   Musculoskeletal:      Right lower leg: No edema.      Left lower leg: No edema.   Skin:     General: Skin is warm and dry.      Capillary Refill: Capillary refill takes less than 2 seconds.   Neurological:      Mental Status: He is alert  and oriented to person, place, and time.      Cranial Nerves: No dysarthria or facial asymmetry.      Sensory: No sensory deficit.      Motor: No weakness.       Neurological Exam:   LOC: alert  Attention Span: Good   Language: No aphasia  Articulation: No dysarthria  Orientation: Person, Place, Time   Visual Fields: Full  EOM (CN III, IV, VI): Full/intact  Facial Sensation (CN V): Normal  Facial Movement (CN VII): Symmetric facial expression    Motor: Arm left  Normal 5/5  Leg left  Normal 5/5  Arm right  Normal 5/5  Leg right Normal 5/5  Sensation: Intact to light touch, temperature and vibration    Laboratory:  CMP:   Recent Labs   Lab 12/17/22  0334   CALCIUM 9.2   ALBUMIN 3.1*   PROT 5.9*      K 4.6   CO2 26      BUN 23   CREATININE 1.3   ALKPHOS 82   ALT 16   AST 18   BILITOT 0.7     CBC:   Recent Labs   Lab 12/17/22  0334   WBC 4.84   RBC 3.76*   HGB 12.7*   HCT 37.6*      *   MCH 33.8*   MCHC 33.8     Lipid Panel:   Recent Labs   Lab 12/14/22  1009   CHOL 177   LDLCALC 128.6   HDL 32*   TRIG 82     Coagulation:   Recent Labs   Lab 12/14/22  1009   INR 1.1     Hgb A1C:   Recent Labs   Lab 12/14/22  1552   HGBA1C 6.7*     TSH:   Recent Labs   Lab 12/14/22  1009   TSH 4.401*       Diagnostic Results     Brain Imaging   CTH without contrast 12/15/22  Chronic change noted, there is no evidence for superimposed acute intracranial process.    MRI brain without contrast 12/15/22  Acute deep white matter infarct on the right without associated hemorrhage or mass effect.  Mild underlying chronic ischemic changes.    CTH without contrast 12/14/22  -No acute intracranial findings.  -Age-appropriate cerebral volume loss with moderate patchy decreased attenuation supratentorial white matter while nonspecific suggestive for chronic ischemic change.  -No evidence for acute intracranial hemorrhage.  Clinical correlation and further evaluation as warranted.      Vessel Imaging   CTA stroke multiphase  12/14/22  -CT head shows no acute intracranial abnormalities.  -CTA head neck shows no intracranial large vessel occlusion or aneurysm.  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  -Atherosclerosis at the carotid bifurcations with evidence of prior endarterectomy.  No high-grade stenosis.      Cardiac Imaging   TTE 12/15/22   The left ventricle is normal in size with concentric remodeling and normal systolic function.   The estimated ejection fraction is 68%.   Grade I left ventricular diastolic dysfunction.   Normal right ventricular size with normal right ventricular systolic function.   Mild right atrial enlargement.   There is a transcutaneously-placed aortic bioprosthesis present. There is no aortic insufficiency present.   The aortic valve mean gradient is 8 mmHg with a dimensionless index of 0.52.   Mild tricuspid regurgitation.   Normal central venous pressure (3 mmHg).   The estimated PA systolic pressure is 55 mmHg.   There is moderate pulmonary hypertension.      YAMILET Mills  Comprehensive Stroke Center  Department of Vascular Neurology   [facility] - Neurosurgery [facility])

## 2022-12-17 NOTE — PLAN OF CARE
[facility] Care Plan    POC reviewed with Vega A Jose F and family at 1500. Pt verbalized understanding. Questions and concerns addressed. No acute events today. Pt progressing toward goals. Will continue to monitor. See below and flowsheets for full assessment and VS info.     -Finished replacing phos.  -Notified Son (Eliseo) about pt's new room #921. Also, explained discharge planning on differences between home health and skilled nursing facility.         Is this a stroke patient? yes- Stroke booklet reviewed with patient and family, risk factors identified for patient and stroke booklet remains at bedside for ongoing education.     Neuro:  Jody Coma Scale  Best Eye Response: 4-->(E4) spontaneous  Best Motor Response: 6-->(M6) obeys commands  Best Verbal Response: 5-->(V5) oriented  [place] Coma Scale Score: 15  Assessment Qualifiers: patient not sedated/intubated  Pupil PERRLA: yes     24 hr Temp:  [97.7 °F (36.5 °C)-98.8 °F (37.1 °C)]     CV:   Rhythm: sinus arrhythmia  BP goals:   SBP < 180  MAP > 65    Resp:           Plan:  wean from nasal cannula.    GI/:     Diet/Nutrition Received: consistent carb/diabetic diet  Last Bowel Movement: 12/15/22  Voiding Characteristics: external catheter    Intake/Output Summary (Last 24 hours) at 12/17/2022 1646  Last data filed at 12/17/2022 1601  Gross per 24 hour   Intake 800 ml   Output 2080 ml   Net -1280 ml     Unmeasured Output  Urine Occurrence: 1  Stool Occurrence: 1  Pad Count: 1    Labs/Accuchecks:  Recent Labs   Lab 12/17/22  0334   WBC 4.84   RBC 3.76*   HGB 12.7*   HCT 37.6*         Recent Labs   Lab 12/17/22  0334      K 4.6   CO2 26      BUN 23   CREATININE 1.3   ALKPHOS 82   ALT 16   AST 18   BILITOT 0.7      Recent Labs   Lab 12/14/22  1009   INR 1.1      Recent Labs   Lab 12/14/22  1009   TROPONINI 0.008       Electrolytes: Electrolytes replaced  Accuchecks: ACHS    Gtts:      LDA/Wounds:  Lines/Drains/Airways       Drain  Duration              Male External Urinary Catheter 12/14/22 1919 Medium 2 days              Peripheral Intravenous Line  Duration                  Peripheral IV - Single Lumen 12/14/22 1300 18 G Left Antecubital 3 days         Peripheral IV - Single Lumen 12/14/22 1300 20 G Anterior;Right Hand 3 days                  Wounds: No  Wound care consulted: No

## 2022-12-17 NOTE — SUBJECTIVE & OBJECTIVE
Neurologic Chief Complaint: R MCA syndrome    Subjective:     Interval History: Patient is seen for follow-up neurological assessment and treatment recommendations:   Remains in NCC pending step down, NAEO. Neuro exam unchanged. PT/OT recommending SNF vs HHPT with 24/7 care, will plan for SNF at time time given decreased caregiver support at home.     HPI, Past Medical, Family, and Social History remains the same as documented in the initial encounter.     Review of Systems   Constitutional:  Negative for chills and fever.   HENT:  Negative for rhinorrhea and sneezing.    Eyes:  Negative for pain and redness.   Respiratory:  Negative for cough and shortness of breath.    Gastrointestinal:  Negative for abdominal pain and nausea.   Genitourinary:  Negative for difficulty urinating and dysuria.   Musculoskeletal:  Positive for myalgias. Negative for back pain and neck pain.   Neurological:  Positive for weakness (resolved). Negative for tremors and speech difficulty.   Scheduled Meds:   albuterol-ipratropium  3 mL Nebulization Q6H    aspirin  81 mg Oral Daily    atorvastatin  40 mg Oral Daily    clopidogreL  75 mg Oral Daily    heparin (porcine)  5,000 Units Subcutaneous Q8H    memantine  10 mg Oral Daily    metoprolol tartrate  12.5 mg Oral Q12H    mupirocin   Topical (Top) Daily    polyethylene glycol  17 g Oral BID    senna-docusate 8.6-50 mg  1 tablet Oral BID    tamsulosin  0.4 mg Oral Daily     Continuous Infusions:  PRN Meds:acetaminophen, dextrose 10%, dextrose 10%, glucagon (human recombinant), hydrALAZINE, insulin aspart U-100, labetalol, magnesium oxide, magnesium oxide, morphine, potassium bicarbonate, potassium bicarbonate, potassium bicarbonate, potassium, sodium phosphates, potassium, sodium phosphates, potassium, sodium phosphates, sodium chloride 0.9%    Objective:     Vital Signs (Most Recent):  Temp: 98.6 °F (37 °C) (12/17/22 1101)  Pulse: 71 (12/17/22 1101)  Resp: 16 (12/17/22 1101)  BP: 138/61  (12/17/22 1101)  SpO2: 96 % (12/17/22 1101)  BP Location: Right arm    Vital Signs Range (Last 24H):  Temp:  [97.7 °F (36.5 °C)-98.8 °F (37.1 °C)]   Pulse:  [64-82]   Resp:  [11-29]   BP: (100-158)/(56-74)   SpO2:  [91 %-99 %]   BP Location: Right arm    Physical Exam  Vitals and nursing note reviewed.   Constitutional:       General: He is not in acute distress.  HENT:      Head: Normocephalic and atraumatic.      Nose: Nose normal. No rhinorrhea.   Eyes:      Conjunctiva/sclera: Conjunctivae normal.   Cardiovascular:      Rate and Rhythm: Normal rate and regular rhythm.      Heart sounds: No murmur heard.  Pulmonary:      Effort: Pulmonary effort is normal. No respiratory distress.      Breath sounds: Normal breath sounds.   Abdominal:      Palpations: Abdomen is soft.      Tenderness: There is no abdominal tenderness.   Musculoskeletal:      Right lower leg: No edema.      Left lower leg: No edema.   Skin:     General: Skin is warm and dry.      Capillary Refill: Capillary refill takes less than 2 seconds.   Neurological:      Mental Status: He is alert and oriented to person, place, and time.      Cranial Nerves: No dysarthria or facial asymmetry.      Sensory: No sensory deficit.      Motor: No weakness.       Neurological Exam:   LOC: alert  Attention Span: Good   Language: No aphasia  Articulation: No dysarthria  Orientation: Person, Place, Time   Visual Fields: Full  EOM (CN III, IV, VI): Full/intact  Facial Sensation (CN V): Normal  Facial Movement (CN VII): Symmetric facial expression    Motor: Arm left  Normal 5/5  Leg left  Normal 5/5  Arm right  Normal 5/5  Leg right Normal 5/5  Sensation: Intact to light touch, temperature and vibration    Laboratory:  CMP:   Recent Labs   Lab 12/17/22  0334   CALCIUM 9.2   ALBUMIN 3.1*   PROT 5.9*      K 4.6   CO2 26      BUN 23   CREATININE 1.3   ALKPHOS 82   ALT 16   AST 18   BILITOT 0.7     CBC:   Recent Labs   Lab 12/17/22  0334   WBC 4.84   RBC 3.76*    HGB 12.7*   HCT 37.6*      *   MCH 33.8*   MCHC 33.8     Lipid Panel:   Recent Labs   Lab 12/14/22  1009   CHOL 177   LDLCALC 128.6   HDL 32*   TRIG 82     Coagulation:   Recent Labs   Lab 12/14/22  1009   INR 1.1     Hgb A1C:   Recent Labs   Lab 12/14/22  1552   HGBA1C 6.7*     TSH:   Recent Labs   Lab 12/14/22  1009   TSH 4.401*       Diagnostic Results     Brain Imaging   CTH without contrast 12/15/22  Chronic change noted, there is no evidence for superimposed acute intracranial process.    MRI brain without contrast 12/15/22  Acute deep white matter infarct on the right without associated hemorrhage or mass effect.  Mild underlying chronic ischemic changes.    CTH without contrast 12/14/22  -No acute intracranial findings.  -Age-appropriate cerebral volume loss with moderate patchy decreased attenuation supratentorial white matter while nonspecific suggestive for chronic ischemic change.  -No evidence for acute intracranial hemorrhage.  Clinical correlation and further evaluation as warranted.      Vessel Imaging   CTA stroke multiphase 12/14/22  -CT head shows no acute intracranial abnormalities.  -CTA head neck shows no intracranial large vessel occlusion or aneurysm.  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  -Atherosclerosis at the carotid bifurcations with evidence of prior endarterectomy.  No high-grade stenosis.      Cardiac Imaging   TTE 12/15/22  The left ventricle is normal in size with concentric remodeling and normal systolic function.  The estimated ejection fraction is 68%.  Grade I left ventricular diastolic dysfunction.  Normal right ventricular size with normal right ventricular systolic function.  Mild right atrial enlargement.  There is a transcutaneously-placed aortic bioprosthesis present. There is no aortic insufficiency present.  The aortic valve mean gradient is 8 mmHg with a dimensionless index of  0.52.  Mild tricuspid regurgitation.  Normal central venous pressure (3 mmHg).  The estimated PA systolic pressure is 55 mmHg.  There is moderate pulmonary hypertension.

## 2022-12-17 NOTE — ASSESSMENT & PLAN NOTE
87 year old man with HTN, HLD, T2DM, CKD, CAD, s/p TAVR for aortic stenosis, s/p bilateral CEA for carotid stenosis (left 12/2021), COPD, dementia, BPH, follicular lymphoma on chemo admitted to [facility] for post-thrombolytic monitoring. Initially presented to OSH for left sided weakness. CTH negative for acute process,  received tenecteplase at 1019 on 12/14. CTA on arrival negative for LVO, no IR intervention.    [facility]  - goal SBP <180  - PRN hydralazine, labetalol  -Restart DAAPT   - PT/OT/SLP evaluation

## 2022-12-17 NOTE — PLAN OF CARE
[facility] Care Plan    POC reviewed with Vega Kohler  at 0300. Pt verbalized understanding. Questions and concerns addressed. No acute events overnight. Pt progressing toward goals. Will continue to monitor. See below and flowsheets for full assessment and VS info.     - Pt has transfer orders and is awaiting bed in NPU  - IV on L hand is leaking a small amount; dressing replaced; flushes well and pt has no c/o pain or discomfort with flushing.       Is this a stroke patient? yes- Stroke booklet reviewed with patient, risk factors identified for patient and stroke booklet remains at bedside for ongoing education.     Neuro:  [place] Coma Scale  Best Eye Response: 4-->(E4) spontaneous  Best Motor Response: 6-->(M6) obeys commands  Best Verbal Response: 5-->(V5) oriented  Jody Coma Scale Score: 15  Assessment Qualifiers: patient not sedated/intubated, no eye obstruction present  Pupil PERRLA: yes     24hr Temp:  [97.7 °F (36.5 °C)-98.9 °F (37.2 °C)]     CV:   Rhythm: normal sinus rhythm  BP goals:   SBP < 160  MAP > 65    Resp:           Plan:  Pt on 0.5L O2NC to maintain sats >88%    GI/:     Diet/Nutrition Received: consistent carb/diabetic diet  Last Bowel Movement: 12/15/22  Voiding Characteristics: external catheter    Intake/Output Summary (Last 24 hours) at 12/17/2022 0423  Last data filed at 12/16/2022 2301  Gross per 24 hour   Intake 232.22 ml   Output 1895 ml   Net -1662.78 ml     Unmeasured Output  Urine Occurrence: (P) 1  Stool Occurrence: 1    Labs/Accuchecks:  Recent Labs   Lab 12/17/22  0334   WBC 4.84   RBC 3.76*   HGB 12.7*   HCT 37.6*         Recent Labs   Lab 12/17/22  0334      K 4.6   CO2 26      BUN 23   CREATININE 1.3   ALKPHOS 82   ALT 16   AST 18   BILITOT 0.7      Recent Labs   Lab 12/14/22  1009   INR 1.1      Recent Labs   Lab 12/14/22  1009   TROPONINI 0.008       Electrolytes: No replacement orders  Accuchecks: ACHS    Gtts:      LDA/Wounds:  Lines/Drains/Airways        Drain  Duration             Male External Urinary Catheter 12/14/22 1919 Medium 2 days              Peripheral Intravenous Line  Duration                  Peripheral IV - Single Lumen 12/14/22 1300 18 G Left Antecubital 2 days         Peripheral IV - Single Lumen 12/14/22 1300 20 G Anterior;Right Hand 2 days                  Wounds: No  Wound care consulted: No

## 2022-12-17 NOTE — ASSESSMENT & PLAN NOTE
86 yo M with initial CC disabling left hemiplegia now  s/p TNK. Patient was in his normal state of health when he woke up today. He was watching a game when he decided to go to the bathroom today 2022 @ 8:30 AM at which time he noted difficulty using the left side of his body. He managed to use a walker to ambulate to the bathroom, but on the way back, he had a fall, without head injury or LOC. Initial telestroke NIHSS 9, CTH at OSH was negative for acute intracranial evidence of stroke or bleed, however, given clinical evidence of stroke, TNK was administered at 10:19 AM  and pt transferred to [facility] for post-thrombolytic care. On arrival, post-TNK NIHSS of 1 on my evaluation for LUE drift. Patient is independent at baseline with mRS 0. Suspect etiology likely .    Neuro exam remains stable, stepping down to NPU this afternoon. PT/OT evals recommending SNF vs HHPT, will pursue dispo planning for SNF at this time due to decreased caregiver support at home    Antithrombotics for secondary stroke prevention: Antiplatelets: Aspirin: 81 mg daily  Clopidogrel: 75 mg daily    Statins for secondary stroke prevention and hyperlipidemia, if present:   Statins: Atorvastatin- 40 mg daily     Aggressive risk factor modification: HTN, HLD, CAD, Carotid artery disease     Rehab efforts: SNF    Diagnostics ordered/pending: None     VTE prophylaxis: Heparin 5000 units SQ every 8 hours  Mechanical prophylaxis: Place SCDs    BP parameters: SBP <180

## 2022-12-17 NOTE — PROGRESS NOTES
"[facility] - Neuro Critical Care  Neurocritical Care  Progress Note    Admit Date: 12/14/2022  Service Date: 12/17/2022  Length of Stay: 3    Subjective:     Chief Complaint: Cerebrovascular accident (CVA) due to thrombosis of right middle cerebral artery    History of Present Illness: Vega Kohler is an 87 year old man with history of HTN, T2DM, HLD, CAD, COPD, CKD, CHF, Alzheimer's, follicular lymphoma on chemo (last rituxan 11/17) transferred to [facility] for post-thrombolytic monitoring. Woke up 12/14 without symptoms around 0700. Sat down to watch TV, around 0830 noticed that he had difficulty extending his left fingers and then difficulty walking to the bathroom due to left sided weakness with subsequent fall. Tenecteplase started at 1019 prior to transfer. Did not undergo thrombectomy. On initial evaluation, patient reports that his left sided weakness has improved about "50%." Reports some left hip/buttock pain related to fall. Has chronic L ankle/lower leg pain due to injury in his 20s.    At baseline ambulates with cane/walker. Lives alone per patient.      Hospital Course: 12/15/2022 Pt with mild back pain. Strength returning to normal. Residual weakness to the LLE.   12/16/2022 Restart DAAPT. Pt stable for stepdown to vasc neuro  12/17/2022 Step down to [facility] neuro      Interval History:  As above    Review of Systems   Constitutional:  Negative for activity change, appetite change, chills, fatigue, fever and unexpected weight change.   HENT:  Negative for trouble swallowing and voice change.    Eyes: Negative.    Respiratory:  Negative for cough, chest tightness, shortness of breath and wheezing.    Cardiovascular:  Negative for chest pain, palpitations and leg swelling.   Gastrointestinal:  Negative for abdominal distention, abdominal pain, anal bleeding, blood in stool, constipation, diarrhea, nausea, rectal pain and vomiting.   Endocrine: Negative.    Genitourinary: Negative.    Musculoskeletal:  " Positive for back pain. Negative for neck pain and neck stiffness.   Skin:  Negative for pallor, rash and wound.   Allergic/Immunologic: Negative.    Neurological:  Positive for weakness. Negative for dizziness, seizures, syncope, light-headedness, numbness and headaches.   Hematological: Negative.    Psychiatric/Behavioral:  Negative for confusion and sleep disturbance. The patient is not nervous/anxious.    All other systems reviewed and are negative.    Objective:     Vitals:  Temp: 98.6 °F (37 °C)  Pulse: 70  Rhythm: normal sinus rhythm  BP: (!) 118/56  MAP (mmHg): 81  Resp: 13  SpO2: 97 %    Temp  Min: 97.7 °F (36.5 °C)  Max: 98.8 °F (37.1 °C)  Pulse  Min: 64  Max: 80  BP  Min: 114/67  Max: 158/71  MAP (mmHg)  Min: 81  Max: 106  Resp  Min: 11  Max: 29  SpO2  Min: 91 %  Max: 99 %    12/16 0701 - 12/17 0700  In: 386.4 [P.O.:240; I.V.:146.4]  Out: 2145 [Urine:2145]   Unmeasured Output  Urine Occurrence: (P) 1  Stool Occurrence: 1       Physical Exam  Vitals and nursing note reviewed.   Constitutional:       General: He is not in acute distress.     Appearance: Normal appearance. He is not ill-appearing or toxic-appearing.   HENT:      Head: Normocephalic and atraumatic.      Nose: Nose normal.      Mouth/Throat:      Mouth: Mucous membranes are moist.      Pharynx: Oropharynx is clear.   Eyes:      General: No scleral icterus.     Extraocular Movements: Extraocular movements intact.      Conjunctiva/sclera: Conjunctivae normal.      Pupils: Pupils are equal, round, and reactive to light.   Cardiovascular:      Rate and Rhythm: Normal rate and regular rhythm.      Pulses: Normal pulses.      Heart sounds: Normal heart sounds. No murmur heard.  Pulmonary:      Effort: Pulmonary effort is normal. No respiratory distress.      Breath sounds: Normal breath sounds. No stridor. No wheezing.   Abdominal:      General: Abdomen is flat. Bowel sounds are normal. There is no distension.      Palpations: Abdomen is soft.       Tenderness: There is no abdominal tenderness. There is no guarding or rebound.   Musculoskeletal:      Cervical back: Normal range of motion and neck supple. No rigidity.   Lymphadenopathy:      Cervical: No cervical adenopathy.   Skin:     General: Skin is warm and dry.      Capillary Refill: Capillary refill takes less than 2 seconds.      Coloration: Skin is not jaundiced or pale.      Findings: No bruising or rash.   Neurological:      Mental Status: He is alert and oriented to person, place, and time. Mental status is at baseline.      Sensory: No sensory deficit.      Motor: Weakness (4/5 LLE) present.      Comments: Following commands  Sensation intact  PERRLA  Mild tremor in hands bilaterally  No pronator drift     Psychiatric:         Mood and Affect: Mood normal.         Behavior: Behavior normal.         Medications:  Continuous Scheduledalbuterol-ipratropium, 3 mL, Q6H  aspirin, 81 mg, Daily  atorvastatin, 40 mg, Daily  clopidogreL, 75 mg, Daily  heparin (porcine), 5,000 Units, Q8H  memantine, 10 mg, Daily  metoprolol tartrate, 12.5 mg, Q12H  mupirocin, , Daily  polyethylene glycol, 17 g, BID  senna-docusate 8.6-50 mg, 1 tablet, BID  tamsulosin, 0.4 mg, Daily    PRNacetaminophen, 650 mg, Q6H PRN  dextrose 10%, 12.5 g, PRN  dextrose 10%, 25 g, PRN  glucagon (human recombinant), 1 mg, PRN  hydrALAZINE, 10 mg, Q6H PRN  insulin aspart U-100, 0-5 Units, QID (AC + HS) PRN  labetalol, 10 mg, Q6H PRN  magnesium oxide, 800 mg, PRN  magnesium oxide, 800 mg, PRN  morphine, 1 mg, Q4H PRN  potassium bicarbonate, 35 mEq, PRN  potassium bicarbonate, 50 mEq, PRN  potassium bicarbonate, 60 mEq, PRN  potassium, sodium phosphates, 2 packet, PRN  potassium, sodium phosphates, 2 packet, PRN  potassium, sodium phosphates, 2 packet, PRN  sodium chloride 0.9%, 10 mL, PRN      Today I personally reviewed pertinent medications, lines/drains/airways, imaging, laboratory results, notably:    Diet  Diet diabetic [facility];  2000 Calorie; Cardiac (Low Na/Chol); Thin        Assessment/Plan:     Neuro  * Cerebrovascular accident (CVA) due to thrombosis of right middle cerebral artery  87 year old man with HTN, HLD, T2DM, CKD, CAD, s/p TAVR for aortic stenosis, s/p bilateral CEA for carotid stenosis (left 12/2021), COPD, dementia, BPH, follicular lymphoma on chemo admitted to [facility] for post-thrombolytic monitoring. Initially presented to OSH for left sided weakness. CTH negative for acute process,  received tenecteplase at 1019 on 12/14. CTA on arrival negative for LVO, no IR intervention.    Stepdown to [facility] neuro  - goal SBP <180  - PRN hydralazine, labetalol  -Restart DAAPT   - PT/OT/SLP evaluation    Dementia  - continue home memantine 10 mg QHS    Pulmonary  COPD, moderate  Per Pulmonology note 9/2022, sats 81% on room air and has home oxygen, however does not use it.  - scheduled duonebs  - PRN O2 with goal SpO2 88-92%    Cardiac/Vascular  Aortic stenosis s/p TAVR  TAVR in 10/2018.    Chronic diastolic heart failure, NYHA class 2  Taking torsemide 10 mg QD and metoprolol succinate 25 mg QHS prior to admission  - continue metoprolol  - hold torsemide for now, restart as appropriate    Hypertension  Not on anti-hypertensive prior to admission.  - SBP goal <180  - PRN labetalol, hydralazine    Carotid artery disease  S/p PCI and MILI in 2018  - ASA 81 mg and clopidogrel per Cardiology (last evaluation 12/2021)    Hyperlipidemia    - continue atorvastatin 40 mg    Renal/  BPH (benign prostatic hyperplasia)  - continue home tamsulosin    Chronic kidney disease, stage 3 (moderate)  Baseline Cr 1.5-1.9  - monitor Cr  - dose medications for renal clearance  - avoid nephrotoxic medication    Oncology  Follicular lymphoma  Stage I grade 3a follicular lymphoma. Diagnosed in 9/2022. Has been receiving rituxan, last dose 11/17/22.    Endocrine  Hypothyroidism  Not on levothyroxine prior to admission. TSH 4.4 with fT4 WNL.    Type 2 diabetes  mellitus  - check A1c  - goal inpatient -180  - LDSSI and accuchecks  - diabetic diet when appropriate          The patient is being Prophylaxed for:  Venous Thromboembolism with: Mechanical  Stress Ulcer with: H2B  Ventilator Pneumonia with: none    Activity Orders          Diet diabetic [facility]; 2000 Calorie; Cardiac (Low Na/Chol); Thin: Diabetic starting at 12/15 0932    Turn patient starting at 12/14 1400    Elevate HOB starting at 12/14 1334        Full Code    Montana Cox MD  Neurocritical Care  [facility] - Neuro Critical Care

## 2022-12-18 NOTE — PLAN OF CARE
Problem: Adult Inpatient Plan of Care  Goal: Plan of Care Review  Outcome: Ongoing, Progressing     Problem: Communication Impairment (Stroke, Ischemic/Transient Ischemic Attack)  Goal: Improved Communication Skills  Outcome: Ongoing, Progressing     Patient is AAO x4. POC reviewed with patient. Patient verbalized understanding. Patient's breathing is unlabored with equal chest expansion. Patient denies any numbness and tingling. Patient voids per condom catheter. Patient remained free from falls. Patient rested well through shift. Bed in lowest position,bed alarm on, side rails up x3, no complaints or signs of distress. WCTM during shift.  See flowsheets for full assessment and VS info.

## 2022-12-18 NOTE — ASSESSMENT & PLAN NOTE
88 yo M with initial CC disabling left hemiplegia now  s/p TNK. Patient was in his normal state of health when he woke up today. He was watching a game when he decided to go to the bathroom today 2022 @ 8:30 AM at which time he noted difficulty using the left side of his body. He managed to use a walker to ambulate to the bathroom, but on the way back, he had a fall, without head injury or LOC. Initial telestroke NIHSS 9, CTH at OSH was negative for acute intracranial evidence of stroke or bleed, however, given clinical evidence of stroke, TNK was administered at 10:19 AM  and pt transferred [place] [facility] [place] post-thrombolytic care. On arrival, post-TNK NIHSS of 1 on my evaluation for LUE drift. Patient is independent at baseline with mRS 0. Suspect etiology likely .    2022 Patient stepped down to NPU 2022. Neuro exam remains stable.  MARYLIN.  PT/OT agnes recommending SNF vs HHPT, will pursue dispo planning for SNF at this time due to decreased caregiver support at home.  Placement pending.    Antithrombotics for secondary stroke prevention: Antiplatelets: Aspirin: 81 mg daily  Clopidogrel: 75 mg daily    Statins for secondary stroke prevention and hyperlipidemia, if present:   Statins: Atorvastatin- 40 mg daily     Aggressive risk factor modification: HTN, HLD, CAD, Carotid artery disease     Rehab efforts: SNF    Diagnostics ordered/pending: None     VTE prophylaxis: Heparin 5000 units SQ every 8 hours  Mechanical prophylaxis: Place SCDs    BP parameters: SBP <180

## 2022-12-18 NOTE — ASSESSMENT & PLAN NOTE
Stroke risk factor. A1c 6.7.  -BG goal while inpatient 140-180  -Continue LDSSI ACHS PRN  -24h glucose 140s

## 2022-12-18 NOTE — PROGRESS NOTES
[facility] - Neurosurgery ([facility])  Vascular Neurology  Comprehensive Stroke Center  Progress Note    Assessment/Plan:     * Cerebrovascular accident (CVA) due to thrombosis of right middle cerebral artery  86 yo M with initial CC disabling left hemiplegia now  s/p TNK. Patient was in his normal state of health when he woke up today. He was watching a game when he decided to go to the bathroom today 2022 @ 8:30 AM at which time he noted difficulty using the left side of his body. He managed to use a walker to ambulate to the bathroom, but on the way back, he had a fall, without head injury or LOC. Initial telestroke NIHSS 9, CTH at OSH was negative for acute intracranial evidence of stroke or bleed, however, given clinical evidence of stroke, TNK was administered at 10:19 AM  and [place] [facility] [place]. On arrival, post-TNK NIHSS of 1 on my evaluation for LUE drift. Patient is independent at baseline with mRS 0. Suspect etiology likely .    2022 Patient stepped down to NPU 2022. Neuro exam remains stable.  MARYLIN.  PT/OT jarenals recommending SNF vs HHPT, will pursue dispo planning for SNF at this time due to decreased caregiver support at home.  Placement pending.    Antithrombotics for secondary stroke prevention: Antiplatelets: Aspirin: 81 mg daily  Clopidogrel: 75 mg daily    Statins for secondary stroke prevention and hyperlipidemia, if present:   Statins: Atorvastatin- 40 mg daily     Aggressive risk factor modification: HTN, HLD, CAD, Carotid artery disease     Rehab efforts: SNF    Diagnostics ordered/pending: None     VTE prophylaxis: Heparin 5000 units SQ every 8 hours  Mechanical prophylaxis: Place SCDs    BP parameters: SBP <180      Hypertension  Stroke risk factor.  SBP <180, MAP >65.  SBPs over the last 24h .    Continue lopressor. Hold for SBP<100, HR<60.    Follicular lymphoma  H/o follicular lymphoma per chart (grade 3a, stage 1); receiving  rituxan (cycle 3 in 11/2022), 10/2022 PET scan positive for 3 cm lymphadenopathy in left axillary region otherwise HUSSEIN.    Aortic stenosis s/p TAVR  History of aortic stenosis    Chronic diastolic heart failure, NYHA class 2  -TTE 12/15/2022 w/ EF 68%    Chronic kidney disease, stage 3 (moderate)  sCr 1.9 on admission, baseline 1.5-1.9  --Creatinine stable, 1.3 today  --Avoid nephrotoxic agents, renally dose meds when possible  --Continue to monitor daily CMP.       COPD, moderate  Per 9/2022 Pulm note: sats 81% on RA and is noncompliant with home oxygen  SpO2 goal 88-92%  24h SPO2 92-97%  Continue supplemental O2 PRN   Continue scheduled duonebs    Carotid artery disease  Stroke risk factor  --S/p L carotid endarterectomy in 12/3/202  --Continue ASA, Plavix, and statin    Hyperlipidemia  Stroke risk factor  , goal <70  Atorvastatin 40mg daily    Hypothyroidism  -Subclinical hypothyroidism; TSH 4.4; fT4 0.95 12/14/2021  -Follow up with PCP    Type 2 diabetes mellitus  Stroke risk factor. A1c 6.7.  -BG goal while inpatient 140-180  -Continue LDSSI ACHS PRN  -24h glucose 140s         12/15/2022 Neuro exam stable; NIHSS 3 for mild drowsiness, LUE/LLE drift. AAOx4, CT head negative for interval changes or superimposed acute intracranial process.  12/16/2022 Neuro exam stable; NIHSS 0. No longer having LUE/LLE drift. AAOx4. Pt to be stepped down to NPU.  12/17/2022 Remains in NCC pending step down, NAEO. Neuro exam unchanged. PT/OT recommending SNF vs HHPT with 24/7 care, will plan for SNF at time time given decreased caregiver support at home.   12/18/2022 Stepped down to NPU overnight.  NAEO.  Neuro exam is stable.  Awaiting SNF placement.        STROKE DOCUMENTATION   Acute Stroke Times   Last Known Normal Date: 12/14/22  Last Known Normal Time: 0700  Symptom Onset Date: 12/14/22  Symptom Onset Time: 0830  Stroke Team Called Date: 12/14/22  Stroke Team Called Time: 1240  Stroke Team Arrival Date:  12/14/22  Stroke Team Arrival Time: 1243  CTA Interpretation Time: 1314  Thrombectomy Recommended: No    NIH Scale:  1a. Level of Consciousness: 0-->Alert, keenly responsive  1b. LOC Questions: 0-->Answers both questions correctly  1c. LOC Commands: 0-->Performs both tasks correctly  2. Best Gaze: 0-->Normal  3. Visual: 0-->No visual loss  4. Facial Palsy: 0-->Normal symmetrical movements  5a. Motor Arm, Left: 0-->No drift, limb holds 90 (or 45) degrees for full 10 secs  5b. Motor Arm, Right: 0-->No drift, limb holds 90 (or 45) degrees for full 10 secs  6a. Motor Leg, Left: 0-->No drift, leg holds 30 degree position for full 5 secs  6b. Motor Leg, Right: 0-->No drift, leg holds 30 degree position for full 5 secs  7. Limb Ataxia: 0-->Absent  8. Sensory: 0-->Normal, no sensory loss  9. Best Language: 0-->No aphasia, normal  10. Dysarthria: 0-->Normal  11. Extinction and Inattention (formerly Neglect): 0-->No abnormality  Total (NIH Stroke Scale): 0       Modified Lavon Score: 0  Jody Coma Scale:15   ABCD2 Score:    [id number] Score:   HAS -BLED Score:   ICH Score:   Hunt & Rae Classification:      Hemorrhagic change of an Ischemic Stroke: Does this patient have an ischemic stroke with hemorrhagic changes? No     Neurologic Chief Complaint: R MCA stroke    Subjective:     Interval History: Patient is seen for follow-up neurological assessment and treatment recommendations: Stepped down to NPU overnight.  NAEO.  Neuro exam is stable.  Awaiting SNF placement.    HPI, Past Medical, Family, and Social History remains the same as documented in the initial encounter.     Review of Systems   Constitutional:  Negative for chills, fatigue and fever.   HENT:  Negative for drooling, facial swelling and trouble swallowing.    Eyes:  Negative for photophobia, pain and visual disturbance.   Respiratory:  Negative for cough, shortness of breath and wheezing.    Cardiovascular:  Negative for chest pain, palpitations and leg  swelling.   Gastrointestinal:  Negative for abdominal pain, constipation, diarrhea, nausea and vomiting.   Endocrine: Negative for cold intolerance and heat intolerance.   Genitourinary:  Negative for dysuria and hematuria.   Musculoskeletal:  Negative for neck pain and neck stiffness.   Skin:  Negative for rash and wound.   Allergic/Immunologic: Negative for environmental allergies and food allergies.   Neurological:  Positive for headaches (frontal). Negative for dizziness, tremors, weakness, light-headedness and numbness.   Hematological:  Negative for adenopathy. Does not bruise/bleed easily.   Psychiatric/Behavioral:  Negative for agitation, confusion and hallucinations.    Scheduled Meds:   albuterol-ipratropium  3 mL Nebulization Q6H    aspirin  81 mg Oral Daily    atorvastatin  40 mg Oral Daily    clopidogreL  75 mg Oral Daily    heparin (porcine)  5,000 Units Subcutaneous Q8H    memantine  10 mg Oral Daily    metoprolol tartrate  12.5 mg Oral Q12H    mupirocin   Topical (Top) Daily    polyethylene glycol  17 g Oral BID    senna-docusate 8.6-50 mg  1 tablet Oral BID    tamsulosin  0.4 mg Oral Daily     Continuous Infusions:  PRN Meds:acetaminophen, dextrose 10%, dextrose 10%, glucagon (human recombinant), insulin aspart U-100, sodium chloride 0.9%    Objective:     Vital Signs (Most Recent):  Temp: 98.9 °F (37.2 °C) (12/18/22 1531)  Pulse: 79 (12/18/22 1519)  Resp: 16 (12/18/22 1351)  BP: 125/62 (12/18/22 1242)  SpO2: 97 % (12/18/22 1351)  BP Location: Left arm    Vital Signs Range (Last 24H):  Temp:  [97.3 °F (36.3 °C)-98.9 °F (37.2 °C)]   Pulse:  [73-89]   Resp:  [14-18]   BP: ()/(57-75)   SpO2:  [92 %-97 %]   BP Location: Left arm    Physical Exam  Vitals and nursing note reviewed.   Constitutional:       General: He is not in acute distress.     Appearance: He is well-developed. He is not diaphoretic.   HENT:      Head: Normocephalic and atraumatic.      Right Ear: External ear normal.       Left Ear: External ear normal.      Nose: Nose normal.      Mouth/Throat:      Mouth: Mucous membranes are moist.   Eyes:      General: No scleral icterus.        Right eye: No discharge.         Left eye: No discharge.      Pupils: Pupils are equal, round, and reactive to light.   Cardiovascular:      Rate and Rhythm: Normal rate and regular rhythm.   Pulmonary:      Effort: Pulmonary effort is normal. No respiratory distress.   Abdominal:      General: There is no distension.      Palpations: Abdomen is soft.      Tenderness: There is no abdominal tenderness.   Musculoskeletal:      Cervical back: Normal range of motion and neck supple.   Skin:     General: Skin is warm and dry.      Capillary Refill: Capillary refill takes less than 2 seconds.   Neurological:      Mental Status: He is alert and oriented to person, place, and time.   Psychiatric:         Mood and Affect: Mood normal.       Neurological Exam:   LOC: alert  Attention Span: Good   Articulation: No dysarthria  EOM (CN III, IV, VI): Full/intact    Laboratory:  CMP:   Recent Labs   Lab 12/18/22  0245   CALCIUM 8.9   ALBUMIN 3.0*   PROT 5.4*      K 4.4   CO2 25      BUN 19   CREATININE 1.3   ALKPHOS 86   ALT 13   AST 16   BILITOT 0.8     CBC:   Recent Labs   Lab 12/18/22  0245   WBC 5.07   RBC 3.71*   HGB 12.4*   HCT 37.4*      *   MCH 33.4*   MCHC 33.2     Lipid Panel:   Recent Labs   Lab 12/14/22  1009   CHOL 177   LDLCALC 128.6   HDL 32*   TRIG 82     Coagulation:   Recent Labs   Lab 12/14/22  1009   INR 1.1     Hgb A1C:   Recent Labs   Lab 12/14/22  1552   HGBA1C 6.7*     TSH:   Recent Labs   Lab 12/14/22  1009   TSH 4.401*       Diagnostic Results     Brain Imaging   CTH 12/15/2022 Impression:  Chronic change noted, there is no evidence for superimposed acute intracranial process.    MRI Brain 12/15/2022 Impression:  Acute deep white matter infarct on the right without associated hemorrhage or mass effect.  Mild  underlying chronic ischemic changes      Vessel Imaging   CTA Stroke MP 12/14/2022  Impression:  CT head shows no acute intracranial abnormalities.  CTA head neck shows no intracranial large vessel occlusion or aneurysm.  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  Atherosclerosis at the carotid bifurcations with evidence of prior endarterectomy.  No high-grade stenosis.    Cardiac Imaging   TTE 12/14/2022  Summary   The left ventricle is normal in size with concentric remodeling and normal systolic function.   The estimated ejection fraction is 68%.   Grade I left ventricular diastolic dysfunction.   Normal right ventricular size with normal right ventricular systolic function.   Mild right atrial enlargement.   There is a transcutaneously-placed aortic bioprosthesis present. There is no aortic insufficiency present.   The aortic valve mean gradient is 8 mmHg with a dimensionless index of 0.52.   Mild tricuspid regurgitation.   Normal central venous pressure (3 mmHg).   The estimated PA systolic pressure is 55 mmHg.   There is moderate pulmonary hypertension.        Xochitl Horan, FERCHO, NP  Comprehensive Stroke Center  Department of Vascular Neurology   [facility]

## 2022-12-18 NOTE — ASSESSMENT & PLAN NOTE
Per 9/2022 Pulm note: sats 81% on RA and is noncompliant with home oxygen  SpO2 goal 88-92%  24h SPO2 92-97%  Continue supplemental O2 PRN   Continue scheduled duonebs

## 2022-12-18 NOTE — SUBJECTIVE & OBJECTIVE
Neurologic Chief Complaint: R MCA stroke    Subjective:     Interval History: Patient is seen for follow-up neurological assessment and treatment recommendations: Stepped down to NPU overnight.  NAEO.  Neuro exam is stable.  Awaiting SNF placement.    HPI, Past Medical, Family, and Social History remains the same as documented in the initial encounter.     Review of Systems   Constitutional:  Negative for chills, fatigue and fever.   HENT:  Negative for drooling, facial swelling and trouble swallowing.    Eyes:  Negative for photophobia, pain and visual disturbance.   Respiratory:  Negative for cough, shortness of breath and wheezing.    Cardiovascular:  Negative for chest pain, palpitations and leg swelling.   Gastrointestinal:  Negative for abdominal pain, constipation, diarrhea, nausea and vomiting.   Endocrine: Negative for cold intolerance and heat intolerance.   Genitourinary:  Negative for dysuria and hematuria.   Musculoskeletal:  Negative for neck pain and neck stiffness.   Skin:  Negative for rash and wound.   Allergic/Immunologic: Negative for environmental allergies and food allergies.   Neurological:  Positive for headaches (frontal). Negative for dizziness, tremors, weakness, light-headedness and numbness.   Hematological:  Negative for adenopathy. Does not bruise/bleed easily.   Psychiatric/Behavioral:  Negative for agitation, confusion and hallucinations.    Scheduled Meds:   albuterol-ipratropium  3 mL Nebulization Q6H    aspirin  81 mg Oral Daily    atorvastatin  40 mg Oral Daily    clopidogreL  75 mg Oral Daily    heparin (porcine)  5,000 Units Subcutaneous Q8H    memantine  10 mg Oral Daily    metoprolol tartrate  12.5 mg Oral Q12H    mupirocin   Topical (Top) Daily    polyethylene glycol  17 g Oral BID    senna-docusate 8.6-50 mg  1 tablet Oral BID    tamsulosin  0.4 mg Oral Daily     Continuous Infusions:  PRN Meds:acetaminophen, dextrose 10%, dextrose 10%, glucagon (human recombinant), insulin  aspart U-100, sodium chloride 0.9%    Objective:     Vital Signs (Most Recent):  Temp: 98.9 °F (37.2 °C) (12/18/22 1531)  Pulse: 79 (12/18/22 1519)  Resp: 16 (12/18/22 1351)  BP: 125/62 (12/18/22 1242)  SpO2: 97 % (12/18/22 1351)  BP Location: Left arm    Vital Signs Range (Last 24H):  Temp:  [97.3 °F (36.3 °C)-98.9 °F (37.2 °C)]   Pulse:  [73-89]   Resp:  [14-18]   BP: ()/(57-75)   SpO2:  [92 %-97 %]   BP Location: Left arm    Physical Exam  Vitals and nursing note reviewed.   Constitutional:       General: He is not in acute distress.     Appearance: He is well-developed. He is not diaphoretic.   HENT:      Head: Normocephalic and atraumatic.      Right Ear: External ear normal.      Left Ear: External ear normal.      Nose: Nose normal.      Mouth/Throat:      Mouth: Mucous membranes are moist.   Eyes:      General: No scleral icterus.        Right eye: No discharge.         Left eye: No discharge.      Pupils: Pupils are equal, round, and reactive to light.   Cardiovascular:      Rate and Rhythm: Normal rate and regular rhythm.   Pulmonary:      Effort: Pulmonary effort is normal. No respiratory distress.   Abdominal:      General: There is no distension.      Palpations: Abdomen is soft.      Tenderness: There is no abdominal tenderness.   Musculoskeletal:      Cervical back: Normal range of motion and neck supple.   Skin:     General: Skin is warm and dry.      Capillary Refill: Capillary refill takes less than 2 seconds.   Neurological:      Mental Status: He is alert and oriented to person, place, and time.   Psychiatric:         Mood and Affect: Mood normal.       Neurological Exam:   LOC: alert  Attention Span: Good   Articulation: No dysarthria  EOM (CN III, IV, VI): Full/intact    Laboratory:  CMP:   Recent Labs   Lab 12/18/22  0245   CALCIUM 8.9   ALBUMIN 3.0*   PROT 5.4*      K 4.4   CO2 25      BUN 19   CREATININE 1.3   ALKPHOS 86   ALT 13   AST 16   BILITOT 0.8     CBC:   Recent  Labs   Lab 12/18/22  0245   WBC 5.07   RBC 3.71*   HGB 12.4*   HCT 37.4*      *   MCH 33.4*   MCHC 33.2     Lipid Panel:   Recent Labs   Lab 12/14/22  1009   CHOL 177   LDLCALC 128.6   HDL 32*   TRIG 82     Coagulation:   Recent Labs   Lab 12/14/22  1009   INR 1.1     Hgb A1C:   Recent Labs   Lab 12/14/22  1552   HGBA1C 6.7*     TSH:   Recent Labs   Lab 12/14/22  1009   TSH 4.401*       Diagnostic Results     Brain Imaging   CTH 12/15/2022 Impression:  Chronic change noted, there is no evidence for superimposed acute intracranial process.    MRI Brain 12/15/2022 Impression:  Acute deep white matter infarct on the right without associated hemorrhage or mass effect.  Mild underlying chronic ischemic changes      Vessel Imaging   CTA Stroke MP 12/14/2022  Impression:  CT head shows no acute intracranial abnormalities.  CTA head neck shows no intracranial large vessel occlusion or aneurysm.  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  Atherosclerosis at the carotid bifurcations with evidence of prior endarterectomy.  No high-grade stenosis.    Cardiac Imaging   TTE 12/14/2022  Summary  The left ventricle is normal in size with concentric remodeling and normal systolic function.  The estimated ejection fraction is 68%.  Grade I left ventricular diastolic dysfunction.  Normal right ventricular size with normal right ventricular systolic function.  Mild right atrial enlargement.  There is a transcutaneously-placed aortic bioprosthesis present. There is no aortic insufficiency present.  The aortic valve mean gradient is 8 mmHg with a dimensionless index of 0.52.  Mild tricuspid regurgitation.  Normal central venous pressure (3 mmHg).  The estimated PA systolic pressure is 55 mmHg.  There is moderate pulmonary hypertension.

## 2022-12-18 NOTE — ASSESSMENT & PLAN NOTE
Stroke risk factor.  SBP <180, MAP >65.  SBPs over the last 24h .    Continue lopressor. Hold for SBP<100, HR<60.

## 2022-12-18 NOTE — ASSESSMENT & PLAN NOTE
sCr 1.9 on admission, baseline 1.5-1.9  --Creatinine stable, 1.3 today  --Avoid nephrotoxic agents, renally dose meds when possible  --Continue to monitor daily CMP.

## 2022-12-19 NOTE — PLAN OF CARE
Problem: SLP  Goal: SLP Goal  Description: Speech Language Pathology Goals  Goals expected to be met by 12/22  1. Pt will tolerate regular diet with thin liquids without overt s/s aspiration.   2. Pt will complete simple reasoning/problem solving tasks with 70% accy with min cues.   3. Pt will complete assessment of reading, writing, visual spatial skills to determine need for tx.         Outcome: Met

## 2022-12-19 NOTE — PLAN OF CARE
[facility] completed LOCET via phone. [facility] faxed PASSR to obtain the 142 for NH admission.    CAMMIE Fabian  [phone]

## 2022-12-19 NOTE — ASSESSMENT & PLAN NOTE
-TTE 12/15/2022 w/ EF 68%  -GDMT when appropriate but holding metoprolol at this time given hypotension in setting of small vessel stroke

## 2022-12-19 NOTE — ASSESSMENT & PLAN NOTE
Stroke risk factor.  SBP <180, MAP >65.  Avoid hypotension in the setting of small vessel disease   Holding lopressor for now given hypotension during this admission in the setting of an acute stroke, will resume in 5-7 days

## 2022-12-19 NOTE — ASSESSMENT & PLAN NOTE
Patient is a 88 yo M with PMH of HTN, HLD, DM, CAD, PAD, CHF, COPD, CKD, dementia, and follicular lymphoma (chemo, last tx ), and L ICA stenosis (s/p L CEA). Patient was seen at OSH via telestroke by Dr. Sam for LSW, NIHSS 9 initially. Patient determined to be a thrombolytic candidate. Patient transferred to [facility] and admitted to [facility]. On arrival, patient with NIH 1. MRI with R MCA infarct suggestive of small vessel etiology. TTE with EF 68%, grade 1 LVDD, and mild YADIRA.     Therapy recs for SNF or  with 24/7 supervision and regular diet. Son is currently trying to make arrangements for 24/7 supervision as patient lives alone. Medically ready for discharge otherwise.       Etiology:      Antithrombotics for secondary stroke prevention: Antiplatelets: Aspirin: 81 mg daily  Clopidogrel: 75 mg daily    Statins for secondary stroke prevention and hyperlipidemia, if present:   Statins: Atorvastatin- 40 mg daily     Aggressive risk factor modification: HTN, HLD, CAD, Carotid artery disease     Rehab efforts: SNF    Diagnostics ordered/pending: None     VTE prophylaxis: Heparin 5000 units SQ every 8 hours  Mechanical prophylaxis: Place SCDs    BP parameters: SBP <180

## 2022-12-19 NOTE — PT/OT/SLP PROGRESS
"Physical Therapy Treatment    Patient Name:  Vega Kohler   MRN:  [id number]    Recommendations:     Discharge Recommendations: other (see comments)  Discharge Equipment Recommendations: bedside commode, bath bench  Barriers to discharge:  Increased level of assist, Inaccessible home, and Decreased caregiver support    Assessment:     Vega Kohler is a 87 y.o. 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  He presents with the following impairments/functional limitations: weakness, impaired endurance, impaired self care skills, impaired functional mobility, impaired balance, impaired cardiopulmonary response to activity, gait instability, decreased coordination, decreased safety awareness, decreased lower extremity function, decreased upper extremity function, pain. Pt  was to perform exercises in bed and sitting @ the EOB. Pt participated in gait training using RW with CGA. Pt will continue to benefit from skilled acute PT to improve mobility, strength, and endurance.    Rehab Prognosis: Fair; patient would benefit from acute skilled PT services to address these deficits and reach maximum level of function.    Recent Surgery: * No surgery found *      Plan:     During this hospitalization, patient to be seen 3 x/week to address the identified rehab impairments via gait training, therapeutic activities, therapeutic exercises, neuromuscular re-education and progress toward the following goals:    Plan of Care Expires:  01/13/23    Subjective     Chief Complaint: "My rear (low back) is burning that never happened before".  Patient/Family Comments/goals: "Thank you"  Pain/Comfort:  Pain Rating 1: 7/10  Location - Side 1: Bilateral  Location - Orientation 1: posterior  Location 1: back  Pain Addressed 1: Reposition, Nurse notified      Objective:     Communicated with nurse prior to session.  Patient found HOB elevated with telemetry, pulse ox " (continuous), oxygen, bed alarm, blood pressure cuff, SCD upon PT entry to room.     General Precautions: Standard, aspiration  Orthopedic Precautions: N/A  Braces: N/A  Respiratory Status: Nasal cannula, flow .5 L/min     Functional Mobility:  Bed Mobility:     Rolling Left:  stand by assistance  Scooting: stand by assistance  Supine to Sit: minimum assistance  Transfers:     Sit to Stand:  contact guard assistance with rolling walker  Bed to Chair: contact guard assistance with  rolling walker  using  Step Transfer  Gait: 2*5 ft fwd/bck with RW @ min A min unsteady gait  Balance: Fair dynamic standing balance with RW      AM-PAC 6 CLICK MOBILITY  Turning over in bed (including adjusting bedclothes, sheets and blankets)?: 4  Sitting down on and standing up from a chair with arms (e.g., wheelchair, bedside commode, etc.): 3  Moving from lying on back to sitting on the side of the bed?: 3  Moving to and from a bed to a chair (including a wheelchair)?: 3  Need to walk in hospital room?: 3  Climbing 3-5 steps with a railing?:  (Not done)       Treatment & Education:  Therapeutic exercises: Hip abd/add, marching, LAQ's, and AP X 15 reps  Pt was educated on progress towards PT goals and importance of performing TE in chair/bed ti improve mobility to BLE.    Patient left up in chair with all lines intact, call button in reach, chair alarm on, and NURSE notified..    GOALS:   Multidisciplinary Problems       Physical Therapy Goals          Problem: Physical Therapy    Goal Priority Disciplines Outcome Goal Variances Interventions   Physical Therapy Goal     PT, PT/OT Ongoing, Progressing     Description: Goals to be complete by 12/30/22    Pt will perform sup<>sit transfers w/ independence  Pt will have sufficient dynamic balance to sit EOB while performing ADLs/therex w/ independence  PT will be able to stand up from EOB w/ supervision using RW  Pt will ambulate 100 feet w/ RW supervision   Pt will be independent w/ HEP  therex on BLE w/ good form and ROM                          Time Tracking:     PT Received On: 12/19/22  PT Start Time: 1109     PT Stop Time: 1138  PT Total Time (min): 29 min     Billable Minutes: Therapeutic Activity 16 and Therapeutic Exercise 13    Treatment Type: Treatment  PT/PTA: PTA     PTA Visit Number: 1     12/19/2022

## 2022-12-19 NOTE — PROGRESS NOTES
[facility] - Neurosurgery ([facility])  Vascular Neurology  Comprehensive Stroke Center  Progress Note    Assessment/Plan:     * Cerebrovascular accident (CVA) due to thrombosis of right middle cerebral artery  Patient is a 86 yo M with PMH of HTN, HLD, DM, CAD, PAD, CHF, COPD, CKD, dementia, and follicular lymphoma (chemo, last tx ), and L ICA stenosis (s/p L CEA). Patient was seen at OSH via telestroke by Dr. Sam for LSW, NIHSS 9 initially. Patient determined to be a thrombolytic candidate. Patient transferred to [facility] and admitted to [facility]. On arrival, patient with NIH 1. MRI with R MCA infarct suggestive of small vessel etiology. TTE with EF 68%, grade 1 LVDD, and mild YADIRA.     Therapy recs for SNF or  with 24/7 supervision and regular diet. Son is currently trying to make arrangements for 24/7 supervision as patient lives alone. Medically ready for discharge otherwise.       Etiology:      Antithrombotics for secondary stroke prevention: Antiplatelets: Aspirin: 81 mg daily  Clopidogrel: 75 mg daily    Statins for secondary stroke prevention and hyperlipidemia, if present:   Statins: Atorvastatin- 40 mg daily     Aggressive risk factor modification: HTN, HLD, CAD, Carotid artery disease     Rehab efforts: SNF    Diagnostics ordered/pending: None     VTE prophylaxis: Heparin 5000 units SQ every 8 hours  Mechanical prophylaxis: Place SCDs    BP parameters: SBP <180      BPH (benign prostatic hyperplasia)  Currently on tamsulosin 0.4mg daily     Follicular lymphoma  H/o follicular lymphoma per chart (grade 3a, stage 1); receiving rituxan (cycle 3 in 2022), 10/2022 PET scan positive for 3 cm lymphadenopathy in left axillary region otherwise HUSSEIN.    Aortic stenosis s/p TAVR  History of aortic stenosis    Chronic diastolic heart failure, NYHA class 2  -TTE 12/15/2022 w/ EF 68%  -GDMT when appropriate but holding metoprolol at this time given hypotension in setting of small vessel stroke     Chronic  kidney disease, stage 3 (moderate)  sCr 1.9 on admission, baseline 1.5-1.9  --Creatinine 1.5 today   --Avoid nephrotoxic agents, renally dose meds when possible  --Continue to monitor daily CMP.       Hypertension  Stroke risk factor.  SBP <180, MAP >65.  Avoid hypotension in the setting of small vessel disease   Holding lopressor for now given hypotension during this admission in the setting of an acute stroke, will resume in 5-7 days     COPD, moderate  Per 9/2022 Pulm note: sats 81% on RA and is noncompliant with home oxygen  SpO2 goal 88-92%, remains at goal with only 0.5 L/min   Continue supplemental O2 PRN    Continue home meds at discharge     Carotid artery disease  Stroke risk factor  --S/p L carotid endarterectomy in 12/3/202  --Continue ASA, Plavix, and statin    Hyperlipidemia  Stroke risk factor  , goal <70  Atorvastatin 40mg daily, continue     Hypothyroidism  -Subclinical hypothyroidism; TSH 4.4; fT4 0.95 12/14/2021  -Follow up with PCP    Type 2 diabetes mellitus  Stroke risk factor. A1c 6.7.  -BG goal while inpatient 140-180  -Continue LDSSI ACHS PRN   -24h glucose 140s       12/15/2022 Neuro exam stable; NIHSS 3 for mild drowsiness, LUE/LLE drift. AAOx4, CT head negative for interval changes or superimposed acute intracranial process.  12/16/2022 Neuro exam stable; NIHSS 0. No longer having LUE/LLE drift. AAOx4. Pt to be stepped down to NPU.  12/17/2022 Remains in NCC pending step down, NAEO. Neuro exam unchanged. PT/OT recommending SNF vs HHPT with 24/7 care, will plan for SNF at time time given decreased caregiver support at home.   12/18/2022 Stepped down to NPU overnight.  NAEO.  Neuro exam is stable.  Awaiting SNF placement.  12/19/2022 Patient remains with LUE drift. Speech at baseline per patient. SBP < 120, will d/c metoprolol at this time to maintain BP/perfusion given small vessel stroke. Patient is medically ready. Pending SNF v/ HH with 24/7 supervision (son is trying to set this  up as patient lives in mobile home alone). Soap enema ordered if patient unable to have BM by the end of the day.       STROKE DOCUMENTATION   Acute Stroke Times   Last Known Normal Date: 12/14/22  Last Known Normal Time: 0700  Symptom Onset Date: 12/14/22  Symptom Onset Time: 0830  Stroke Team Called Date: 12/14/22  Stroke Team Called Time: 1240  Stroke Team Arrival Date: 12/14/22  Stroke Team Arrival Time: 1243  CTA Interpretation Time: 1314  Thrombectomy Recommended: No    NIH Scale:  1a. Level of Consciousness: 0-->Alert, keenly responsive  1b. LOC Questions: 0-->Answers both questions correctly  1c. LOC Commands: 0-->Performs both tasks correctly  2. Best Gaze: 0-->Normal  3. Visual: 0-->No visual loss  4. Facial Palsy: 0-->Normal symmetrical movements  5a. Motor Arm, Left: 1-->Drift, limb holds 90 (or 45) degrees, but drifts down before full 10 seconds, does not hit bed or other support  5b. Motor Arm, Right: 0-->No drift, limb holds 90 (or 45) degrees for full 10 secs  6a. Motor Leg, Left: 0-->No drift, leg holds 30 degree position for full 5 secs  6b. Motor Leg, Right: 0-->No drift, leg holds 30 degree position for full 5 secs  7. Limb Ataxia: 0-->Absent  8. Sensory: 0-->Normal, no sensory loss  9. Best Language: 0-->No aphasia, normal  10. Dysarthria: 0-->Normal  11. Extinction and Inattention (formerly Neglect): 0-->No abnormality  Total (NIH Stroke Scale): 1       Modified [place] Score: 0  [place]wn Coma Scale:    ABCD2 Score:    [id number] Score:   HAS -BLED Score:   ICH Score:   Hunt & Rae Classification:      Hemorrhagic change of an Ischemic Stroke: Does this patient have an ischemic stroke with hemorrhagic changes? No     Neurologic Chief Complaint: R MCA stroke    Subjective:     Interval History: Patient is seen for follow-up neurological assessment and treatment recommendations:     Patient remains with LUE drift. Speech at baseline per patient. SBP < 120, will d/c metoprolol at this time to  maintain BP/perfusion given small vessel stroke. Patient is medically ready. Pending SNF v/ HH with 24/7 supervision (son is trying to set this up as patient lives in mobile home alone). Soap enema ordered if patient unable to have BM by the end of the day.     HPI, Past Medical, Family, and Social History remains the same as documented in the initial encounter.     Review of Systems   Constitutional:  Negative for fever.   HENT:  Negative for drooling.    Eyes:  Negative for visual disturbance.   Respiratory:  Negative for shortness of breath.    Gastrointestinal:  Negative for nausea and vomiting.   Psychiatric/Behavioral:  Negative for agitation and confusion.    Scheduled Meds:   albuterol-ipratropium  3 mL Nebulization Q6H    aspirin  81 mg Oral Daily    atorvastatin  40 mg Oral Daily    clopidogreL  75 mg Oral Daily    heparin (porcine)  5,000 Units Subcutaneous Q8H    memantine  10 mg Oral Daily    mupirocin   Topical (Top) Daily    polyethylene glycol  17 g Oral BID    senna-docusate 8.6-50 mg  1 tablet Oral BID    tamsulosin  0.4 mg Oral Daily     Continuous Infusions:  PRN Meds:acetaminophen, dextrose 10%, dextrose 10%, glucagon (human recombinant), insulin aspart U-100, sodium chloride 0.9%    Objective:     Vital Signs (Most Recent):  Temp: 97.9 °F (36.6 °C) (12/19/22 0740)  Pulse: 79 (12/19/22 0807)  Resp: 16 (12/19/22 0807)  BP: (!) 108/56 (12/19/22 0740)  SpO2: 96 % (12/19/22 0807)  BP Location: Left arm    Vital Signs Range (Last 24H):  Temp:  [97.8 °F (36.6 °C)-98.9 °F (37.2 °C)]   Pulse:  [74-85]   Resp:  [15-20]   BP: (105-125)/(56-68)   SpO2:  [94 %-97 %]   BP Location: Left arm    Physical Exam  Vitals and nursing note reviewed.   Constitutional:       General: He is not in acute distress.     Appearance: He is well-developed. He is not diaphoretic.   HENT:      Head: Normocephalic and atraumatic.      Right Ear: External ear normal.      Left Ear: External ear normal.      Nose: No  rhinorrhea.      Mouth/Throat:      Mouth: Mucous membranes are moist.   Eyes:      General: No visual field deficit.     Extraocular Movements: Extraocular movements intact.   Cardiovascular:      Rate and Rhythm: Normal rate.   Pulmonary:      Effort: Pulmonary effort is normal. No respiratory distress.   Neurological:      Mental Status: He is alert and oriented to person, place, and time.      Cranial Nerves: No dysarthria or facial asymmetry.      Comments: Slight drift in LUE    Psychiatric:         Behavior: Behavior is cooperative.       Neurological Exam:   LOC: alert  Attention Span: Good   Articulation: No dysarthria  EOM (CN III, IV, VI): Full/intact  Facial Movement (CN VII): Symmetric facial expression    Motor: Arm left  Paresis: 4/5  Leg left  Normal 5/5  Arm right  Normal 5/5  Leg right Normal 5/5  Sensation: Intact to light touch     Laboratory:  CMP:   Recent Labs   Lab 12/19/22  0236   CALCIUM 9.5   ALBUMIN 3.1*   PROT 6.1      K 4.9   CO2 25      BUN 22   CREATININE 1.5*   ALKPHOS 83   ALT 17   AST 18   BILITOT 1.0       CBC:   Recent Labs   Lab 12/19/22  0236   WBC 5.60   RBC 3.93*   HGB 12.7*   HCT 39.2*      *   MCH 32.3*   MCHC 32.4       Lipid Panel:   Recent Labs   Lab 12/14/22  1009   CHOL 177   LDLCALC 128.6   HDL 32*   TRIG 82       Coagulation:   Recent Labs   Lab 12/14/22  1009   INR 1.1       Hgb A1C:   Recent Labs   Lab 12/14/22  1552   HGBA1C 6.7*       TSH:   Recent Labs   Lab 12/14/22  1009   TSH 4.401*         Diagnostic Results     Brain Imaging   CTH 12/15/2022:   Chronic change noted, there is no evidence for superimposed acute intracranial process.    MRI Brain 12/15/2022:    Acute deep white matter infarct on the right without associated hemorrhage or mass effect.  Mild underlying chronic ischemic changes      Vessel Imaging   CTA Stroke MP 12/14/2022:  CT head shows no acute intracranial abnormalities.  CTA head neck shows no intracranial large  vessel occlusion or aneurysm.  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  Atherosclerosis at the carotid bifurcations with evidence of prior endarterectomy.  No high-grade stenosis.    Cardiac Imaging   TTE 12/14/2022   The left ventricle is normal in size with concentric remodeling and normal systolic function.   The estimated ejection fraction is 68%.   Grade I left ventricular diastolic dysfunction.   Normal right ventricular size with normal right ventricular systolic function.   Mild right atrial enlargement.   There is a transcutaneously-placed aortic bioprosthesis present. There is no aortic insufficiency present.   The aortic valve mean gradient is 8 mmHg with a dimensionless index of 0.52.   Mild tricuspid regurgitation.   Normal central venous pressure (3 mmHg).   The estimated PA systolic pressure is 55 mmHg.   There is moderate pulmonary hypertension.        Campbell De Luna PA-C  Comprehensive Stroke Center  Department of Vascular Neurology   [facility])

## 2022-12-19 NOTE — ASSESSMENT & PLAN NOTE
sCr 1.9 on admission, baseline 1.5-1.9  --Creatinine 1.5 today   --Avoid nephrotoxic agents, renally dose meds when possible  --Continue to monitor daily CMP.

## 2022-12-19 NOTE — PT/OT/SLP PROGRESS
"Speech Language Pathology Treatment    Patient Name:  Vega Kohler   MRN:  [id number]  Admitting Diagnosis: Cerebrovascular accident (CVA) due to thrombosis of right middle cerebral artery    Recommendations:                 General Recommendations:  Follow-up not indicated  Diet recommendations:  Regular, Liquid Diet Level: Thin   Aspiration Precautions: Standard aspiration precautions   General Precautions: Standard, aspiration  Communication strategies:  none    Subjective     "I think I am doing okay" per pt    Patient goals: home     Pain/Comfort:  Pain Rating 1: 0/10  Pain Rating Post-Intervention 1: 0/10    Respiratory Status: Room air    Objective:     Has the patient been evaluated by SLP for swallowing?   Yes  Keep patient NPO? No   Current Respiratory Status:        Pt. Seen at bedside and hob was raised to 90 degree angle.  Pt.w as observed drinking 5 sips of soda via can with no s/s of airway compromise.  Speech was 100% intelligible in conversation with no dysarthira.  Cognitively, pt. Reported that he feels at baseline.  Mr. Kohler was oriented to time and place with good recall of recent events.  He verbalized good insight to situation and did recall possible discharge plans.  Mr. Kohler responded to simple problem sovling tasks with 100% accuracy.  Education provided re plan of care.          Assessment:     Vega Kohler is a 87 y.o. male with speech language   and cognitive skills wfl  Goals:   Multidisciplinary Problems       SLP Goals       Not on file              Multidisciplinary Problems (Resolved)          Problem: SLP    Goal Priority Disciplines Outcome   SLP Goal   (Resolved)     SLP Met   Description: Speech Language Pathology Goals  Goals expected to be met by 12/22  1. Pt will tolerate regular diet with thin liquids without overt s/s aspiration.   2. Pt will complete simple reasoning/problem solving tasks with 70% accy with min cues.   3. Pt will complete assessment of reading, writing, " visual spatial skills to determine need for tx.                              Plan:     Patient to be seen:  3 x/week   Plan of Care expires:  01/14/23  Plan of Care reviewed with:  patient   SLP Follow-Up:  No       Discharge recommendations:  other (see comments)   Barriers to Discharge:  None    Time Tracking:     SLP Treatment Date:   12/19/22  Speech Start Time:  0720  Speech Stop Time:  0736     Speech Total Time (min):  16 min    Billable Minutes: Speech Therapy Individual 8 and Treatment Swallowing Dysfunction 8    12/19/2022

## 2022-12-19 NOTE — PLAN OF CARE
"   12/19/22 1348   Post-Acute Status   Post-Acute Authorization Placement   Post-Acute Placement Status Referrals Sent   Coverage Medicare   Discharge Plan   Discharge Plan A Skilled Nursing Facility   Discharge Plan B Home Health     SW met with patient at bedside.  Notes indicate that patient needs either SNF or home with HH and 24 hour supervision.  Patient agreeable to SNF and asked for a list of facilties close to him.  SW provided list and sent to the 2 facilities close to his home, [facility] and [facility].  [facility] has declined and [facility] still pending.  SW will continue to follow and also call patient's son to discuss dispo.     2:34 PM  [facility] is no longer open and [facility] has declined .  SW contacted patient's son Zay and he chose 1. [facility] 2. [facility] and 3. [facility].      SW let him know that [facility] had declined and he stated it remained his first choice.  SW will f/u with [facility] to determine reason for denial, as [facility] simply said "not eligible".  [facility] sent to other 2 facilties for review and will continue to follow.      Jesika Napoles LMSW  [facility]  [phone]    "

## 2022-12-19 NOTE — ASSESSMENT & PLAN NOTE
Per 9/2022 Pulm note: sats 81% on RA and is noncompliant with home oxygen  SpO2 goal 88-92%, remains at goal with only 0.5 L/min   Continue supplemental O2 PRN    Continue home meds at discharge

## 2022-12-19 NOTE — SUBJECTIVE & OBJECTIVE
Neurologic Chief Complaint: R MCA stroke    Subjective:     Interval History: Patient is seen for follow-up neurological assessment and treatment recommendations:     Patient remains with LUE drift. Speech at baseline per patient. SBP < 120, will d/c metoprolol at this time to maintain BP/perfusion given small vessel stroke. Patient is medically ready. Pending SNF v/ HH with 24/7 supervision (son is trying to set this up as patient lives in mobile home alone). Soap enema ordered if patient unable to have BM by the end of the day.     HPI, Past Medical, Family, and Social History remains the same as documented in the initial encounter.     Review of Systems   Constitutional:  Negative for fever.   HENT:  Negative for drooling.    Eyes:  Negative for visual disturbance.   Respiratory:  Negative for shortness of breath.    Gastrointestinal:  Negative for nausea and vomiting.   Psychiatric/Behavioral:  Negative for agitation and confusion.    Scheduled Meds:   albuterol-ipratropium  3 mL Nebulization Q6H    aspirin  81 mg Oral Daily    atorvastatin  40 mg Oral Daily    clopidogreL  75 mg Oral Daily    heparin (porcine)  5,000 Units Subcutaneous Q8H    memantine  10 mg Oral Daily    mupirocin   Topical (Top) Daily    polyethylene glycol  17 g Oral BID    senna-docusate 8.6-50 mg  1 tablet Oral BID    tamsulosin  0.4 mg Oral Daily     Continuous Infusions:  PRN Meds:acetaminophen, dextrose 10%, dextrose 10%, glucagon (human recombinant), insulin aspart U-100, sodium chloride 0.9%    Objective:     Vital Signs (Most Recent):  Temp: 97.9 °F (36.6 °C) (12/19/22 0740)  Pulse: 79 (12/19/22 0807)  Resp: 16 (12/19/22 0807)  BP: (!) 108/56 (12/19/22 0740)  SpO2: 96 % (12/19/22 0807)  BP Location: Left arm    Vital Signs Range (Last 24H):  Temp:  [97.8 °F (36.6 °C)-98.9 °F (37.2 °C)]   Pulse:  [74-85]   Resp:  [15-20]   BP: (105-125)/(56-68)   SpO2:  [94 %-97 %]   BP Location: Left arm    Physical Exam  Vitals and nursing note  reviewed.   Constitutional:       General: He is not in acute distress.     Appearance: He is well-developed. He is not diaphoretic.   HENT:      Head: Normocephalic and atraumatic.      Right Ear: External ear normal.      Left Ear: External ear normal.      Nose: No rhinorrhea.      Mouth/Throat:      Mouth: Mucous membranes are moist.   Eyes:      General: No visual field deficit.     Extraocular Movements: Extraocular movements intact.   Cardiovascular:      Rate and Rhythm: Normal rate.   Pulmonary:      Effort: Pulmonary effort is normal. No respiratory distress.   Neurological:      Mental Status: He is alert and oriented to person, place, and time.      Cranial Nerves: No dysarthria or facial asymmetry.      Comments: Slight drift in LUE    Psychiatric:         Behavior: Behavior is cooperative.       Neurological Exam:   LOC: alert  Attention Span: Good   Articulation: No dysarthria  EOM (CN III, IV, VI): Full/intact  Facial Movement (CN VII): Symmetric facial expression    Motor: Arm left  Paresis: 4/5  Leg left  Normal 5/5  Arm right  Normal 5/5  Leg right Normal 5/5  Sensation: Intact to light touch     Laboratory:  CMP:   Recent Labs   Lab 12/19/22  0236   CALCIUM 9.5   ALBUMIN 3.1*   PROT 6.1      K 4.9   CO2 25      BUN 22   CREATININE 1.5*   ALKPHOS 83   ALT 17   AST 18   BILITOT 1.0       CBC:   Recent Labs   Lab 12/19/22  0236   WBC 5.60   RBC 3.93*   HGB 12.7*   HCT 39.2*      *   MCH 32.3*   MCHC 32.4       Lipid Panel:   Recent Labs   Lab 12/14/22  1009   CHOL 177   LDLCALC 128.6   HDL 32*   TRIG 82       Coagulation:   Recent Labs   Lab 12/14/22  1009   INR 1.1       Hgb A1C:   Recent Labs   Lab 12/14/22  1552   HGBA1C 6.7*       TSH:   Recent Labs   Lab 12/14/22  1009   TSH 4.401*         Diagnostic Results     Brain Imaging   CTH 12/15/2022:   Chronic change noted, there is no evidence for superimposed acute intracranial process.    MRI Brain 12/15/2022:    Acute  deep white matter infarct on the right without associated hemorrhage or mass effect.  Mild underlying chronic ischemic changes      Vessel Imaging   CTA Stroke MP 12/14/2022:  CT head shows no acute intracranial abnormalities.  CTA head neck shows no intracranial large vessel occlusion or aneurysm.  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  Atherosclerosis at the carotid bifurcations with evidence of prior endarterectomy.  No high-grade stenosis.    Cardiac Imaging   TTE 12/14/2022  The left ventricle is normal in size with concentric remodeling and normal systolic function.  The estimated ejection fraction is 68%.  Grade I left ventricular diastolic dysfunction.  Normal right ventricular size with normal right ventricular systolic function.  Mild right atrial enlargement.  There is a transcutaneously-placed aortic bioprosthesis present. There is no aortic insufficiency present.  The aortic valve mean gradient is 8 mmHg with a dimensionless index of 0.52.  Mild tricuspid regurgitation.  Normal central venous pressure (3 mmHg).  The estimated PA systolic pressure is 55 mmHg.  There is moderate pulmonary hypertension.

## 2022-12-19 NOTE — PT/OT/SLP PROGRESS
Occupational Therapy   Treatment    Name: Vega Kohler  MRN: [id number]  Admitting Diagnosis:  Cerebrovascular accident (CVA) due to thrombosis of right middle cerebral artery       Recommendations:     Discharge Recommendations: other (see comments)  Discharge Equipment Recommendations:  bedside commode, bath bench  Barriers to discharge:  Decreased caregiver support    Assessment:     Vega Kohler is a 87 y.o. male with a medical diagnosis of Cerebrovascular accident (CVA) due to thrombosis of right middle cerebral artery.  He presents with fatigue after walking with PT. Performance deficits affecting function are weakness, impaired endurance, impaired balance, gait instability, impaired self care skills, impaired cognition, impaired functional mobility, decreased coordination, decreased safety awareness. Patient completed supine to sit with min (A), EOB sit with no loss of balance, scoot forwards with supervision. A &O to four, follows commands, no confusion noted. Donning socks with min (A) at EOB. EOB sit for fifteen minutes. Sit to supine with supervsion.     Rehab Prognosis:  Good; patient would benefit from acute skilled OT services to address these deficits and reach maximum level of function.       Plan:     Patient to be seen 3 x/week to address the above listed problems via self-care/home management, therapeutic activities, therapeutic exercises, neuromuscular re-education  Plan of Care Expires: 01/15/23  Plan of Care Reviewed with: patient    Subjective     Pain/Comfort:  Pain Rating 1: 0/10  Pain Rating Post-Intervention 1: 0/10    Objective:     Communicated with: RN prior to session.  Patient found HOB elevated with telemetry, pulse ox (continuous), bed alarm upon OT entry to room.    General Precautions: Standard, aspiration, fall    Orthopedic Precautions:N/A  Braces: N/A  Respiratory Status: Nasal cannula, flow 1 L/min     Occupational Performance:     Bed Mobility:    Patient completed Supine to  Sit with minimum assistance  Patient completed Sit to Supine with contact guard assistance       Activities of Daily Living:  Upper Body Dressing: supervision    Lower Body Dressing: minimum assistance at EOB      [facility] 6 Click ADL: 20    Treatment & Education:  Education on home safety, fall prevention and energy conservation.     Patient left HOB elevated with all lines intact, call button in reach, bed alarm on, and Rn notified    GOALS:   Multidisciplinary Problems       Occupational Therapy Goals          Problem: Occupational Therapy    Goal Priority Disciplines Outcome Interventions   Occupational Therapy Goal     OT, PT/OT Ongoing, Progressing    Description: Goals to be met by: 12/29/22     Patient will increase functional independence with ADLs by performing:    UE Dressing with Supervision.  LE Dressing with Supervision.  Grooming while standing at sink with Supervision.  Toileting from toilet with Supervision for hygiene and clothing management.   Toilet transfer to toilet with Supervision.  Functional mobility of household and community distance with  supervision and AD as needed                           Time Tracking:     OT Date of Treatment: 12/19/22  OT Start Time: 1429  OT Stop Time: 1457  OT Total Time (min): 28 min    Billable Minutes:Self Care/Home Management 14  Therapeutic Activity 14    OT/PEDRO: OT          12/19/2022

## 2022-12-20 NOTE — PROGRESS NOTES
[facility] - Neurosurgery ([facility])  Vascular Neurology  Comprehensive Stroke Center  Progress Note    Assessment/Plan:     * Cerebrovascular accident (CVA) due to thrombosis of right middle cerebral artery  Patient is a 86 yo M with PMH of HTN, HLD, DM, CAD, PAD, CHF, COPD, CKD, dementia, and follicular lymphoma (chemo, last tx ), and L ICA stenosis (s/p L CEA). Patient was seen at OSH via telestroke by Dr. Sam for LSW, NIHSS 9 initially. Patient determined to be a thrombolytic candidate. Patient transferred to [facility] and admitted to [facility]. On arrival, patient with NIH 1. MRI with R MCA infarct suggestive of small vessel etiology. TTE with EF 68%, grade 1 LVDD, and mild YADIRA.     Therapy recs for SNF or  with 24/7 supervision and regular diet. Son is currently trying to make arrangements for 24/7 supervision as patient lives alone but actively seeking SNF as well. Medically ready for discharge awaiting placement       Etiology:      Antithrombotics for secondary stroke prevention: Antiplatelets: Aspirin: 81 mg daily  Clopidogrel: 75 mg daily    Statins for secondary stroke prevention and hyperlipidemia, if present:   Statins: Atorvastatin- 40 mg daily     Aggressive risk factor modification: HTN, HLD, CAD, Carotid artery disease     Rehab efforts: SNF    Diagnostics ordered/pending: None     VTE prophylaxis: Heparin 5000 units SQ every 8 hours  Mechanical prophylaxis: Place SCDs    BP parameters: SBP <180      BPH (benign prostatic hyperplasia)  Currently on tamsulosin 0.4mg daily     Follicular lymphoma  H/o follicular lymphoma per chart (grade 3a, stage 1); receiving rituxan (cycle 3 in 2022), 10/2022 PET scan positive for 3 cm lymphadenopathy in left axillary region otherwise HUSSEIN.    Aortic stenosis s/p TAVR  History of aortic stenosis    Chronic diastolic heart failure, NYHA class 2  -TTE 12/15/2022 w/ EF 68%  -GDMT when appropriate but holding metoprolol at this time given hypotension in  setting of small vessel stroke     Chronic kidney disease, stage 3 (moderate)  sCr 1.9 on admission, baseline 1.5-1.9  --Creatinine 1.3 today   --Avoid nephrotoxic agents, renally dose meds when possible  --Continue to monitor daily CMP.       Hypertension  Stroke risk factor.  SBP <180, MAP >65; remains at goal   Avoid hypotension in the setting of small vessel disease   Holding lopressor for now given hypotension during this admission in the setting of an acute stroke, will resume in 4-6 days     COPD, moderate  Per 9/2022 Pulm note: sats 81% on RA and is noncompliant with home oxygen  SpO2 goal 88-92%, remains at goal with only 0.5 L/min   Continue supplemental O2 PRN    Continue home meds at discharge     Carotid artery disease  Stroke risk factor  --S/p L carotid endarterectomy in 12/3/202  --Continue ASA, Plavix, and statin    Hyperlipidemia  Stroke risk factor  , goal <70  Atorvastatin 40mg daily, continue     Hypothyroidism  -Subclinical hypothyroidism; TSH 4.4; fT4 0.95 12/14/2021  -Follow up with PCP    Type 2 diabetes mellitus  Stroke risk factor. A1c 6.7.  -BG goal while inpatient 140-180  -Continue LDSSI ACHS PRN   - Stable        12/15/2022 Neuro exam stable; NIHSS 3 for mild drowsiness, LUE/LLE drift. AAOx4, CT head negative for interval changes or superimposed acute intracranial process.  12/16/2022 Neuro exam stable; NIHSS 0. No longer having LUE/LLE drift. AAOx4. Pt to be stepped down to NPU.  12/17/2022 Remains in NCC pending step down, NAEO. Neuro exam unchanged. PT/OT recommending SNF vs HHPT with 24/7 care, will plan for SNF at time time given decreased caregiver support at home.   12/18/2022 Stepped down to NPU overnight.  NAEO.  Neuro exam is stable.  Awaiting SNF placement.  12/19/2022 Patient remains with LUE drift. Speech at baseline per patient. SBP < 120, will d/c metoprolol at this time to maintain BP/perfusion given small vessel stroke. Patient is medically ready. Pending SNF v/   with 24/7 supervision (son is trying to set this up as patient lives in mobile home alone). Soap enema ordered if patient unable to have BM by the end of the day.   12/20/2022 No acute events overnight. SBP goal < 180, remains at goal. Medically ready, awaiting SNF placement.       STROKE DOCUMENTATION   Acute Stroke Times   Last Known Normal Date: 12/14/22  Last Known Normal Time: 0700  Symptom Onset Date: 12/14/22  Symptom Onset Time: 0830  Stroke Team Called Date: 12/14/22  Stroke Team Called Time: 1240  Stroke Team Arrival Date: 12/14/22  Stroke Team Arrival Time: 1243  Thrombolytic Therapy Recommended: Yes  CTA Interpretation Time: 1314  Thrombectomy Recommended: No    NIH Scale:  1a. Level of Consciousness: 0-->Alert, keenly responsive  1b. LOC Questions: 0-->Answers both questions correctly  1c. LOC Commands: 0-->Performs both tasks correctly  2. Best Gaze: 0-->Normal  3. Visual: 0-->No visual loss  4. Facial Palsy: 0-->Normal symmetrical movements  5a. Motor Arm, Left: 1-->Drift, limb holds 90 (or 45) degrees, but drifts down before full 10 seconds, does not hit bed or other support  5b. Motor Arm, Right: 0-->No drift, limb holds 90 (or 45) degrees for full 10 secs  6a. Motor Leg, Left: 0-->No drift, leg holds 30 degree position for full 5 secs  6b. Motor Leg, Right: 0-->No drift, leg holds 30 degree position for full 5 secs  7. Limb Ataxia: 0-->Absent  8. Sensory: 0-->Normal, no sensory loss  9. Best Language: 0-->No aphasia, normal  10. Dysarthria: 0-->Normal  11. Extinction and Inattention (formerly Neglect): 0-->No abnormality  Total (NIH Stroke Scale): 1       Modified Lavon Score: 0  Jody Coma Scale:    ABCD2 Score:    [id number] Score:   HAS -BLED Score:   ICH Score:   Hunt & Rae Classification:      Hemorrhagic change of an Ischemic Stroke: Does this patient have an ischemic stroke with hemorrhagic changes? No     Neurologic Chief Complaint: R MCA stroke    Subjective:     Interval History:  Patient is seen for follow-up neurological assessment and treatment recommendations:     No acute events overnight. SBP goal < 180, remains at goal. Medically ready, awaiting SNF placement.     HPI, Past Medical, Family, and Social History remains the same as documented in the initial encounter.     Review of Systems   Constitutional:  Negative for fever.   HENT:  Negative for drooling.    Eyes:  Negative for visual disturbance.   Respiratory:  Negative for shortness of breath.    Gastrointestinal:  Negative for nausea and vomiting.   Psychiatric/Behavioral:  Negative for agitation and confusion.    Scheduled Meds:   aspirin  81 mg Oral Daily    atorvastatin  40 mg Oral Daily    clopidogreL  75 mg Oral Daily    heparin (porcine)  5,000 Units Subcutaneous Q8H    memantine  10 mg Oral Daily    mupirocin   Topical (Top) Daily    polyethylene glycol  17 g Oral BID    senna-docusate 8.6-50 mg  1 tablet Oral BID    tamsulosin  0.4 mg Oral Daily     Continuous Infusions:  PRN Meds:acetaminophen, dextrose 10%, dextrose 10%, glucagon (human recombinant), insulin aspart U-100, sodium chloride 0.9%    Objective:     Vital Signs (Most Recent):  Temp: 98.5 °F (36.9 °C) (12/20/22 0740)  Pulse: 77 (12/20/22 0751)  Resp: 18 (12/20/22 0740)  BP: (!) 154/70 (12/20/22 0740)  SpO2: (!) 94 % (12/20/22 0740)  BP Location: Right arm    Vital Signs Range (Last 24H):  Temp:  [97.3 °F (36.3 °C)-98.9 °F (37.2 °C)]   Pulse:  [70-79]   Resp:  [17-20]   BP: (100-176)/(56-72)   SpO2:  [93 %-100 %]   BP Location: Right arm    Physical Exam  Vitals and nursing note reviewed.   Constitutional:       General: He is not in acute distress.     Appearance: He is well-developed. He is not diaphoretic.   HENT:      Head: Normocephalic and atraumatic.      Right Ear: External ear normal.      Left Ear: External ear normal.      Nose: No rhinorrhea.      Mouth/Throat:      Mouth: Mucous membranes are moist.   Eyes:      General: No visual field  deficit.     Extraocular Movements: Extraocular movements intact.   Cardiovascular:      Rate and Rhythm: Normal rate.   Pulmonary:      Effort: Pulmonary effort is normal. No respiratory distress.   Neurological:      Mental Status: He is alert and oriented to person, place, and time.      Cranial Nerves: No dysarthria or facial asymmetry.      Comments: Slight drift in LUE    Psychiatric:         Behavior: Behavior is cooperative.       Neurological Exam:   LOC: alert  Attention Span: Good   Articulation: No dysarthria  EOM (CN III, IV, VI): Full/intact  Facial Movement (CN VII): Symmetric facial expression    Motor: Arm left  Paresis: 4/5  Leg left  Normal 5/5  Arm right  Normal 5/5  Leg right Normal 5/5  Sensation: Intact to light touch     Laboratory:  CMP:   Recent Labs   Lab 12/20/22  0351   CALCIUM 8.9   ALBUMIN 2.9*   PROT 5.7*      K 4.6   CO2 25      BUN 24*   CREATININE 1.3   ALKPHOS 83   ALT 14   AST 18   BILITOT 1.2*       CBC:   Recent Labs   Lab 12/20/22  0351   WBC 4.86   RBC 3.64*   HGB 12.5*   HCT 37.8*      *   MCH 34.3*   MCHC 33.1       Lipid Panel:   Recent Labs   Lab 12/14/22  1009   CHOL 177   LDLCALC 128.6   HDL 32*   TRIG 82       Coagulation:   Recent Labs   Lab 12/14/22  1009   INR 1.1       Hgb A1C:   Recent Labs   Lab 12/14/22  1552   HGBA1C 6.7*       TSH:   Recent Labs   Lab 12/14/22  1009   TSH 4.401*         Diagnostic Results     Brain Imaging   CTH 12/15/2022:   Chronic change noted, there is no evidence for superimposed acute intracranial process.    MRI Brain 12/15/2022:    Acute deep white matter infarct on the right without associated hemorrhage or mass effect.  Mild underlying chronic ischemic changes      Vessel Imaging   CTA Stroke MP 12/14/2022:  CT head shows no acute intracranial abnormalities.  CTA head neck shows no intracranial large vessel occlusion or aneurysm.  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  Atherosclerosis at the carotid bifurcations with evidence of prior endarterectomy.  No high-grade stenosis.    Cardiac Imaging   TTE 12/14/2022   The left ventricle is normal in size with concentric remodeling and normal systolic function.   The estimated ejection fraction is 68%.   Grade I left ventricular diastolic dysfunction.   Normal right ventricular size with normal right ventricular systolic function.   Mild right atrial enlargement.   There is a transcutaneously-placed aortic bioprosthesis present. There is no aortic insufficiency present.   The aortic valve mean gradient is 8 mmHg with a dimensionless index of 0.52.   Mild tricuspid regurgitation.   Normal central venous pressure (3 mmHg).   The estimated PA systolic pressure is 55 mmHg.   There is moderate pulmonary hypertension.        Campbell De Luna PA-C  Comprehensive Stroke Center  Department of Vascular Neurology   [facility])

## 2022-12-20 NOTE — NURSING
Initial Rounds: Patient is asleep, awakes easily but drifts off in conversation. HOB elevated, bed in lowest position, SR's up times 4 and call light in place. Bed alarm activated. No s/sx of distress noted.

## 2022-12-20 NOTE — PLAN OF CARE
Updated clinicals sent.  Still pending response from [facility] and [facility].  Chadwick FLORES reviewing.  CARLOS will continue to follow.     10:00 AM  CARLOS contacted [facility] and spoke with Adin matias: status of referral.  She said that her manager was handling the referral and would call this writer back.    CARLOS spoke with Pati at [facility] and she had not had an opportunity to review, but would do so now and get back to this writer.  CARLOS will continue to follow.     2:48 PM  [facility] reviewing and would like to submit for auth once they get the final decision.  CARLOS asked that they reach out to  or the patient's nephew first to ensure he is ok with his placement since it was not one of his choices.  CARLOS will continue to follow.     Jesika Napoles, ALKA  [facility]  [phone]

## 2022-12-20 NOTE — ASSESSMENT & PLAN NOTE
Stroke risk factor.  SBP <180, MAP >65; remains at goal   Avoid hypotension in the setting of small vessel disease   Holding lopressor for now given hypotension during this admission in the setting of an acute stroke, will resume in 4-6 days

## 2022-12-20 NOTE — SUBJECTIVE & OBJECTIVE
Neurologic Chief Complaint: R MCA stroke    Subjective:     Interval History: Patient is seen for follow-up neurological assessment and treatment recommendations:     No acute events overnight. SBP goal < 180, remains at goal. Medically ready, awaiting SNF placement.     HPI, Past Medical, Family, and Social History remains the same as documented in the initial encounter.     Review of Systems   Constitutional:  Negative for fever.   HENT:  Negative for drooling.    Eyes:  Negative for visual disturbance.   Respiratory:  Negative for shortness of breath.    Gastrointestinal:  Negative for nausea and vomiting.   Psychiatric/Behavioral:  Negative for agitation and confusion.    Scheduled Meds:   aspirin  81 mg Oral Daily    atorvastatin  40 mg Oral Daily    clopidogreL  75 mg Oral Daily    heparin (porcine)  5,000 Units Subcutaneous Q8H    memantine  10 mg Oral Daily    mupirocin   Topical (Top) Daily    polyethylene glycol  17 g Oral BID    senna-docusate 8.6-50 mg  1 tablet Oral BID    tamsulosin  0.4 mg Oral Daily     Continuous Infusions:  PRN Meds:acetaminophen, dextrose 10%, dextrose 10%, glucagon (human recombinant), insulin aspart U-100, sodium chloride 0.9%    Objective:     Vital Signs (Most Recent):  Temp: 98.5 °F (36.9 °C) (12/20/22 0740)  Pulse: 77 (12/20/22 0751)  Resp: 18 (12/20/22 0740)  BP: (!) 154/70 (12/20/22 0740)  SpO2: (!) 94 % (12/20/22 0740)  BP Location: Right arm    Vital Signs Range (Last 24H):  Temp:  [97.3 °F (36.3 °C)-98.9 °F (37.2 °C)]   Pulse:  [70-79]   Resp:  [17-20]   BP: (100-176)/(56-72)   SpO2:  [93 %-100 %]   BP Location: Right arm    Physical Exam  Vitals and nursing note reviewed.   Constitutional:       General: He is not in acute distress.     Appearance: He is well-developed. He is not diaphoretic.   HENT:      Head: Normocephalic and atraumatic.      Right Ear: External ear normal.      Left Ear: External ear normal.      Nose: No rhinorrhea.      Mouth/Throat:      Mouth:  Mucous membranes are moist.   Eyes:      General: No visual field deficit.     Extraocular Movements: Extraocular movements intact.   Cardiovascular:      Rate and Rhythm: Normal rate.   Pulmonary:      Effort: Pulmonary effort is normal. No respiratory distress.   Neurological:      Mental Status: He is alert and oriented to person, place, and time.      Cranial Nerves: No dysarthria or facial asymmetry.      Comments: Slight drift in LUE    Psychiatric:         Behavior: Behavior is cooperative.       Neurological Exam:   LOC: alert  Attention Span: Good   Articulation: No dysarthria  EOM (CN III, IV, VI): Full/intact  Facial Movement (CN VII): Symmetric facial expression    Motor: Arm left  Paresis: 4/5  Leg left  Normal 5/5  Arm right  Normal 5/5  Leg right Normal 5/5  Sensation: Intact to light touch     Laboratory:  CMP:   Recent Labs   Lab 12/20/22  0351   CALCIUM 8.9   ALBUMIN 2.9*   PROT 5.7*      K 4.6   CO2 25      BUN 24*   CREATININE 1.3   ALKPHOS 83   ALT 14   AST 18   BILITOT 1.2*       CBC:   Recent Labs   Lab 12/20/22  0351   WBC 4.86   RBC 3.64*   HGB 12.5*   HCT 37.8*      *   MCH 34.3*   MCHC 33.1       Lipid Panel:   Recent Labs   Lab 12/14/22  1009   CHOL 177   LDLCALC 128.6   HDL 32*   TRIG 82       Coagulation:   Recent Labs   Lab 12/14/22  1009   INR 1.1       Hgb A1C:   Recent Labs   Lab 12/14/22  1552   HGBA1C 6.7*       TSH:   Recent Labs   Lab 12/14/22  1009   TSH 4.401*         Diagnostic Results     Brain Imaging   CTH 12/15/2022:   Chronic change noted, there is no evidence for superimposed acute intracranial process.    MRI Brain 12/15/2022:    Acute deep white matter infarct on the right without associated hemorrhage or mass effect.  Mild underlying chronic ischemic changes      Vessel Imaging   CTA Stroke MP 12/14/2022:  CT head shows no acute intracranial abnormalities.  CTA head neck shows no intracranial large vessel occlusion or aneurysm.  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  Atherosclerosis at the carotid bifurcations with evidence of prior endarterectomy.  No high-grade stenosis.    Cardiac Imaging   TTE 12/14/2022  The left ventricle is normal in size with concentric remodeling and normal systolic function.  The estimated ejection fraction is 68%.  Grade I left ventricular diastolic dysfunction.  Normal right ventricular size with normal right ventricular systolic function.  Mild right atrial enlargement.  There is a transcutaneously-placed aortic bioprosthesis present. There is no aortic insufficiency present.  The aortic valve mean gradient is 8 mmHg with a dimensionless index of 0.52.  Mild tricuspid regurgitation.  Normal central venous pressure (3 mmHg).  The estimated PA systolic pressure is 55 mmHg.  There is moderate pulmonary hypertension.

## 2022-12-20 NOTE — ASSESSMENT & PLAN NOTE
Stroke risk factor. A1c 6.7.  -BG goal while inpatient 140-180  -Continue LDSSI ACHS PRN   - Stable

## 2022-12-20 NOTE — PLAN OF CARE
Problem: Adult Inpatient Plan of Care  Goal: Plan of Care Review  Outcome: Ongoing, Progressing     Problem: Adult Inpatient Plan of Care  Goal: Optimal Comfort and Wellbeing  Outcome: Ongoing, Progressing     Problem: Adjustment to Illness (Stroke, Ischemic/Transient Ischemic Attack)  Goal: Optimal Coping  Outcome: Ongoing, Progressing     Problem: Bowel Elimination Impaired (Stroke, Ischemic/Transient Ischemic Attack)  Goal: Effective Bowel Elimination  Outcome: Ongoing, Progressing     Problem: Cognitive Impairment (Stroke, Ischemic/Transient Ischemic Attack)  Goal: Optimal Cognitive Function  Outcome: Ongoing, Progressing     Problem: Fall Injury Risk  Goal: Absence of Fall and Fall-Related Injury  Outcome: Ongoing, Progressing     Problem: Skin Injury Risk Increased  Goal: Skin Health and Integrity  Outcome: Ongoing, Progressing     Problem: Diabetes Comorbidity  Goal: Blood Glucose Level Within Targeted Range  Outcome: Ongoing, Progressing   Patient has slept well this shift with no s/sx of distress noted. HOB elevated, call light in reach and bed alarm in place. SR's up times 4 and educated on use of call light for assistance. Bed in lowest position. BS at HS was 111, refused his supper and declined a snack as well. Condom cath in place and voiding clear yellow urine. SBP remains below 180. O2 continues at 0.5 liter per n/c SCD's in place.

## 2022-12-20 NOTE — PT/OT/SLP PROGRESS
"Occupational Therapy   Treatment       Patient Name:  Vega Kohler   MRN:  [id number]  Admit Date: 12/14/2022  Admitting Diagnosis:  Cerebrovascular accident (CVA) due to thrombosis of right middle cerebral artery   Length of Stay: 6 days  Recent Surgery: * No surgery found *      Recommendations:     Discharge Recommendations: nursing facility, skilled  Discharge Equipment Recommendations:  bedside commode, bath bench, wheelchair  Barriers to discharge:  Inaccessible home environment, Decreased caregiver support    Plan:     Patient to be seen 3 x/week to address the above listed problems via self-care/home management, therapeutic activities, therapeutic exercises, neuromuscular re-education  Plan of Care Expires: 01/15/22  Plan of Care Reviewed with: patient    Assessment:   Vega Kohler is a 87 y.o. male with a medical diagnosis of Cerebrovascular accident (CVA) due to thrombosis of right middle cerebral artery.  He presents with the following performance deficits affecting function: impaired endurance, weakness, impaired self care skills, impaired functional mobility, gait instability, impaired balance, decreased coordination, decreased upper extremity function, decreased lower extremity function, decreased safety awareness, pain, impaired cardiopulmonary response to activity, impaired fine motor.  Pt continues to benefit from a collaborative PT/OT/SLP program to improve quality of life and focus on recovery of impairments.     Pt progressing towards goals, but remains a high fall risk. OT continues to recc SNF.     Rehab Prognosis: Good; patient would benefit from acute skilled OT services to address these deficits and reach maximum level of function.        Subjective   Communicated with: Rn prior to session.  Patient found HOB elevated with telemetry, bed alarm, pulse ox (continuous), Other (comments) (O2 doffed upon entry) upon OT entry to room.    Patient: "my back hurts being stuck in this bed" at start of " "session. and "it feels better now, but it still hurts some" re: back pain at end of session    Pain/Comfort:  Pain Rating 1: other (see comments) (unrated back pain, improved w assist with thoracic and lumbar extension and mobilizing within room)  Location - Orientation 1: generalized  Location 1: back  Pain Addressed 1: Reposition, Distraction  Pain Rating Post-Intervention 1: other (see comments) (pt described as decreased pain)    Objective:   Patient found with: telemetry, bed alarm, pulse ox (continuous), Other (comments) (O2 doffed upon entry)     General Precautions: Standard, Cardiac aspiration, fall   Orthopedic Precautions:N/A   Braces: N/A   Respiratory Status:   Room air  Vitals: BP (!) 123/59 (BP Location: Right arm, Patient Position: Lying)   Pulse 77   Temp 98.1 °F (36.7 °C) (Oral)   Resp 18   Ht 5' 7" (1.702 m)   Wt 68.9 kg (152 lb)   SpO2 (!) 92%   BMI 23.81 kg/m²     Outcome Measures:  [facility] 6 Click ADL: 18    Cognition:   Oriented X 3 (not time), Alert, and Cooperative  Command following: easily distracted by fatigue w exertion and follows two-step commands  Communication: clear/fluent, [place]     Occupational Performance:  Bed Mobility:    Patient completed Rolling/Turning to Left with  minimum assistance and with side rail  Patient completed Rolling/Turning to Right with minimum assistance and with side rail  Patient completed Supine to Sit with minimum assistance and with side rail on L side of bed  Scooting anteriorly to EOB to have both feet planted on floor: contact guard assistance    Functional Mobility/Transfers:  Static Sitting EOB: CGA-SBA  Dynamic Sitting EOB: CGA  Patient completed Sit <> Stand Transfer with minimum assistance  with  rolling walker; multiple stands, from EOB, from BSC, from chair  Static Standing Balance: CGA w RW, limited standing endurance while completing grooming in standing at sink, requiring seated rest break on besdie commode at sink level  Dynamic Standing " Balance: Marjan-ModA w RW; increased A with minimal exertion. Cues required for safety with RW  Patient completed Bed <> Chair Transfer using Step Transfer technique with minimum assistance with rolling walker  Patient completed Toilet Transfer Step Transfer technique with minimum assistance with  bedside commode  Functional mobility: 15 ft to bathroom, completing grooming instanding, required seated break after aprx 1 min standing, toileting in standing, ana huygiene in seated, standing for washing hands, returned 12 ft to bedside chair with Marjan w RW. Pt required 1 seated rest breaks.      Activities of Daily Living:  Feeding:  minimum assistance seated on bedside commode  Bathing seated on commode: mod A washing seated on bedside commode  Grooming: minimum assistance standing at sink, combing hair and brushing teeth, limited standing endurance. Limited insight into fatigue without cues.   Upper Body Dressing: contact guard assistance seated on commode donning fresh gown  Lower body dressing: Mod A pulling up brief in standing  Toileting: contact guard assistance anterior ana hygeine seated on bedside commode  Max A posterior ana hygiene from therapist in standing, pt holding RW    AMPAC 6 Click ADL:  AMPAC Total Score: 18    Treatment & Education:  -OT POC, safety during ADLs and mobility   -Education on energy conservation and task modification to maximize safety and independence  -Questions answered within OT scope of practice.  -thoracic and lumbar extension stretches with A from therapist in seated. Pt verbalized improved comfort following stretches.     Patient left up in chair with all lines intact, call button in reach, chair alarm on, and RN notified; RW provided for room for use with RN/PCT    GOALS:   Multidisciplinary Problems       Occupational Therapy Goals          Problem: Occupational Therapy    Goal Priority Disciplines Outcome Interventions   Occupational Therapy Goal     OT, PT/OT Ongoing,  Progressing    Description: Goals to be met by: 12/29/22     Patient will increase functional independence with ADLs by performing:    UE Dressing with Supervision.  LE Dressing with Supervision.  Grooming while standing at sink with Supervision.  Toileting from toilet with Supervision for hygiene and clothing management.   Toilet transfer to toilet with Supervision.  Functional mobility of household and community distance with  supervision and AD as needed                           Time Tracking:     OT Date of Treatment: 12/20/22  OT Start Time: 1514  OT Stop Time: 1556  OT Total Time (min): 42 min  Additional staff present: N/A      Billable Minutes:Self Care/Home Management 12  Therapeutic Activity 30      12/20/2022

## 2022-12-20 NOTE — PT/OT/SLP PROGRESS
Physical Therapy      Patient Name:  Vega Kohler   MRN:  [id number]    Patient not seen today secondary to pt in nursing care on first attempt. I was unable to return for a second attempt. Pt to meet POC if seen tomorrow. Will follow-up 12/21.

## 2022-12-20 NOTE — ASSESSMENT & PLAN NOTE
sCr 1.9 on admission, baseline 1.5-1.9  --Creatinine 1.3 today   --Avoid nephrotoxic agents, renally dose meds when possible  --Continue to monitor daily CMP.

## 2022-12-20 NOTE — ASSESSMENT & PLAN NOTE
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 Patient was seen at OSH via telestroke by Dr. Sam for LSW, NIHSS 9 initially. Patient determined to be a thrombolytic candidate. Patient transferred to [facility] and admitted to [facility]. On arrival, patient with NIH 1. MRI with R MCA infarct suggestive of small vessel etiology. TTE with EF 68%, grade 1 LVDD, and mild YADIRA.     Therapy recs for SNF or HH with 24/7 supervision and regular diet. Son is currently trying to make arrangements for 24/7 supervision as patient lives alone but actively seeking SNF as well. Medically ready for discharge awaiting placement       Etiology:      Antithrombotics for secondary stroke prevention: Antiplatelets: Aspirin: 81 mg daily  Clopidogrel: 75 mg daily    Statins for secondary stroke prevention and hyperlipidemia, if present:   Statins: Atorvastatin- 40 mg daily     Aggressive risk factor modification: HTN, HLD, CAD, Carotid artery disease     Rehab efforts: SNF    Diagnostics ordered/pending: None     VTE prophylaxis: Heparin 5000 units SQ every 8 hours  Mechanical prophylaxis: Place SCDs    BP parameters: SBP <180

## 2022-12-21 NOTE — PLAN OF CARE
Review POC; patient agrees with plan; awaiting transportation to transfer to [facility] swing bed (to then await SNF placement).  Progressing as anticipated  Continue as planned; therapy strengthening and safety / fall prevention.

## 2022-12-21 NOTE — NURSING
Patient awaiting transportation to transfer to [facility] in [place], [place].  Patient's son at bedside.

## 2022-12-21 NOTE — ASSESSMENT & PLAN NOTE
-TTE 12/15/2022 w/ EF 68%  -GDMT when appropriate (will restart slowly with metoprolol Saturday Dec 24th and then loop diuretic on Mon Dec 26th)   -Follow up with PCP / cardiologist in outpatient setting

## 2022-12-21 NOTE — PLAN OF CARE
NURSING HOME ORDERS    12/21/2022  [facility]  [address] [address]  Dept: [phone]  Loc: [phone]     Admit to Nursing Home:  SNF     Diagnoses:  Active Hospital Problems    Diagnosis  POA    *Cerebrovascular accident (CVA) due to thrombosis of right middle cerebral artery [I63.311]  Yes    Dementia [F03.90]  Yes     Chronic    BPH (benign prostatic hyperplasia) [N40.0]  Yes     Chronic    Follicular lymphoma [C82.90]  Yes     Chronic    Aortic stenosis s/p TAVR [Z95.2]  Not Applicable     Chronic    Chronic diastolic heart failure, NYHA class 2 [I50.32]  Yes     Chronic    Hyperlipidemia [E78.5]  Yes     Chronic    Type 2 diabetes mellitus [E11.9]  Yes     Chronic    Hypothyroidism [E03.9]  Yes     Chronic    Carotid artery disease [I77.9]  Yes     Chronic    COPD, moderate [J44.9]  Yes     Chronic    Hypertension [I10]  Yes     Chronic    Chronic kidney disease, stage 3 (moderate) [N18.30]  Yes     Chronic     [facility] Regulatory Update 10/1/2020        Resolved Hospital Problems   No resolved problems to display.       Patient is homebound due to:  Cerebrovascular accident (CVA) due to thrombosis of right middle cerebral artery    Allergies:  Review of patient's allergies indicates:   Allergen Reactions    Sulfur Itching    Penicillins Swelling and Rash       Vitals:  Per facility protocol     Diet:  diabetic diet, 2000 moris; thin liquids     Activities:   Activity as tolerated    Goals of Care Treatment Preferences:  Code Status: Full Code      Labs:  per facility protocol     Nursing Precautions:  Aspiration , Fall, and Pressure ulcer prevention    Consults:   PT to evaluate and treat- 3 times a week, OT to evaluate and treat- 3 times a week, and ST to evaluate and treat- 3 times a week     Miscellaneous Care: Diabetes Care: Diabetes: Check blood sugar. Fingerstick blood sugar AC and HS  CHF Care: Daily Weight with notification  of MD/NP of 2lb or > increase in 24 hours    v/s and O2 sat every shift    Oxygen as needed for sats <90%    Report abnormal breath sounds to MD/NP    Edema checks q shift- notify MD/NP of increased edema    Task segmentation by nursing for daily care to decrease exertion    Schedule appointment with cardiologist, for routine follow up     CHF education to include diet ,medication, and CHF flags for MD notification                   Diabetes Care:  SN to perform and educate Diabetic management with blood glucose monitoring: and Report CBG < 60 or > 350 to physician.      Medications: Discontinue all previous medication orders, if any. See new list below.     Medication List        START taking these medications      aspirin 81 MG Chew  Take 1 tablet (81 mg total) by mouth once daily.  Start taking on: December 22, 2022  Replaces: aspirin 81 MG EC tablet            CHANGE how you take these medications      atorvastatin 40 MG tablet  Commonly known as: LIPITOR  Take 1 tablet (40 mg total) by mouth once daily.  Start taking on: December 22, 2022  What changed: when to take this     clopidogreL 75 mg tablet  Commonly known as: PLAVIX  Take 1 tablet (75 mg total) by mouth once daily.  Start taking on: December 22, 2022  What changed: when to take this     metoprolol succinate 25 MG 24 hr tablet  Commonly known as: TOPROL-XL  Take 1 tablet (25 mg total) by mouth every evening.  Start taking on: December 24, 2022  What changed: These instructions start on December 24, 2022. If you are unsure what to do until then, ask your doctor or other care provider.     torsemide 10 MG Tab  Commonly known as: DEMADEX  Take 1 tablet (10 mg total) by mouth once daily.  Start taking on: December 26, 2022  What changed: These instructions start on December 26, 2022. If you are unsure what to do until then, ask your doctor or other care provider.            CONTINUE taking these medications      albuterol 90 mcg/actuation inhaler  Commonly  "known as: VENTOLIN HFA  Inhale 1-2 puffs into the lungs every 6 (six) hours as needed for Wheezing or Shortness of Breath. Rescue     ipratropium 42 mcg (0.06 %) nasal spray  Commonly known as: ATROVENT  ipratropium bromide 42 mcg (0.06 %) nasal spray     meclizine 12.5 mg tablet  Commonly known as: ANTIVERT  Take 2 tablets (25 mg total) by mouth 3 (three) times daily.     memantine 10 MG Tab  Commonly known as: NAMENDA  Take 10 mg by mouth once daily.     MYRBETRIQ 25 mg Tb24 ER tablet  Generic drug: mirabegron  Take 25 mg by mouth once daily. 11/23/20     nitroGLYCERIN 0.4 MG SL tablet  Commonly known as: NITROSTAT  Place 1 tablet (0.4 mg total) under the tongue every 5 (five) minutes as needed for Chest pain.     ondansetron 8 MG Tbdl  Commonly known as: ZOFRAN-ODT  Take 1 tablet (8 mg total) by mouth 3 (three) times daily as needed (for nausea).     pen needle, diabetic 32 gauge x 5/32" Ndle  BD Adrienne 2nd Gen Pen Needle 32 gauge x 5/32"   USE 1 PEN NEEDLE ONCE DAILY USE AS DIRECTED     SOLIQUA 100/33 100 unit-33 mcg/mL Inpn pen  Generic drug: insulin glargine-lixisenatide  INJECT 30 UNITS SUBCUTANEOUSLY ONCE DAILY     tamsulosin 0.4 mg Cap  Commonly known as: FLOMAX  Take 0.4 mg by mouth once daily.     UNABLE TO FIND  medication name: prevagen extra strenght daily            STOP taking these medications      aspirin 81 MG EC tablet  Commonly known as: ECOTRIN  Replaced by: aspirin 81 MG Chew                Immunizations Administered as of 12/21/2022       Name Date Dose VIS Date Route Exp Date    COVID-19, MRNA, LN-S, PF (Moderna) 3/5/2021 -- -- -- --    Lot: [id number]     COVID-19, MRNA, LN-S, PF (Moderna) 3/5/2021 -- -- -- --    Lot: [id number]     COVID-19, MRNA, LN-S, PF (Moderna) 2/5/2021 -- -- -- --    Lot: [id number]     COVID-19, MRNA, LN-S, PF (Moderna) 2/5/2021 -- -- -- --    Lot: [id number]             This patient has had both covid vaccinations    Some patients may experience side effects after vaccination.  " These may include fever, headache, muscle or joint aches.  Most symptoms resolve with 24-48 hours and do not require urgent medical evaluation unless they persist for more than 72 hours or symptoms are concerning for an unrelated medical condition.          _________________________________  Campbell De Luna PA-C  12/21/2022

## 2022-12-21 NOTE — SUBJECTIVE & OBJECTIVE
Neurologic Chief Complaint: R MCA stroke    Subjective:     Interval History: Patient is seen for follow-up neurological assessment and treatment recommendations:     Patient remains neurologically unchanged. SBP drop < 120 but no changes on exam. Last BM yesterday. Pending SNF placement.     HPI, Past Medical, Family, and Social History remains the same as documented in the initial encounter.     Review of Systems   Constitutional:  Negative for fever.   HENT:  Negative for drooling.    Eyes:  Negative for visual disturbance.   Respiratory:  Negative for shortness of breath.    Gastrointestinal:  Negative for nausea and vomiting.   Psychiatric/Behavioral:  Negative for agitation and confusion.    Scheduled Meds:   aspirin  81 mg Oral Daily    atorvastatin  40 mg Oral Daily    clopidogreL  75 mg Oral Daily    heparin (porcine)  5,000 Units Subcutaneous Q8H    memantine  10 mg Oral Daily    mupirocin   Topical (Top) Daily    polyethylene glycol  17 g Oral BID    potassium, sodium phosphates  2 packet Oral Q4H    senna-docusate 8.6-50 mg  1 tablet Oral BID    tamsulosin  0.4 mg Oral Daily     Continuous Infusions:  PRN Meds:acetaminophen, dextrose 10%, dextrose 10%, glucagon (human recombinant), insulin aspart U-100, sodium chloride 0.9%    Objective:     Vital Signs (Most Recent):  Temp: 99.6 °F (37.6 °C) (12/21/22 0851)  Pulse: 85 (12/21/22 0851)  Resp: 17 (12/21/22 0851)  BP: (!) 128/57 (12/21/22 0851)  SpO2: (!) 92 % (12/21/22 0851)  BP Location: Right arm    Vital Signs Range (Last 24H):  Temp:  [97.7 °F (36.5 °C)-99.6 °F (37.6 °C)]   Pulse:  [73-89]   Resp:  [17-18]   BP: (108-128)/(55-74)   SpO2:  [90 %-96 %]   BP Location: Right arm    Physical Exam  Vitals and nursing note reviewed.   Constitutional:       General: He is not in acute distress.     Appearance: He is well-developed. He is not diaphoretic.   HENT:      Head: Normocephalic and atraumatic.      Right Ear: External ear normal.      Left Ear:  External ear normal.      Nose: No rhinorrhea.      Mouth/Throat:      Mouth: Mucous membranes are moist.   Eyes:      General: No visual field deficit.     Extraocular Movements: Extraocular movements intact.   Cardiovascular:      Rate and Rhythm: Normal rate.   Pulmonary:      Effort: Pulmonary effort is normal. No respiratory distress.   Neurological:      Mental Status: He is alert and oriented to person, place, and time.      Cranial Nerves: No dysarthria or facial asymmetry.      Comments: Slight drift in LUE , improving    Psychiatric:         Behavior: Behavior is cooperative.       Neurological Exam:   LOC: alert  Attention Span: Good   Articulation: No dysarthria  EOM (CN III, IV, VI): Full/intact  Facial Movement (CN VII): Symmetric facial expression    Motor: Arm left  Paresis: 4/5  Leg left  Normal 5/5  Arm right  Normal 5/5  Leg right Normal 5/5  Sensation: Intact to light touch     Laboratory:  CMP:   Recent Labs   Lab 12/21/22  0446   CALCIUM 8.9   ALBUMIN 2.9*   PROT 5.9*      K 3.9   CO2 25      BUN 27*   CREATININE 1.4   ALKPHOS 87   ALT 17   AST 21   BILITOT 1.2*       CBC:   Recent Labs   Lab 12/21/22  0446   WBC 4.52   RBC 3.80*   HGB 12.6*   HCT 38.6*      *   MCH 33.2*   MCHC 32.6       Lipid Panel:   Recent Labs   Lab 12/14/22  1009   CHOL 177   LDLCALC 128.6   HDL 32*   TRIG 82       Coagulation:   Recent Labs   Lab 12/14/22  1009   INR 1.1       Hgb A1C:   Recent Labs   Lab 12/14/22  1552   HGBA1C 6.7*       TSH:   Recent Labs   Lab 12/14/22  1009   TSH 4.401*         Diagnostic Results     Brain Imaging   CTH 12/15/2022:   Chronic change noted, there is no evidence for superimposed acute intracranial process.    MRI Brain 12/15/2022:    Acute deep white matter infarct on the right without associated hemorrhage or mass effect.  Mild underlying chronic ischemic changes      Vessel Imaging   CTA Stroke MP 12/14/2022:  CT head shows no acute intracranial  abnormalities.  CTA head neck shows no intracranial large vessel occlusion or aneurysm.  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  Atherosclerosis at the carotid bifurcations with evidence of prior endarterectomy.  No high-grade stenosis.    Cardiac Imaging   TTE 12/14/2022  The left ventricle is normal in size with concentric remodeling and normal systolic function.  The estimated ejection fraction is 68%.  Grade I left ventricular diastolic dysfunction.  Normal right ventricular size with normal right ventricular systolic function.  Mild right atrial enlargement.  There is a transcutaneously-placed aortic bioprosthesis present. There is no aortic insufficiency present.  The aortic valve mean gradient is 8 mmHg with a dimensionless index of 0.52.  Mild tricuspid regurgitation.  Normal central venous pressure (3 mmHg).  The estimated PA systolic pressure is 55 mmHg.  There is moderate pulmonary hypertension.

## 2022-12-21 NOTE — TELEPHONE ENCOUNTER
I have reached out to Vega Kohler to inform him of the [employer] application process for [employer] and whats required to apply.  Vega Kohler did not answer. I left a voicemail and mailed a letter introducing him to the pharmacy patient assistance program. I will follow up in 5 business days.

## 2022-12-21 NOTE — ASSESSMENT & PLAN NOTE
Stroke risk factor.  SBP <180, MAP >65; remains at goal   Avoid hypotension in the setting of small vessel disease   Holding lopressor for now given hypotension during this admission in the setting of an acute stroke, will resume on Sat Dec 24th

## 2022-12-21 NOTE — PLAN OF CARE
12/21/22 1422   Final Note   Assessment Type Final Discharge Note   Anticipated Discharge Disposition SNF OR     Patient transferring to [facility] at 1600.  Family notified.      Jesika Napoles LMSW  [facility]  [phone]

## 2022-12-21 NOTE — ASSESSMENT & PLAN NOTE
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 Patient was seen at OSH via telestroke by Dr. Sam for LSW, NIHSS 9 initially. Patient determined to be a thrombolytic candidate. Patient transferred to [facility] and admitted to [facility]. On arrival, patient with NIH 1. MRI with R MCA infarct suggestive of small vessel etiology. TTE with EF 68%, grade 1 LVDD, and mild YADIRA.     Patient is pending SNF placement. Medically ready for discharge, likely will be today/tomorrow.       Etiology:      Antithrombotics for secondary stroke prevention: Antiplatelets: Aspirin: 81 mg daily  Clopidogrel: 75 mg daily    Statins for secondary stroke prevention and hyperlipidemia, if present:   Statins: Atorvastatin- 40 mg daily     Aggressive risk factor modification: HTN, HLD, CAD, Carotid artery disease     Rehab efforts: SNF    Diagnostics ordered/pending: None     VTE prophylaxis: Heparin 5000 units SQ every 8 hours  Mechanical prophylaxis: Place SCDs    BP parameters: SBP <180

## 2022-12-21 NOTE — ASSESSMENT & PLAN NOTE
H/o follicular lymphoma per chart (grade 3a, stage 1); receiving rituxan (cycle 3 in 11/2022), 10/2022 PET scan positive for 3 cm lymphadenopathy in left axillary region otherwise HUSSEIN.      -Will need follow up visit once discharge (per last hem/onc note follow up indicated around 12/17/22, delayed due to current admission for stroke)

## 2022-12-21 NOTE — PLAN OF CARE
12/21/22 0737   Post-Acute Status   Post-Acute Authorization Placement   Post-Acute Placement Status Pending Bed Availability   Coverage Medicare   Discharge Plan   Discharge Plan A Skilled Nursing Facility     [facility] had agreed to accept this patient pending MD review this morning.  CARLOS reached out to patient's son Zay and he is agreeable to this placement.  SW awaiting phone call back from Milagro at [facility] [phone] for final decision.  SW will reach out to provider for transfer orders and continue to follow.  ..    Milagro at [facility] confirmed that they will accept the patient today.  D/C orders are in and   SW awaiting return call for room assignment, number for report and time for transport.     2:20 PM  CARLOS set up transport for 1600 and provided RN with the number for report.  CARLOS called patient's son Zay and notified him of transfer and provided him with address and room number to facility.      Jesika Napoles, ALKA  [facility]  [phone]

## 2022-12-21 NOTE — PLAN OF CARE
Problem: Adult Inpatient Plan of Care  Goal: Plan of Care Review  Outcome: Ongoing, Progressing     Problem: Adult Inpatient Plan of Care  Goal: Patient-Specific Goal (Individualized)  Outcome: Ongoing, Progressing     Problem: Adult Inpatient Plan of Care  Goal: Optimal Comfort and Wellbeing  Outcome: Ongoing, Progressing     Problem: Adult Inpatient Plan of Care  Goal: Readiness for Transition of Care  Outcome: Ongoing, Progressing     Problem: Adjustment to Illness (Stroke, Ischemic/Transient Ischemic Attack)  Goal: Optimal Coping  Outcome: Ongoing, Progressing     Problem: Functional Ability Impaired (Stroke, Ischemic/Transient Ischemic Attack)  Goal: Optimal Functional Ability  Outcome: Ongoing, Progressing     Problem: Fall Injury Risk  Goal: Absence of Fall and Fall-Related Injury  Outcome: Ongoing, Progressing     Problem: Skin Injury Risk Increased  Goal: Skin Health and Integrity  Outcome: Ongoing, Progressing     Problem: Diabetes Comorbidity  Goal: Blood Glucose Level Within Targeted Range  Outcome: Ongoing, Progress  Patient is currently asleep with no s/sx of distress noted. HOB elevated, O2 in place at 0.5 LPM per n/c. No sob or cough noted. Admitted to unit s/p CVA with left sided weakness. Was up in chair at beginning of the shift and back to bed with 2 assist. Turned Q 2 hours for pressure relief. Uses condom cath with no skin breakdown noted d/t use of condom cath. Bed in lowest position with SR's up times 3 and call light in reach. Bed alarm activated and SCD's in place.

## 2022-12-21 NOTE — PROGRESS NOTES
Addended by: TAM RAYA on: 11/20/2018 02:59 PM     Modules accepted: Orders     [facility] Brooke - Neurosurgery ([facility])  Vascular Neurology  Comprehensive Stroke Center  Progress Note    Assessment/Plan:     * Cerebrovascular accident (CVA) due to thrombosis of right middle cerebral artery  Patient is a 86 yo M with PMH of HTN, HLD, DM, CAD, PAD, CHF, COPD, CKD, dementia, and follicular lymphoma (chemo, last tx ), and L ICA stenosis (s/p L CEA). Patient was seen at OSH via telestroke by Dr. Sam for LSW, NIHSS 9 initially. Patient determined to be a thrombolytic candidate. Patient transferred to [facility] and admitted to [facility]. On arrival, patient with NIH 1. MRI with R MCA infarct suggestive of small vessel etiology. TTE with EF 68%, grade 1 LVDD, and mild YADIRA.     Patient is pending SNF placement. Medically ready for discharge, likely will be today/tomorrow.       Etiology:      Antithrombotics for secondary stroke prevention: Antiplatelets: Aspirin: 81 mg daily  Clopidogrel: 75 mg daily    Statins for secondary stroke prevention and hyperlipidemia, if present:   Statins: Atorvastatin- 40 mg daily     Aggressive risk factor modification: HTN, HLD, CAD, Carotid artery disease     Rehab efforts: SNF    Diagnostics ordered/pending: None     VTE prophylaxis: Heparin 5000 units SQ every 8 hours  Mechanical prophylaxis: Place SCDs    BP parameters: SBP <180      BPH (benign prostatic hyperplasia)  Currently on tamsulosin 0.4mg daily, continue   Follow up with PCP at discharge     Follicular lymphoma  H/o follicular lymphoma per chart (grade 3a, stage 1); receiving rituxan (cycle 3 in 2022), 10/2022 PET scan positive for 3 cm lymphadenopathy in left axillary region otherwise HUSSEIN.      -Will need follow up visit once discharge (per last hem/onc note follow up indicated around 22, delayed due to current admission for stroke)     Aortic stenosis s/p TAVR  History of aortic stenosis    Chronic diastolic heart failure, NYHA class 2  -TTE 12/15/2022 w/ EF 68%  -GDMT when appropriate (will  restart slowly with metoprolol Saturday Dec 24th and then loop diuretic on Mon Dec 26th)   -Follow up with PCP / cardiologist in outpatient setting     Chronic kidney disease, stage 3 (moderate)  sCr 1.9 on admission, baseline 1.5-1.9  --Creatinine 1.3 today   --Avoid nephrotoxic agents, renally dose meds when possible  --Continue to monitor daily CMP.       Hypertension  Stroke risk factor.  SBP <180, MAP >65; remains at goal   Avoid hypotension in the setting of small vessel disease   Holding lopressor for now given hypotension during this admission in the setting of an acute stroke, will resume on Sat Dec 24th    COPD, moderate  Per 9/2022 Pulm note: sats 81% on RA and is noncompliant with home oxygen  SpO2 goal 88-92%, remains at goal with only 0.5 L/min   Continue supplemental O2 PRN    Continue home meds at discharge     Carotid artery disease  Stroke risk factor  --S/p L carotid endarterectomy in 12/3/202  --Continue ASA, Plavix, and statin    Hyperlipidemia  Stroke risk factor  , goal <70  Atorvastatin 40mg daily, continue     Hypothyroidism  -Subclinical hypothyroidism; TSH 4.4; fT4 0.95 12/14/2021  -Follow up with PCP    Type 2 diabetes mellitus  Stroke risk factor. A1c 6.7.  -BG goal while inpatient 140-180  -Continue LDSSI ACHS PRN   - Stable        12/15/2022 Neuro exam stable; NIHSS 3 for mild drowsiness, LUE/LLE drift. AAOx4, CT head negative for interval changes or superimposed acute intracranial process.  12/16/2022 Neuro exam stable; NIHSS 0. No longer having LUE/LLE drift. AAOx4. Pt to be stepped down to NPU.  12/17/2022 Remains in NCC pending step down, NAEO. Neuro exam unchanged. PT/OT recommending SNF vs HHPT with 24/7 care, will plan for SNF at time time given decreased caregiver support at home.   12/18/2022 Stepped down to NPU overnight.  NAEO.  Neuro exam is stable.  Awaiting SNF placement.  12/19/2022 Patient remains with LUE drift. Speech at baseline per patient. SBP < 120, will  d/c metoprolol at this time to maintain BP/perfusion given small vessel stroke. Patient is medically ready. Pending SNF v/ HH with 24/7 supervision (son is trying to set this up as patient lives in mobile home alone). Soap enema ordered if patient unable to have BM by the end of the day.   12/20/2022 No acute events overnight. SBP goal < 180, remains at goal. Medically ready, awaiting SNF placement.   12/21/2022 Patient remains neurologically unchanged. SBP drop < 120 but no changes on exam. Last BM yesterday. Pending SNF placement.       STROKE DOCUMENTATION   Acute Stroke Times   Last Known Normal Date: 12/14/22  Last Known Normal Time: 0700  Symptom Onset Date: 12/14/22  Symptom Onset Time: 0830  Stroke Team Called Date: 12/14/22  Stroke Team Called Time: 1240  Stroke Team Arrival Date: 12/14/22  Stroke Team Arrival Time: 1243  Thrombolytic Therapy Recommended: Yes  CTA Interpretation Time: 1314  Thrombectomy Recommended: No    NIH Scale:  1a. Level of Consciousness: 0-->Alert, keenly responsive  1b. LOC Questions: 0-->Answers both questions correctly  1c. LOC Commands: 0-->Performs both tasks correctly  2. Best Gaze: 0-->Normal  3. Visual: 0-->No visual loss  4. Facial Palsy: 0-->Normal symmetrical movements  5a. Motor Arm, Left: 1-->Drift, limb holds 90 (or 45) degrees, but drifts down before full 10 seconds, does not hit bed or other support  5b. Motor Arm, Right: 0-->No drift, limb holds 90 (or 45) degrees for full 10 secs  6a. Motor Leg, Left: 0-->No drift, leg holds 30 degree position for full 5 secs  6b. Motor Leg, Right: 0-->No drift, leg holds 30 degree position for full 5 secs  7. Limb Ataxia: 0-->Absent  8. Sensory: 0-->Normal, no sensory loss  9. Best Language: 0-->No aphasia, normal  10. Dysarthria: 0-->Normal  11. Extinction and Inattention (formerly Neglect): 0-->No abnormality  Total (NIH Stroke Scale): 1       Modified [place] Score: 0  Jody Coma Scale:    ABCD2 Score:    [id number] Score:    HAS -BLED Score:   ICH Score:   Hunt & Rae Classification:      Hemorrhagic change of an Ischemic Stroke: Does this patient have an ischemic stroke with hemorrhagic changes? No     Neurologic Chief Complaint: R MCA stroke    Subjective:     Interval History: Patient is seen for follow-up neurological assessment and treatment recommendations:     Patient remains neurologically unchanged. SBP drop < 120 but no changes on exam. Last BM yesterday. Pending SNF placement.     HPI, Past Medical, Family, and Social History remains the same as documented in the initial encounter.     Review of Systems   Constitutional:  Negative for fever.   HENT:  Negative for drooling.    Eyes:  Negative for visual disturbance.   Respiratory:  Negative for shortness of breath.    Gastrointestinal:  Negative for nausea and vomiting.   Psychiatric/Behavioral:  Negative for agitation and confusion.    Scheduled Meds:   aspirin  81 mg Oral Daily    atorvastatin  40 mg Oral Daily    clopidogreL  75 mg Oral Daily    heparin (porcine)  5,000 Units Subcutaneous Q8H    memantine  10 mg Oral Daily    mupirocin   Topical (Top) Daily    polyethylene glycol  17 g Oral BID    potassium, sodium phosphates  2 packet Oral Q4H    senna-docusate 8.6-50 mg  1 tablet Oral BID    tamsulosin  0.4 mg Oral Daily     Continuous Infusions:  PRN Meds:acetaminophen, dextrose 10%, dextrose 10%, glucagon (human recombinant), insulin aspart U-100, sodium chloride 0.9%    Objective:     Vital Signs (Most Recent):  Temp: 99.6 °F (37.6 °C) (12/21/22 0851)  Pulse: 85 (12/21/22 0851)  Resp: 17 (12/21/22 0851)  BP: (!) 128/57 (12/21/22 0851)  SpO2: (!) 92 % (12/21/22 0851)  BP Location: Right arm    Vital Signs Range (Last 24H):  Temp:  [97.7 °F (36.5 °C)-99.6 °F (37.6 °C)]   Pulse:  [73-89]   Resp:  [17-18]   BP: (108-128)/(55-74)   SpO2:  [90 %-96 %]   BP Location: Right arm    Physical Exam  Vitals and nursing note reviewed.   Constitutional:       General: He  is not in acute distress.     Appearance: He is well-developed. He is not diaphoretic.   HENT:      Head: Normocephalic and atraumatic.      Right Ear: External ear normal.      Left Ear: External ear normal.      Nose: No rhinorrhea.      Mouth/Throat:      Mouth: Mucous membranes are moist.   Eyes:      General: No visual field deficit.     Extraocular Movements: Extraocular movements intact.   Cardiovascular:      Rate and Rhythm: Normal rate.   Pulmonary:      Effort: Pulmonary effort is normal. No respiratory distress.   Neurological:      Mental Status: He is alert and oriented to person, place, and time.      Cranial Nerves: No dysarthria or facial asymmetry.      Comments: Slight drift in LUE , improving    Psychiatric:         Behavior: Behavior is cooperative.       Neurological Exam:   LOC: alert  Attention Span: Good   Articulation: No dysarthria  EOM (CN III, IV, VI): Full/intact  Facial Movement (CN VII): Symmetric facial expression    Motor: Arm left  Paresis: 4/5  Leg left  Normal 5/5  Arm right  Normal 5/5  Leg right Normal 5/5  Sensation: Intact to light touch     Laboratory:  CMP:   Recent Labs   Lab 12/21/22  0446   CALCIUM 8.9   ALBUMIN 2.9*   PROT 5.9*      K 3.9   CO2 25      BUN 27*   CREATININE 1.4   ALKPHOS 87   ALT 17   AST 21   BILITOT 1.2*       CBC:   Recent Labs   Lab 12/21/22  0446   WBC 4.52   RBC 3.80*   HGB 12.6*   HCT 38.6*      *   MCH 33.2*   MCHC 32.6       Lipid Panel:   Recent Labs   Lab 12/14/22  1009   CHOL 177   LDLCALC 128.6   HDL 32*   TRIG 82       Coagulation:   Recent Labs   Lab 12/14/22  1009   INR 1.1       Hgb A1C:   Recent Labs   Lab 12/14/22  1552   HGBA1C 6.7*       TSH:   Recent Labs   Lab 12/14/22  1009   TSH 4.401*         Diagnostic Results     Brain Imaging   CTH 12/15/2022:   Chronic change noted, there is no evidence for superimposed acute intracranial process.    MRI Brain 12/15/2022:    Acute deep white matter infarct on the  right without associated hemorrhage or mass effect.  Mild underlying chronic ischemic changes      Vessel Imaging   CTA Stroke MP 12/14/2022:  CT head shows no acute intracranial abnormalities.  CTA head neck shows no intracranial large vessel occlusion or aneurysm.  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  Atherosclerosis at the carotid bifurcations with evidence of prior endarterectomy.  No high-grade stenosis.    Cardiac Imaging   TTE 12/14/2022   The left ventricle is normal in size with concentric remodeling and normal systolic function.   The estimated ejection fraction is 68%.   Grade I left ventricular diastolic dysfunction.   Normal right ventricular size with normal right ventricular systolic function.   Mild right atrial enlargement.   There is a transcutaneously-placed aortic bioprosthesis present. There is no aortic insufficiency present.   The aortic valve mean gradient is 8 mmHg with a dimensionless index of 0.52.   Mild tricuspid regurgitation.   Normal central venous pressure (3 mmHg).   The estimated PA systolic pressure is 55 mmHg.   There is moderate pulmonary hypertension.        Campbell De Luna PA-C  [facility] St[facility]  Department of Vascular Neurology   [facility])

## 2022-12-21 NOTE — NURSING
Report called to [facility] nurse, SONIA Olea.  Inform transportation scheduled for patient  @ 4 PM.  Instructed to leave IV access for transport.

## 2022-12-21 NOTE — DISCHARGE SUMMARY
Lazaro Brooke - Neurosurgery ([facility])  Vascular Neurology  Comprehensive Stroke Center  Discharge Summary     Summary:     Admit Date: 2022 12:43 PM    Discharge Date and Time:  2022 1:41 PM    Attending Physician: Anshul Sam MD     Discharge Provider: Campbell De Luna PA-C    History of Present Illness: Mr Kohler is a pleasant 86 yo M for whom vascular neurology is consulted for ischemic stroke with initial CC disabling left hemiplegia s/p TNK. Patient was in his normal state of health when he woke up today. He was watching a game when he decided to go to the bathroom today 2022 @ 8:30 AM at which time he noted difficulty using the left side of his body. He managed to use a walker to ambulate to the bathroom, but on the way back, he had a fall, without head injury or LOC. CTH at OSH was negative for acute intracranial evidence of stroke or bleed, however, given clinical evidence of stroke, TNK was administered at 10:19 AM  and pt transferred to [facility] for post-thrombolytic care.    He has a PMHx of high grade stenosis in the left internal carotid artery (now s/p L carotid endarterectomy in 12/3/2021; CTA 2022 without evidence of significant carotid stenosis), follicular lymphoma, alzheimers, COPD, aortic valve stenosis, heart failure, renal insufficiency, PVD, DM, pulmonary HTN, dysphagia, lipoid pneumonia.     Initial telestroke NIHSS 9 prior to arrival; On arrival, post-TNK NIHSS of 1 on my evaluation for LUE drift. Patient is independent at baseline with mRS 0.        Hospital Course (synopsis of major diagnoses, care, treatment, and services provided during the course of the hospital stay): Patient admitted for post TNK care. Exam improved following TNK. MRI with R MCA infarct. Etiology likely . Patient stepped down to NPU to await SNF placement. Metoprolol originally started but held given etiology of stroke and risk of expansion with hypoperfusion. Will plan to resume meds this  Saturday and then remainder of home BP meds on Monday. Patient will be discharged on DAPT with outpatient follow up.     12/15/2022 Neuro exam stable; NIHSS 3 for mild drowsiness, LUE/LLE drift. AAOx4, CT head negative for interval changes or superimposed acute intracranial process.  12/16/2022 Neuro exam stable; NIHSS 0. No longer having LUE/LLE drift. AAOx4. Pt to be stepped down to NPU.  12/17/2022 Remains in NCC pending step down, NAEO. Neuro exam unchanged. PT/OT recommending SNF vs HHPT with 24/7 care, will plan for SNF at time time given decreased caregiver support at home.   12/18/2022 Stepped down to NPU overnight.  NAEO.  Neuro exam is stable.  Awaiting SNF placement.  12/19/2022 Patient remains with LUE drift. Speech at baseline per patient. SBP < 120, will d/c metoprolol at this time to maintain BP/perfusion given small vessel stroke. Patient is medically ready. Pending SNF v/ HH with 24/7 supervision (son is trying to set this up as patient lives in mobile home alone). Soap enema ordered if patient unable to have BM by the end of the day.   12/20/2022 No acute events overnight. SBP goal < 180, remains at goal. Medically ready, awaiting SNF placement.   12/21/2022 Patient remains neurologically unchanged. SBP drop < 120 but no changes on exam. Last BM yesterday. Pending SNF placement.       Goals of Care Treatment Preferences:  Code Status: Full Code      Stroke Etiology: Ischemic Small Vessel Disease ([place])    STROKE DOCUMENTATION   Acute Stroke Times   Last Known Normal Date: 12/14/22  Last Known Normal Time: 0700  Symptom Onset Date: 12/14/22  Symptom Onset Time: 0830  Stroke Team Called Date: 12/14/22  Stroke Team Called Time: 1240  Stroke Team Arrival Date: 12/14/22  Stroke Team Arrival Time: 1243  Thrombolytic Therapy Recommended: Yes  CTA Interpretation Time: 1314  Thrombectomy Recommended: No     NIH Scale:  1a. Level of Consciousness: 0-->Alert, keenly responsive  1b. LOC Questions:  0-->Answers both questions correctly  1c. LOC Commands: 0-->Performs both tasks correctly  2. Best Gaze: 0-->Normal  3. Visual: 0-->No visual loss  4. Facial Palsy: 0-->Normal symmetrical movements  5a. Motor Arm, Left: 1-->Drift, limb holds 90 (or 45) degrees, but drifts down before full 10 seconds, does not hit bed or other support  5b. Motor Arm, Right: 0-->No drift, limb holds 90 (or 45) degrees for full 10 secs  6a. Motor Leg, Left: 0-->No drift, leg holds 30 degree position for full 5 secs  6b. Motor Leg, Right: 0-->No drift, leg holds 30 degree position for full 5 secs  7. Limb Ataxia: 0-->Absent  8. Sensory: 0-->Normal, no sensory loss  9. Best Language: 0-->No aphasia, normal  10. Dysarthria: 0-->Normal  11. Extinction and Inattention (formerly Neglect): 0-->No abnormality  Total (NIH Stroke Scale): 1        Modified La Fontaine Score: 0  Jody Coma Scale:    ABCD2 Score:    [id number] Score:   HAS -BLED Score:   ICH Score:   Hunt & Rae Classification:       Assessment/Plan:     Diagnostic Results:    Brain Imaging   CTH 12/15/2022:   Chronic change noted, there is no evidence for superimposed acute intracranial process.     MRI Brain 12/15/2022:    Acute deep white matter infarct on the right without associated hemorrhage or mass effect.  Mild underlying chronic ischemic changes     Vessel Imaging   CTA Stroke MP 12/14/2022:  CT head shows no acute intracranial abnormalities.  CTA head neck shows no intracranial large vessel occlusion or aneurysm.  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  Atherosclerosis at the carotid bifurcations with evidence of prior endarterectomy.  No high-grade stenosis.     Cardiac Imaging   TTE 12/14/2022   The left ventricle is normal in size with concentric remodeling and normal systolic function.   The estimated ejection fraction is 68%.   Grade I left ventricular diastolic dysfunction.   Normal right  ventricular size with normal right ventricular systolic function.   Mild right atrial enlargement.   There is a transcutaneously-placed aortic bioprosthesis present. There is no aortic insufficiency present.   The aortic valve mean gradient is 8 mmHg with a dimensionless index of 0.52.   Mild tricuspid regurgitation.   Normal central venous pressure (3 mmHg).   The estimated PA systolic pressure is 55 mmHg.   There is moderate pulmonary hypertension.    Interventions: IV-TNK     Complications: None    Disposition:     Final Active Diagnoses:    Diagnosis Date Noted POA    PRINCIPAL PROBLEM:  Cerebrovascular accident (CVA) due to thrombosis of right middle cerebral artery [I63.311] 12/14/2022 Yes    Dementia [F03.90] 12/14/2022 Yes     Chronic    BPH (benign prostatic hyperplasia) [N40.0] 12/14/2022 Yes     Chronic    Follicular lymphoma [C82.90] 10/06/2022 Yes     Chronic    Aortic stenosis s/p TAVR [Z95.2]  Not Applicable     Chronic    Chronic diastolic heart failure, NYHA class 2 [I50.32] 08/31/2018 Yes     Chronic    Hyperlipidemia [E78.5]  Yes     Chronic    Type 2 diabetes mellitus [E11.9] 02/09/2017 Yes     Chronic    Hypothyroidism [E03.9] 02/09/2017 Yes     Chronic    Carotid artery disease [I77.9] 11/09/2016 Yes     Chronic    COPD, moderate [J44.9] 11/09/2016 Yes     Chronic    Hypertension [I10] 11/09/2016 Yes     Chronic    Chronic kidney disease, stage 3 (moderate) [N18.30] 11/09/2016 Yes     Chronic      Problems Resolved During this Admission:     Aortic stenosis s/p TAVR  History of aortic stenosis        Recommendations:     Post-discharge complication risks: None    Stroke Education given to: patient    Follow-up in Stroke Clinic in 4-6 weeks.     Discharge Plan:  Antithrombotics: Aspirin 81mg, Clopidogrel 75mg  Statin: Atorvastatin 40mg  Aggresive risk factor modification:  Hypertension  Diabetes  High Cholesterol    Follow Up:   Follow-up Information     Fabricio Mckeon MD  Follow up in 1 week(s).    Specialty: Family Medicine  Why: Please follow up with your PCP in 1 week for your recent hospital stay.  Contact information:  [address] [facility]  [place] [address]  [phone]             [facility] VASCULAR NEUROLOGY. Go in 1 month(s).    Specialty: Vascular Neurology  Why: Please follow up with Vascular Neurology in clinic in 4-6 weeks for your recent stroke. Our office should be contacting you within 1 week to set this up. If you do not hear from us in that timeframe, please call to schedule your follow up appointment.  Contact information:  Sergey Brooke  [facility] [id number] [phone]                       Patient Instructions:      Ambulatory referral/consult to Vascular Neurology   Standing Status: Future   Referral Priority: Routine Referral Type: Consultation   Referral Reason: Specialty Services Required   Requested Specialty: Vascular Neurology   Number of Visits Requested: 1       Medications:  Reconciled Home Medications:      Medication List      START taking these medications    aspirin 81 MG Chew  Take 1 tablet (81 mg total) by mouth once daily.  Start taking on: December 22, 2022  Replaces: aspirin 81 MG EC tablet        CHANGE how you take these medications    atorvastatin 40 MG tablet  Commonly known as: LIPITOR  Take 1 tablet (40 mg total) by mouth once daily.  Start taking on: December 22, 2022  What changed: when to take this     clopidogreL 75 mg tablet  Commonly known as: PLAVIX  Take 1 tablet (75 mg total) by mouth once daily.  Start taking on: December 22, 2022  What changed: when to take this     metoprolol succinate 25 MG 24 hr tablet  Commonly known as: TOPROL-XL  Take 1 tablet (25 mg total) by mouth every evening.  Start taking on: December 24, 2022  What changed: These instructions start on December 24, 2022. If you are unsure what to do until then, ask your doctor or other care provider.     torsemide 10 MG  "Tab  Commonly known as: DEMADEX  Take 1 tablet (10 mg total) by mouth once daily.  Start taking on: December 26, 2022  What changed: These instructions start on December 26, 2022. If you are unsure what to do until then, ask your doctor or other care provider.        CONTINUE taking these medications    albuterol 90 mcg/actuation inhaler  Commonly known as: VENTOLIN HFA  Inhale 1-2 puffs into the lungs every 6 (six) hours as needed for Wheezing or Shortness of Breath. Rescue     ipratropium 42 mcg (0.06 %) nasal spray  Commonly known as: ATROVENT  ipratropium bromide 42 mcg (0.06 %) nasal spray     meclizine 12.5 mg tablet  Commonly known as: ANTIVERT  Take 2 tablets (25 mg total) by mouth 3 (three) times daily.     memantine 10 MG Tab  Commonly known as: NAMENDA  Take 10 mg by mouth once daily.     MYRBETRIQ 25 mg Tb24 ER tablet  Generic drug: mirabegron  Take 25 mg by mouth once daily. 11/23/20     nitroGLYCERIN 0.4 MG SL tablet  Commonly known as: NITROSTAT  Place 1 tablet (0.4 mg total) under the tongue every 5 (five) minutes as needed for Chest pain.     ondansetron 8 MG Tbdl  Commonly known as: ZOFRAN-ODT  Take 1 tablet (8 mg total) by mouth 3 (three) times daily as needed (for nausea).     pen needle, diabetic 32 gauge x 5/32" Ndle  BD Adrienne 2nd Gen Pen Needle 32 gauge x 5/32"   USE 1 PEN NEEDLE ONCE DAILY USE AS DIRECTED     SOLIQUA 100/33 100 unit-33 mcg/mL Inpn pen  Generic drug: insulin glargine-lixisenatide  INJECT 30 UNITS SUBCUTANEOUSLY ONCE DAILY     tamsulosin 0.4 mg Cap  Commonly known as: FLOMAX  Take 0.4 mg by mouth once daily.     UNABLE TO FIND  medication name: prevagen extra strenght daily        STOP taking these medications    aspirin 81 MG EC tablet  Commonly known as: ECOTRIN  Replaced by: aspirin 81 MG Chew            Campbell De Luna PA-C  [facility]  Department of Vascular Neurology   [facility] - Neurosurgery ([facility]   "

## 2022-12-22 NOTE — NURSING
Saline locks X 2 removed per transportation's policy per RN.  Leaving room via WC and belongings with patient's son.

## 2022-12-22 NOTE — PLAN OF CARE
Plan of care reviewed with pt. Pt voiced understanding. Pt AAO X 3. Pt c/o left flank pain where he had fallen from the stroke during the shift with some relief from prn pain meds. No apparent distress noted. Fall precautions maintained. Pt remains free of fall or injury. Bed in lowest position, locked, call light within reach, and bed alarm on. Side rails up x's 2 with slip resistant socks on.     Problem: Adult Inpatient Plan of Care  Goal: Plan of Care Review  Outcome: Ongoing, Progressing  Flowsheets (Taken 12/22/2022 3720)  Plan of Care Reviewed With: patient  Goal: Absence of Hospital-Acquired Illness or Injury  Outcome: Ongoing, Progressing  Goal: Optimal Comfort and Wellbeing  Outcome: Ongoing, Progressing     Problem: Diabetes Comorbidity  Goal: Blood Glucose Level Within Targeted Range  Outcome: Ongoing, Progressing     Problem: Adjustment to Illness (Stroke, Ischemic/Transient Ischemic Attack)  Goal: Optimal Coping  Outcome: Ongoing, Progressing     Problem: Bowel Elimination Impaired (Stroke, Ischemic/Transient Ischemic Attack)  Goal: Effective Bowel Elimination  Outcome: Ongoing, Progressing     Problem: Cerebral Tissue Perfusion (Stroke, Ischemic/Transient Ischemic Attack)  Goal: Optimal Cerebral Tissue Perfusion  Outcome: Ongoing, Progressing     Problem: Cognitive Impairment (Stroke, Ischemic/Transient Ischemic Attack)  Goal: Optimal Cognitive Function  Outcome: Ongoing, Progressing     Problem: Communication Impairment (Stroke, Ischemic/Transient Ischemic Attack)  Goal: Improved Communication Skills  Outcome: Ongoing, Progressing     Problem: Functional Ability Impaired (Stroke, Ischemic/Transient Ischemic Attack)  Goal: Optimal Functional Ability  Outcome: Ongoing, Progressing     Problem: Respiratory Compromise (Stroke, Ischemic/Transient Ischemic Attack)  Goal: Effective Oxygenation and Ventilation  Outcome: Ongoing, Progressing     Problem: Sensorimotor Impairment (Stroke, Ischemic/Transient  Ischemic Attack)  Goal: Improved Sensorimotor Function  Outcome: Ongoing, Progressing     Problem: Swallowing Impairment (Stroke, Ischemic/Transient Ischemic Attack)  Goal: Optimal Eating and Swallowing without Aspiration  Outcome: Ongoing, Progressing     Problem: Urinary Elimination Impaired (Stroke, Ischemic/Transient Ischemic Attack)  Goal: Effective Urinary Elimination  Outcome: Ongoing, Progressing     Problem: Skin Injury Risk Increased  Goal: Skin Health and Integrity  Outcome: Ongoing, Progressing     Problem: Fall Injury Risk  Goal: Absence of Fall and Fall-Related Injury  Outcome: Ongoing, Progressing

## 2022-12-22 NOTE — CONSULTS
Patient currently at [facility] on Swing bed utilizing medicare skilled days.   CM will continue to follow and assist with any discharge needs.

## 2022-12-22 NOTE — ASSESSMENT & PLAN NOTE
Stroke risk factor  --S/p L carotid endarterectomy in 12/3/202  --Continue ASA, Plavix, and statin

## 2022-12-22 NOTE — HPI
86yo male patient with extensive medical hx. (Alzheimers, aortic stenosis, arthritis, BPH, CHF, CKD, COPD, CAD/PAD, DM2, HLD/HTN, pulm HTN, S/p TAVR and hypothyroid). He was treated acutely [facility] for ischemic CVA with left hemiplegia s/p TNK 22. Pt was independent at baseline prior to this event. Exam improved following TNK. MRI with R MCA infarct. Etiology likely . Patient stepped down to NPU to await SNF placement. Metoprolol originally started but held given etiology of stroke and risk of expansion with hypoperfusion. Will plan to resume meds this Saturday and then remainder of home BP meds on Monday. Patient was discharged on DAPT with outpatient follow up. Patient remains neurologically unchanged. SBP drop < 120 but no changes on exam. Last BM yesterday. He was brought to [facility] yesterday for continued SKILL therapy with PT / OT. On plavix and statin and asa. VSS- no bp meds 123/64        Yesterday 22 pt tested positive for COVID; staff noting patient more sluggish   Requiring oxygen ; CXR with infiltrates   BC negative  Influenza negative  COVID POSITIVE- day 2 Remdesivir, dexamehtasone and levofloxacin   Transfer back to skilled

## 2022-12-22 NOTE — ASSESSMENT & PLAN NOTE
Per 9/2022 Pulm note: sats 81% on RA and is noncompliant with home oxygen  SpO2 goal 88-92%, remains at goal with only 0.5 L/min - 1L  Continue supplemental O2 PRN    Continue home meds at discharge

## 2022-12-22 NOTE — PLAN OF CARE
Pt ambulated with PT and OT.   Male external catheter maintained.            Problem: Cerebral Tissue Perfusion (Stroke, Ischemic/Transient Ischemic Attack)  Goal: Optimal Cerebral Tissue Perfusion  Outcome: Ongoing, Progressing     Problem: Cognitive Impairment (Stroke, Ischemic/Transient Ischemic Attack)  Goal: Optimal Cognitive Function  Outcome: Ongoing, Progressing     Problem: Communication Impairment (Stroke, Ischemic/Transient Ischemic Attack)  Goal: Improved Communication Skills  Outcome: Ongoing, Progressing     Problem: Functional Ability Impaired (Stroke, Ischemic/Transient Ischemic Attack)  Goal: Optimal Functional Ability  Outcome: Ongoing, Progressing     Problem: Respiratory Compromise (Stroke, Ischemic/Transient Ischemic Attack)  Goal: Effective Oxygenation and Ventilation  Outcome: Ongoing, Progressing     Problem: Sensorimotor Impairment (Stroke, Ischemic/Transient Ischemic Attack)  Goal: Improved Sensorimotor Function  Outcome: Ongoing, Progressing     Problem: Swallowing Impairment (Stroke, Ischemic/Transient Ischemic Attack)  Goal: Optimal Eating and Swallowing without Aspiration  Outcome: Ongoing, Progressing     Problem: Urinary Elimination Impaired (Stroke, Ischemic/Transient Ischemic Attack)  Goal: Effective Urinary Elimination  Outcome: Ongoing, Progressing     Problem: Skin Injury Risk Increased  Goal: Skin Health and Integrity  Outcome: Ongoing, Progressing     Problem: Fall Injury Risk  Goal: Absence of Fall and Fall-Related Injury  Outcome: Ongoing, Progressing

## 2022-12-22 NOTE — H&P
[facility] ([facility])  [facility]  History & Physical    Patient Name: Vega Kohler  MRN: [id number]  Patient Class: IP- Swing  Admission Date: 2022  Attending Physician: Thom Callejas MD  Primary Care Provider: Fabricio Mckeon MD         Patient information was obtained from patient and ER records.     Subjective:     Principal Problem:Cerebrovascular accident (CVA) due to thrombosis of right middle cerebral artery    Chief Complaint: No chief complaint on file.       HPI: 86yo male patient with extensive medical hx. (Alzheimers, aortic stenosis, arthritis, BPH, CHF, CKD, COPD, CAD/PAD, DM2, HLD/HTN, pulm HTN, S/p TAVR and hypothyroid). He was treated acutely [facility] for ischemic CVA with left hemiplegia s/p TNK 22. Pt was independent at baseline prior to this event. Exam improved following TNK. MRI with R MCA infarct. Etiology likely . Patient stepped down to NPU to await SNF placement. Metoprolol originally started but held given etiology of stroke and risk of expansion with hypoperfusion. Will plan to resume meds this Saturday and then remainder of home BP meds on Monday. Patient was discharged on DAPT with outpatient follow up. Patient remains neurologically unchanged. SBP drop < 120 but no changes on exam. Last BM yesterday. He was brought to [facility] yesterday for continued SKILL therapy with PT / OT. On plavix and statin and asa. VSS- no bp meds 123/64          Past Medical History:   Diagnosis Date    Abnormal barium swallow     Alzheimer's dementia, late onset     Anal stenosis     Anticoagulant long-term use     Aortic stenosis     Arthritis     Aspiration pneumonitis     Benign prostatic hyperplasia     Carotid artery disease     CHF (congestive heart failure)     Chronic diastolic heart failure     Chronic kidney disease (CKD), stage III (moderate)     CKD (chronic kidney disease)     Colon polyp     COPD (chronic obstructive pulmonary disease)     Coronary artery  disease     Diabetes mellitus, type 2     Diverticulosis     Dysphagia     Hearing aid worn 01/23/2020    Received bilateral hearing aides today    Heart disease     History of CEA (carotid endarterectomy)     [place] (hard of hearing)     Hyperlipidemia     Hypertension     Hypertensive heart disease     Hypothyroidism     Joint disease     Memory loss     PAD (peripheral artery disease)     Presence of drug coated stent in right coronary artery     Pulmonary hypertension     S/P TAVR (transcatheter aortic valve replacement)     Small bowel obstruction     Wears dentures     UPPER AND LOWER    Wears glasses        Past Surgical History:   Procedure Laterality Date    anal fissure repair      ANKLE FUSION Left 08/15/2016    X 2    APPENDECTOMY      BACK SURGERY      X 4    CAROTID ENDARTERECTOMY Right     CAROTID ENDARTERECTOMY Left     CAROTID ENDARTERECTOMY Left 12/3/2021    Procedure: ENDARTERECTOMY-CAROTID;  Surgeon: Tim Castillo MD;  Location: [facility] OR;  Service: Cardiology;  Laterality: Left;  pt unsure if he stopped plavix, Dr. Castillo ok to proceed    CARPAL TUNNEL RELEASE Right     CHOLECYSTECTOMY      COLONOSCOPY N/A 7/26/2018    Procedure: HIGH RISK SCREENING COLONOSCOPY;  Surgeon: Connor Murrell MD;  Location: [facility] ENDO;  Service: Endoscopy;  Laterality: N/A;    CORONARY ANGIOPLASTY WITH STENT PLACEMENT      ELBOW SURGERY Bilateral     EXCISIONAL HEMORRHOIDECTOMY      x3    EXPLORATORY LAPAROTOMY W/ BOWEL RESECTION  11/17/2011    EYE SURGERY      cataract bilaterally    history of bowel resection      KNEE SURGERY Left     LAMINECTOMY AND MICRODISCECTOMY LUMBAR SPINE  07/21/2011    L5-S1    LEFT HEART CATHETERIZATION Left 8/31/2018    Procedure: Left heart cath;  Surgeon: Rex Chris MD;  Location: [facility] CATH;  Service: Cardiology;  Laterality: Left;    LUMBAR FUSION  11/09/2011    Anterior approach--L5-S1    LUNG BIOPSY Right 07/21/2009    VATS  with wedge ofr right lower lobe    LUNG BIOPSY Left 3/16/2020    Procedure: BIOPSY, LUNG;  Surgeon: [facility] Diagnostic Provider;  Location: [facility] OR;  Service: General;  Laterality: Left;    PERCUTANEOUS TRANSCATHETER AORTIC VALVE REPLACEMENT (TAVR) Left 10/30/2018    Procedure: REPLACEMENT, AORTIC VALVE, PERCUTANEOUS, TRANSCATHETER;  Surgeon: Rex Chris MD;  Location: [facility] OR;  Service: Cardiology;  Laterality: Left;    PERCUTANEOUS TRANSCATHETER AORTIC VALVE REPLACEMENT (TAVR)  10/30/2018    Procedure: REPLACEMENT, AORTIC VALVE, PERCUTANEOUS, TRANSCATHETER;  Surgeon: Tim Castillo MD;  Location: [facility] OR;  Service: Cardiovascular;;    RIGHT HEART CATHETERIZATION Right 8/31/2018    Procedure: INSERTION, CATHETER, RIGHT HEART;  Surgeon: Rex Chris MD;  Location: [facility] CATH;  Service: Cardiology;  Laterality: Right;    ROTATOR CUFF REPAIR Left     SINUS SURGERY      SPINE SURGERY      back surgery    TENDON RELEASE Bilateral     TONSILLECTOMY         Review of patient's allergies indicates:   Allergen Reactions    Sulfur Itching    Penicillins Swelling and Rash       Current Facility-Administered Medications on File Prior to Encounter   Medication    [COMPLETED] potassium, sodium phosphates 280-160-250 mg packet 2 packet    [DISCONTINUED] acetaminophen tablet 650 mg    [DISCONTINUED] aspirin chewable tablet 81 mg    [DISCONTINUED] atorvastatin tablet 40 mg    [DISCONTINUED] clopidogreL tablet 75 mg    [DISCONTINUED] dextrose 10% bolus 125 mL 125 mL    [DISCONTINUED] dextrose 10% bolus 250 mL 250 mL    [DISCONTINUED] glucagon (human recombinant) injection 1 mg    [DISCONTINUED] heparin (porcine) injection 5,000 Units    [DISCONTINUED] insulin aspart U-100 pen 0-5 Units    [DISCONTINUED] memantine tablet 10 mg    [DISCONTINUED] mupirocin 2 % ointment    [DISCONTINUED] polyethylene glycol packet 17 g    [DISCONTINUED] senna-docusate 8.6-50 mg per tablet 1 tablet     "[DISCONTINUED] sodium chloride 0.9% flush 10 mL    [DISCONTINUED] tamsulosin 24 hr capsule 0.4 mg     Current Outpatient Medications on File Prior to Encounter   Medication Sig    albuterol (VENTOLIN HFA) 90 mcg/actuation inhaler Inhale 1-2 puffs into the lungs every 6 (six) hours as needed for Wheezing or Shortness of Breath. Rescue    aspirin 81 MG Chew Take 1 tablet (81 mg total) by mouth once daily.    atorvastatin (LIPITOR) 40 MG tablet Take 1 tablet (40 mg total) by mouth once daily.    clopidogreL (PLAVIX) 75 mg tablet Take 1 tablet (75 mg total) by mouth once daily.    ipratropium (ATROVENT) 42 mcg (0.06 %) nasal spray ipratropium bromide 42 mcg (0.06 %) nasal spray    meclizine (ANTIVERT) 12.5 mg tablet Take 2 tablets (25 mg total) by mouth 3 (three) times daily.    memantine (NAMENDA) 10 MG Tab Take 10 mg by mouth once daily.    [START ON 12/24/2022] metoprolol succinate (TOPROL-XL) 25 MG 24 hr tablet Take 1 tablet (25 mg total) by mouth every evening.    MYRBETRIQ 25 mg Tb24 ER tablet Take 25 mg by mouth once daily. 11/23/20    nitroGLYCERIN (NITROSTAT) 0.4 MG SL tablet Place 1 tablet (0.4 mg total) under the tongue every 5 (five) minutes as needed for Chest pain.    ondansetron (ZOFRAN-ODT) 8 MG TbDL Take 1 tablet (8 mg total) by mouth 3 (three) times daily as needed (for nausea).    pen needle, diabetic 32 gauge x 5/32" Ndle BD Adrienne 2nd Gen Pen Needle 32 gauge x 5/32"   USE 1 PEN NEEDLE ONCE DAILY USE AS DIRECTED    SOLIQUA 100/33 100 unit-33 mcg/mL InPn pen INJECT 30 UNITS SUBCUTANEOUSLY ONCE DAILY    tamsulosin (FLOMAX) 0.4 mg Cap Take 0.4 mg by mouth once daily.    [START ON 12/26/2022] torsemide (DEMADEX) 10 MG Tab Take 1 tablet (10 mg total) by mouth once daily.    UNABLE TO FIND medication name: prevagen extra strenght daily     Family History       Problem Relation (Age of Onset)    Cancer Mother    Diabetes Brother    Heart disease Father, Brother          Tobacco Use    " Smoking status: Former     Packs/day: 1.50     Years: 20.00     Pack years: 30.00     Types: Cigarettes     Start date:      Quit date:      Years since quittin.9    Smokeless tobacco: Never   Substance and Sexual Activity    Alcohol use: No    Drug use: No    Sexual activity: Not on file     Review of Systems   Constitutional:  Negative for chills and fever.   HENT:  Negative for congestion and sore throat.    Respiratory:  Negative for cough and shortness of breath.    Cardiovascular:  Negative for chest pain.   Gastrointestinal:  Negative for abdominal pain, diarrhea, nausea and vomiting.   Genitourinary:  Negative for dysuria and hematuria.   Skin:  Negative for rash.   Neurological:  Positive for weakness. Negative for dizziness, syncope and light-headedness.   Objective:     Vital Signs (Most Recent):  Temp: 98.4 °F (36.9 °C) (22 0734)  Pulse: 84 (22 0800)  Resp: 16 (22 07)  BP: 123/64 (22 0734)  SpO2: (!) 94 % (22 0745)   Vital Signs (24h Range):  Temp:  [98 °F (36.7 °C)-99.1 °F (37.3 °C)] 98.4 °F (36.9 °C)  Pulse:  [73-92] 84  Resp:  [16-20] 16  SpO2:  [84 %-98 %] 94 %  BP: (113-142)/(57-64) 123/64     Weight: 69.5 kg (153 lb 3.5 oz)  Body mass index is 24 kg/m².    Physical Exam  Vitals and nursing note reviewed.   Constitutional:       General: He is not in acute distress.  HENT:      Head: Normocephalic and atraumatic.      Right Ear: External ear normal.      Left Ear: External ear normal.   Eyes:      Conjunctiva/sclera: Conjunctivae normal.      Pupils: Pupils are equal, round, and reactive to light.   Cardiovascular:      Rate and Rhythm: Normal rate and regular rhythm.      Heart sounds: No murmur heard.  Pulmonary:      Effort: Pulmonary effort is normal. No respiratory distress.      Breath sounds: Normal breath sounds.   Abdominal:      General: Bowel sounds are normal. There is no distension.      Palpations: Abdomen is soft.      Tenderness: There  is no abdominal tenderness.   Musculoskeletal:      Cervical back: Neck supple.      Right lower leg: No edema.      Left lower leg: No edema.   Neurological:      General: No focal deficit present.      Mental Status: He is alert and oriented to person, place, and time.      Motor: Weakness present.   Psychiatric:         Mood and Affect: Mood normal.         Behavior: Behavior normal.         CRANIAL NERVES     CN III, IV, VI   Pupils are equal, round, and reactive to light.     Significant Labs: CBC:   Recent Labs   Lab 12/21/22  0446   WBC 4.52   HGB 12.6*   HCT 38.6*        CMP:   Recent Labs   Lab 12/21/22  0446      K 3.9      CO2 25   *   BUN 27*   CREATININE 1.4   CALCIUM 8.9   PROT 5.9*   ALBUMIN 2.9*   BILITOT 1.2*   ALKPHOS 87   AST 21   ALT 17   ANIONGAP 10       Significant Imaging:     MRI Acute deep white matter infarct on the right without associated hemorrhage or mass effect.  Mild underlying chronic ischemic changes.       Assessment/Plan:     * Cerebrovascular accident (CVA) due to thrombosis of right middle cerebral artery  Cont OPT./OT here   on asa and statin as well as plavix       BPH (benign prostatic hyperplasia)  Cont flomax       Dementia  namenda per home routine       Follicular lymphoma    H/o follicular lymphoma per chart (grade 3a, stage 1); receiving rituxan (cycle 3 in 11/2022), 10/2022 PET scan positive for 3 cm lymphadenopathy in left axillary region otherwise HUSSEIN.        -Will need follow up visit once discharge (per last hem/onc note follow up indicated around 12/17/22, delayed due to current admission for stroke)     Aortic stenosis s/p TAVR    History of aortic stenosis    Chronic diastolic heart failure, NYHA class 2    -TTE 12/15/2022 w/ EF 68%  -GDMT when appropriate (will restart slowly with metoprolol Saturday Dec 24th and then loop diuretic on Mon Dec 26th)   -Follow up with PCP / cardiologist in outpatient setting    Chronic kidney disease,  stage 3 (moderate)  Creat 1.4 yesterday will check labs Mondays and Thursdays   sCr 1.9 on admission, baseline 1.5-1.9  --Creatinine 1.3 today   --Avoid nephrotoxic agents, renally dose meds when possible  --Continue to monitor daily CMP.      Hypertension  Stroke risk factor.  SBP <180, MAP >65; remains at goal   Avoid hypotension in the setting of small vessel disease   Holding lopressor for now given hypotension during this admission in the setting of an acute stroke, will resume on Sat Dec 24th  Bp today 123/64-HR 84    COPD, moderate  Per 9/2022 Pulm note: sats 81% on RA and is noncompliant with home oxygen  SpO2 goal 88-92%, remains at goal with only 0.5 L/min - 1L  Continue supplemental O2 PRN    Continue home meds at discharge       Carotid artery disease  Stroke risk factor  --S/p L carotid endarterectomy in 12/3/202  --Continue ASA, Plavix, and statin         Diabetes mellitus, type 2  Stroke risk factor. A1c 6.7.  -BG goal while inpatient 140-180  -Continue LDSSI ACHS PRN   - Stable     Bs 139 this am > Dm diet no meds     Hyperlipidemia  Stroke risk factor  , goal <70  Atorvastatin 40mg daily, continue       Hypothyroidism    -Subclinical hypothyroidism; TSH 4.4; fT4 0.95 12/14/2021  -Follow up with PCP  Not on meds     VTE Risk Mitigation (From admission, onward)    None             Thom Callejas MD  Department of Hospital Medicine   [place] [facility] (3rd Fl)

## 2022-12-22 NOTE — PT/OT/SLP EVAL
"Physical Therapy Evaluation    Patient Name:  Vega Kohler   MRN:  [id number]    Recommendations:     Discharge Recommendations: nursing facility, skilled, home with home health, home health PT, home health OT   Discharge Equipment Recommendations: bath bench   Barriers to discharge: Inaccessible home and Decreased caregiver support    Assessment:     Vega Kohler is a 87 y.o. male admitted with a medical diagnosis of <principal problem not specified>.  He presents with the following impairments/functional limitations: weakness, impaired self care skills, impaired functional mobility, gait instability, impaired balance, decreased upper extremity function, decreased lower extremity function, decreased safety awareness, impaired cardiopulmonary response to activity, impaired endurance. Patient tolerated RW gait functions at room air x ~25 feet with min/Mincontact guard assistance. Fatigues easily.Monitored O2 SAT from based line 97% to 70%. Back to 95% after 2 minute  rest with breathing ex. Patient will benefit from skilled PT tx to inc safety and increase independence with functional mobility and self care activities and to return to previous level of functions.    Rehab Prognosis: Fair; patient would benefit from acute skilled PT services to address these deficits and reach maximum level of function.    Recent Surgery: * No surgery found *      Plan:     During this hospitalization, patient to be seen daily to address the identified rehab impairments via gait training, therapeutic activities, therapeutic exercises and progress toward the following goals:    Plan of Care Expires:       Subjective     Chief Complaint: Weakness and fatigue  Patient/Family Comments/goals: "To get stronger get better and to return home".  Pain/Comfort:  Pain Rating 1: 0/10    Patients cultural, spiritual, Anglican conflicts given the current situation: no    Living Environment:  Patient lives alone in a mobile home with 4 steps and 2 " handrails to enter  Prior to admission, patients level of function was Modified Independent with ADLs and gait functions using RW.  Equipment used at home: walker, rolling, cane, straight, wheelchair, bedside commode, oxygen, other (see comments) (concentrator Oxygen- uses as needed per pt.).  DME owned (not currently used): none.  Upon discharge, patient will have assistance from son who lives close by.    Objective:     Communicated with patient prior to session.  Patient found HOB elevated with bed alarm, matthew catheter, oxygen, peripheral IV, telemetry, SCD  upon PT entry to room.    General Precautions: Standard, aspiration, fall  Orthopedic Precautions:N/A   Braces: N/A  Respiratory Status: Nasal cannula, flow 2 L/min    Exams:  Cognitive Exam:  Patient is oriented to Person, Place, and Time  Fine Motor Coordination:    -       Intact  Gross Motor Coordination:  WFL  Postural Exam:  Patient presented with the following abnormalities:    -       Rounded shoulders  -       Forward head  Skin Integrity/Edema:      -       Skin integrity: Visible skin intact  RLE ROM: WFL  RLE Strength: 4/5  LLE ROM: WFL except left ankle DF limited 0 - 10 degrees due to old left lower leg/foot injury(ORIF)  LLE Strength: 3-/5    Functional Mobility:  Bed Mobility:     Rolling Left:  contact guard assistance  Rolling Right: contact guard assistance  Scooting: contact guard assistance  Supine to Sit: contact guard assistance  Sit to Supine: contact guard assistance  Transfers:     Sit to Stand:  minimum assistance with rolling walker  Bed to Chair: minimum assistance with  rolling walker  using  Step Transfer  Gait: min/contact guard assistance x ~25 feet with RW. Left LE limp due to left lower leg injury . Dec left  foot/floor clearance and fatigues easily.      AM-PAC 6 CLICK MOBILITY  Total Score:16       Treatment & Education:  PT eval. Educated patient the safety measures in out of bed activity and proper breathing ex;  Initiated gait trng with RW x ~25 feet with min/cg A    Patient left up in chair with all lines intact, call button in reach, and nursing notified.    GOALS:   Multidisciplinary Problems       Physical Therapy Goals          Problem: Physical Therapy    Goal Priority Disciplines Outcome Goal Variances Interventions   Physical Therapy Goal     PT, PT/OT      Description:   Patient will increase functional independence with mobility by performin. Supine <> sit with Modified Independent  2. Sit <>Stand with Modified Independent with RW  3. Bed to chair transfer with Supervision or Set-up Assistancewith or without rolling walker using Step Transfer TECHNIQUE  4. Gait  x ~100  feet with Standby Assistance with or without rolling walker.  5. Ascend/descend 4 stair steps with rails and with contact guard assistance and cues.  6. Lower extremity exercise program x10 reps per handout, with assistance as needed                          History:     Past Medical History:   Diagnosis Date    Abnormal barium swallow     Alzheimer's dementia, late onset     Anal stenosis     Anticoagulant long-term use     Aortic stenosis     Arthritis     Aspiration pneumonitis     Benign prostatic hyperplasia     Carotid artery disease     CHF (congestive heart failure)     Chronic diastolic heart failure     Chronic kidney disease (CKD), stage III (moderate)     CKD (chronic kidney disease)     Colon polyp     COPD (chronic obstructive pulmonary disease)     Coronary artery disease     Diabetes mellitus, type 2     Diverticulosis     Dysphagia     Hearing aid worn 2020    Received bilateral hearing aides today    Heart disease     History of CEA (carotid endarterectomy)     [place] (hard of hearing)     Hyperlipidemia     Hypertension     Hypertensive heart disease     Hypothyroidism     Joint disease     Memory loss     PAD (peripheral artery disease)     Presence of drug coated stent in right coronary artery     Pulmonary  hypertension     S/P TAVR (transcatheter aortic valve replacement)     Small bowel obstruction     Wears dentures     UPPER AND LOWER    Wears glasses        Past Surgical History:   Procedure Laterality Date    anal fissure repair      ANKLE FUSION Left 08/15/2016    X 2    APPENDECTOMY      BACK SURGERY      X 4    CAROTID ENDARTERECTOMY Right     CAROTID ENDARTERECTOMY Left     CAROTID ENDARTERECTOMY Left 12/3/2021    Procedure: ENDARTERECTOMY-CAROTID;  Surgeon: Tim Castillo MD;  Location: [facility] OR;  Service: Cardiology;  Laterality: Left;  pt unsure if he stopped plavix, Dr. Castillo ok to proceed    CARPAL TUNNEL RELEASE Right     CHOLECYSTECTOMY      COLONOSCOPY N/A 7/26/2018    Procedure: HIGH RISK SCREENING COLONOSCOPY;  Surgeon: Connor Murrell MD;  Location: [facility] ENDO;  Service: Endoscopy;  Laterality: N/A;    CORONARY ANGIOPLASTY WITH STENT PLACEMENT      ELBOW SURGERY Bilateral     EXCISIONAL HEMORRHOIDECTOMY      x3    EXPLORATORY LAPAROTOMY W/ BOWEL RESECTION  11/17/2011    EYE SURGERY      cataract bilaterally    history of bowel resection      KNEE SURGERY Left     LAMINECTOMY AND MICRODISCECTOMY LUMBAR SPINE  07/21/2011    L5-S1    LEFT HEART CATHETERIZATION Left 8/31/2018    Procedure: Left heart cath;  Surgeon: Rex Chris MD;  Location: [facility] CATH;  Service: Cardiology;  Laterality: Left;    LUMBAR FUSION  11/09/2011    Anterior approach--L5-S1    LUNG BIOPSY Right 07/21/2009    VATS with wedge ofr right lower lobe    LUNG BIOPSY Left 3/16/2020    Procedure: BIOPSY, LUNG;  Surgeon: [facility] Diagnostic Provider;  Location: [facility] OR;  Service: General;  Laterality: Left;    PERCUTANEOUS TRANSCATHETER AORTIC VALVE REPLACEMENT (TAVR) Left 10/30/2018    Procedure: REPLACEMENT, AORTIC VALVE, PERCUTANEOUS, TRANSCATHETER;  Surgeon: Rex Chris MD;  Location: [facility] OR;  Service: Cardiology;  Laterality: Left;    PERCUTANEOUS TRANSCATHETER AORTIC VALVE REPLACEMENT (TAVR)  10/30/2018     Procedure: REPLACEMENT, AORTIC VALVE, PERCUTANEOUS, TRANSCATHETER;  Surgeon: Tim Castillo MD;  Location: [facility] OR;  Service: Cardiovascular;;    RIGHT HEART CATHETERIZATION Right 8/31/2018    Procedure: INSERTION, CATHETER, RIGHT HEART;  Surgeon: Rex Chris MD;  Location: [facility] CATH;  Service: Cardiology;  Laterality: Right;    ROTATOR CUFF REPAIR Left     SINUS SURGERY      SPINE SURGERY      back surgery    TENDON RELEASE Bilateral     TONSILLECTOMY         Time Tracking:     PT Received On: 12/22/22  PT Start Time: 0912     PT Stop Time: 0942  PT Total Time (min): 30 min     Billable Minutes: Evaluation 15 and Therapeutic Activity 15      12/22/2022

## 2022-12-22 NOTE — PROGRESS NOTES
12/21/22 9704   Pain/Comfort/Sleep   Pain Body Location - Side Left   Pain Body Location - Orientation lower   Pain Body Location back   Pain Rating (0-10): Rest 5     Called and spoke with Dr. Weir about pt's c/o pain to left side back where he fell when he had the stroke. New orders for Norco 5 mg every 4 hours prn for pain.

## 2022-12-22 NOTE — SUBJECTIVE & OBJECTIVE
Past Medical History:   Diagnosis Date    Abnormal barium swallow     Alzheimer's dementia, late onset     Anal stenosis     Anticoagulant long-term use     Aortic stenosis     Arthritis     Aspiration pneumonitis     Benign prostatic hyperplasia     Carotid artery disease     CHF (congestive heart failure)     Chronic diastolic heart failure     Chronic kidney disease (CKD), stage III (moderate)     CKD (chronic kidney disease)     Colon polyp     COPD (chronic obstructive pulmonary disease)     Coronary artery disease     Diabetes mellitus, type 2     Diverticulosis     Dysphagia     Hearing aid worn 01/23/2020    Received bilateral hearing aides today    Heart disease     History of CEA (carotid endarterectomy)     [place] (hard of hearing)     Hyperlipidemia     Hypertension     Hypertensive heart disease     Hypothyroidism     Joint disease     Memory loss     PAD (peripheral artery disease)     Presence of drug coated stent in right coronary artery     Pulmonary hypertension     S/P TAVR (transcatheter aortic valve replacement)     Small bowel obstruction     Wears dentures     UPPER AND LOWER    Wears glasses        Past Surgical History:   Procedure Laterality Date    anal fissure repair      ANKLE FUSION Left 08/15/2016    X 2    APPENDECTOMY      BACK SURGERY      X 4    CAROTID ENDARTERECTOMY Right     CAROTID ENDARTERECTOMY Left     CAROTID ENDARTERECTOMY Left 12/3/2021    Procedure: ENDARTERECTOMY-CAROTID;  Surgeon: Tim Castillo MD;  Location: [facility] OR;  Service: Cardiology;  Laterality: Left;  pt unsure if he stopped plavix, Dr. Castillo ok to proceed    CARPAL TUNNEL RELEASE Right     CHOLECYSTECTOMY      COLONOSCOPY N/A 7/26/2018    Procedure: HIGH RISK SCREENING COLONOSCOPY;  Surgeon: Connor Murrell MD;  Location: [facility] ENDO;  Service: Endoscopy;  Laterality: N/A;    CORONARY ANGIOPLASTY WITH STENT PLACEMENT      ELBOW SURGERY Bilateral     EXCISIONAL HEMORRHOIDECTOMY      x3     EXPLORATORY LAPAROTOMY W/ BOWEL RESECTION  11/17/2011    EYE SURGERY      cataract bilaterally    history of bowel resection      KNEE SURGERY Left     LAMINECTOMY AND MICRODISCECTOMY LUMBAR SPINE  07/21/2011    L5-S1    LEFT HEART CATHETERIZATION Left 8/31/2018    Procedure: Left heart cath;  Surgeon: Rex Chris MD;  Location: [facility] CATH;  Service: Cardiology;  Laterality: Left;    LUMBAR FUSION  11/09/2011    Anterior approach--L5-S1    LUNG BIOPSY Right 07/21/2009    VATS with wedge ofr right lower lobe    LUNG BIOPSY Left 3/16/2020    Procedure: BIOPSY, LUNG;  Surgeon: [facility] Diagnostic Provider;  Location: [facility] OR;  Service: General;  Laterality: Left;    PERCUTANEOUS TRANSCATHETER AORTIC VALVE REPLACEMENT (TAVR) Left 10/30/2018    Procedure: REPLACEMENT, AORTIC VALVE, PERCUTANEOUS, TRANSCATHETER;  Surgeon: Rex Chris MD;  Location: [facility] OR;  Service: Cardiology;  Laterality: Left;    PERCUTANEOUS TRANSCATHETER AORTIC VALVE REPLACEMENT (TAVR)  10/30/2018    Procedure: REPLACEMENT, AORTIC VALVE, PERCUTANEOUS, TRANSCATHETER;  Surgeon: Tim Castillo MD;  Location: [facility] OR;  Service: Cardiovascular;;    RIGHT HEART CATHETERIZATION Right 8/31/2018    Procedure: INSERTION, CATHETER, RIGHT HEART;  Surgeon: Rex Chris MD;  Location: [facility] CATH;  Service: Cardiology;  Laterality: Right;    ROTATOR CUFF REPAIR Left     SINUS SURGERY      SPINE SURGERY      back surgery    TENDON RELEASE Bilateral     TONSILLECTOMY         Review of patient's allergies indicates:   Allergen Reactions    Sulfur Itching    Penicillins Swelling and Rash       Current Facility-Administered Medications on File Prior to Encounter   Medication    [COMPLETED] potassium, sodium phosphates 280-160-250 mg packet 2 packet    [DISCONTINUED] acetaminophen tablet 650 mg    [DISCONTINUED] aspirin chewable tablet 81 mg    [DISCONTINUED] atorvastatin tablet 40 mg    [DISCONTINUED] clopidogreL tablet 75 mg    [DISCONTINUED]  "dextrose 10% bolus 125 mL 125 mL    [DISCONTINUED] dextrose 10% bolus 250 mL 250 mL    [DISCONTINUED] glucagon (human recombinant) injection 1 mg    [DISCONTINUED] heparin (porcine) injection 5,000 Units    [DISCONTINUED] insulin aspart U-100 pen 0-5 Units    [DISCONTINUED] memantine tablet 10 mg    [DISCONTINUED] mupirocin 2 % ointment    [DISCONTINUED] polyethylene glycol packet 17 g    [DISCONTINUED] senna-docusate 8.6-50 mg per tablet 1 tablet    [DISCONTINUED] sodium chloride 0.9% flush 10 mL    [DISCONTINUED] tamsulosin 24 hr capsule 0.4 mg     Current Outpatient Medications on File Prior to Encounter   Medication Sig    albuterol (VENTOLIN HFA) 90 mcg/actuation inhaler Inhale 1-2 puffs into the lungs every 6 (six) hours as needed for Wheezing or Shortness of Breath. Rescue    aspirin 81 MG Chew Take 1 tablet (81 mg total) by mouth once daily.    atorvastatin (LIPITOR) 40 MG tablet Take 1 tablet (40 mg total) by mouth once daily.    clopidogreL (PLAVIX) 75 mg tablet Take 1 tablet (75 mg total) by mouth once daily.    ipratropium (ATROVENT) 42 mcg (0.06 %) nasal spray ipratropium bromide 42 mcg (0.06 %) nasal spray    meclizine (ANTIVERT) 12.5 mg tablet Take 2 tablets (25 mg total) by mouth 3 (three) times daily.    memantine (NAMENDA) 10 MG Tab Take 10 mg by mouth once daily.    [START ON 12/24/2022] metoprolol succinate (TOPROL-XL) 25 MG 24 hr tablet Take 1 tablet (25 mg total) by mouth every evening.    MYRBETRIQ 25 mg Tb24 ER tablet Take 25 mg by mouth once daily. 11/23/20    nitroGLYCERIN (NITROSTAT) 0.4 MG SL tablet Place 1 tablet (0.4 mg total) under the tongue every 5 (five) minutes as needed for Chest pain.    ondansetron (ZOFRAN-ODT) 8 MG TbDL Take 1 tablet (8 mg total) by mouth 3 (three) times daily as needed (for nausea).    pen needle, diabetic 32 gauge x 5/32" Ndle BD Adrienne 2nd Gen Pen Needle 32 gauge x 5/32"   USE 1 PEN NEEDLE ONCE DAILY USE AS DIRECTED    SOLIQUA 100/33 100 unit-33 mcg/mL InPn " pen INJECT 30 UNITS SUBCUTANEOUSLY ONCE DAILY    tamsulosin (FLOMAX) 0.4 mg Cap Take 0.4 mg by mouth once daily.    [START ON 2022] torsemide (DEMADEX) 10 MG Tab Take 1 tablet (10 mg total) by mouth once daily.    UNABLE TO FIND medication name: prevagen extra strenght daily     Family History       Problem Relation (Age of Onset)    Cancer Mother    Diabetes Brother    Heart disease Father, Brother          Tobacco Use    Smoking status: Former     Packs/day: 1.50     Years: 20.00     Pack years: 30.00     Types: Cigarettes     Start date:      Quit date:      Years since quittin.9    Smokeless tobacco: Never   Substance and Sexual Activity    Alcohol use: No    Drug use: No    Sexual activity: Not on file     Review of Systems   Constitutional:  Negative for chills and fever.   HENT:  Negative for congestion and sore throat.    Respiratory:  Negative for cough and shortness of breath.    Cardiovascular:  Negative for chest pain.   Gastrointestinal:  Negative for abdominal pain, diarrhea, nausea and vomiting.   Genitourinary:  Negative for dysuria and hematuria.   Skin:  Negative for rash.   Neurological:  Positive for weakness. Negative for dizziness, syncope and light-headedness.   Objective:     Vital Signs (Most Recent):  Temp: 98.4 °F (36.9 °C) (22 0734)  Pulse: 84 (22 0800)  Resp: 16 (22 0734)  BP: 123/64 (22 0734)  SpO2: (!) 94 % (22 0745)   Vital Signs (24h Range):  Temp:  [98 °F (36.7 °C)-99.1 °F (37.3 °C)] 98.4 °F (36.9 °C)  Pulse:  [73-92] 84  Resp:  [16-20] 16  SpO2:  [84 %-98 %] 94 %  BP: (113-142)/(57-64) 123/64     Weight: 69.5 kg (153 lb 3.5 oz)  Body mass index is 24 kg/m².    Physical Exam  Vitals and nursing note reviewed.   Constitutional:       General: He is not in acute distress.  HENT:      Head: Normocephalic and atraumatic.      Right Ear: External ear normal.      Left Ear: External ear normal.   Eyes:      Conjunctiva/sclera: Conjunctivae  normal.      Pupils: Pupils are equal, round, and reactive to light.   Cardiovascular:      Rate and Rhythm: Normal rate and regular rhythm.      Heart sounds: No murmur heard.  Pulmonary:      Effort: Pulmonary effort is normal. No respiratory distress.      Breath sounds: Normal breath sounds.   Abdominal:      General: Bowel sounds are normal. There is no distension.      Palpations: Abdomen is soft.      Tenderness: There is no abdominal tenderness.   Musculoskeletal:      Cervical back: Neck supple.      Right lower leg: No edema.      Left lower leg: No edema.   Neurological:      General: No focal deficit present.      Mental Status: He is alert and oriented to person, place, and time.      Motor: Weakness present.   Psychiatric:         Mood and Affect: Mood normal.         Behavior: Behavior normal.         CRANIAL NERVES     CN III, IV, VI   Pupils are equal, round, and reactive to light.     Significant Labs: CBC:   Recent Labs   Lab 12/21/22  0446   WBC 4.52   HGB 12.6*   HCT 38.6*        CMP:   Recent Labs   Lab 12/21/22  0446      K 3.9      CO2 25   *   BUN 27*   CREATININE 1.4   CALCIUM 8.9   PROT 5.9*   ALBUMIN 2.9*   BILITOT 1.2*   ALKPHOS 87   AST 21   ALT 17   ANIONGAP 10       Significant Imaging:     MRI Acute deep white matter infarct on the right without associated hemorrhage or mass effect.  Mild underlying chronic ischemic changes.

## 2022-12-22 NOTE — PLAN OF CARE
[facility] - Med Surg (3rd Fl)  Initial Discharge Assessment       Primary Care Provider: Fabricio Mckeon MD    Admission Diagnosis: CVA (cerebral vascular accident) [I63.9]    Admission Date: 12/21/2022  Expected Discharge Date:     Discharge Barriers Identified: None    Payor: MEDICARE / Plan: MEDICARE PART A & B / Product Type: Government /     Extended Emergency Contact Information  Primary Emergency Contact: Eliseo Kohler   [place]  Mobile Phone: [phone]  Relation: Son  Secondary Emergency Contact: Sandie Kohler  Mobile Phone: [phone]  Relation: Relative    Discharge Plan A: Home with family, Home Health  Discharge Plan B:  (Transfer to SNF at a nursing home)      [facility] [place] [facility] [address] [facility] [address] [id number] [facility] [address]  [place] [address]  Phone: [phone] Fax: [phone]      Initial Assessment (most recent)       Adult Discharge Assessment - 12/22/22 1141          Discharge Assessment    Assessment Type Discharge Planning Assessment     Confirmed/corrected address, phone number and insurance No     Reason No family to provide information/patient unable to answer     Source of Information health record     Communicated JOSE A with patient/caregiver Date not available/Unable to determine     Reason For Admission Debility     People in Home alone     Facility Arrived From: [facility]     Do you expect to return to your current living situation? Yes     Do you have help at home or someone to help you manage your care at home? Yes     Who are your caregiver(s) and their phone number(s)? Eliseo Kohler (Son) [phone]     Number of Stairs, Main Entrance four     Stair Railings, Main Entrance railings on both sides of stairs     Home Layout Able to live on 1st floor     Equipment Currently Used at Home walker, rolling;cane, straight;wheelchair;bedside commode;oxygen     Readmission within 30 days? No     Patient currently being followed by outpatient case management? No      Do you currently have service(s) that help you manage your care at home? No     Do you have prescription coverage? Yes     Coverage BCBS Supplement     Are you on dialysis? No     Discharge Plan A Home with family;Home Health     Discharge Plan B --   Transfer to SNF at a nursing home    DME Needed Upon Discharge  other (see comments)   TBD    Discharge Barriers Identified None                          Discharge assessment completed through MD discussion with patient and the health record. Discharge assessment completed via RN at [facility] prior to SWING admission. Patient's plan is to return home once SWING is completed. SW did attempt to call patient's son to verify all information patient provided but he did not answer. A voicemail was unable to be left. SW to attempt call again tomorrow.

## 2022-12-22 NOTE — NURSING TRANSFER
Nursing Transfer Note      12/21/2022     Reason patient is being transferred: swing bed awaiting SNF    [facility] [place] room # 308    Transfer via wheelchair    Transfer with no meds or O2    Transported by wheelchair van    Medicines sent: no    Any special needs or follow-up needed: none    Chart send with patient: No    Notified: son at bedside awaiting pt to arrive    Patient reassessed at: 12/21/22 @ 2015    Upon arrival to floor: cardiac monitor applied, patient oriented to room, call bell in reach, and bed in lowest position. O2 applied to pt due to pt being transferred without O2 and O2 sat 84%

## 2022-12-22 NOTE — PT/OT/SLP EVAL
"Occupational Therapy   Evaluation    Name: Vega Kohler  MRN: [id number]  Admitting Diagnosis: Cerebrovascular accident (CVA) due to thrombosis of right middle cerebral artery  Recent Surgery: * No surgery found *      Recommendations:     Discharge Recommendations: home with home health, home health OT, home health PT, nursing facility, skilled  Discharge Equipment Recommendations:  bath bench  Barriers to discharge:  Inaccessible home environment, Decreased caregiver support    Assessment:     Vega Kohler is a 87 y.o. male with a medical diagnosis of Cerebrovascular accident (CVA) due to thrombosis of right middle cerebral artery.  He presents with performance deficits affecting function: weakness, impaired endurance, impaired self care skills, impaired functional mobility, gait instability, impaired balance, decreased safety awareness, impaired cardiopulmonary response to activity.      Rehab Prognosis: Fair; patient would benefit from acute skilled OT services to address these deficits and reach maximum level of function.       Plan:     Patient to be seen 5 x/week to address the above listed problems via self-care/home management, therapeutic activities, therapeutic exercises  Plan of Care Expires: 01/05/23  Plan of Care Reviewed with: patient    Subjective     Chief Complaint: "I feel kind of weak and tired."  Patient/Family Comments/goals: Go home.    Occupational Profile:  Living Environment: Mobile home with 4STE and handrails. Son lives near by per Pt.  Previous level of function: MOD I with AD listed below.  Equipment Used at Home: walker, rolling, cane, straight, wheelchair, bedside commode, oxygen  Assistance upon Discharge: Son    Pain/Comfort:  Pain Rating 1: other (see comments) (Did not quantify)  Location - Side 1: Bilateral  Location - Orientation 1: lower  Location 1: back  Pain Addressed 1: Other (see comments) (Pt advised therapist not to touch lower back during transfers due to long history " "of back problems.)  Pain Rating Post-Intervention 1: 0/10    Patients cultural, spiritual, Yazidism conflicts given the current situation: no    Objective:     Communicated with: Nursing prior to session.  Patient found HOB elevated with bed alarm, matthew catheter, peripheral IV, telemetry, SCD upon OT entry to room.    General Precautions: Standard, airborne, fall  Orthopedic Precautions: N/A  Braces: N/A  Respiratory Status: Nasal cannula, flow 2 L/min    Occupational Performance:    Bed Mobility:    Patient completed Rolling/Turning to Right with stand by assistance  Patient completed Scooting/Bridging with stand by assistance  Patient completed Supine to Sit with stand by assistance    Functional Mobility/Transfers:  Patient completed Sit <> Stand Transfer with contact guard assistance  with  rolling walker   Patient completed Bed <> Chair Transfer using Step Transfer technique with contact guard assistance with rolling walker  Functional Mobility: Pt ambulated ~10 feet using RW and CGA to chair from bed.     Activities of Daily Living:  Feeding:  independence to eat lunch using bilateral upper extremity to manipulate utensils to eat noodle and pieces of fish.   Grooming: modified independence for set up to perform standing oral care and face washing. Pt with fair stand balance using bilateral upper extremity to wash face without RW support. Pt stabilized with left upper extremity on tabletop during standing oral care. Able to apply toothpaste with right upper extremity holding toothbrush with left.   Upper Body Dressing: minimum assistance to don gown seated EOB. Assist to bring gown over shoulders.   Lower Body Dressing: stand by assistance to don and doff socks seated EOB. Pt reports he has increased difficulty lately due to not being able to "bend" like he used to.     Cognitive/Visual Perceptual:  Cognitive/Psychosocial Skills:     -       Oriented to: Person, Place, Time, and Situation   -       Follows " Commands/attention:Follows multistep  commands  -       Memory: No Deficits noted    Physical Exam:  Balance:    -       Fair standing balance to wash face without RW support. Good seated static and dynamic balance.   Postural examination/scapula alignment:    -       Rounded shoulders  -       Forward head  Skin integrity: Visible skin intact  Upper Extremity Range of Motion:     -       Right Upper Extremity: WFL  -       Left Upper Extremity: Deficits: active range of motion shoulder flexion to just greater than 90 degrees. All other movements WFL.  Upper Extremity Strength: -       Right Upper Extremity: WFL  -       Left Upper Extremity: Deficits: Weakness noted in left shoulder flexion -4/5. All other movements tested WFL.   Strength:    -       Right Upper Extremity: WFL  -       Left Upper Extremity: WFL    AMPAC 6 Click ADL:  AMPAC Total Score: 18    Treatment & Education:  OT evaluation completed with Pt and educated on OT role and POC. Upon entry Pt with independence to eat lunch using bilateral upper extremity to manipulate utensils and open containers. Bed mobility performed with SBA for safety. Good seated static and dynamic balance noted during upper extremity and LE dressing. CGA for all transfers from bed and bedside chair x3. No RW required for sit > stand from bedside chair to perform standing ADL at table top. Standing oral care and face washing completed with MOD I for set up. No reports of fatigue noted during evaluation. Pt does reports some weakness in left shoulder. Pt would benefit from skilled OT to increase activity tolerance and strength to increase independence and safety during ADL.     Patient left up in chair with all lines intact, call button in reach, and nursing notified    GOALS:   Multidisciplinary Problems       Occupational Therapy Goals          Problem: Occupational Therapy    Goal Priority Disciplines Outcome Interventions   Occupational Therapy Goal     OT, PT/OT  Ongoing, Progressing    Description: Pt to perform UB dressing with MOD I seated EOB.  Pt to perform LB dressing with MOD I seated EOB.  Pt to perform self toileting with MOD I using RW and standard commode.  Pt to perform level functional transfers required for ADL's with MOD I, RW level.  Pt to demonstrate consistent adherence to breathing control and energy conservation techniques as educated by OT.  Pt to participate in standing activity for ~3 minutes for increased activity tolerance and safety in ADL.                         History:     Past Medical History:   Diagnosis Date    Abnormal barium swallow     Alzheimer's dementia, late onset     Anal stenosis     Anticoagulant long-term use     Aortic stenosis     Arthritis     Aspiration pneumonitis     Benign prostatic hyperplasia     Carotid artery disease     CHF (congestive heart failure)     Chronic diastolic heart failure     Chronic kidney disease (CKD), stage III (moderate)     CKD (chronic kidney disease)     Colon polyp     COPD (chronic obstructive pulmonary disease)     Coronary artery disease     Diabetes mellitus, type 2     Diverticulosis     Dysphagia     Hearing aid worn 01/23/2020    Received bilateral hearing aides today    Heart disease     History of CEA (carotid endarterectomy)     [place] (hard of hearing)     Hyperlipidemia     Hypertension     Hypertensive heart disease     Hypothyroidism     Joint disease     Memory loss     PAD (peripheral artery disease)     Presence of drug coated stent in right coronary artery     Pulmonary hypertension     S/P TAVR (transcatheter aortic valve replacement)     Small bowel obstruction     Wears dentures     UPPER AND LOWER    Wears glasses          Past Surgical History:   Procedure Laterality Date    anal fissure repair      ANKLE FUSION Left 08/15/2016    X 2    APPENDECTOMY      BACK SURGERY      X 4    CAROTID ENDARTERECTOMY Right     CAROTID ENDARTERECTOMY Left     CAROTID ENDARTERECTOMY Left  12/3/2021    Procedure: ENDARTERECTOMY-CAROTID;  Surgeon: Tim Castillo MD;  Location: [facility] OR;  Service: Cardiology;  Laterality: Left;  pt unsure if he stopped plavix, Dr. Castillo ok to proceed    CARPAL TUNNEL RELEASE Right     CHOLECYSTECTOMY      COLONOSCOPY N/A 7/26/2018    Procedure: HIGH RISK SCREENING COLONOSCOPY;  Surgeon: Connor Murrell MD;  Location: [facility] ENDO;  Service: Endoscopy;  Laterality: N/A;    CORONARY ANGIOPLASTY WITH STENT PLACEMENT      ELBOW SURGERY Bilateral     EXCISIONAL HEMORRHOIDECTOMY      x3    EXPLORATORY LAPAROTOMY W/ BOWEL RESECTION  11/17/2011    EYE SURGERY      cataract bilaterally    history of bowel resection      KNEE SURGERY Left     LAMINECTOMY AND MICRODISCECTOMY LUMBAR SPINE  07/21/2011    L5-S1    LEFT HEART CATHETERIZATION Left 8/31/2018    Procedure: Left heart cath;  Surgeon: Rex Chris MD;  Location: [facility] CATH;  Service: Cardiology;  Laterality: Left;    LUMBAR FUSION  11/09/2011    Anterior approach--L5-S1    LUNG BIOPSY Right 07/21/2009    VATS with wedge ofr right lower lobe    LUNG BIOPSY Left 3/16/2020    Procedure: BIOPSY, LUNG;  Surgeon: [facility] Diagnostic Provider;  Location: [facility] OR;  Service: General;  Laterality: Left;    PERCUTANEOUS TRANSCATHETER AORTIC VALVE REPLACEMENT (TAVR) Left 10/30/2018    Procedure: REPLACEMENT, AORTIC VALVE, PERCUTANEOUS, TRANSCATHETER;  Surgeon: Rex Chris MD;  Location: [facility] OR;  Service: Cardiology;  Laterality: Left;    PERCUTANEOUS TRANSCATHETER AORTIC VALVE REPLACEMENT (TAVR)  10/30/2018    Procedure: REPLACEMENT, AORTIC VALVE, PERCUTANEOUS, TRANSCATHETER;  Surgeon: Tim Castillo MD;  Location: [facility] OR;  Service: Cardiovascular;;    RIGHT HEART CATHETERIZATION Right 8/31/2018    Procedure: INSERTION, CATHETER, RIGHT HEART;  Surgeon: Rex Chris MD;  Location: [facility] CATH;  Service: Cardiology;  Laterality: Right;    ROTATOR CUFF REPAIR Left     SINUS SURGERY      SPINE SURGERY       back surgery    TENDON RELEASE Bilateral     TONSILLECTOMY         Time Tracking:     OT Date of Treatment: 12/22/22  OT Start Time: 1330  OT Stop Time: 1358  OT Total Time (min): 28 min    Billable Minutes:Evaluation 28 12/22/2022

## 2022-12-22 NOTE — ASSESSMENT & PLAN NOTE
Stroke risk factor. A1c 6.7.  -BG goal while inpatient 140-180  -Continue LDSSI ACHS PRN   - Stable     Bs 139 this am > Dm diet no meds

## 2022-12-22 NOTE — ASSESSMENT & PLAN NOTE
Creat 1.4 yesterday will check labs Mondays and Thursdays   sCr 1.9 on admission, baseline 1.5-1.9  --Creatinine 1.3 today   --Avoid nephrotoxic agents, renally dose meds when possible  --Continue to monitor daily CMP.

## 2022-12-22 NOTE — ASSESSMENT & PLAN NOTE
Stroke risk factor.  SBP <180, MAP >65; remains at goal   Avoid hypotension in the setting of small vessel disease   Holding lopressor for now given hypotension during this admission in the setting of an acute stroke, will resume on Sat Dec 24th  Bp today 123/64-HR 84

## 2022-12-23 NOTE — ASSESSMENT & PLAN NOTE
-TTE 12/15/2022 w/ EF 68%  -GDMT when appropriate (will restart slowly with metoprolol Saturday Dec 24th and then loop diuretic on Mon Dec 26th)   -Follow up with PCP / cardiologist in outpatient setting.

## 2022-12-23 NOTE — ASSESSMENT & PLAN NOTE
Creat 1.4 yesterday will check labs Mondays and Thursdays   sCr 1.9 on admission, baseline 1.5-1.9  --Creatinine 1.3 today   --Avoid nephrotoxic agents, renally dose meds when possible.  --Continue to monitor daily CMP.

## 2022-12-23 NOTE — PT/OT/SLP PROGRESS
Occupational Therapy   Treatment    Name: Vega Kohler  MRN: [id number]  Admitting Diagnosis:  Cerebrovascular accident (CVA) due to thrombosis of right middle cerebral artery       Recommendations:     Discharge Recommendations: nursing facility, skilled, home with home health, home health PT, home health OT  Discharge Equipment Recommendations:  bath bench  Barriers to discharge:  Decreased caregiver support, Inaccessible home environment    Assessment:     Vega Kohler is a 87 y.o. male with a medical diagnosis of Cerebrovascular accident (CVA) due to thrombosis of right middle cerebral artery.  He presents with good participation. Performance deficits affecting function are weakness, impaired endurance, impaired self care skills, impaired functional mobility, gait instability, decreased safety awareness, impaired cardiopulmonary response to activity, impaired balance.     Rehab Prognosis:  Good; patient would benefit from acute skilled OT services to address these deficits and reach maximum level of function.       Plan:     Patient to be seen 5 x/week to address the above listed problems via self-care/home management, therapeutic activities, therapeutic exercises  Plan of Care Expires: 01/05/23  Plan of Care Reviewed with: patient    Subjective     Pain/Comfort:  Pain Rating 1: 0/10 (uncfortable area catheter)    Objective:     Communicated with: RN prior to session.  Patient found HOB elevated with oxygen, PureWick, peripheral IV, telemetry, SCD upon OT entry to room.    General Precautions: Standard, fall    Orthopedic Precautions:N/A  Braces: N/A  Respiratory Status: Nasal cannula, flow 1.5 L/min     Occupational Performance:     Bed Mobility:    Patient completed Rolling/Turning to Left with  supervision  Patient completed Scooting/Bridging with supervision  Patient completed Supine to Sit with supervision  Patient completed Sit to Supine with supervision     Functional Mobility/Transfers:  Patient  completed Sit <> Stand Transfer with stand by assistance  with  no assistive device   Functional Mobility: Pt completed supine  to rolling to L side, to scooting to EOB to sitting at EOB unsupported to complete ADL task with supervision and min verbal cues for energy conservation. Pt performed 2 sets x 5 reps of sit to stand with supervision and no AD with no loss of balance.     Activities of Daily Living:  Grooming: modified independence post et up sitting EOB unsupported  Toileting: maximal assistance Pt currently in diaper, needing assistance to change it; pt able to wipe buttocks.       [facility] 6 Click ADL: 18    Treatment & Education:  Pt completed all bed mobility task with supervision for safety. Pt performed toileting task needed max A to change diaper. Pt was able to complete ana-care; verbal and tactile cues needed for hand placement while rolling and bridging to complete task. Pt completed 2 x 5 sets of sit to stand at bed side with supervision for safety. Pt sitting EOB unsupported was able to perform oral hygiene mod I post set-up. Pt able to side set to head of bed with supervision to sitting EOB to supine with HOB elevated with supervision for safety.     Patient left HOB elevated with all lines intact and call button in reach    GOALS:   Multidisciplinary Problems       Occupational Therapy Goals          Problem: Occupational Therapy    Goal Priority Disciplines Outcome Interventions   Occupational Therapy Goal     OT, PT/OT Ongoing, Progressing    Description: Pt to perform UB dressing with MOD I seated EOB.  Pt to perform LB dressing with MOD I seated EOB.  Pt to perform self toileting with MOD I using RW and standard commode.  Pt to perform level functional transfers required for ADL's with MOD I, RW level.  Pt to demonstrate consistent adherence to breathing control and energy conservation techniques as educated by OT.  Pt to participate in standing activity for ~3 minutes for increased activity  tolerance and safety in ADL.                         Time Tracking:     OT Date of Treatment: 12/23/22  OT Start Time: 0323  OT Stop Time: 0356  OT Total Time (min): 33 min    Billable Minutes:Self Care/Home Management 25 min  Therapeutic Exercise 8 min    OT/PEDRO: PEDRO VASQUEZ Visit Number: 1    12/23/2022

## 2022-12-23 NOTE — SUBJECTIVE & OBJECTIVE
Past Medical History:   Diagnosis Date    Abnormal barium swallow     Alzheimer's dementia, late onset     Anal stenosis     Anticoagulant long-term use     Aortic stenosis     Arthritis     Aspiration pneumonitis     Benign prostatic hyperplasia     Carotid artery disease     CHF (congestive heart failure)     Chronic diastolic heart failure     Chronic kidney disease (CKD), stage III (moderate)     CKD (chronic kidney disease)     Colon polyp     COPD (chronic obstructive pulmonary disease)     Coronary artery disease     Diabetes mellitus, type 2     Diverticulosis     Dysphagia     Hearing aid worn 01/23/2020    Received bilateral hearing aides today    Heart disease     History of CEA (carotid endarterectomy)     [place] (hard of hearing)     Hyperlipidemia     Hypertension     Hypertensive heart disease     Hypothyroidism     Joint disease     Memory loss     PAD (peripheral artery disease)     Presence of drug coated stent in right coronary artery     Pulmonary hypertension     S/P TAVR (transcatheter aortic valve replacement)     Small bowel obstruction     Wears dentures     UPPER AND LOWER    Wears glasses        Past Surgical History:   Procedure Laterality Date    anal fissure repair      ANKLE FUSION Left 08/15/2016    X 2    APPENDECTOMY      BACK SURGERY      X 4    CAROTID ENDARTERECTOMY Right     CAROTID ENDARTERECTOMY Left     CAROTID ENDARTERECTOMY Left 12/3/2021    Procedure: ENDARTERECTOMY-CAROTID;  Surgeon: Tim Castillo MD;  Location: [facility] OR;  Service: Cardiology;  Laterality: Left;  pt unsure if he stopped plavix, Dr. Castillo ok to proceed    CARPAL TUNNEL RELEASE Right     CHOLECYSTECTOMY      COLONOSCOPY N/A 7/26/2018    Procedure: HIGH RISK SCREENING COLONOSCOPY;  Surgeon: Connor Murrell MD;  Location: [facility] ENDO;  Service: Endoscopy;  Laterality: N/A;    CORONARY ANGIOPLASTY WITH STENT PLACEMENT      ELBOW SURGERY Bilateral     EXCISIONAL HEMORRHOIDECTOMY      x3     EXPLORATORY LAPAROTOMY W/ BOWEL RESECTION  11/17/2011    EYE SURGERY      cataract bilaterally    history of bowel resection      KNEE SURGERY Left     LAMINECTOMY AND MICRODISCECTOMY LUMBAR SPINE  07/21/2011    L5-S1    LEFT HEART CATHETERIZATION Left 8/31/2018    Procedure: Left heart cath;  Surgeon: Rex Chris MD;  Location: [facility] CATH;  Service: Cardiology;  Laterality: Left;    LUMBAR FUSION  11/09/2011    Anterior approach--L5-S1    LUNG BIOPSY Right 07/21/2009    VATS with wedge ofr right lower lobe    LUNG BIOPSY Left 3/16/2020    Procedure: BIOPSY, LUNG;  Surgeon: [facility] Diagnostic Provider;  Location: [facility] OR;  Service: General;  Laterality: Left;    PERCUTANEOUS TRANSCATHETER AORTIC VALVE REPLACEMENT (TAVR) Left 10/30/2018    Procedure: REPLACEMENT, AORTIC VALVE, PERCUTANEOUS, TRANSCATHETER;  Surgeon: Rex Chris MD;  Location: [facility] OR;  Service: Cardiology;  Laterality: Left;    PERCUTANEOUS TRANSCATHETER AORTIC VALVE REPLACEMENT (TAVR)  10/30/2018    Procedure: REPLACEMENT, AORTIC VALVE, PERCUTANEOUS, TRANSCATHETER;  Surgeon: Tim Castillo MD;  Location: [facility] OR;  Service: Cardiovascular;;    RIGHT HEART CATHETERIZATION Right 8/31/2018    Procedure: INSERTION, CATHETER, RIGHT HEART;  Surgeon: Rex Chris MD;  Location: [facility] CATH;  Service: Cardiology;  Laterality: Right;    ROTATOR CUFF REPAIR Left     SINUS SURGERY      SPINE SURGERY      back surgery    TENDON RELEASE Bilateral     TONSILLECTOMY         Review of patient's allergies indicates:   Allergen Reactions    Sulfur Itching    Penicillins Swelling and Rash       No current facility-administered medications on file prior to encounter.     Current Outpatient Medications on File Prior to Encounter   Medication Sig    albuterol (VENTOLIN HFA) 90 mcg/actuation inhaler Inhale 1-2 puffs into the lungs every 6 (six) hours as needed for Wheezing or Shortness of Breath. Rescue    aspirin 81 MG Chew Take 1 tablet (81 mg  "total) by mouth once daily.    atorvastatin (LIPITOR) 40 MG tablet Take 1 tablet (40 mg total) by mouth once daily.    clopidogreL (PLAVIX) 75 mg tablet Take 1 tablet (75 mg total) by mouth once daily.    ipratropium (ATROVENT) 42 mcg (0.06 %) nasal spray ipratropium bromide 42 mcg (0.06 %) nasal spray    meclizine (ANTIVERT) 12.5 mg tablet Take 2 tablets (25 mg total) by mouth 3 (three) times daily.    memantine (NAMENDA) 10 MG Tab Take 10 mg by mouth once daily.    [START ON 2022] metoprolol succinate (TOPROL-XL) 25 MG 24 hr tablet Take 1 tablet (25 mg total) by mouth every evening.    MYRBETRIQ 25 mg Tb24 ER tablet Take 25 mg by mouth once daily. 20    nitroGLYCERIN (NITROSTAT) 0.4 MG SL tablet Place 1 tablet (0.4 mg total) under the tongue every 5 (five) minutes as needed for Chest pain.    ondansetron (ZOFRAN-ODT) 8 MG TbDL Take 1 tablet (8 mg total) by mouth 3 (three) times daily as needed (for nausea).    pen needle, diabetic 32 gauge x 5/32" Ndle BD Adrienne 2nd Gen Pen Needle 32 gauge x 5/32"   USE 1 PEN NEEDLE ONCE DAILY USE AS DIRECTED    SOLIQUA 100/33 100 unit-33 mcg/mL InPn pen INJECT 30 UNITS SUBCUTANEOUSLY ONCE DAILY    tamsulosin (FLOMAX) 0.4 mg Cap Take 0.4 mg by mouth once daily.    [START ON 2022] torsemide (DEMADEX) 10 MG Tab Take 1 tablet (10 mg total) by mouth once daily.    UNABLE TO FIND medication name: prevagen extra strenght daily     Family History       Problem Relation (Age of Onset)    Cancer Mother    Diabetes Brother    Heart disease Father, Brother          Tobacco Use    Smoking status: Former     Packs/day: 1.50     Years: 20.00     Pack years: 30.00     Types: Cigarettes     Start date:      Quit date:      Years since quittin.9    Smokeless tobacco: Never   Substance and Sexual Activity    Alcohol use: No    Drug use: No    Sexual activity: Not on file     Review of Systems   Constitutional:  Negative for chills and fever.   HENT:  Negative for " congestion and sore throat.    Respiratory:  Negative for cough and shortness of breath.    Cardiovascular:  Negative for chest pain.   Gastrointestinal:  Negative for abdominal pain, diarrhea, nausea and vomiting.   Genitourinary:  Negative for dysuria and hematuria.   Skin:  Negative for rash.   Neurological:  Positive for weakness. Negative for dizziness, syncope and light-headedness.        Left arm weakness getting better   Objective:     Vital Signs (Most Recent):  Temp: 98.8 °F (37.1 °C) (12/23/22 0408)  Pulse: 78 (12/23/22 0600)  Resp: 16 (12/23/22 0408)  BP: (!) 102/52 (12/23/22 0408)  SpO2: 95 % (12/23/22 0714)   Vital Signs (24h Range):  Temp:  [98.2 °F (36.8 °C)-98.8 °F (37.1 °C)] 98.8 °F (37.1 °C)  Pulse:  [73-88] 78  Resp:  [16-20] 16  SpO2:  [92 %-100 %] 95 %  BP: (102-131)/(52-62) 102/52     Weight: 68.8 kg (151 lb 10.8 oz)  Body mass index is 23.76 kg/m².    Physical Exam  Vitals and nursing note reviewed.   Constitutional:       General: He is not in acute distress.  HENT:      Head: Normocephalic and atraumatic.      Right Ear: External ear normal.      Left Ear: External ear normal.   Eyes:      Conjunctiva/sclera: Conjunctivae normal.      Pupils: Pupils are equal, round, and reactive to light.   Cardiovascular:      Rate and Rhythm: Normal rate and regular rhythm.      Heart sounds: No murmur heard.  Pulmonary:      Effort: Pulmonary effort is normal. No respiratory distress.      Breath sounds: Normal breath sounds.   Abdominal:      General: Bowel sounds are normal. There is no distension.      Palpations: Abdomen is soft.      Tenderness: There is no abdominal tenderness.   Musculoskeletal:      Cervical back: Neck supple.      Right lower leg: No edema.      Left lower leg: No edema.   Neurological:      General: No focal deficit present.      Mental Status: He is alert and oriented to person, place, and time.      Motor: Weakness present.      Comments: Left arm weakness much improved    Psychiatric:         Mood and Affect: Mood normal.         Behavior: Behavior normal.         CRANIAL NERVES     CN III, IV, VI   Pupils are equal, round, and reactive to light.     Significant Labs: CBC:   Recent Labs   Lab 12/23/22  0610   WBC 4.05   HGB 11.4*   HCT 34.5*          CMP:   Recent Labs   Lab 12/23/22  0610      K 3.9      CO2 25   *   BUN 21   CREATININE 1.4   CALCIUM 8.7   PROT 5.7*   ALBUMIN 2.8*   BILITOT 0.8   ALKPHOS 77   AST 18   ALT 21   ANIONGAP 10         Significant Imaging:     MRI Acute deep white matter infarct on the right without associated hemorrhage or mass effect.  Mild underlying chronic ischemic changes.

## 2022-12-23 NOTE — ASSESSMENT & PLAN NOTE
Stroke risk factor.  SBP <180, MAP >65; remains at goal   Avoid hypotension in the setting of small vessel disease   Holding lopressor for now given hypotension during this admission in the setting of an acute stroke, will resume on Sat Dec 24th  Bp today 123/64-HR 84.

## 2022-12-23 NOTE — PLAN OF CARE
Problem: Adult Inpatient Plan of Care  Goal: Plan of Care Review  Outcome: Ongoing, Progressing  Flowsheets (Taken 12/23/2022 3409)  Plan of Care Reviewed With: patient  Goal: Absence of Hospital-Acquired Illness or Injury  Outcome: Ongoing, Progressing  Goal: Optimal Comfort and Wellbeing  Outcome: Ongoing, Progressing     Problem: Diabetes Comorbidity  Goal: Blood Glucose Level Within Targeted Range  Outcome: Ongoing, Progressing     Problem: Adjustment to Illness (Stroke, Ischemic/Transient Ischemic Attack)  Goal: Optimal Coping  Outcome: Ongoing, Progressing     Problem: Bowel Elimination Impaired (Stroke, Ischemic/Transient Ischemic Attack)  Goal: Effective Bowel Elimination  Outcome: Ongoing, Progressing     Problem: Cerebral Tissue Perfusion (Stroke, Ischemic/Transient Ischemic Attack)  Goal: Optimal Cerebral Tissue Perfusion  Outcome: Ongoing, Progressing     Problem: Cognitive Impairment (Stroke, Ischemic/Transient Ischemic Attack)  Goal: Optimal Cognitive Function  Outcome: Ongoing, Progressing     Problem: Communication Impairment (Stroke, Ischemic/Transient Ischemic Attack)  Goal: Improved Communication Skills  Outcome: Ongoing, Progressing     Problem: Functional Ability Impaired (Stroke, Ischemic/Transient Ischemic Attack)  Goal: Optimal Functional Ability  Outcome: Ongoing, Progressing     Problem: Respiratory Compromise (Stroke, Ischemic/Transient Ischemic Attack)  Goal: Effective Oxygenation and Ventilation  Outcome: Ongoing, Progressing     Problem: Sensorimotor Impairment (Stroke, Ischemic/Transient Ischemic Attack)  Goal: Improved Sensorimotor Function  Outcome: Ongoing, Progressing     Problem: Swallowing Impairment (Stroke, Ischemic/Transient Ischemic Attack)  Goal: Optimal Eating and Swallowing without Aspiration  Outcome: Ongoing, Progressing     Problem: Urinary Elimination Impaired (Stroke, Ischemic/Transient Ischemic Attack)  Goal: Effective Urinary Elimination  Outcome: Ongoing,  Progressing     Problem: Skin Injury Risk Increased  Goal: Skin Health and Integrity  Outcome: Ongoing, Progressing     Problem: Fall Injury Risk  Goal: Absence of Fall and Fall-Related Injury  Outcome: Ongoing, Progressing

## 2022-12-23 NOTE — PROGRESS NOTES
[facility] ([facility])  [facility] Medicine  Progress Note    Patient Name: Vega Kohler  MRN: [id number]  Patient Class: IP- Swing   Admission Date: 2022  Length of Stay: 2 days  Attending Physician: Jennifer Yoon MD  Primary Care Provider: Fabricio Mckeon MD        Subjective:     Principal Problem:Cerebrovascular accident (CVA) due to thrombosis of right middle cerebral artery        HPI:  88yo male patient with extensive medical hx. (Alzheimers, aortic stenosis, arthritis, BPH, CHF, CKD, COPD, CAD/PAD, DM2, HLD/HTN, pulm HTN, S/p TAVR and hypothyroid). He was treated acutely [facility] for ischemic CVA with left hemiplegia s/p TNK 22. Pt was independent at baseline prior to this event. Exam improved following TNK. MRI with R MCA infarct. Etiology likely . Patient stepped down to NPU to await SNF placement. Metoprolol originally started but held given etiology of stroke and risk of expansion with hypoperfusion. Will plan to resume meds this Saturday and then remainder of home BP meds on Monday. Patient was discharged on DAPT with outpatient follow up. Patient remains neurologically unchanged. SBP drop < 120 but no changes on exam. Last BM yesterday. He was brought to [facility] yesterday for continued SKILL therapy with PT / OT. On plavix and statin and asa. VSS- no bp meds 123/64          Overview/Hospital Course:       Vega Kohler is a 87 y.o. male  Admitted to Skilled care yesterday for PT/OT post ischemic CVA wqtih Left hemiplegia.   VSS , aferbile,    Gait: min/contact guard assistance x ~25 feet with RW. Left LE limp due to left lower leg injury . Dec left  foot/floor clearance and fatigues easily.      Past Medical History:   Diagnosis Date    Abnormal barium swallow     Alzheimer's dementia, late onset     Anal stenosis     Anticoagulant long-term use     Aortic stenosis     Arthritis     Aspiration pneumonitis     Benign prostatic hyperplasia     Carotid artery disease      CHF (congestive heart failure)     Chronic diastolic heart failure     Chronic kidney disease (CKD), stage III (moderate)     CKD (chronic kidney disease)     Colon polyp     COPD (chronic obstructive pulmonary disease)     Coronary artery disease     Diabetes mellitus, type 2     Diverticulosis     Dysphagia     Hearing aid worn 01/23/2020    Received bilateral hearing aides today    Heart disease     History of CEA (carotid endarterectomy)     [place] (hard of hearing)     Hyperlipidemia     Hypertension     Hypertensive heart disease     Hypothyroidism     Joint disease     Memory loss     PAD (peripheral artery disease)     Presence of drug coated stent in right coronary artery     Pulmonary hypertension     S/P TAVR (transcatheter aortic valve replacement)     Small bowel obstruction     Wears dentures     UPPER AND LOWER    Wears glasses        Past Surgical History:   Procedure Laterality Date    anal fissure repair      ANKLE FUSION Left 08/15/2016    X 2    APPENDECTOMY      BACK SURGERY      X 4    CAROTID ENDARTERECTOMY Right     CAROTID ENDARTERECTOMY Left     CAROTID ENDARTERECTOMY Left 12/3/2021    Procedure: ENDARTERECTOMY-CAROTID;  Surgeon: Tim Castillo MD;  Location: [facility] OR;  Service: Cardiology;  Laterality: Left;  pt unsure if he stopped plavix, Dr. Castillo ok to proceed    CARPAL TUNNEL RELEASE Right     CHOLECYSTECTOMY      COLONOSCOPY N/A 7/26/2018    Procedure: HIGH RISK SCREENING COLONOSCOPY;  Surgeon: Connor Murrell MD;  Location: [facility] ENDO;  Service: Endoscopy;  Laterality: N/A;    CORONARY ANGIOPLASTY WITH STENT PLACEMENT      ELBOW SURGERY Bilateral     EXCISIONAL HEMORRHOIDECTOMY      x3    EXPLORATORY LAPAROTOMY W/ BOWEL RESECTION  11/17/2011    EYE SURGERY      cataract bilaterally    history of bowel resection      KNEE SURGERY Left     LAMINECTOMY AND MICRODISCECTOMY LUMBAR SPINE  07/21/2011    L5-S1    LEFT HEART  CATHETERIZATION Left 8/31/2018    Procedure: Left heart cath;  Surgeon: Rex Chris MD;  Location: [facility] CATH;  Service: Cardiology;  Laterality: Left;    LUMBAR FUSION  11/09/2011    Anterior approach--L5-S1    LUNG BIOPSY Right 07/21/2009    VATS with wedge ofr right lower lobe    LUNG BIOPSY Left 3/16/2020    Procedure: BIOPSY, LUNG;  Surgeon: Everett Diagnostic Provider;  Location: [facility] OR;  Service: General;  Laterality: Left;    PERCUTANEOUS TRANSCATHETER AORTIC VALVE REPLACEMENT (TAVR) Left 10/30/2018    Procedure: REPLACEMENT, AORTIC VALVE, PERCUTANEOUS, TRANSCATHETER;  Surgeon: Rex Chris MD;  Location: [facility] OR;  Service: Cardiology;  Laterality: Left;    PERCUTANEOUS TRANSCATHETER AORTIC VALVE REPLACEMENT (TAVR)  10/30/2018    Procedure: REPLACEMENT, AORTIC VALVE, PERCUTANEOUS, TRANSCATHETER;  Surgeon: Tim Castillo MD;  Location: [facility] OR;  Service: Cardiovascular;;    RIGHT HEART CATHETERIZATION Right 8/31/2018    Procedure: INSERTION, CATHETER, RIGHT HEART;  Surgeon: Rex Chris MD;  Location: [facility] CATH;  Service: Cardiology;  Laterality: Right;    ROTATOR CUFF REPAIR Left     SINUS SURGERY      SPINE SURGERY      back surgery    TENDON RELEASE Bilateral     TONSILLECTOMY         Review of patient's allergies indicates:   Allergen Reactions    Sulfur Itching    Penicillins Swelling and Rash       No current facility-administered medications on file prior to encounter.     Current Outpatient Medications on File Prior to Encounter   Medication Sig    albuterol (VENTOLIN HFA) 90 mcg/actuation inhaler Inhale 1-2 puffs into the lungs every 6 (six) hours as needed for Wheezing or Shortness of Breath. Rescue    aspirin 81 MG Chew Take 1 tablet (81 mg total) by mouth once daily.    atorvastatin (LIPITOR) 40 MG tablet Take 1 tablet (40 mg total) by mouth once daily.    clopidogreL (PLAVIX) 75 mg tablet Take 1 tablet (75 mg total) by mouth once daily.    ipratropium  "(ATROVENT) 42 mcg (0.06 %) nasal spray ipratropium bromide 42 mcg (0.06 %) nasal spray    meclizine (ANTIVERT) 12.5 mg tablet Take 2 tablets (25 mg total) by mouth 3 (three) times daily.    memantine (NAMENDA) 10 MG Tab Take 10 mg by mouth once daily.    [START ON 2022] metoprolol succinate (TOPROL-XL) 25 MG 24 hr tablet Take 1 tablet (25 mg total) by mouth every evening.    MYRBETRIQ 25 mg Tb24 ER tablet Take 25 mg by mouth once daily. 20    nitroGLYCERIN (NITROSTAT) 0.4 MG SL tablet Place 1 tablet (0.4 mg total) under the tongue every 5 (five) minutes as needed for Chest pain.    ondansetron (ZOFRAN-ODT) 8 MG TbDL Take 1 tablet (8 mg total) by mouth 3 (three) times daily as needed (for nausea).    pen needle, diabetic 32 gauge x 5/32" Ndle BD Adrienne 2nd Gen Pen Needle 32 gauge x 5/32"   USE 1 PEN NEEDLE ONCE DAILY USE AS DIRECTED    SOLIQUA 100/33 100 unit-33 mcg/mL InPn pen INJECT 30 UNITS SUBCUTANEOUSLY ONCE DAILY    tamsulosin (FLOMAX) 0.4 mg Cap Take 0.4 mg by mouth once daily.    [START ON 2022] torsemide (DEMADEX) 10 MG Tab Take 1 tablet (10 mg total) by mouth once daily.    UNABLE TO FIND medication name: prevagen extra strenght daily     Family History       Problem Relation (Age of Onset)    Cancer Mother    Diabetes Brother    Heart disease Father, Brother          Tobacco Use    Smoking status: Former     Packs/day: 1.50     Years: 20.00     Pack years: 30.00     Types: Cigarettes     Start date:      Quit date:      Years since quittin.9    Smokeless tobacco: Never   Substance and Sexual Activity    Alcohol use: No    Drug use: No    Sexual activity: Not on file     Review of Systems   Constitutional:  Negative for chills and fever.   HENT:  Negative for congestion and sore throat.    Respiratory:  Negative for cough and shortness of breath.    Cardiovascular:  Negative for chest pain.   Gastrointestinal:  Negative for abdominal pain, diarrhea, nausea and " vomiting.   Genitourinary:  Negative for dysuria and hematuria.   Skin:  Negative for rash.   Neurological:  Positive for weakness. Negative for dizziness, syncope and light-headedness.        Left arm weakness getting better   Objective:     Vital Signs (Most Recent):  Temp: 98.8 °F (37.1 °C) (12/23/22 0408)  Pulse: 78 (12/23/22 0600)  Resp: 16 (12/23/22 0408)  BP: (!) 102/52 (12/23/22 0408)  SpO2: 95 % (12/23/22 0714)   Vital Signs (24h Range):  Temp:  [98.2 °F (36.8 °C)-98.8 °F (37.1 °C)] 98.8 °F (37.1 °C)  Pulse:  [73-88] 78  Resp:  [16-20] 16  SpO2:  [92 %-100 %] 95 %  BP: (102-131)/(52-62) 102/52     Weight: 68.8 kg (151 lb 10.8 oz)  Body mass index is 23.76 kg/m².    Physical Exam  Vitals and nursing note reviewed.   Constitutional:       General: He is not in acute distress.  HENT:      Head: Normocephalic and atraumatic.      Right Ear: External ear normal.      Left Ear: External ear normal.   Eyes:      Conjunctiva/sclera: Conjunctivae normal.      Pupils: Pupils are equal, round, and reactive to light.   Cardiovascular:      Rate and Rhythm: Normal rate and regular rhythm.      Heart sounds: No murmur heard.  Pulmonary:      Effort: Pulmonary effort is normal. No respiratory distress.      Breath sounds: Normal breath sounds.   Abdominal:      General: Bowel sounds are normal. There is no distension.      Palpations: Abdomen is soft.      Tenderness: There is no abdominal tenderness.   Musculoskeletal:      Cervical back: Neck supple.      Right lower leg: No edema.      Left lower leg: No edema.   Neurological:         Mental Status: He is alert .     Motor: Weakness present.      Comments: Left arm weakness much improved   Psychiatric:         Mood and Affect: Mood normal.         Behavior: Behavior normal.         CRANIAL NERVES     CN III, IV, VI   Pupils are equal, round, and reactive to light.     Significant Labs: CBC:   Recent Labs   Lab 12/23/22 0610   WBC 4.05   HGB 11.4*   HCT 34.5*           CMP:   Recent Labs   Lab 12/23/22  0610      K 3.9      CO2 25   *   BUN 21   CREATININE 1.4   CALCIUM 8.7   PROT 5.7*   ALBUMIN 2.8*   BILITOT 0.8   ALKPHOS 77   AST 18   ALT 21   ANIONGAP 10         Significant Imaging:     MRI Acute deep white matter infarct on the right without associated hemorrhage or mass effect.  Mild underlying chronic ischemic changes.         Assessment/Plan:      * Cerebrovascular accident (CVA) due to thrombosis of right middle cerebral artery  Cont OPT./OT here   on asa and statin as well as plavix .      BPH (benign prostatic hyperplasia)  Cont flomax .      Dementia  namenda per home routine .      Follicular lymphoma    H/o follicular lymphoma per chart (grade 3a, stage 1); receiving rituxan (cycle 3 in 11/2022), 10/2022 PET scan positive for 3 cm lymphadenopathy in left axillary region otherwise HUSSEIN.        -Will need follow up visit once discharge (per last hem/onc note follow up indicated around 12/17/22, delayed due to current admission for stroke)     Aortic stenosis s/p TAVR    History of aortic stenosis.    Chronic diastolic heart failure, NYHA class 2    -TTE 12/15/2022 w/ EF 68%  -GDMT when appropriate (will restart slowly with metoprolol Saturday Dec 24th and then loop diuretic on Mon Dec 26th)   -Follow up with PCP / cardiologist in outpatient setting.    Chronic kidney disease, stage 3 (moderate)  Creat 1.4 yesterday will check labs Mondays and Thursdays   sCr 1.9 on admission, baseline 1.5-1.9  --Creatinine 1.3 today   --Avoid nephrotoxic agents, renally dose meds when possible.  --Continue to monitor daily CMP.      Hypertension  Stroke risk factor.  SBP <180, MAP >65; remains at goal   Avoid hypotension in the setting of small vessel disease   Holding lopressor for now given hypotension during this admission in the setting of an acute stroke, will resume on Sat Dec 24th  Bp today 123/64-HR 84.    COPD, moderate  Per 9/2022 Pulm note: sats 81%  on RA and is noncompliant with home oxygen  SpO2 goal 88-92%, remains at goal with only 0.5 L/min - 1L  Continue supplemental O2 PRN    Continue home meds at discharge .      Carotid artery disease  Stroke risk factor  --S/p L carotid endarterectomy in 12/3/202  --Continue ASA, Plavix, and statin.         Diabetes mellitus, type 2  Stroke risk factor. A1c 6.7.  -BG goal while inpatient 140-180  -Continue LDSSI ACHS PRN   - Stable     Bs 139 this am > Dm diet no meds .    Hyperlipidemia  Stroke risk factor.  , goal <70  Atorvastatin 40mg daily, continue       Hypothyroidism    -Subclinical hypothyroidism; TSH 4.4; fT4 0.95 12/14/2021  -Follow up with PCP  Not on meds .      VTE Risk Mitigation (From admission, onward)         Ordered     enoxaparin injection 40 mg  Daily         12/23/22 1117                Discharge Planning   JOSE A:      Code Status: Full Code   Is the patient medically ready for discharge?:     Reason for patient still in hospital (select all that apply): Treatment  Discharge Plan A: Home with family, Home Health                  Jennifer Yoon MD  Department of Hospital Medicine   [facility] Surg (3rd Fl)

## 2022-12-23 NOTE — PLAN OF CARE
Problem: Occupational Therapy  Goal: Occupational Therapy Goal  Description: Pt to perform UB dressing with MOD I seated EOB.  Pt to perform LB dressing with MOD I seated EOB.  Pt to perform self toileting with MOD I using RW and standard commode.  Pt to perform level functional transfers required for ADL's with MOD I, RW level.  Pt to demonstrate consistent adherence to breathing control and energy conservation techniques as educated by OT.  Pt to participate in standing activity for ~3 minutes for increased activity tolerance and safety in ADL.    Outcome: Ongoing, Progressing   Continue with POC.

## 2022-12-23 NOTE — ASSESSMENT & PLAN NOTE
Stroke risk factor  --S/p L carotid endarterectomy in 12/3/202  --Continue ASA, Plavix, and statin.

## 2022-12-23 NOTE — ASSESSMENT & PLAN NOTE
Stroke risk factor. A1c 6.7.  -BG goal while inpatient 140-180  -Continue LDSSI ACHS PRN   - Stable     Bs 139 this am > Dm diet no meds .

## 2022-12-23 NOTE — HOSPITAL COURSE
12/ 22/22     Vega Kohler is a 87 y.o. male  Admitted to Skilled care yesterday for PT/OT post ischemic CVA wqtih Left hemiplegia.   VSS , aferbile,   Gait: min/contact guard assistance x ~25 feet with RW. Left LE limp due to left lower leg injury . Dec left  foot/floor clearance and fatigues easily.    12/26: SWING admit for PT/OT post CVA with left hemiplegia. Progressing with PT.  Gait: Contact guard assistance x ~75 feet  with RW and O2 SAT monitor.  He is on 1L O2 to maintain sats.   Glucose averaging 200s.    12/28 SWING ADMIT FOR CONT PT/OT post CVA with residual left sided hemiplegia. Progressing well with PT>>stand by assistance with  rolling walker  using  Step Transfer Gait: CGA 60' w/ RW with a goal of 100 ft, he remains on 1L NC sats 94% does have 4 steps to get into home- will need to practice stairs prior to d/c. Lives alone but son lives near by and will help take care of him at d/c    12/30 SWING admit for continued PT/OT for post CVA with residual left sided hemiplegia. He is ambulating 70ft stand by assist using RW> goal is 100ft but also needs to ascend and descend steps to get into home- (4).     Bp is a little low today. Torsemide was started Monday. Held yesterday for creat climbing was 1.8 yesterday - better today 1.6 (baseline 1.4)bp 102/59 this am. POX 86% on RA- has O2 but blowing to top of nose. He report that it dries him. Added humidification today. Has home O2 for use as needed for COPD     1/1  Bp has remained okay. Fever this morning, coughing slightly more. Sats about the same. Denies any new complaints or issues.      1/2/23  Vega Kohler is a 87 y.o. male  Her on swing admit   Nurse reports he has been more sluggish   Using oxygen ; CXR with infiltrates   BC negative  Influenza negative  COVID POSITIVE      1/3/23 here for skilled nursing after CVA but developed resp symptoms and tested positive for COVID yesterday   Day 2 Remdesevir ,tmax 100.2 , WBC 2.78   Gait: Contact  Guard Assistance and cues x ~50 feet with RW and O2 supplement. Decrease foot/floor clearance and gets fatigue at the end distance  Will need to transfer back to acute for COVID tx;

## 2022-12-23 NOTE — PT/OT/SLP PROGRESS
Physical Therapy Treatment    Patient Name:  Vega Kohler   MRN:  [id number]    Recommendations:     Discharge Recommendations: nursing facility, skilled, home with home health, home health PT, home health OT  Discharge Equipment Recommendations: bath bench  Barriers to discharge: Inaccessible home and Decreased caregiver support    Assessment:     Vega Kohler is a 87 y.o. 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  He presents with the following impairments/functional limitations: weakness, impaired endurance, impaired self care skills, impaired functional mobility, gait instability, decreased safety awareness, impaired cardiopulmonary response to activity, impaired balance. Patient tolerated Increase gait distance x ~80 feet using RW and portable O2 supplement  with contact guard assistance. Gets tired at the end distance. O2 SAT based line from 95% to 85%. Returned back to 93% after 2 minute rest with breathing ex. Nursing made aware.    Rehab Prognosis: Fair; patient would benefit from acute skilled PT services to address these deficits and reach maximum level of function.    Recent Surgery: * No surgery found *      Plan:     During this hospitalization, patient to be seen daily to address the identified rehab impairments via gait training, therapeutic activities, therapeutic exercises and progress toward the following goals:    Plan of Care Expires:       Subjective     Chief Complaint: no new complain   Patient/Family Comments/goals: none stated  Pain/Comfort:  Pain Rating 1: 0/10      Objective:     Communicated with patient prior to session.  Patient found supine with bed alarm, oxygen, PureWick, peripheral IV, telemetry, SCD upon PT entry to room.     General Precautions: Standard, fall  Orthopedic Precautions: N/A  Braces: N/A  Respiratory Status: Nasal cannula, flow 1 L/min     Functional Mobility:  Bed Mobility:      Rolling Left:  modified independence  Rolling Right: modified independence  Scooting: supervision  Supine to Sit: contact guard assistance  Sit to Supine: contact guard assistance  Transfers:     Sit to Stand:  stand by assistance with rolling walker  Bed to Chair: contact guard assistance with  rolling walker  using  Step Transfer  Gait: Contact Guard Assistance x ~80 feet with RW and portable O2 supplement. Fatigues easily at the end distance.       AM-PAC 6 CLICK MOBILITY  Turning over in bed (including adjusting bedclothes, sheets and blankets)?: 4  Sitting down on and standing up from a chair with arms (e.g., wheelchair, bedside commode, etc.): 3  Moving from lying on back to sitting on the side of the bed?: 3  Moving to and from a bed to a chair (including a wheelchair)?: 3  Need to walk in hospital room?: 3  Climbing 3-5 steps with a railing?: 2 (cllinical judgement)  Basic Mobility Total Score: 18       Treatment & Education:  B LE strengthening and coordination ex x 7 minutes; rolling to sides x 5 on each; sit <>stand ex x 5; transfer trng; gait  trng with RW and portable O2 x ~80 feet with contact guard assistance.    Patient left up in chair with all lines intact, call button in reach, and nursing notified..    GOALS:   Multidisciplinary Problems       Physical Therapy Goals          Problem: Physical Therapy    Goal Priority Disciplines Outcome Goal Variances Interventions   Physical Therapy Goal     PT, PT/OT      Description:   Patient will increase functional independence with mobility by performin. Supine <> sit with Modified Independent  2. Sit <>Stand with Modified Independent with RW  3. Bed to chair transfer with Supervision or Set-up Assistancewith or without rolling walker using Step Transfer TECHNIQUE  4. Gait  x ~100  feet with Standby Assistance with or without rolling walker.  5. Ascend/descend 4 stair steps with rails and with contact guard assistance and cues.  6. Lower extremity  exercise program x10 reps per handout, with assistance as needed                          Time Tracking:     PT Received On: 12/23/22  PT Start Time: 0950     PT Stop Time: 1020  PT Total Time (min): 30 min     Billable Minutes: Gait Training 15 and Therapeutic Activity 15    Treatment Type: Treatment  PT/PTA: PT           12/23/2022

## 2022-12-23 NOTE — ASSESSMENT & PLAN NOTE
Per 9/2022 Pulm note: sats 81% on RA and is noncompliant with home oxygen  SpO2 goal 88-92%, remains at goal with only 0.5 L/min - 1L  Continue supplemental O2 PRN    Continue home meds at discharge .

## 2022-12-23 NOTE — ASSESSMENT & PLAN NOTE
-Subclinical hypothyroidism; TSH 4.4; fT4 0.95 12/14/2021  -Follow up with PCP  Not on meds .   OPERATIVE REPORT  PATIENT NAME: Whitney Lozano    :  1943  MRN: [id number]  Pt Location: [facility] OR ROOM 06    SURGERY DATE: 3/7/2022    Surgeon(s) and Role:     * Luciano Desouza MD - Primary     * Tatiana Downey MD - Radha Rivera MD - Assisting    Preop Diagnosis:  Pneumonia [J18 9]  Acute respiratory failure with hypoxia ([address] ) [J96 01]    Post-Op Diagnosis Codes:     * Pneumonia [J18 9]     * Acute respiratory failure with hypoxia ([address] ) [J96 01]    Procedure(s) (LRB):  TRACHEOSTOMY (N/A)    Specimen(s):  * No specimens in log *    Estimated Blood Loss:   Minimal    Drains:  NG/OG/Enteral Tube Orogastric 16 Fr Left mouth (Active)   Placement Reverification Auscultation 22   Site Assessment Clean;Dry; Intact 22 0000   Status Tube feed stopped or held 22 0000   Drainage Appearance Bile;Green 22 0800   Intake (mL) 60 mL 22 1800   Output (mL) 50 mL 22 1800   Number of days: 8       Urethral Catheter Temperature probe (Active)   Amt returned on insertion(mL) 400 mL 22 2317   Reasons to continue Urinary Catheter  Acute urinary retention/obstruction failing urinary retention protocol 22   Goal for Removal Voiding trial when ambulation improves 22   Site Assessment Clean;Skin intact 22 0400   Hayes Care Done 22 2046   Collection Container Standard drainage bag 22   Securement Method Securing device (Describe) 22   Output (mL) 70 mL 22 0800   Number of days: 2       [REMOVED] Urethral Catheter Temperature probe 16 Fr   (Removed)   Reasons to continue Urinary Catheter  Accurate I&O assessment in critically ill patients (48 hr  max) 22 1030   Goal for Removal Remove after 48 hrs of I/O monitoring 22   Site Assessment Clean;Skin intact 22   Hayes Care Done 22 0900   Collection Container Standard drainage bag 22   Securement Method Securing device (Describe) 03/03/22 2000   Output (mL) 75 mL 03/04/22 1201   Number of days: 6       Anesthesia Type:   General    Operative Indications:  Pneumonia [J18 9]  Acute respiratory failure with hypoxia ([address] ) [J96 01]      Operative Findings:  Size 6 ETT removed, 8 Tracheostomy placed successfully  <2 point desaturation intra-operatively  copious NGT output     Complications:   None    Procedure and Technique:  The patient was taken from the ICU to the operating room and transferred to the operating table  She was already intubated and sedated  She was positioned supine with head support and care was taken to maintain cervical spinal stability  The anterior portion of her cervical collar was removed  The neck was prepped and draped in the usual sterile fashion with chloraprep  Prior to commencement of the operation, a time-out checklist was satisfactorily completed and agreed upon by all members of the operative team     The sternal notch, thyroid and cricoid cartilages were identified and marked  A horizontal incision was made through the skin at the level of the cricothyroid membrane  Dissection was carried down through the level of the platysma with electrocautery  Dissection continued in the midline with a combination of blunt and electrosurgical technique until the anterior tracheal rings were well visualized  Where necessary, bleeding from the surgical bed and associated thyroid tissue was controlled with point electrocautery  The 1st tracheal ring was incised in a H fashion with horizontal incisions above and below and a vertical incision through the rng itself  The trachea was easily spread and an 8 Shiley tracheostomy appliance (balloon checked on back table and lubricated) was easily passed following removal of the ETT by anesthesia  The cuff balloon on the tracheostomy appliance  was inflated  And the patient was easily ventilated following connection to the ventilator       The tracheostomy appliance was secured with 4 sutures of 0 prolene at the skin, and a velcro strap  Care was taken to replace the patient's cervical collar  She was transferred to her bed and taken back to the ICU  Dr Ewelina Murillo was present throughout the entire procedure     Patient Disposition:  Critical Care Unit, hemodynamically stable and intubated and hemodynamically stable        SIGNATURE: David Fitzgerald MD  DATE: March 7, 2022  TIME: 11:52 AM

## 2022-12-24 NOTE — PLAN OF CARE
Pt here for inpatient swing. PT/OT continued. Medications administered as ordered.       Problem: Adult Inpatient Plan of Care  Goal: Plan of Care Review  Outcome: Ongoing, Progressing  Goal: Patient-Specific Goal (Individualized)  Outcome: Ongoing, Progressing  Goal: Absence of Hospital-Acquired Illness or Injury  Outcome: Ongoing, Progressing  Goal: Optimal Comfort and Wellbeing  Outcome: Ongoing, Progressing  Goal: Readiness for Transition of Care  Outcome: Ongoing, Progressing     Problem: Diabetes Comorbidity  Goal: Blood Glucose Level Within Targeted Range  Outcome: Ongoing, Progressing     Problem: Adjustment to Illness (Stroke, Ischemic/Transient Ischemic Attack)  Goal: Optimal Coping  Outcome: Ongoing, Progressing     Problem: Bowel Elimination Impaired (Stroke, Ischemic/Transient Ischemic Attack)  Goal: Effective Bowel Elimination  Outcome: Ongoing, Progressing     Problem: Cerebral Tissue Perfusion (Stroke, Ischemic/Transient Ischemic Attack)  Goal: Optimal Cerebral Tissue Perfusion  Outcome: Ongoing, Progressing     Problem: Cognitive Impairment (Stroke, Ischemic/Transient Ischemic Attack)  Goal: Optimal Cognitive Function  Outcome: Ongoing, Progressing     Problem: Communication Impairment (Stroke, Ischemic/Transient Ischemic Attack)  Goal: Improved Communication Skills  Outcome: Ongoing, Progressing     Problem: Functional Ability Impaired (Stroke, Ischemic/Transient Ischemic Attack)  Goal: Optimal Functional Ability  Outcome: Ongoing, Progressing     Problem: Respiratory Compromise (Stroke, Ischemic/Transient Ischemic Attack)  Goal: Effective Oxygenation and Ventilation  Outcome: Ongoing, Progressing     Problem: Sensorimotor Impairment (Stroke, Ischemic/Transient Ischemic Attack)  Goal: Improved Sensorimotor Function  Outcome: Ongoing, Progressing     Problem: Swallowing Impairment (Stroke, Ischemic/Transient Ischemic Attack)  Goal: Optimal Eating and Swallowing without Aspiration  Outcome: Ongoing,  Progressing     Problem: Urinary Elimination Impaired (Stroke, Ischemic/Transient Ischemic Attack)  Goal: Effective Urinary Elimination  Outcome: Ongoing, Progressing     Problem: Skin Injury Risk Increased  Goal: Skin Health and Integrity  Outcome: Ongoing, Progressing     Problem: Fall Injury Risk  Goal: Absence of Fall and Fall-Related Injury  Outcome: Ongoing, Progressing

## 2022-12-24 NOTE — PT/OT/SLP PROGRESS
Physical Therapy Treatment    Patient Name:  Vega Kohler   MRN:  [id number]    Recommendations:     Discharge Recommendations: nursing facility, skilled, home with home health, home health PT, home health OT  Discharge Equipment Recommendations: bath bench  Barriers to discharge: Inaccessible home and Decreased caregiver support    Assessment:     Vega Kohler is a 87 y.o. 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  He presents with the following impairments/functional limitations: weakness, impaired endurance, impaired self care skills, impaired functional mobility, gait instability, decreased safety awareness, impaired cardiopulmonary response to activity, impaired balance.  Patient progressed to supervision with supine <>sit. Patient requested pain medication prior to gait training, therefore educated and instructed on bilateral lower extremity exercises. Patient demonstrated good understanding of exercises provided. VCs for proper hand placement during sit <> stand transfer with RW. Patient able to demonstrate good understanding following minimal VCs. O2 SAT monitored during therapeutic activities and gait training due to patient now on room air trial per nurse. O2 SAT based line from 95%- 83%, therefore decreased gait distance to ~ 50 feet and increased rest breaks. Returned to 94% after 3 minute rest break with breathing ex. Nurse made present and assisted with reapplying Purewick at end of session.     Rehab Prognosis: Fair; patient would benefit from acute skilled PT services to address these deficits and reach maximum level of function.    Recent Surgery: * No surgery found *      Plan:     During this hospitalization, patient to be seen daily to address the identified rehab impairments via gait training, therapeutic activities, therapeutic exercises and progress toward the following goals:    Plan of Care Expires:   23    Subjective     Chief Complaint: back pain  Patient/Family Comments/goals: none stated   Pain/Comfort:  Pain Rating 1: 8/10  Location - Side 1: Bilateral  Location - Orientation 1: generalized  Location 1: back  Pain Addressed 1: Pre-medicate for activity, Nurse notified  Pain Rating Post-Intervention 1: other (see comments) (not stated by patient)      Objective:     Communicated with nurse and patient prior to session.  Patient found supine with PureWick, peripheral IV, telemetry upon PT entry to room.     General Precautions: Standard, fall  Orthopedic Precautions: N/A  Braces: N/A  Respiratory Status: Room air     Functional Mobility:  Bed Mobility:     Rolling Left:  modified independence  Rolling Right: modified independence  Scooting: modified independence  Supine to Sit: supervision  Sit to Supine: supervision  Transfers:     Sit to Stand:  stand by assistance with rolling walker  Gait: Contact guard assistance x ~ 50 feet with RW and O2 SAT monitor.       AM-PAC 6 CLICK MOBILITY          Treatment & Education:  Bilateral lower extremity strengthening ex x 8 minutes; sit <> stand ex x 5; transfer training with VCs for proper hand placement and safety; gait training with RW and O2 SAT monitor x ~ 50 feet with CGA.     Patient left HOB elevated with all lines intact, call button in reach, nurse present, and SCD .    GOALS:   Multidisciplinary Problems       Physical Therapy Goals          Problem: Physical Therapy    Goal Priority Disciplines Outcome Goal Variances Interventions   Physical Therapy Goal     PT, PT/OT      Description:   Patient will increase functional independence with mobility by performin. Supine <> sit with Modified Independent  2. Sit <>Stand with Modified Independent with RW  3. Bed to chair transfer with Supervision or Set-up Assistancewith or without rolling walker using Step Transfer TECHNIQUE  4. Gait  x ~100  feet with Standby Assistance with or without rolling  walker.  5. Ascend/descend 4 stair steps with rails and with contact guard assistance and cues.  6. Lower extremity exercise program x10 reps per handout, with assistance as needed                          Time Tracking:     PT Received On: 12/24/22  PT Start Time: 0915     PT Stop Time: 1000  PT Total Time (min): 45 min     Billable Minutes: Gait Training 15, Therapeutic Activity 22, and Therapeutic Exercise 8    Treatment Type: Treatment  PT/PTA: PTA     PTA Visit Number: 1     12/24/2022

## 2022-12-24 NOTE — PLAN OF CARE
Problem: Adult Inpatient Plan of Care  Goal: Plan of Care Review  Outcome: Ongoing, Progressing  Goal: Patient-Specific Goal (Individualized)  Outcome: Ongoing, Progressing  Goal: Absence of Hospital-Acquired Illness or Injury  Outcome: Ongoing, Progressing  Goal: Optimal Comfort and Wellbeing  Outcome: Ongoing, Progressing  Goal: Readiness for Transition of Care  Outcome: Ongoing, Progressing     Problem: Diabetes Comorbidity  Goal: Blood Glucose Level Within Targeted Range  Outcome: Ongoing, Progressing     Problem: Adjustment to Illness (Stroke, Ischemic/Transient Ischemic Attack)  Goal: Optimal Coping  Outcome: Ongoing, Progressing     Problem: Bowel Elimination Impaired (Stroke, Ischemic/Transient Ischemic Attack)  Goal: Effective Bowel Elimination  Outcome: Ongoing, Progressing     Problem: Cerebral Tissue Perfusion (Stroke, Ischemic/Transient Ischemic Attack)  Goal: Optimal Cerebral Tissue Perfusion  Outcome: Ongoing, Progressing     Problem: Cognitive Impairment (Stroke, Ischemic/Transient Ischemic Attack)  Goal: Optimal Cognitive Function  Outcome: Ongoing, Progressing     Problem: Communication Impairment (Stroke, Ischemic/Transient Ischemic Attack)  Goal: Improved Communication Skills  Outcome: Ongoing, Progressing     Problem: Functional Ability Impaired (Stroke, Ischemic/Transient Ischemic Attack)  Goal: Optimal Functional Ability  Outcome: Ongoing, Progressing     Problem: Respiratory Compromise (Stroke, Ischemic/Transient Ischemic Attack)  Goal: Effective Oxygenation and Ventilation  Outcome: Ongoing, Progressing     Problem: Sensorimotor Impairment (Stroke, Ischemic/Transient Ischemic Attack)  Goal: Improved Sensorimotor Function  Outcome: Ongoing, Progressing     Problem: Swallowing Impairment (Stroke, Ischemic/Transient Ischemic Attack)  Goal: Optimal Eating and Swallowing without Aspiration  Outcome: Ongoing, Progressing     Problem: Urinary Elimination Impaired (Stroke, Ischemic/Transient  Ischemic Attack)  Goal: Effective Urinary Elimination  Outcome: Ongoing, Progressing     Problem: Skin Injury Risk Increased  Goal: Skin Health and Integrity  Outcome: Ongoing, Progressing     Problem: Fall Injury Risk  Goal: Absence of Fall and Fall-Related Injury  Outcome: Ongoing, Progressing

## 2022-12-25 NOTE — PT/OT/SLP PROGRESS
Physical Therapy Treatment    Patient Name:  Vega Kohler   MRN:  [id number]    Recommendations:     Discharge Recommendations: nursing facility, skilled, home with home health, home health PT, home health OT  Discharge Equipment Recommendations: bath bench  Barriers to discharge: Inaccessible home and Decreased caregiver support    Assessment:     Vega Kohler is a 87 y.o. 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  He presents with the following impairments/functional limitations: weakness, impaired endurance, impaired self care skills, impaired functional mobility, gait instability, decreased safety awareness, impaired cardiopulmonary response to activity, impaired balance. Patient now on room air due to continued fluctuations in O2 SATS. Initially, O2 95%. Patient able to complete sit<> stand transfer without VCs today, demonstrating improvement in safety awareness. O2 SATS declined to ~75% with gait training, improved to 95% with breathing exercises. Increased gait by 15 feet. No signs of distress or discomfort present. Patient tolerated treatment fairly well with no significant changes in pain reported.     Rehab Prognosis: Fair; patient would benefit from acute skilled PT services to address these deficits and reach maximum level of function.    Recent Surgery: * No surgery found *      Plan:     During this hospitalization, patient to be seen daily to address the identified rehab impairments via gait training, therapeutic activities and progress toward the following goals:    Plan of Care Expires:  12/13/22    Subjective     Chief Complaint: low back pain   Patient/Family Comments/goals: none stated   Pain/Comfort:  Pain Rating 1: 8/10  Location - Side 1: Bilateral  Location - Orientation 1: lower  Location 1: back  Pain Addressed 1: Pre-medicate for activity, Nurse notified  Pain Rating Post-Intervention 1: other (see comments)  (No subjective changes stated during/following treatment.)      Objective:     Communicated with nurse and patient prior to session.  Patient found HOB elevated with PureWick, peripheral IV, telemetry, and SCD upon PT entry to room.     General Precautions: Standard, fall  Orthopedic Precautions: N/A  Braces: N/A  Respiratory Status: Nasal cannula, flow 1 L/min     Functional Mobility:  Bed Mobility:     Rolling Left:  modified independence  Rolling Right: modified independence  Scooting: modified independence  Bridging: modified independence  Supine to Sit: supervision  Sit to Supine: supervision  Transfers:     Sit to Stand:  stand by assistance with rolling walker  Gait: Contact guard assistance x ~75 feet  with RW and O2 SAT monitor.      AM-PAC 6 CLICK MOBILITY          Treatment & Education:  Safety and breathing exercises with gait training secondary to O2 SAT fluctuations.   Sit to stand x 5   Static standing balance x 3 minutes   Rolling to each side x 4 reps     Patient left HOB elevated with all lines intact, call button in reach, bed alarm on, nurse notified, and SCD .    GOALS:   Multidisciplinary Problems       Physical Therapy Goals          Problem: Physical Therapy    Goal Priority Disciplines Outcome Goal Variances Interventions   Physical Therapy Goal     PT, PT/OT      Description:   Patient will increase functional independence with mobility by performin. Supine <> sit with Modified Independent  2. Sit <>Stand with Modified Independent with RW  3. Bed to chair transfer with Supervision or Set-up Assistancewith or without rolling walker using Step Transfer TECHNIQUE  4. Gait  x ~100  feet with Standby Assistance with or without rolling walker.  5. Ascend/descend 4 stair steps with rails and with contact guard assistance and cues.  6. Lower extremity exercise program x10 reps per handout, with assistance as needed                          Time Tracking:     PT Received On: 22  PT  Start Time: 0835     PT Stop Time: 0915  PT Total Time (min): 40 min     Billable Minutes: Gait Training 15 and Therapeutic Activity 25    Treatment Type: Treatment  PT/PTA: PTA     PTA Visit Number: 2     12/25/2022

## 2022-12-25 NOTE — PLAN OF CARE
Back pain managed with norco PRN. Medications administered as ordered. Blood sugar monitored and maintained with insulin. No new orders noted.     Problem: Adult Inpatient Plan of Care  Goal: Plan of Care Review  Outcome: Ongoing, Progressing  Goal: Patient-Specific Goal (Individualized)  Outcome: Ongoing, Progressing  Goal: Absence of Hospital-Acquired Illness or Injury  Outcome: Ongoing, Progressing  Goal: Optimal Comfort and Wellbeing  Outcome: Ongoing, Progressing  Goal: Readiness for Transition of Care  Outcome: Ongoing, Progressing     Problem: Diabetes Comorbidity  Goal: Blood Glucose Level Within Targeted Range  Outcome: Ongoing, Progressing     Problem: Adjustment to Illness (Stroke, Ischemic/Transient Ischemic Attack)  Goal: Optimal Coping  Outcome: Ongoing, Progressing     Problem: Bowel Elimination Impaired (Stroke, Ischemic/Transient Ischemic Attack)  Goal: Effective Bowel Elimination  Outcome: Ongoing, Progressing     Problem: Cerebral Tissue Perfusion (Stroke, Ischemic/Transient Ischemic Attack)  Goal: Optimal Cerebral Tissue Perfusion  Outcome: Ongoing, Progressing     Problem: Cognitive Impairment (Stroke, Ischemic/Transient Ischemic Attack)  Goal: Optimal Cognitive Function  Outcome: Ongoing, Progressing     Problem: Communication Impairment (Stroke, Ischemic/Transient Ischemic Attack)  Goal: Improved Communication Skills  Outcome: Ongoing, Progressing     Problem: Functional Ability Impaired (Stroke, Ischemic/Transient Ischemic Attack)  Goal: Optimal Functional Ability  Outcome: Ongoing, Progressing     Problem: Respiratory Compromise (Stroke, Ischemic/Transient Ischemic Attack)  Goal: Effective Oxygenation and Ventilation  Outcome: Ongoing, Progressing     Problem: Sensorimotor Impairment (Stroke, Ischemic/Transient Ischemic Attack)  Goal: Improved Sensorimotor Function  Outcome: Ongoing, Progressing     Problem: Swallowing Impairment (Stroke, Ischemic/Transient Ischemic Attack)  Goal: Optimal  Eating and Swallowing without Aspiration  Outcome: Ongoing, Progressing     Problem: Urinary Elimination Impaired (Stroke, Ischemic/Transient Ischemic Attack)  Goal: Effective Urinary Elimination  Outcome: Ongoing, Progressing     Problem: Skin Injury Risk Increased  Goal: Skin Health and Integrity  Outcome: Ongoing, Progressing     Problem: Fall Injury Risk  Goal: Absence of Fall and Fall-Related Injury  Outcome: Ongoing, Progressing

## 2022-12-26 NOTE — ASSESSMENT & PLAN NOTE
Stroke risk factor.  SBP <180, MAP >65; remains at goal   Avoid hypotension in the setting of small vessel disease   Holding lopressor for now given hypotension during this admission in the setting of an acute stroke, will resume on Sat Dec 24th  Bp today 123/64-HR 84.    12/26: resuming home torsemide per admission plan. BP Stable and will monitor BP and labs

## 2022-12-26 NOTE — ASSESSMENT & PLAN NOTE
H/o follicular lymphoma per chart (grade 3a, stage 1); receiving rituxan (cycle 3 in 11/2022), 10/2022 PET scan positive for 3 cm lymphadenopathy in left axillary region otherwise HUSSEIN.        -Will need follow up visit once discharge (per last hem/onc note follow up indicated around 12/17/22, delayed due to current admission for stroke)    PATIENT MEDICATED WITH ATIVAN 1MG IVP FOR ANXIETY. IVF PATENT AND INFUSING VIA
[facility] SITE. TELE MONITOR IN PLACE. NPO FOR U/S ABD IN AM. WILL CONT TO MONITOR.

## 2022-12-26 NOTE — PLAN OF CARE
Plan of care reviewed with patient. Blood sugars covered with sliding scale insulin. States he ate some cookies that daughter brought him then asked me to discard remainder in the garbage. Complains of itching to left ankle. Dressing removed - no redness. MD aware and orders noted and carried out. Actively participates in ROM. Remains free of injury.

## 2022-12-26 NOTE — SUBJECTIVE & OBJECTIVE
Past Medical History:   Diagnosis Date    Abnormal barium swallow     Alzheimer's dementia, late onset     Anal stenosis     Anticoagulant long-term use     Aortic stenosis     Arthritis     Aspiration pneumonitis     Benign prostatic hyperplasia     Carotid artery disease     CHF (congestive heart failure)     Chronic diastolic heart failure     Chronic kidney disease (CKD), stage III (moderate)     CKD (chronic kidney disease)     Colon polyp     COPD (chronic obstructive pulmonary disease)     Coronary artery disease     Diabetes mellitus, type 2     Diverticulosis     Dysphagia     Hearing aid worn 01/23/2020    Received bilateral hearing aides today    Heart disease     History of CEA (carotid endarterectomy)     [place] (hard of hearing)     Hyperlipidemia     Hypertension     Hypertensive heart disease     Hypothyroidism     Joint disease     Memory loss     PAD (peripheral artery disease)     Presence of drug coated stent in right coronary artery     Pulmonary hypertension     S/P TAVR (transcatheter aortic valve replacement)     Small bowel obstruction     Wears dentures     UPPER AND LOWER    Wears glasses        Past Surgical History:   Procedure Laterality Date    anal fissure repair      ANKLE FUSION Left 08/15/2016    X 2    APPENDECTOMY      BACK SURGERY      X 4    CAROTID ENDARTERECTOMY Right     CAROTID ENDARTERECTOMY Left     CAROTID ENDARTERECTOMY Left 12/3/2021    Procedure: ENDARTERECTOMY-CAROTID;  Surgeon: Tim Castillo MD;  Location: [facility] OR;  Service: Cardiology;  Laterality: Left;  pt unsure if he stopped plavix, Dr. Castillo ok to proceed    CARPAL TUNNEL RELEASE Right     CHOLECYSTECTOMY      COLONOSCOPY N/A 7/26/2018    Procedure: HIGH RISK SCREENING COLONOSCOPY;  Surgeon: Connor Murrell MD;  Location: [facility] ENDO;  Service: Endoscopy;  Laterality: N/A;    CORONARY ANGIOPLASTY WITH STENT PLACEMENT      ELBOW SURGERY Bilateral     EXCISIONAL HEMORRHOIDECTOMY      x3     EXPLORATORY LAPAROTOMY W/ BOWEL RESECTION  11/17/2011    EYE SURGERY      cataract bilaterally    history of bowel resection      KNEE SURGERY Left     LAMINECTOMY AND MICRODISCECTOMY LUMBAR SPINE  07/21/2011    L5-S1    LEFT HEART CATHETERIZATION Left 8/31/2018    Procedure: Left heart cath;  Surgeon: Rex Chris MD;  Location: [facility] CATH;  Service: Cardiology;  Laterality: Left;    LUMBAR FUSION  11/09/2011    Anterior approach--L5-S1    LUNG BIOPSY Right 07/21/2009    VATS with wedge ofr right lower lobe    LUNG BIOPSY Left 3/16/2020    Procedure: BIOPSY, LUNG;  Surgeon: [facility] Diagnostic Provider;  Location: [facility] OR;  Service: General;  Laterality: Left;    PERCUTANEOUS TRANSCATHETER AORTIC VALVE REPLACEMENT (TAVR) Left 10/30/2018    Procedure: REPLACEMENT, AORTIC VALVE, PERCUTANEOUS, TRANSCATHETER;  Surgeon: Rex Chris MD;  Location: [facility] OR;  Service: Cardiology;  Laterality: Left;    PERCUTANEOUS TRANSCATHETER AORTIC VALVE REPLACEMENT (TAVR)  10/30/2018    Procedure: REPLACEMENT, AORTIC VALVE, PERCUTANEOUS, TRANSCATHETER;  Surgeon: Tim Castillo MD;  Location: [facility] OR;  Service: Cardiovascular;;    RIGHT HEART CATHETERIZATION Right 8/31/2018    Procedure: INSERTION, CATHETER, RIGHT HEART;  Surgeon: Rex Chris MD;  Location: [facility] CATH;  Service: Cardiology;  Laterality: Right;    ROTATOR CUFF REPAIR Left     SINUS SURGERY      SPINE SURGERY      back surgery    TENDON RELEASE Bilateral     TONSILLECTOMY         Review of patient's allergies indicates:   Allergen Reactions    Sulfur Itching    Penicillins Swelling and Rash       No current facility-administered medications on file prior to encounter.     Current Outpatient Medications on File Prior to Encounter   Medication Sig    albuterol (VENTOLIN HFA) 90 mcg/actuation inhaler Inhale 1-2 puffs into the lungs every 6 (six) hours as needed for Wheezing or Shortness of Breath. Rescue    aspirin 81 MG Chew Take 1 tablet (81 mg  "total) by mouth once daily.    atorvastatin (LIPITOR) 40 MG tablet Take 1 tablet (40 mg total) by mouth once daily.    clopidogreL (PLAVIX) 75 mg tablet Take 1 tablet (75 mg total) by mouth once daily.    ipratropium (ATROVENT) 42 mcg (0.06 %) nasal spray ipratropium bromide 42 mcg (0.06 %) nasal spray    meclizine (ANTIVERT) 12.5 mg tablet Take 2 tablets (25 mg total) by mouth 3 (three) times daily.    memantine (NAMENDA) 10 MG Tab Take 10 mg by mouth once daily.    metoprolol succinate (TOPROL-XL) 25 MG 24 hr tablet Take 1 tablet (25 mg total) by mouth every evening.    MYRBETRIQ 25 mg Tb24 ER tablet Take 25 mg by mouth once daily. 20    nitroGLYCERIN (NITROSTAT) 0.4 MG SL tablet Place 1 tablet (0.4 mg total) under the tongue every 5 (five) minutes as needed for Chest pain.    ondansetron (ZOFRAN-ODT) 8 MG TbDL Take 1 tablet (8 mg total) by mouth 3 (three) times daily as needed (for nausea).    pen needle, diabetic 32 gauge x 5/32" Ndle BD Adrienne 2nd Gen Pen Needle 32 gauge x 5/32"   USE 1 PEN NEEDLE ONCE DAILY USE AS DIRECTED    SOLIQUA 100/33 100 unit-33 mcg/mL InPn pen INJECT 30 UNITS SUBCUTANEOUSLY ONCE DAILY    tamsulosin (FLOMAX) 0.4 mg Cap Take 0.4 mg by mouth once daily.    torsemide (DEMADEX) 10 MG Tab Take 1 tablet (10 mg total) by mouth once daily.    UNABLE TO FIND medication name: prevagen extra strenght daily     Family History       Problem Relation (Age of Onset)    Cancer Mother    Diabetes Brother    Heart disease Father, Brother          Tobacco Use    Smoking status: Former     Packs/day: 1.50     Years: 20.00     Pack years: 30.00     Types: Cigarettes     Start date:      Quit date:      Years since quittin.0    Smokeless tobacco: Never   Substance and Sexual Activity    Alcohol use: No    Drug use: No    Sexual activity: Not on file     Review of Systems   Constitutional:  Negative for chills and fever.   HENT:  Negative for congestion and sore throat.    Respiratory:  " Negative for cough and shortness of breath.    Cardiovascular:  Negative for chest pain.   Gastrointestinal:  Negative for abdominal pain, diarrhea, nausea and vomiting.   Genitourinary:  Negative for dysuria and hematuria.   Skin:  Negative for rash.   Neurological:  Positive for weakness. Negative for dizziness, syncope and light-headedness.        Left arm weakness getting better   Objective:     Vital Signs (Most Recent):  Temp: 98.4 °F (36.9 °C) (12/26/22 0715)  Pulse: 86 (12/26/22 0800)  Resp: 18 (12/26/22 0715)  BP: 134/68 (12/26/22 0715)  SpO2: 95 % (12/26/22 0720)   Vital Signs (24h Range):  Temp:  [96.1 °F (35.6 °C)-98.5 °F (36.9 °C)] 98.4 °F (36.9 °C)  Pulse:  [70-89] 86  Resp:  [18] 18  SpO2:  [95 %-97 %] 95 %  BP: (118-138)/(56-68) 134/68     Weight: 71.1 kg (156 lb 12 oz)  Body mass index is 24.55 kg/m².    Physical Exam  Vitals and nursing note reviewed.   Constitutional:       General: He is not in acute distress.  HENT:      Head: Normocephalic and atraumatic.      Right Ear: External ear normal.      Left Ear: External ear normal.   Eyes:      Conjunctiva/sclera: Conjunctivae normal.      Pupils: Pupils are equal, round, and reactive to light.   Cardiovascular:      Rate and Rhythm: Normal rate and regular rhythm.      Heart sounds: No murmur heard.  Pulmonary:      Effort: Pulmonary effort is normal. No respiratory distress.      Breath sounds: Normal breath sounds.   Abdominal:      General: Bowel sounds are normal. There is no distension.      Palpations: Abdomen is soft.      Tenderness: There is no abdominal tenderness.   Musculoskeletal:      Cervical back: Neck supple.      Right lower leg: No edema.      Left lower leg: No edema.   Neurological:      General: No focal deficit present.      Mental Status: He is alert and oriented to person, place, and time.      Motor: Weakness present.      Comments: Left arm weakness much improved   Psychiatric:         Mood and Affect: Mood normal.          Behavior: Behavior normal.         CRANIAL NERVES     CN III, IV, VI   Pupils are equal, round, and reactive to light.     Significant Labs: CBC:   Recent Labs   Lab 12/25/22  0612   WBC 4.74   HGB 11.5*   HCT 34.9*          CMP:   Recent Labs   Lab 12/25/22  0612      K 4.1      CO2 27   *   BUN 21   CREATININE 1.4   CALCIUM 9.1   PROT 5.8*   ALBUMIN 2.8*   BILITOT 0.7   ALKPHOS 72   AST 27   ALT 35   ANIONGAP 8         Significant Imaging:     MRI Acute deep white matter infarct on the right without associated hemorrhage or mass effect.  Mild underlying chronic ischemic changes.

## 2022-12-26 NOTE — ASSESSMENT & PLAN NOTE
Creat 1.4 yesterday will check labs Mondays and Thursdays   sCr 1.9 on admission, baseline 1.5-1.9  --Creatinine 1.3 today   --Avoid nephrotoxic agents, renally dose meds when possible.  --Continue to monitor daily CMP.      12/26: repeat labs ordered for AM. monitor

## 2022-12-26 NOTE — ASSESSMENT & PLAN NOTE
Stroke risk factor. A1c 6.7.  -BG goal while inpatient 140-180  -Continue LDSSI ACHS PRN   - Stable     Bs 139 this am > Dm diet no meds .    12/26: blood sugars rising to consistently > 299 Add Levemir 10 units QHS to SSI. Cont diabetic diet

## 2022-12-26 NOTE — PROGRESS NOTES
[facility] ([facility])  [facility] Medicine  Progress Note    Patient Name: Vega Kohler  MRN: [id number]  Patient Class: IP- Swing   Admission Date: 2022  Length of Stay: 5 days  Attending Physician: Jennifer Yoon MD  Primary Care Provider: Fabricio Mckeon MD        Subjective:     Principal Problem:Cerebrovascular accident (CVA) due to thrombosis of right middle cerebral artery        HPI:  86yo male patient with extensive medical hx. (Alzheimers, aortic stenosis, arthritis, BPH, CHF, CKD, COPD, CAD/PAD, DM2, HLD/HTN, pulm HTN, S/p TAVR and hypothyroid). He was treated acutely [facility] for ischemic CVA with left hemiplegia s/p TNK 22. Pt was independent at baseline prior to this event. Exam improved following TNK. MRI with R MCA infarct. Etiology likely . Patient stepped down to NPU to await SNF placement. Metoprolol originally started but held given etiology of stroke and risk of expansion with hypoperfusion. Will plan to resume meds this Saturday and then remainder of home BP meds on Monday. Patient was discharged on DAPT with outpatient follow up. Patient remains neurologically unchanged. SBP drop < 120 but no changes on exam. Last BM yesterday. He was brought to [facility] yesterday for continued SKILL therapy with PT / OT. On plavix and statin and asa. VSS- no bp meds 123/64          Overview/Hospital Course:       Vega Kohler is a 87 y.o. male  Admitted to Skilled care yesterday for PT/OT post ischemic CVA wqtih Left hemiplegia.   VSS , aferbile,    Gait: min/contact guard assistance x ~25 feet with RW. Left LE limp due to left lower leg injury . Dec left  foot/floor clearance and fatigues easily.    : SWING admit for PT/OT post CVA with left hemiplegia. Progressing with PT.   Gait: Contact guard assistance x ~75 feet  with RW and O2 SAT monitor.  He is on 1L O2 to maintain sats.   Glucose averaging 200s      Past Medical History:   Diagnosis Date    Abnormal barium swallow      Alzheimer's dementia, late onset     Anal stenosis     Anticoagulant long-term use     Aortic stenosis     Arthritis     Aspiration pneumonitis     Benign prostatic hyperplasia     Carotid artery disease     CHF (congestive heart failure)     Chronic diastolic heart failure     Chronic kidney disease (CKD), stage III (moderate)     CKD (chronic kidney disease)     Colon polyp     COPD (chronic obstructive pulmonary disease)     Coronary artery disease     Diabetes mellitus, type 2     Diverticulosis     Dysphagia     Hearing aid worn 01/23/2020    Received bilateral hearing aides today    Heart disease     History of CEA (carotid endarterectomy)     [place] (hard of hearing)     Hyperlipidemia     Hypertension     Hypertensive heart disease     Hypothyroidism     Joint disease     Memory loss     PAD (peripheral artery disease)     Presence of drug coated stent in right coronary artery     Pulmonary hypertension     S/P TAVR (transcatheter aortic valve replacement)     Small bowel obstruction     Wears dentures     UPPER AND LOWER    Wears glasses        Past Surgical History:   Procedure Laterality Date    anal fissure repair      ANKLE FUSION Left 08/15/2016    X 2    APPENDECTOMY      BACK SURGERY      X 4    CAROTID ENDARTERECTOMY Right     CAROTID ENDARTERECTOMY Left     CAROTID ENDARTERECTOMY Left 12/3/2021    Procedure: ENDARTERECTOMY-CAROTID;  Surgeon: Tim Castillo MD;  Location: [facility] OR;  Service: Cardiology;  Laterality: Left;  pt unsure if he stopped plavix, Dr. Castillo ok to proceed    CARPAL TUNNEL RELEASE Right     CHOLECYSTECTOMY      COLONOSCOPY N/A 7/26/2018    Procedure: HIGH RISK SCREENING COLONOSCOPY;  Surgeon: Connor Murrell MD;  Location: [facility] ENDO;  Service: Endoscopy;  Laterality: N/A;    CORONARY ANGIOPLASTY WITH STENT PLACEMENT      ELBOW SURGERY Bilateral     EXCISIONAL HEMORRHOIDECTOMY      x3    EXPLORATORY LAPAROTOMY W/  BOWEL RESECTION  11/17/2011    EYE SURGERY      cataract bilaterally    history of bowel resection      KNEE SURGERY Left     LAMINECTOMY AND MICRODISCECTOMY LUMBAR SPINE  07/21/2011    L5-S1    LEFT HEART CATHETERIZATION Left 8/31/2018    Procedure: Left heart cath;  Surgeon: Rex Chris MD;  [facility]: [facility] CATH;  Service: Cardiology;  Laterality: Left;    LUMBAR FUSION  11/09/2011    Anterior approach--L5-S1    LUNG BIOPSY Right 07/21/2009    VATS with wedge ofr right lower lobe    LUNG BIOPSY Left 3/16/2020    Procedure: BIOPSY, LUNG;  Surgeon: [facility] Diagnostic Provider;  [facility]: [facility];  Service: General;  Laterality: Left;    PERCUTANEOUS TRANSCATHETER AORTIC VALVE REPLACEMENT (TAVR) Left 10/30/2018    Procedure: REPLACEMENT, AORTIC VALVE, PERCUTANEOUS, TRANSCATHETER;  Surgeon: Rex Chris MD;  [facility]: [facility];  Service: Cardiology;  Laterality: Left;    PERCUTANEOUS TRANSCATHETER AORTIC VALVE REPLACEMENT (TAVR)  10/30/2018    Procedure: REPLACEMENT, AORTIC VALVE, PERCUTANEOUS, TRANSCATHETER;  Surgeon: Tim Castillo MD;  [facility]: [facility];  Service: Cardiovascular;;    RIGHT HEART CATHETERIZATION Right 8/31/2018    Procedure: INSERTION, CATHETER, RIGHT HEART;  Surgeon: Rex Chris MD;  [facility];  Service: Cardiology;  Laterality: Right;    ROTATOR CUFF REPAIR Left     SINUS SURGERY      SPINE SURGERY      back surgery    TENDON RELEASE Bilateral     TONSILLECTOMY         Review of patient's allergies indicates:   Allergen Reactions    Sulfur Itching    Penicillins Swelling and Rash       No current facility-administered medications on file prior to encounter.     Current Outpatient Medications on File Prior to Encounter   Medication Sig    albuterol (VENTOLIN HFA) 90 mcg/actuation inhaler Inhale 1-2 puffs into the lungs every 6 (six) hours as needed for Wheezing or Shortness of Breath. Rescue    aspirin 81 MG Chew Take 1 tablet (81 mg total) by  "mouth once daily.    atorvastatin (LIPITOR) 40 MG tablet Take 1 tablet (40 mg total) by mouth once daily.    clopidogreL (PLAVIX) 75 mg tablet Take 1 tablet (75 mg total) by mouth once daily.    ipratropium (ATROVENT) 42 mcg (0.06 %) nasal spray ipratropium bromide 42 mcg (0.06 %) nasal spray    meclizine (ANTIVERT) 12.5 mg tablet Take 2 tablets (25 mg total) by mouth 3 (three) times daily.    memantine (NAMENDA) 10 MG Tab Take 10 mg by mouth once daily.    metoprolol succinate (TOPROL-XL) 25 MG 24 hr tablet Take 1 tablet (25 mg total) by mouth every evening.    MYRBETRIQ 25 mg Tb24 ER tablet Take 25 mg by mouth once daily. 20    nitroGLYCERIN (NITROSTAT) 0.4 MG SL tablet Place 1 tablet (0.4 mg total) under the tongue every 5 (five) minutes as needed for Chest pain.    ondansetron (ZOFRAN-ODT) 8 MG TbDL Take 1 tablet (8 mg total) by mouth 3 (three) times daily as needed (for nausea).    pen needle, diabetic 32 gauge x 5/32" Ndle BD Adrienne 2nd Gen Pen Needle 32 gauge x 5/32"   USE 1 PEN NEEDLE ONCE DAILY USE AS DIRECTED    SOLIQUA 100/33 100 unit-33 mcg/mL InPn pen INJECT 30 UNITS SUBCUTANEOUSLY ONCE DAILY    tamsulosin (FLOMAX) 0.4 mg Cap Take 0.4 mg by mouth once daily.    torsemide (DEMADEX) 10 MG Tab Take 1 tablet (10 mg total) by mouth once daily.    UNABLE TO FIND medication name: prevagen extra strenght daily     Family History       Problem Relation (Age of Onset)    Cancer Mother    Diabetes Brother    Heart disease Father, Brother          Tobacco Use    Smoking status: Former     Packs/day: 1.50     Years: 20.00     Pack years: 30.00     Types: Cigarettes     Start date:      Quit date:      Years since quittin.0    Smokeless tobacco: Never   Substance and Sexual Activity    Alcohol use: No    Drug use: No    Sexual activity: Not on file     Review of Systems   Constitutional:  Negative for chills and fever.   HENT:  Negative for congestion and sore throat.  "   Respiratory:  Negative for cough and shortness of breath.    Cardiovascular:  Negative for chest pain.   Gastrointestinal:  Negative for abdominal pain, diarrhea, nausea and vomiting.   Genitourinary:  Negative for dysuria and hematuria.   Skin:  Negative for rash.   Neurological:  Positive for weakness. Negative for dizziness, syncope and light-headedness.        Left arm weakness getting better   Objective:     Vital Signs (Most Recent):  Temp: 98.4 °F (36.9 °C) (12/26/22 0715)  Pulse: 86 (12/26/22 0800)  Resp: 18 (12/26/22 0715)  BP: 134/68 (12/26/22 0715)  SpO2: 95 % (12/26/22 0720)   Vital Signs (24h Range):  Temp:  [96.1 °F (35.6 °C)-98.5 °F (36.9 °C)] 98.4 °F (36.9 °C)  Pulse:  [70-89] 86  Resp:  [18] 18  SpO2:  [95 %-97 %] 95 %  BP: (118-138)/(56-68) 134/68     Weight: 71.1 kg (156 lb 12 oz)  Body mass index is 24.55 kg/m².    Physical Exam  Vitals and nursing note reviewed.   Constitutional:       General: He is not in acute distress.  HENT:      Head: Normocephalic and atraumatic.      Right Ear: External ear normal.      Left Ear: External ear normal.   Eyes:      Conjunctiva/sclera: Conjunctivae normal.      Pupils: Pupils are equal, round, and reactive to light.   Cardiovascular:      Rate and Rhythm: Normal rate and regular rhythm.      Heart sounds: No murmur heard.  Pulmonary:      Effort: Pulmonary effort is normal. No respiratory distress.      Breath sounds: Normal breath sounds.   Abdominal:      General: Bowel sounds are normal. There is no distension.      Palpations: Abdomen is soft.      Tenderness: There is no abdominal tenderness.   Musculoskeletal:      Cervical back: Neck supple.      Right lower leg: No edema.      Left lower leg: No edema.   Neurological:      General: No focal deficit present.      Mental Status: He is alert and oriented to person, place, and time.      Motor: Weakness present.      Comments: Left arm weakness much improved   Psychiatric:         Mood and Affect:  Mood normal.         Behavior: Behavior normal.         CRANIAL NERVES     CN III, IV, VI   Pupils are equal, round, and reactive to light.     Significant Labs: CBC:   Recent Labs   Lab 12/25/22  0612   WBC 4.74   HGB 11.5*   HCT 34.9*          CMP:   Recent Labs   Lab 12/25/22  0612      K 4.1      CO2 27   *   BUN 21   CREATININE 1.4   CALCIUM 9.1   PROT 5.8*   ALBUMIN 2.8*   BILITOT 0.7   ALKPHOS 72   AST 27   ALT 35   ANIONGAP 8         Significant Imaging:     MRI Acute deep white matter infarct on the right without associated hemorrhage or mass effect.  Mild underlying chronic ischemic changes.         Assessment/Plan:      * Cerebrovascular accident (CVA) due to thrombosis of right middle cerebral artery  Cont PT/OT here-progressing   on asa and statin as well as plavix .      BPH (benign prostatic hyperplasia)  Cont flomax .      Dementia  namenda per home routine .      Follicular lymphoma    H/o follicular lymphoma per chart (grade 3a, stage 1); receiving rituxan (cycle 3 in 11/2022), 10/2022 PET scan positive for 3 cm lymphadenopathy in left axillary region otherwise HUSSEIN.        -Will need follow up visit once discharge (per last hem/onc note follow up indicated around 12/17/22, delayed due to current admission for stroke)     Aortic stenosis s/p TAVR    History of aortic stenosis.    Chronic diastolic heart failure, NYHA class 2    -TTE 12/15/2022 w/ EF 68%  -GDMT when appropriate (will restart slowly with metoprolol Saturday Dec 24th and then loop diuretic on Mon Dec 26th)   -Follow up with PCP / cardiologist in outpatient setting.    12/26: resuming torsemide today per plan. BP stable. Cr with labs in AM to reassess  May have a very mild volume excess so adding diuretic may help hypoxia with exertion    Chronic kidney disease, stage 3 (moderate)  Creat 1.4 yesterday will check labs Mondays and Thursdays   sCr 1.9 on admission, baseline 1.5-1.9  --Creatinine 1.3 today    --Avoid nephrotoxic agents, renally dose meds when possible.  --Continue to monitor daily CMP.      12/26: repeat labs ordered for AM. monitor    Hypertension  Stroke risk factor.  SBP <180, MAP >65; remains at goal   Avoid hypotension in the setting of small vessel disease   Holding lopressor for now given hypotension during this admission in the setting of an acute stroke, will resume on Sat Dec 24th  Bp today 123/64-HR 84.    12/26: resuming home torsemide per admission plan. BP Stable and will monitor BP and labs    COPD, moderate  Per 9/2022 Pulm note: sats 81% on RA and is noncompliant with home oxygen  SpO2 goal 88-92%, remains at goal with only 0.5 L/min - 1L  Continue supplemental O2 PRN    Continue home meds at discharge .      Carotid artery disease  Stroke risk factor  --S/p L carotid endarterectomy in 12/3/202  --Continue ASA, Plavix, and statin.         Diabetes mellitus, type 2  Stroke risk factor. A1c 6.7.  -BG goal while inpatient 140-180  -Continue LDSSI ACHS PRN   - Stable     Bs 139 this am > Dm diet no meds .    12/26: blood sugars rising to consistently > 299 Add Levemir 10 units QHS to SSI. Cont diabetic diet    Hyperlipidemia  Stroke risk factor.  , goal <70  Atorvastatin 40mg daily, continue       Hypothyroidism    -Subclinical hypothyroidism; TSH 4.4; fT4 0.95 12/14/2021  -Follow up with PCP  Not on meds .      VTE Risk Mitigation (From admission, onward)         Ordered     enoxaparin injection 40 mg  Daily         12/23/22 1117                Discharge Planning   JOSE A:      Code Status: Full Code   Is the patient medically ready for discharge?:     Reason for patient still in hospital (select all that apply): PT / OT recommendations  Discharge Plan A: Home with family, Home Health                  Cindy Young MD  Department of Hospital Medicine   [place] [facility] (3rd Fl)

## 2022-12-26 NOTE — PT/OT/SLP PROGRESS
Physical Therapy Treatment    Patient Name:  Vega Kohler   MRN:  [id number]    Recommendations:     Discharge Recommendations: nursing facility, skilled, home with home health, home health PT, home health OT  Discharge Equipment Recommendations: bath bench  Barriers to discharge: Inaccessible home and Decreased caregiver support    Assessment:     Vega Kohler is a 87 y.o. 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  He presents with the following impairments/functional limitations: weakness, impaired endurance, impaired self care skills, impaired functional mobility, decreased safety awareness, impaired balance . Pt able to tolerate mobility activity with pt still having dec. SpO2 after ambulation from 94% to 88% while on supplemental oxygen.    Rehab Prognosis: Fair; patient would benefit from acute skilled PT services to address these deficits and reach maximum level of function.    Recent Surgery: * No surgery found *      Plan:     During this hospitalization, patient to be seen daily to address the identified rehab impairments via gait training, therapeutic activities and progress toward the following goals:    Plan of Care Expires:  01/05/23    Subjective     Chief Complaint: no complaint  Patient/Family Comments/goals: none verbalized  Pain/Comfort:         Objective:     Communicated with patient prior to session.  Patient found supine with   upon PT entry to room.     General Precautions: Standard, fall  Orthopedic Precautions: N/A  Braces: N/A     Functional Mobility:  Bed Mobility:     Supine to Sit: modified independence  Sit to Supine: modified independence  Transfers:     Sit to Stand:  modified independence with rolling walker  Gait: pt able to complete ambulation x 75 ft with SBA      AM-PAC 6 CLICK MOBILITY  Turning over in bed (including adjusting bedclothes, sheets and blankets)?: 4  Sitting down on and standing up  from a chair with arms (e.g., wheelchair, bedside commode, etc.): 3  Moving from lying on back to sitting on the side of the bed?: 3  Moving to and from a bed to a chair (including a wheelchair)?: 3  Need to walk in hospital room?: 3  Climbing 3-5 steps with a railing?: 2  Basic Mobility Total Score: 18       Treatment & Education:  Tolerated sitting, standing and ambulation.    Patient left supine with all lines intact and call button in reach..    GOALS:   Multidisciplinary Problems       Physical Therapy Goals          Problem: Physical Therapy    Goal Priority Disciplines Outcome Goal Variances Interventions   Physical Therapy Goal     PT, PT/OT      Description:   Patient will increase functional independence with mobility by performin. Supine <> sit with Modified Independent  2. Sit <>Stand with Modified Independent with RW  3. Bed to chair transfer with Supervision or Set-up Assistancewith or without rolling walker using Step Transfer TECHNIQUE  4. Gait  x ~100  feet with Standby Assistance with or without rolling walker.  5. Ascend/descend 4 stair steps with rails and with contact guard assistance and cues.  6. Lower extremity exercise program x10 reps per handout, with assistance as needed                          Time Tracking:     PT Received On: 22  PT Start Time: 1130     PT Stop Time: 1145  PT Total Time (min): 15 min     Billable Minutes: Therapeutic Activity 10    Treatment Type: Treatment  PT/PTA: PT     PTA Visit Number: 2     2022

## 2022-12-26 NOTE — NURSING
Scant/small amount of bleeding noted to lovenox administration site. Held pressure for 5 minutes, bleeding controlled.

## 2022-12-26 NOTE — ASSESSMENT & PLAN NOTE
-TTE 12/15/2022 w/ EF 68%  -GDMT when appropriate (will restart slowly with metoprolol Saturday Dec 24th and then loop diuretic on Mon Dec 26th)   -Follow up with PCP / cardiologist in outpatient setting.    12/26: resuming torsemide today per plan. BP stable. Cr with labs in AM to reassess  May have a very mild volume excess so adding diuretic may help hypoxia with exertion

## 2022-12-27 NOTE — PT/OT/SLP PROGRESS
Physical Therapy Treatment    Patient Name:  Vega Kohler   MRN:  [id number]    Recommendations:     Discharge Recommendations: nursing facility, skilled, home with home health, home health PT  Discharge Equipment Recommendations: bath bench  Barriers to discharge: Inaccessible home and Decreased caregiver support    Assessment:     Vega Kohler is a 87 y.o. 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  He presents with the following impairments/functional limitations: weakness, impaired endurance, impaired self care skills, gait instability, impaired balance, decreased lower extremity function, decreased safety awareness, pain.  Pt tolerated treatment session well and displayed increased independence with all gait and transfer tasks.    Rehab Prognosis: Fair; patient would benefit from acute skilled PT services to address these deficits and reach maximum level of function.    Recent Surgery: * No surgery found *      Plan:     During this hospitalization, patient to be seen daily to address the identified rehab impairments via gait training, therapeutic activities, therapeutic exercises and progress toward the following goals:    Plan of Care Expires:  01/05/23    Subjective     Chief Complaint: pain  Patient/Family Comments/goals: decrease pain  Pain/Comfort:  Pain Rating 1: 8/10  Location - Side 1: Left  Location - Orientation 1: posterior  Location 1: hip  Pain Addressed 1: Reposition  Pain Rating Post-Intervention 1: 8/10      Objective:     Communicated with pt prior to session.  Patient found supine with oxygen, telemetry upon PT entry to room.     General Precautions: Standard, fall  Orthopedic Precautions: N/A  Braces: N/A  Respiratory Status: Nasal cannula, flow 1 L/min     Functional Mobility:  Bed Mobility:     Rolling Left:  modified independence  Rolling Right: modified independence  Scooting: modified independence  Supine  to Sit: modified independence  Sit to Supine: modified independence  Transfers:     Sit to Stand:  supervision with rolling walker  Bed to Chair: stand by assistance with  rolling walker  using  Step Transfer  Toilet Transfer: stand by assistance with  rolling walker  using  Step Transfer  Gait: CGA 60' w/ RW      AM-PAC 6 CLICK MOBILITY  Turning over in bed (including adjusting bedclothes, sheets and blankets)?: 4  Sitting down on and standing up from a chair with arms (e.g., wheelchair, bedside commode, etc.): 4  Moving from lying on back to sitting on the side of the bed?: 4  Moving to and from a bed to a chair (including a wheelchair)?: 3  Need to walk in hospital room?: 3  Climbing 3-5 steps with a railing?: 2  Basic Mobility Total Score: 20       Treatment & Education:  Supine to sit x 2  Scooting in sitting x 9  Sit to stand x 4  Static standing 2 x 3 min  Gait 60' w/ RW  Transfer to bedside commode x 2  Self-care tasks x 3 min  Scooting in supine x 4    Patient left supine with all lines intact and call button in reach..    GOALS:   Multidisciplinary Problems       Physical Therapy Goals          Problem: Physical Therapy    Goal Priority Disciplines Outcome Goal Variances Interventions   Physical Therapy Goal     PT, PT/OT      Description:   Patient will increase functional independence with mobility by performin. Supine <> sit with Modified Independent  2. Sit <>Stand with Modified Independent with RW  3. Bed to chair transfer with Supervision or Set-up Assistancewith or without rolling walker using Step Transfer TECHNIQUE  4. Gait  x ~100  feet with Standby Assistance with or without rolling walker.  5. Ascend/descend 4 stair steps with rails and with contact guard assistance and cues.  6. Lower extremity exercise program x10 reps per handout, with assistance as needed                          Time Tracking:     PT Received On: 22  PT Start Time: 1040     PT Stop Time: 1105  PT Total Time  (min): 25 min     Billable Minutes: Therapeutic Activity 25    Treatment Type: Treatment  PT/PTA: PT     PTA Visit Number: 2     12/27/2022

## 2022-12-27 NOTE — PLAN OF CARE
VS stable. Pt up with PT and OT.   Dr. Yoon notified that pt has not had BM since 12/20/22. Lactulose ordered and administered.       Problem: Respiratory Compromise (Stroke, Ischemic/Transient Ischemic Attack)  Goal: Effective Oxygenation and Ventilation  Outcome: Ongoing, Progressing     Problem: Functional Ability Impaired (Stroke, Ischemic/Transient Ischemic Attack)  Goal: Optimal Functional Ability  Outcome: Ongoing, Progressing     Problem: Bowel Elimination Impaired (Stroke, Ischemic/Transient Ischemic Attack)  Goal: Effective Bowel Elimination  Outcome: Ongoing, Progressing

## 2022-12-27 NOTE — PLAN OF CARE
Problem: Occupational Therapy  Goal: Occupational Therapy Goal  Description: Pt to perform UB dressing with MOD I seated EOB.  Pt to perform LB dressing with MOD I seated EOB.  Pt to perform self toileting with MOD I using RW and standard commode.  Pt to perform level functional transfers required for ADL's with MOD I, RW level.  Pt to demonstrate consistent adherence to breathing control and energy conservation techniques as educated by OT.  Pt to participate in standing activity for ~3 minutes for increased activity tolerance and safety in ADL.    Outcome: Ongoing, Progressing   Cont with OT POC

## 2022-12-27 NOTE — PLAN OF CARE
Plan of care reviewed with pt. Pt voiced understanding. Pt AAO X 4. Pt denies any c/o during the shift. No apparent distress noted. Fall precautions maintained. Pt remains free of fall or injury. Bed in lowest position, locked, call light within reach, and bed alarm on. Side rails up x's 2 with slip resistant socks on. Pt has not had a bowel movement since 12/20/22.     Problem: Adult Inpatient Plan of Care  Goal: Plan of Care Review  Outcome: Ongoing, Progressing  Flowsheets (Taken 12/27/2022 0519)  Plan of Care Reviewed With: patient  Goal: Absence of Hospital-Acquired Illness or Injury  Outcome: Ongoing, Progressing  Goal: Optimal Comfort and Wellbeing  Outcome: Ongoing, Progressing     Problem: Diabetes Comorbidity  Goal: Blood Glucose Level Within Targeted Range  Outcome: Ongoing, Progressing     Problem: Adjustment to Illness (Stroke, Ischemic/Transient Ischemic Attack)  Goal: Optimal Coping  Outcome: Ongoing, Progressing     Problem: Bowel Elimination Impaired (Stroke, Ischemic/Transient Ischemic Attack)  Goal: Effective Bowel Elimination  Outcome: Ongoing, Progressing     Problem: Cerebral Tissue Perfusion (Stroke, Ischemic/Transient Ischemic Attack)  Goal: Optimal Cerebral Tissue Perfusion  Outcome: Ongoing, Progressing     Problem: Cognitive Impairment (Stroke, Ischemic/Transient Ischemic Attack)  Goal: Optimal Cognitive Function  Outcome: Ongoing, Progressing     Problem: Communication Impairment (Stroke, Ischemic/Transient Ischemic Attack)  Goal: Improved Communication Skills  Outcome: Ongoing, Progressing     Problem: Functional Ability Impaired (Stroke, Ischemic/Transient Ischemic Attack)  Goal: Optimal Functional Ability  Outcome: Ongoing, Progressing     Problem: Respiratory Compromise (Stroke, Ischemic/Transient Ischemic Attack)  Goal: Effective Oxygenation and Ventilation  Outcome: Ongoing, Progressing     Problem: Sensorimotor Impairment (Stroke, Ischemic/Transient Ischemic Attack)  Goal: Improved  Sensorimotor Function  Outcome: Ongoing, Progressing     Problem: Swallowing Impairment (Stroke, Ischemic/Transient Ischemic Attack)  Goal: Optimal Eating and Swallowing without Aspiration  Outcome: Ongoing, Progressing     Problem: Urinary Elimination Impaired (Stroke, Ischemic/Transient Ischemic Attack)  Goal: Effective Urinary Elimination  Outcome: Ongoing, Progressing     Problem: Skin Injury Risk Increased  Goal: Skin Health and Integrity  Outcome: Ongoing, Progressing     Problem: Fall Injury Risk  Goal: Absence of Fall and Fall-Related Injury  Outcome: Ongoing, Progressing

## 2022-12-27 NOTE — PT/OT/SLP PROGRESS
Occupational Therapy   Treatment    Name: Vega Kohler  MRN: [id number]  Admitting Diagnosis:  Cerebrovascular accident (CVA) due to thrombosis of right middle cerebral artery       Recommendations:     Discharge Recommendations: nursing facility, skilled, home with home health, home health PT, home health OT  Discharge Equipment Recommendations:  bath bench  Barriers to discharge:  Decreased caregiver support    Assessment:     Vega Kohler is a 87 y.o. male with a medical diagnosis of Cerebrovascular accident (CVA) due to thrombosis of right middle cerebral artery.  Performance deficits affecting function are weakness, impaired endurance, impaired self care skills, impaired functional mobility, decreased safety awareness, impaired balance.     Rehab Prognosis:  Good; patient would benefit from acute skilled OT services to address these deficits and reach maximum level of function.       Plan:     Patient to be seen 5 x/week to address the above listed problems via self-care/home management, therapeutic activities, therapeutic exercises  Plan of Care Expires: 01/05/23  Plan of Care Reviewed with: patient    Subjective     Pain/Comfort:  Pain Rating 1: 10/10  Location - Side 1: Left  Location - Orientation 1: posterior  Location 1: hip  Pain Addressed 1: Distraction, Reposition, Nurse notified  Pain Rating Post-Intervention 1:  (Pt did not rate of complain with pain throughout tx session)    Objective:     Communicated with: RN prior to session.  Patient found HOB elevated with peripheral IV, telemetry, oxygen upon OT entry to room.    General Precautions: Standard, fall    Orthopedic Precautions:N/A  Braces: N/A  Respiratory Status: Nasal cannula, flow 2 L/min     Occupational Performance:     Bed Mobility:    Patient completed Rolling/Turning to Right with modified independence  Patient completed Scooting/Bridging with modified independence  Patient completed Supine to Sit with modified independence  Patient  completed Sit to Supine with modified independence     Functional Mobility/Transfers:  Patient completed Sit <> Stand Transfer with contact guard assistance  with  rolling walker   Patient completed Bed <> Chair Transfer using Step Transfer technique with contact guard assistance with rolling walker  Functional Mobility: Pt was able to stand from sitting edge of bed and onto RW and ambulate with RW to bedside chair with CGA for safety. Pt was able to stand from chair with Mod I and ambulate back to bed with CGA and hand held assistance.     Activities of Daily Living:  Bathing: stand by assistance Post set up to wash self while sitting in chair as well as applying Deoderant.  Upper Body Dressing: stand by assistance to anali and doff shirt  Lower Body Dressing: stand by assistance to anali and doff socks and slippers      [facility] 6 Click ADL: 19    Treatment & Education:  Pt was able to roll to R side and up to sitting position on edge of bed with Mod I using bed rails. Pt was able to stand from edge of bed and onto rolling walker with CGA. Pt ambulated to bedside chair using RW and CGA and sat with Mod I. Pt washed face and under arms with stand by assistance post set up sitting in chair. Pt was able to stand from chair with CGA and ambulate to bed with hand held assistance. Pt returned to bed in supine position with Mod I using bed rails and was able to scoot self up in bed with Mod I and verbal cues.     Patient left supine with all lines intact, call button in reach, bed alarm on, and RN present    GOALS:   Multidisciplinary Problems       Occupational Therapy Goals          Problem: Occupational Therapy    Goal Priority Disciplines Outcome Interventions   Occupational Therapy Goal     OT, PT/OT Ongoing, Progressing    Description: Pt to perform UB dressing with MOD I seated EOB.  Pt to perform LB dressing with MOD I seated EOB.  Pt to perform self toileting with MOD I using RW and standard commode.  Pt to perform  level functional transfers required for ADL's with MOD I, RW level.  Pt to demonstrate consistent adherence to breathing control and energy conservation techniques as educated by OT.  Pt to participate in standing activity for ~3 minutes for increased activity tolerance and safety in ADL.                         Time Tracking:     OT Date of Treatment: 12/27/22  OT Start Time: 0831  OT Stop Time: 0909  OT Total Time (min): 38 min    Billable Minutes:Self Care/Home Management 8  Therapeutic Activity 30    OT/PEDRO: PEDRO   PEDRO Visit Number: 2    12/27/2022

## 2022-12-28 NOTE — PROGRESS NOTES
Pt was unwilling to participate this date stating that he wanted to sleep. 2 attempts were made and both were unsuccessful.

## 2022-12-28 NOTE — ASSESSMENT & PLAN NOTE
-TTE 12/15/2022 w/ EF 68%  -GDMT when appropriate (will restart slowly with metoprolol Saturday Dec 24th and then loop diuretic on Mon Dec 26th)   -Follow up with PCP / cardiologist in outpatient setting.    12/26: resuming torsemide today per plan. BP stable. Cr with labs in AM to reassess  May have a very mild volume excess so adding diuretic may help hypoxia with exertion    12/28  Appears clinically euvolemic today

## 2022-12-28 NOTE — ASSESSMENT & PLAN NOTE
Stroke risk factor. A1c 6.7.  -BG goal while inpatient 140-180  -Continue LDSSI ACHS PRN   - Stable     Bs 139 this am > Dm diet no meds .    12/26: blood sugars rising to consistently > 299 Add Levemir 10 units QHS to SSI. Cont diabetic diet    12/28   189-327  Increase detemir to 13

## 2022-12-28 NOTE — PT/OT/SLP PROGRESS
Physical Therapy Treatment    Patient Name:  Vega Kohelr   MRN:  [id number]    Recommendations:     Discharge Recommendations: nursing facility, skilled, home with home health, home health PT  Discharge Equipment Recommendations: bath bench  Barriers to discharge: Inaccessible home and Decreased caregiver support    Assessment:     Vega Kohler is a 87 y.o. 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  He presents with the following impairments/functional limitations: weakness, impaired endurance, impaired self care skills, gait instability, impaired balance, decreased lower extremity function, decreased safety awareness, pain.  Pt tolerated treatment session well and displayed increased independence with all gait and transfer tasks.    Rehab Prognosis: Fair; patient would benefit from acute skilled PT services to address these deficits and reach maximum level of function.    Recent Surgery: * No surgery found *      Plan:     During this hospitalization, patient to be seen daily to address the identified rehab impairments via gait training, therapeutic activities, therapeutic exercises and progress toward the following goals:    Plan of Care Expires:  01/05/23    Subjective     Chief Complaint: fatigue  Patient/Family Comments/goals: decrease fatigue  Pain/Comfort:  Pain Rating 1: 0/10      Objective:     Communicated with pt prior to session.  Patient found supine with oxygen, telemetry upon PT entry to room.     General Precautions: Standard, fall  Orthopedic Precautions: N/A  Braces: N/A  Respiratory Status: Nasal cannula, flow 1 L/min     Functional Mobility:  Bed Mobility:     Rolling Right: modified independence  Scooting: modified independence  Supine to Sit: modified independence  Transfers:     Sit to Stand:  supervision with rolling walker  Bed to Chair: stand by assistance with  rolling walker  using  Step Transfer  Toilet  Transfer: stand by assistance with  rolling walker  using  Step Transfer  Gait: CGA 60' w/ RW      AM-PAC 6 CLICK MOBILITY  Turning over in bed (including adjusting bedclothes, sheets and blankets)?: 4  Sitting down on and standing up from a chair with arms (e.g., wheelchair, bedside commode, etc.): 4  Moving from lying on back to sitting on the side of the bed?: 4  Moving to and from a bed to a chair (including a wheelchair)?: 3  Need to walk in hospital room?: 3  Climbing 3-5 steps with a railing?: 2  Basic Mobility Total Score: 20       Treatment & Education:  Supine to sit x 2  Scooting in sitting x 9  Sit to stand x 4  Static standing 2 x 3 min  Gait 70' w/ RW  Transfer to bedside commode x 2  Self-care tasks x 3 min  Transfer to bedside chair    Patient left up in chair with all lines intact and call button in reach..    GOALS:   Multidisciplinary Problems       Physical Therapy Goals          Problem: Physical Therapy    Goal Priority Disciplines Outcome Goal Variances Interventions   Physical Therapy Goal     PT, PT/OT      Description:   Patient will increase functional independence with mobility by performin. Supine <> sit with Modified Independent  2. Sit <>Stand with Modified Independent with RW  3. Bed to chair transfer with Supervision or Set-up Assistancewith or without rolling walker using Step Transfer TECHNIQUE  4. Gait  x ~100  feet with Standby Assistance with or without rolling walker.  5. Ascend/descend 4 stair steps with rails and with contact guard assistance and cues.  6. Lower extremity exercise program x10 reps per handout, with assistance as needed                          Time Tracking:     PT Received On: 22  PT Start Time: 1115     PT Stop Time: 1140  PT Total Time (min): 25 min     Billable Minutes: Therapeutic Activity 25    Treatment Type: Treatment  PT/PTA: PT     PTA Visit Number: 2     2022

## 2022-12-28 NOTE — ASSESSMENT & PLAN NOTE
Creat 1.4 yesterday will check labs Mondays and Thursdays   sCr 1.9 on admission, baseline 1.5-1.9  --Creatinine 1.3 today   --Avoid nephrotoxic agents, renally dose meds when possible.  --Continue to monitor daily CMP.      12/26: repeat labs ordered for AM. Monitor    12/28   BMP  Lab Results   Component Value Date     12/27/2022    K 4.1 12/27/2022     12/27/2022    CO2 27 12/27/2022    BUN 23 12/27/2022    CREATININE 1.5 (H) 12/27/2022    CALCIUM 9.0 12/27/2022    ANIONGAP 10 12/27/2022    EGFRNORACEVR 45 (A) 12/27/2022       Check every other day

## 2022-12-28 NOTE — PLAN OF CARE
Patient resting with no complaints. No BM since 12/20.     Problem: Bowel Elimination Impaired (Stroke, Ischemic/Transient Ischemic Attack)  Goal: Effective Bowel Elimination  Outcome: Ongoing, Not Progressing     Problem: Cognitive Impairment (Stroke, Ischemic/Transient Ischemic Attack)  Goal: Optimal Cognitive Function  Outcome: Ongoing, Progressing     Problem: Adjustment to Illness (Stroke, Ischemic/Transient Ischemic Attack)  Goal: Optimal Coping  Outcome: Ongoing, Progressing     Problem: Diabetes Comorbidity  Goal: Blood Glucose Level Within Targeted Range  Outcome: Ongoing, Progressing     Problem: Adult Inpatient Plan of Care  Goal: Plan of Care Review  Outcome: Ongoing, Progressing  Goal: Optimal Comfort and Wellbeing  Outcome: Ongoing, Progressing  Goal: Readiness for Transition of Care  Outcome: Ongoing, Progressing

## 2022-12-28 NOTE — PT/OT/SLP PROGRESS
Occupational Therapy      Patient Name:  Vega Kohler   MRN:  [id number]    Patient not seen today secondary to Patient unwilling to participate. Will follow-up tomorrow.  Nursing notified.    12/28/2022

## 2022-12-28 NOTE — PROGRESS NOTES
[facility] ([facility])  [facility] Medicine  Progress Note    Patient Name: Vega Kohler  MRN: [id number]  Patient Class: IP- Swing   Admission Date: 2022  Length of Stay: 7 days  Attending Physician: Jennifer Yoon MD  Primary Care Provider: Fabricio Mckeon MD        Subjective:     Principal Problem:Cerebrovascular accident (CVA) due to thrombosis of right middle cerebral artery        HPI:  88yo male patient with extensive medical hx. (Alzheimers, aortic stenosis, arthritis, BPH, CHF, CKD, COPD, CAD/PAD, DM2, HLD/HTN, pulm HTN, S/p TAVR and hypothyroid). [place] [facility] [place] with left hemiplegia s/p TNK 22. Pt was independent at baseline prior to this event. Exam improved following TNK. MRI with R MCA infarct. Etiology likely . Patient stepped down to NPU to await SNF placement. Metoprolol originally started but held given etiology of stroke and risk of expansion with hypoperfusion. Will plan to resume meds this Saturday and then remainder of home BP meds on Monday. Patient was discharged on DAPT with outpatient follow up. Patient remains neurologically unchanged. SBP drop < 120 but no changes on exam. Last BM yesterday. He was brought to [facility] yesterday for continued SKILL therapy with PT / OT. On plavix and statin and asa. VSS- no bp meds 123/64          Overview/Hospital Course:       Vega Kohler is a 87 y.o. male  Admitted to Skilled care yesterday for PT/OT post ischemic CVA wqtih Left hemiplegia.   VSS , aferbile,    Gait: min/contact guard assistance x ~25 feet with RW. Left LE limp due to left lower leg injury . Dec left  foot/floor clearance and fatigues easily.    : SWING admit for PT/OT post CVA with left hemiplegia. Progressing with PT.   Gait: Contact guard assistance x ~75 feet  with RW and O2 SAT monitor.  He is on 1L O2 to maintain sats.   Glucose averaging 200s.     SWING ADMIT FOR CONT PT/OT post CVA with residual left sided  hemiplegia. Progressing well with PT>>stand by assistance with  rolling walker  using  Step Transfer Gait: CGA 60' w/ RW with a goal of 100 ft, he remains on 1L NC sats 94% does have 4 steps to get into home- will need to practice stairs prior to d/c. Lives alone but son lives near by and will help take care of him at d/c      Past Medical History:   Diagnosis Date    Abnormal barium swallow     Alzheimer's dementia, late onset     Anal stenosis     Anticoagulant long-term use     Aortic stenosis     Arthritis     Aspiration pneumonitis     Benign prostatic hyperplasia     Carotid artery disease     CHF (congestive heart failure)     Chronic diastolic heart failure     Chronic kidney disease (CKD), stage III (moderate)     CKD (chronic kidney disease)     Colon polyp     COPD (chronic obstructive pulmonary disease)     Coronary artery disease     Diabetes mellitus, type 2     Diverticulosis     Dysphagia     Hearing aid worn 01/23/2020    Received bilateral hearing aides today    Heart disease     History of CEA (carotid endarterectomy)     [place] (hard of hearing)     Hyperlipidemia     Hypertension     Hypertensive heart disease     Hypothyroidism     Joint disease     Memory loss     PAD (peripheral artery disease)     Presence of drug coated stent in right coronary artery     Pulmonary hypertension     S/P TAVR (transcatheter aortic valve replacement)     Small bowel obstruction     Wears dentures     UPPER AND LOWER    Wears glasses        Past Surgical History:   Procedure Laterality Date    anal fissure repair      ANKLE FUSION Left 08/15/2016    X 2    APPENDECTOMY      BACK SURGERY      X 4    CAROTID ENDARTERECTOMY Right     CAROTID ENDARTERECTOMY Left     CAROTID ENDARTERECTOMY Left 12/3/2021    Procedure: ENDARTERECTOMY-CAROTID;  Surgeon: Tim Castillo MD;  [facility]: [facility];  Service: Cardiology;  Laterality: Left;  pt unsure if he stopped plavix,   Anna ok to proceed    CARPAL TUNNEL RELEASE Right     CHOLECYSTECTOMY      COLONOSCOPY N/A 7/26/2018    Procedure: HIGH RISK SCREENING COLONOSCOPY;  Surgeon: Connor Murrell MD;  Location: [facility] ENDO;  Service: Endoscopy;  Laterality: N/A;    CORONARY ANGIOPLASTY WITH STENT PLACEMENT      ELBOW SURGERY Bilateral     EXCISIONAL HEMORRHOIDECTOMY      x3    EXPLORATORY LAPAROTOMY W/ BOWEL RESECTION  11/17/2011    EYE SURGERY      cataract bilaterally    history of bowel resection      KNEE SURGERY Left     LAMINECTOMY AND MICRODISCECTOMY LUMBAR SPINE  07/21/2011    L5-S1    LEFT HEART CATHETERIZATION Left 8/31/2018    Procedure: Left heart cath;  Surgeon: Rex Chris MD;  Location: [facility] CATH;  Service: Cardiology;  Laterality: Left;    LUMBAR FUSION  11/09/2011    Anterior approach--L5-S1    LUNG BIOPSY Right 07/21/2009    VATS with wedge ofr right lower lobe    LUNG BIOPSY Left 3/16/2020    Procedure: BIOPSY, LUNG;  Surgeon: [facility] Diagnostic Provider;  Location: [facility] OR;  Service: General;  Laterality: Left;    PERCUTANEOUS TRANSCATHETER AORTIC VALVE REPLACEMENT (TAVR) Left 10/30/2018    Procedure: REPLACEMENT, AORTIC VALVE, PERCUTANEOUS, TRANSCATHETER;  Surgeon: Rex Chris MD;  Location: [facility] OR;  Service: Cardiology;  Laterality: Left;    PERCUTANEOUS TRANSCATHETER AORTIC VALVE REPLACEMENT (TAVR)  10/30/2018    Procedure: REPLACEMENT, AORTIC VALVE, PERCUTANEOUS, TRANSCATHETER;  Surgeon: Tim Castillo MD;  Location: [facility] OR;  Service: Cardiovascular;;    RIGHT HEART CATHETERIZATION Right 8/31/2018    Procedure: INSERTION, CATHETER, RIGHT HEART;  Surgeon: Rex Chris MD;  Location: [facility] CATH;  Service: Cardiology;  Laterality: Right;    ROTATOR CUFF REPAIR Left     SINUS SURGERY      SPINE SURGERY      back surgery    TENDON RELEASE Bilateral     TONSILLECTOMY         Review of patient's allergies indicates:   Allergen Reactions    Sulfur Itching     "Penicillins Swelling and Rash       No current facility-administered medications on file prior to encounter.     Current Outpatient Medications on File Prior to Encounter   Medication Sig    albuterol (VENTOLIN HFA) 90 mcg/actuation inhaler Inhale 1-2 puffs into the lungs every 6 (six) hours as needed for Wheezing or Shortness of Breath. Rescue    aspirin 81 MG Chew Take 1 tablet (81 mg total) by mouth once daily.    atorvastatin (LIPITOR) 40 MG tablet Take 1 tablet (40 mg total) by mouth once daily.    clopidogreL (PLAVIX) 75 mg tablet Take 1 tablet (75 mg total) by mouth once daily.    ipratropium (ATROVENT) 42 mcg (0.06 %) nasal spray ipratropium bromide 42 mcg (0.06 %) nasal spray    meclizine (ANTIVERT) 12.5 mg tablet Take 2 tablets (25 mg total) by mouth 3 (three) times daily.    memantine (NAMENDA) 10 MG Tab Take 10 mg by mouth once daily.    metoprolol succinate (TOPROL-XL) 25 MG 24 hr tablet Take 1 tablet (25 mg total) by mouth every evening.    MYRBETRIQ 25 mg Tb24 ER tablet Take 25 mg by mouth once daily. 11/23/20    nitroGLYCERIN (NITROSTAT) 0.4 MG SL tablet Place 1 tablet (0.4 mg total) under the tongue every 5 (five) minutes as needed for Chest pain.    ondansetron (ZOFRAN-ODT) 8 MG TbDL Take 1 tablet (8 mg total) by mouth 3 (three) times daily as needed (for nausea).    pen needle, diabetic 32 gauge x 5/32" Ndle BD Adrienne 2nd Gen Pen Needle 32 gauge x 5/32"   USE 1 PEN NEEDLE ONCE DAILY USE AS DIRECTED    SOLIQUA 100/33 100 unit-33 mcg/mL InPn pen INJECT 30 UNITS SUBCUTANEOUSLY ONCE DAILY    tamsulosin (FLOMAX) 0.4 mg Cap Take 0.4 mg by mouth once daily.    torsemide (DEMADEX) 10 MG Tab Take 1 tablet (10 mg total) by mouth once daily.    UNABLE TO FIND medication name: prevagen extra strenght daily     Family History       Problem Relation (Age of Onset)    Cancer Mother    Diabetes Brother    Heart disease Father, Brother          Tobacco Use    Smoking status: Former     Packs/day: " 1.50     Years: 20.00     Pack years: 30.00     Types: Cigarettes     Start date:      Quit date:      Years since quittin.0    Smokeless tobacco: Never   Substance and Sexual Activity    Alcohol use: No    Drug use: No    Sexual activity: Not on file     Review of Systems   Constitutional:  Negative for chills and fever.   HENT:  Negative for congestion and sore throat.    Respiratory:  Negative for cough and shortness of breath.    Cardiovascular:  Negative for chest pain.   Gastrointestinal:  Negative for abdominal pain, diarrhea, nausea and vomiting.   Genitourinary:  Negative for dysuria and hematuria.   Skin:  Negative for rash.   Neurological:  Positive for weakness. Negative for dizziness, syncope and light-headedness.        Left arm weakness getting better   Objective:     Vital Signs (Most Recent):  Temp: 98 °F (36.7 °C) (22 0718)  Pulse: 76 (22 1000)  Resp: 18 (22 0835)  BP: 133/63 (22 0718)  SpO2: (!) 94 % (22 0718)   Vital Signs (24h Range):  Temp:  [98 °F (36.7 °C)-98.2 °F (36.8 °C)] 98 °F (36.7 °C)  Pulse:  [] 76  Resp:  [17-18] 18  SpO2:  [94 %-98 %] 94 %  BP: (106-133)/(52-63) 133/63     Weight: 69.4 kg (153 lb)  Body mass index is 23.96 kg/m².    Physical Exam  Vitals and nursing note reviewed.   Constitutional:       General: He is not in acute distress.  HENT:      Head: Normocephalic and atraumatic.      Right Ear: External ear normal.      Left Ear: External ear normal.   Eyes:      Conjunctiva/sclera: Conjunctivae normal.      Pupils: Pupils are equal, round, and reactive to light.   Cardiovascular:      Rate and Rhythm: Normal rate and regular rhythm.      Heart sounds: No murmur heard.  Pulmonary:      Effort: Pulmonary effort is normal. No respiratory distress.      Breath sounds: Normal breath sounds.   Abdominal:      General: Bowel sounds are normal. There is no distension.      Palpations: Abdomen is soft.      Tenderness: There is  no abdominal tenderness.   Musculoskeletal:      Cervical back: Neck supple.      Right lower leg: No edema.      Left lower leg: No edema.   Neurological:      General: No focal deficit present.      Mental Status: He is alert and oriented to person, place, and time.      Motor: Weakness present.      Comments: Left arm weakness much improved   Psychiatric:         Mood and Affect: Mood normal.         Behavior: Behavior normal.         CRANIAL NERVES     CN III, IV, VI   Pupils are equal, round, and reactive to light.     Significant Labs: CBC:   Recent Labs   Lab 12/27/22  0602   WBC 5.08   HGB 11.6*   HCT 34.6*          CMP:   Recent Labs   Lab 12/27/22  0602      K 4.1      CO2 27   *   BUN 23   CREATININE 1.5*   CALCIUM 9.0   PROT 6.0   ALBUMIN 2.8*   BILITOT 0.7   ALKPHOS 69   AST 16   ALT 24   ANIONGAP 10         Significant Imaging:     MRI Acute deep white matter infarct on the right without associated hemorrhage or mass effect.  Mild underlying chronic ischemic changes.         Assessment/Plan:      * Cerebrovascular accident (CVA) due to thrombosis of right middle cerebral artery  Cont PT/OT here-progressing   on asa and statin as well as plavix .      BPH (benign prostatic hyperplasia)  Cont flomax .      Dementia  namenda per home routine .      Follicular lymphoma    H/o follicular lymphoma per chart (grade 3a, stage 1); receiving rituxan (cycle 3 in 11/2022), 10/2022 PET scan positive for 3 cm lymphadenopathy in left axillary region otherwise HUSSEIN.        -Will need follow up visit once discharge (per last hem/onc note follow up indicated around 12/17/22, delayed due to current admission for stroke)     Aortic stenosis s/p TAVR    History of aortic stenosis.    Chronic diastolic heart failure, NYHA class 2    -TTE 12/15/2022 w/ EF 68%  -GDMT when appropriate (will restart slowly with metoprolol Saturday Dec 24th and then loop diuretic on Mon Dec 26th)   -Follow up with PCP /  cardiologist in outpatient setting.    12/26: resuming torsemide today per plan. BP stable. Cr with labs in AM to reassess  May have a very mild volume excess so adding diuretic may help hypoxia with exertion    12/28  Appears clinically euvolemic today     Chronic kidney disease, stage 3 (moderate)  Creat 1.4 yesterday will check labs Mondays and Thursdays   sCr 1.9 on admission, baseline 1.5-1.9  --Creatinine 1.3 today   --Avoid nephrotoxic agents, renally dose meds when possible.  --Continue to monitor daily CMP.      12/26: repeat labs ordered for AM. Monitor    12/28   BMP  Lab Results   Component Value Date     12/27/2022    K 4.1 12/27/2022     12/27/2022    CO2 27 12/27/2022    BUN 23 12/27/2022    CREATININE 1.5 (H) 12/27/2022    CALCIUM 9.0 12/27/2022    ANIONGAP 10 12/27/2022    EGFRNORACEVR 45 (A) 12/27/2022       Check every other day     Hypertension  Stroke risk factor.  SBP <180, MAP >65; remains at goal   Avoid hypotension in the setting of small vessel disease   Holding lopressor for now given hypotension during this admission in the setting of an acute stroke, will resume on Sat Dec 24th  Bp today 123/64-HR 84.    12/26: resuming home torsemide per admission plan. BP Stable and will monitor BP and labs    COPD, moderate  Per 9/2022 Pulm note: sats 81% on RA and is noncompliant with home oxygen  SpO2 goal 88-92%, remains at goal with only 0.5 L/min - 1L  Continue supplemental O2 PRN    Continue home meds at discharge .      Carotid artery disease  Stroke risk factor  --S/p L carotid endarterectomy in 12/3/202  --Continue ASA, Plavix, and statin.         Diabetes mellitus, type 2  Stroke risk factor. A1c 6.7.  -BG goal while inpatient 140-180  -Continue LDSSI ACHS PRN   - Stable     Bs 139 this am > Dm diet no meds .    12/26: blood sugars rising to consistently > 299 Add Levemir 10 units QHS to SSI. Cont diabetic diet    12/28   189-327  Increase detemir to 13     Hyperlipidemia  Stroke  risk factor.  , goal <70  Atorvastatin 40mg daily, continue       Hypothyroidism    -Subclinical hypothyroidism; TSH 4.4; fT4 0.95 12/14/2021  -Follow up with PCP  Not on meds .      VTE Risk Mitigation (From admission, onward)         Ordered     enoxaparin injection 40 mg  Daily         12/23/22 1117                Discharge Planning   JOSE A:      Code Status: Full Code   Is the patient medically ready for discharge?:     Reason for patient still in hospital (select all that apply): Treatment  Discharge Plan A: Home with family, Home Health                  Kal Stoddard MD  Department of Hospital Medicine   [place] [facility] Surg (3rd Fl)

## 2022-12-28 NOTE — SUBJECTIVE & OBJECTIVE
Past Medical History:   Diagnosis Date    Abnormal barium swallow     Alzheimer's dementia, late onset     Anal stenosis     Anticoagulant long-term use     Aortic stenosis     Arthritis     Aspiration pneumonitis     Benign prostatic hyperplasia     Carotid artery disease     CHF (congestive heart failure)     Chronic diastolic heart failure     Chronic kidney disease (CKD), stage III (moderate)     CKD (chronic kidney disease)     Colon polyp     COPD (chronic obstructive pulmonary disease)     Coronary artery disease     Diabetes mellitus, type 2     Diverticulosis     Dysphagia     Hearing aid worn 01/23/2020    Received bilateral hearing aides today    Heart disease     History of CEA (carotid endarterectomy)     [place] (hard of hearing)     Hyperlipidemia     Hypertension     Hypertensive heart disease     Hypothyroidism     Joint disease     Memory loss     PAD (peripheral artery disease)     Presence of drug coated stent in right coronary artery     Pulmonary hypertension     S/P TAVR (transcatheter aortic valve replacement)     Small bowel obstruction     Wears dentures     UPPER AND LOWER    Wears glasses        Past Surgical History:   Procedure Laterality Date    anal fissure repair      ANKLE FUSION Left 08/15/2016    X 2    APPENDECTOMY      BACK SURGERY      X 4    CAROTID ENDARTERECTOMY Right     CAROTID ENDARTERECTOMY Left     CAROTID ENDARTERECTOMY Left 12/3/2021    Procedure: ENDARTERECTOMY-CAROTID;  Surgeon: Tim Castillo MD;  Location: [facility] OR;  Service: Cardiology;  Laterality: Left;  pt unsure if he stopped plavix, Dr. Castillo ok to proceed    CARPAL TUNNEL RELEASE Right     CHOLECYSTECTOMY      COLONOSCOPY N/A 7/26/2018    Procedure: HIGH RISK SCREENING COLONOSCOPY;  Surgeon: Connor Murrell MD;  Location: [facility] ENDO;  Service: Endoscopy;  Laterality: N/A;    CORONARY ANGIOPLASTY WITH STENT PLACEMENT      ELBOW SURGERY Bilateral     EXCISIONAL HEMORRHOIDECTOMY      x3     EXPLORATORY LAPAROTOMY W/ BOWEL RESECTION  11/17/2011    EYE SURGERY      cataract bilaterally    history of bowel resection      KNEE SURGERY Left     LAMINECTOMY AND MICRODISCECTOMY LUMBAR SPINE  07/21/2011    L5-S1    LEFT HEART CATHETERIZATION Left 8/31/2018    Procedure: Left heart cath;  Surgeon: Rex Chris MD;  Location: [facility] CATH;  Service: Cardiology;  Laterality: Left;    LUMBAR FUSION  11/09/2011    Anterior approach--L5-S1    LUNG BIOPSY Right 07/21/2009    VATS with wedge ofr right lower lobe    LUNG BIOPSY Left 3/16/2020    Procedure: BIOPSY, LUNG;  Surgeon: [facility] Diagnostic Provider;  Location: [facility] OR;  Service: General;  Laterality: Left;    PERCUTANEOUS TRANSCATHETER AORTIC VALVE REPLACEMENT (TAVR) Left 10/30/2018    Procedure: REPLACEMENT, AORTIC VALVE, PERCUTANEOUS, TRANSCATHETER;  Surgeon: Rex Chris MD;  Location: [facility] OR;  Service: Cardiology;  Laterality: Left;    PERCUTANEOUS TRANSCATHETER AORTIC VALVE REPLACEMENT (TAVR)  10/30/2018    Procedure: REPLACEMENT, AORTIC VALVE, PERCUTANEOUS, TRANSCATHETER;  Surgeon: Tim Castillo MD;  Location: [facility] OR;  Service: Cardiovascular;;    RIGHT HEART CATHETERIZATION Right 8/31/2018    Procedure: INSERTION, CATHETER, RIGHT HEART;  Surgeon: Rex Chris MD;  Location: [facility] CATH;  Service: Cardiology;  Laterality: Right;    ROTATOR CUFF REPAIR Left     SINUS SURGERY      SPINE SURGERY      back surgery    TENDON RELEASE Bilateral     TONSILLECTOMY         Review of patient's allergies indicates:   Allergen Reactions    Sulfur Itching    Penicillins Swelling and Rash       No current facility-administered medications on file prior to encounter.     Current Outpatient Medications on File Prior to Encounter   Medication Sig    albuterol (VENTOLIN HFA) 90 mcg/actuation inhaler Inhale 1-2 puffs into the lungs every 6 (six) hours as needed for Wheezing or Shortness of Breath. Rescue    aspirin 81 MG Chew Take 1 tablet (81 mg  "total) by mouth once daily.    atorvastatin (LIPITOR) 40 MG tablet Take 1 tablet (40 mg total) by mouth once daily.    clopidogreL (PLAVIX) 75 mg tablet Take 1 tablet (75 mg total) by mouth once daily.    ipratropium (ATROVENT) 42 mcg (0.06 %) nasal spray ipratropium bromide 42 mcg (0.06 %) nasal spray    meclizine (ANTIVERT) 12.5 mg tablet Take 2 tablets (25 mg total) by mouth 3 (three) times daily.    memantine (NAMENDA) 10 MG Tab Take 10 mg by mouth once daily.    metoprolol succinate (TOPROL-XL) 25 MG 24 hr tablet Take 1 tablet (25 mg total) by mouth every evening.    MYRBETRIQ 25 mg Tb24 ER tablet Take 25 mg by mouth once daily. 20    nitroGLYCERIN (NITROSTAT) 0.4 MG SL tablet Place 1 tablet (0.4 mg total) under the tongue every 5 (five) minutes as needed for Chest pain.    ondansetron (ZOFRAN-ODT) 8 MG TbDL Take 1 tablet (8 mg total) by mouth 3 (three) times daily as needed (for nausea).    pen needle, diabetic 32 gauge x 5/32" Ndle BD Adrienne 2nd Gen Pen Needle 32 gauge x 5/32"   USE 1 PEN NEEDLE ONCE DAILY USE AS DIRECTED    SOLIQUA 100/33 100 unit-33 mcg/mL InPn pen INJECT 30 UNITS SUBCUTANEOUSLY ONCE DAILY    tamsulosin (FLOMAX) 0.4 mg Cap Take 0.4 mg by mouth once daily.    torsemide (DEMADEX) 10 MG Tab Take 1 tablet (10 mg total) by mouth once daily.    UNABLE TO FIND medication name: prevagen extra strenght daily     Family History       Problem Relation (Age of Onset)    Cancer Mother    Diabetes Brother    Heart disease Father, Brother          Tobacco Use    Smoking status: Former     Packs/day: 1.50     Years: 20.00     Pack years: 30.00     Types: Cigarettes     Start date:      Quit date:      Years since quittin.0    Smokeless tobacco: Never   Substance and Sexual Activity    Alcohol use: No    Drug use: No    Sexual activity: Not on file     Review of Systems   Constitutional:  Negative for chills and fever.   HENT:  Negative for congestion and sore throat.    Respiratory:  " Negative for cough and shortness of breath.    Cardiovascular:  Negative for chest pain.   Gastrointestinal:  Negative for abdominal pain, diarrhea, nausea and vomiting.   Genitourinary:  Negative for dysuria and hematuria.   Skin:  Negative for rash.   Neurological:  Positive for weakness. Negative for dizziness, syncope and light-headedness.        Left arm weakness getting better   Objective:     Vital Signs (Most Recent):  Temp: 98 °F (36.7 °C) (12/28/22 0718)  Pulse: 76 (12/28/22 1000)  Resp: 18 (12/28/22 0835)  BP: 133/63 (12/28/22 0718)  SpO2: (!) 94 % (12/28/22 0718)   Vital Signs (24h Range):  Temp:  [98 °F (36.7 °C)-98.2 °F (36.8 °C)] 98 °F (36.7 °C)  Pulse:  [] 76  Resp:  [17-18] 18  SpO2:  [94 %-98 %] 94 %  BP: (106-133)/(52-63) 133/63     Weight: 69.4 kg (153 lb)  Body mass index is 23.96 kg/m².    Physical Exam  Vitals and nursing note reviewed.   Constitutional:       General: He is not in acute distress.  HENT:      Head: Normocephalic and atraumatic.      Right Ear: External ear normal.      Left Ear: External ear normal.   Eyes:      Conjunctiva/sclera: Conjunctivae normal.      Pupils: Pupils are equal, round, and reactive to light.   Cardiovascular:      Rate and Rhythm: Normal rate and regular rhythm.      Heart sounds: No murmur heard.  Pulmonary:      Effort: Pulmonary effort is normal. No respiratory distress.      Breath sounds: Normal breath sounds.   Abdominal:      General: Bowel sounds are normal. There is no distension.      Palpations: Abdomen is soft.      Tenderness: There is no abdominal tenderness.   Musculoskeletal:      Cervical back: Neck supple.      Right lower leg: No edema.      Left lower leg: No edema.   Neurological:      General: No focal deficit present.      Mental Status: He is alert and oriented to person, place, and time.      Motor: Weakness present.      Comments: Left arm weakness much improved   Psychiatric:         Mood and Affect: Mood normal.          Behavior: Behavior normal.         CRANIAL NERVES     CN III, IV, VI   Pupils are equal, round, and reactive to light.     Significant Labs: CBC:   Recent Labs   Lab 12/27/22  0602   WBC 5.08   HGB 11.6*   HCT 34.6*          CMP:   Recent Labs   Lab 12/27/22  0602      K 4.1      CO2 27   *   BUN 23   CREATININE 1.5*   CALCIUM 9.0   PROT 6.0   ALBUMIN 2.8*   BILITOT 0.7   ALKPHOS 69   AST 16   ALT 24   ANIONGAP 10         Significant Imaging:     MRI Acute deep white matter infarct on the right without associated hemorrhage or mass effect.  Mild underlying chronic ischemic changes.

## 2022-12-29 PROBLEM — E43 SEVERE MALNUTRITION: Status: ACTIVE | Noted: 2022-01-01

## 2022-12-29 NOTE — PROGRESS NOTES
[facility] - Med Surg (3rd Fl)  Adult Nutrition  Progress Note    SUMMARY       Recommendations    Recommendation/Intervention:   1. Rec'd continue diabetic and cardiac diet.   2. Rec'd encouraging Boost Glucose Control Chocolate provided during mealtimes for decreased PO intake, decreased appetite, and increased nutritional needs.   3. RD to follow and make rec's accordingly.    Goals: Pt will meet % EEN by RD follow up.  Nutrition Goal Status: new  Communication of RD Recs: other (comment) (POC)    Assessment and Plan    Nutrition Problem  Severe malnutrition in the context of acute on chronic illness    Related to (etiology):   Inadequate oral intake, decreased appetite, and increased nutritional needs s/t follicular lymphoma and radiation tx    Signs and Symptoms (as evidenced by):   < 75% EER in > 1 month, 7.9% weight loss in 1 month, moderate fat loss, and moderate muscle wasting    Interventions/Recommendations (treatment strategy):  Recommendation/Intervention:   1. Rec'd continue diabetic and cardiac diet.   2. Rec'd encouraging Boost Glucose Control Chocolate provided during mealtimes for decreased PO intake, decreased appetite, and increased nutritional needs.   3. RD to follow and make rec's accordingly.    Goals: Pt will meet % EEN by RD follow up.  Nutrition Goal Status: new    Nutrition Diagnosis Status:   New        Malnutrition Assessment  Malnutrition Type: acute illness or injury  Energy Intake: moderate energy intake  Eyes (Micronutrient): eyelid(s) pallor  Teeth (Micronutrient): other (see comments) (pt is edentulous)  Tongue (Micronutrient): fissured   Micronutrient Evaluation: suspected deficiency  Micronutrient Evaluation Comments: anemia of neoplastic disease noted in non-problem hospital list   Weight Loss (Malnutrition): greater than 5% in 1 month  Energy Intake (Malnutrition): less than 75% for greater than or equal to 1 month  Subcutaneous Fat (Malnutrition): moderate  depletion  Muscle Mass (Malnutrition): moderate depletion   Orbital Region (Subcutaneous Fat Loss): moderate depletion  Upper Arm Region (Subcutaneous Fat Loss): moderate depletion   [place] (Muscle Loss): moderate depletion  Clavicle Bone Region (Muscle Loss): mild depletion  Clavicle and Acromion Bone Region (Muscle Loss): mild depletion  Dorsal Hand (Muscle Loss): moderate depletion       Subcutaneous Fat Loss (Final Summary): severe protein-calorie malnutrition  Muscle Loss Evaluation (Final Summary): severe protein-calorie malnutrition    Severe Weight Loss (Malnutrition): greater than 5% in 1 month    Reason for Assessment    Reason For Assessment: length of stay (LOS 8)  Diagnosis: stroke/CVA  Relevant Medical History: alzheimer's, CHF, CKD3, diverticulosis, dysphagia, [place], HTN, HLD,COPD, CAD, anal stenosis  Interdisciplinary Rounds: did not attend  General Information Comments: Pt with diabetic and cardiac diet. Pt's neice/neighbor present at the bedside. Wt hx per chart states pt lost a significant amount of weight in one month. Pt states his appetite is inadequate and hasn't been eating well at home. States he was consuming Boost at home in the past but states it was too expensive to continue purchasing. Says he is willing to try Boost Glucose Control Chocolate. Pt has increased nutritional needs s/t lymphoma and radiation treatments. 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 Not meeting EEN at this time. NFPE performed showed signs of malnutrition. States he is dealing with constipation but did have a BM today. Will continue to monitor for any nutrition-related changes.  Nutrition Discharge Planning: Continue diabetic and cardiac diet with Boost Glucose Control Chocolate TID upon discharge.    Nutrition Risk Screen    Nutrition Risk Screen: unintentional loss of 10 lbs or more in the past 2 months (> 10# in 1 month)    Nutrition/Diet History    Spiritual, Cultural Beliefs, Muslim  "Practices, Values that Affect Care: no  Food Allergies: FA    Anthropometrics    Temp: 98.8 °F (37.1 °C)  Height Method: Stated  Height: 5' 7" (170.2 cm)  Height (inches): 67 in  Weight Method: Bed Scale  Weight: 68 kg (149 lb 14.6 oz)  Weight (lb): 149.91 lb  Ideal Body Weight (IBW), Male: 148 lb  % Ideal Body Weight, Male (lb): 103.08 %  BMI (Calculated): 23.5  BMI Grade: 18.5-24.9 - normal  Weight Loss: unintentional (7.9% in one month)  Usual Body Weight (UBW), k.85 kg (per chart review on 11/3/2022)  Weight Change Amount: 12 lb 12.8 oz (in one month)  % Usual Body Weight: 92.27       Lab/Procedures/Meds    Pertinent Labs Reviewed: reviewed  Pertinent Labs Comments: RBC: 3.33 (L), H&H: 11/33 (L), BUN: 30 (H), creatinine: 1.8 (H), glucose: 198-237; most recent HgbA1C: 6.7%  Pertinent Medications Reviewed: reviewed  Pertinent Medications Comments: statin, insulin, lactulose, torsemide    Physical Findings/Assessment         Estimated/Assessed Needs    Weight Used For Calorie Calculations: 68 kg (149 lb 14.6 oz)  Energy Calorie Requirements (kcal): 0881-7378  Energy Need Method: Kcal/kg (25-30 kcal for cancer)  Protein Requirements: 68-85 (1-1.25 g/kg/day)  Weight Used For Protein Calculations: 68 kg (149 lb 14.6 oz)  Fluid Requirements (mL): 1700  Estimated Fluid Requirement Method: RDA Method  RDA Method (mL): 1700  CHO Requirement: 210-257 (45-55% of total kcals)      Nutrition Prescription Ordered    Current Diet Order: diabetic; cardiac  Oral Nutrition Supplement: Boost Glucose Control Chocolate    Evaluation of Received Nutrient/Fluid Intake    % Kcal Needs: 50-75% (yesterday)  % Protein Needs: % (yesterday)  Energy Calories Required: not meeting needs  Protein Required: meeting needs  Tolerance: tolerating  % Intake of Estimated Energy Needs: 50 - 75 %  % Meal Intake: 50 - 75 %    Nutrition Risk    Level of Risk/Frequency of Follow-up: moderate - high (2 x per week)     Monitor and " Evaluation    Food and Nutrient Intake: energy intake, food and beverage intake  Food and Nutrient Adminstration: diet order  Knowledge/Beliefs/Attitudes: food and nutrition knowledge/skill, beliefs and attitudes  Physical Activity and Function: nutrition-related ADLs and IADLs, factors affecting access to physical activity  Anthropometric Measurements: height/length, weight, weight change, body mass index  Biochemical Data, Medical Tests and Procedures: electrolyte and renal panel, gastrointestinal profile, glucose/endocrine profile, lipid profile, inflammatory profile  Nutrition-Focused Physical Findings: overall appearance, extremities, muscles and bones, head and eyes, skin     Nutrition Follow-Up    RD Follow-up?: Yes

## 2022-12-29 NOTE — PLAN OF CARE
Recommendation/Intervention:   1. Rec'd continue diabetic and cardiac diet.   2. Rec'd encouraging Boost Glucose Control Chocolate provided during mealtimes for decreased PO intake, decreased appetite, and increased nutritional needs.   3. RD to follow and make rec's accordingly.    Goals: Pt will meet % EEN by RD follow up.  Nutrition Goal Status: new

## 2022-12-29 NOTE — NURSING
Assisted patient to stand and void; patient urinated 300 mls of jaye urine. Pt then assisted to bedside commode to attempt bowel movement.

## 2022-12-29 NOTE — PT/OT/SLP PROGRESS
Physical Therapy Treatment    Patient Name:  Vega Kohler   MRN:  [id number]    Recommendations:     Discharge Recommendations: nursing facility, skilled, home with home health, home health PT  Discharge Equipment Recommendations: bath bench  Barriers to discharge: Inaccessible home and Decreased caregiver support    Assessment:     Vega Kohler is a 87 y.o. 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  He presents with the following impairments/functional limitations: weakness, impaired endurance, impaired self care skills, gait instability, impaired balance, decreased lower extremity function, decreased safety awareness, pain.  Pt tolerated treatment session well and displayed increased independence with all gait and transfer tasks.    Rehab Prognosis: Fair; patient would benefit from acute skilled PT services to address these deficits and reach maximum level of function.    Recent Surgery: * No surgery found *      Plan:     During this hospitalization, patient to be seen daily to address the identified rehab impairments via gait training, therapeutic activities, therapeutic exercises and progress toward the following goals:    Plan of Care Expires:  01/05/23    Subjective     Chief Complaint: fatigue  Patient/Family Comments/goals: decrease fatigue  Pain/Comfort:  Pain Rating 1: 0/10      Objective:     Communicated with pt prior to session.  Patient found supine with oxygen, telemetry upon PT entry to room.     General Precautions: Standard, fall  Orthopedic Precautions: N/A  Braces: N/A  Respiratory Status: Nasal cannula, flow 1 L/min     Functional Mobility:  Bed Mobility:     Rolling Left:  contact guard assistance  Rolling Right: modified independence  Scooting: modified independence  Supine to Sit: modified independence  Sit to Supine: modified independence  Transfers:     Sit to Stand:  supervision with rolling walker  Bed to  Chair: stand by assistance with  rolling walker  using  Step Transfer  Toilet Transfer: stand by assistance with  rolling walker  using  Step Transfer  Gait: CGA 70' w/ RW      AM-PAC 6 CLICK MOBILITY  Turning over in bed (including adjusting bedclothes, sheets and blankets)?: 4  Sitting down on and standing up from a chair with arms (e.g., wheelchair, bedside commode, etc.): 4  Moving from lying on back to sitting on the side of the bed?: 4  Moving to and from a bed to a chair (including a wheelchair)?: 3  Need to walk in hospital room?: 3  Climbing 3-5 steps with a railing?: 2  Basic Mobility Total Score: 20       Treatment & Education:  Supine to sit x 2  Scooting in sitting x 9  Sit to stand x 4  Static standing 2 x 3 min  Gait 70' w/ RW  Transfer to bedside commode x 2  Self-care tasks x 3 min    Patient left supine with all lines intact and call button in reach..    GOALS:   Multidisciplinary Problems       Physical Therapy Goals          Problem: Physical Therapy    Goal Priority Disciplines Outcome Goal Variances Interventions   Physical Therapy Goal     PT, PT/OT      Description:   Patient will increase functional independence with mobility by performin. Supine <> sit with Modified Independent  2. Sit <>Stand with Modified Independent with RW  3. Bed to chair transfer with Supervision or Set-up Assistancewith or without rolling walker using Step Transfer TECHNIQUE  4. Gait  x ~100  feet with Standby Assistance with or without rolling walker.  5. Ascend/descend 4 stair steps with rails and with contact guard assistance and cues.  6. Lower extremity exercise program x10 reps per handout, with assistance as needed                          Time Tracking:     PT Received On: 22  PT Start Time: 1005     PT Stop Time: 1030  PT Total Time (min): 25 min     Billable Minutes: Therapeutic Activity 25    Treatment Type: Treatment  PT/PTA: PT     PTA Visit Number: 2     2022

## 2022-12-29 NOTE — PROGRESS NOTES
Pharmacist Renal Dose Adjustment Note    Vega Kohler is a 87 y.o. male being treated with the medication Lovenox    Patient Data:    Vital Signs (Most Recent):  Temp: 97.9 °F (36.6 °C) (12/28/22 1921)  Pulse: 85 (12/29/22 0600)  Resp: 18 (12/28/22 1933)  BP: 128/61 (12/28/22 1921)  SpO2: 96 % (12/29/22 0700) Vital Signs (72h Range):  Temp:  [97.7 °F (36.5 °C)-98.6 °F (37 °C)]   Pulse:  []   Resp:  [16-20]   BP: (104-137)/(51-65)   SpO2:  [93 %-98 %]      Recent Labs   Lab 12/25/22  0612 12/27/22  0602 12/29/22  0554   CREATININE 1.4 1.5* 1.8*     Serum creatinine: 1.8 mg/dL (H) 12/29/22 0554  Estimated creatinine clearance: 27 mL/min (A)    Medication:Lovenox dose: 40mg frequency Q24 will be changed to medication:Lovenox dose:30mg frequency:Q24    Pharmacist's Name: Yesi Perry  Pharmacist's Extension: [phone]

## 2022-12-29 NOTE — PLAN OF CARE
Back pain managed with norco PRN. Blood sugar monitored and maintained with insulin. Lactulose given PRN for constipation.       Problem: Adult Inpatient Plan of Care  Goal: Plan of Care Review  Outcome: Ongoing, Progressing  Goal: Patient-Specific Goal (Individualized)  Outcome: Ongoing, Progressing  Goal: Absence of Hospital-Acquired Illness or Injury  Outcome: Ongoing, Progressing  Goal: Optimal Comfort and Wellbeing  Outcome: Ongoing, Progressing  Goal: Readiness for Transition of Care  Outcome: Ongoing, Progressing     Problem: Diabetes Comorbidity  Goal: Blood Glucose Level Within Targeted Range  Outcome: Ongoing, Progressing     Problem: Adjustment to Illness (Stroke, Ischemic/Transient Ischemic Attack)  Goal: Optimal Coping  Outcome: Ongoing, Progressing     Problem: Bowel Elimination Impaired (Stroke, Ischemic/Transient Ischemic Attack)  Goal: Effective Bowel Elimination  Outcome: Ongoing, Progressing     Problem: Cerebral Tissue Perfusion (Stroke, Ischemic/Transient Ischemic Attack)  Goal: Optimal Cerebral Tissue Perfusion  Outcome: Ongoing, Progressing     Problem: Cognitive Impairment (Stroke, Ischemic/Transient Ischemic Attack)  Goal: Optimal Cognitive Function  Outcome: Ongoing, Progressing     Problem: Communication Impairment (Stroke, Ischemic/Transient Ischemic Attack)  Goal: Improved Communication Skills  Outcome: Ongoing, Progressing     Problem: Functional Ability Impaired (Stroke, Ischemic/Transient Ischemic Attack)  Goal: Optimal Functional Ability  Outcome: Ongoing, Progressing     Problem: Respiratory Compromise (Stroke, Ischemic/Transient Ischemic Attack)  Goal: Effective Oxygenation and Ventilation  Outcome: Ongoing, Progressing     Problem: Sensorimotor Impairment (Stroke, Ischemic/Transient Ischemic Attack)  Goal: Improved Sensorimotor Function  Outcome: Ongoing, Progressing     Problem: Swallowing Impairment (Stroke, Ischemic/Transient Ischemic Attack)  Goal: Optimal Eating and  Swallowing without Aspiration  Outcome: Ongoing, Progressing     Problem: Urinary Elimination Impaired (Stroke, Ischemic/Transient Ischemic Attack)  Goal: Effective Urinary Elimination  Outcome: Ongoing, Progressing     Problem: Skin Injury Risk Increased  Goal: Skin Health and Integrity  Outcome: Ongoing, Progressing     Problem: Fall Injury Risk  Goal: Absence of Fall and Fall-Related Injury  Outcome: Ongoing, Progressing

## 2022-12-29 NOTE — PT/OT/SLP PROGRESS
Occupational Therapy   Treatment    Name: Vega Kohler  MRN: [id number]  Admitting Diagnosis:  Cerebrovascular accident (CVA) due to thrombosis of right middle cerebral artery       Recommendations:     Discharge Recommendations: nursing facility, skilled, home with home health, home health PT, home health OT  Discharge Equipment Recommendations:  bath bench  Barriers to discharge:  Decreased caregiver support    Assessment:     Vega Kohler is a 87 y.o. male with a medical diagnosis of Cerebrovascular accident (CVA) due to thrombosis of right middle cerebral artery.  Performance deficits affecting function are weakness, impaired endurance, impaired self care skills, impaired functional mobility, gait instability, impaired balance, decreased coordination, decreased upper extremity function, decreased safety awareness, pain.     Rehab Prognosis:  Good; patient would benefit from acute skilled OT services to address these deficits and reach maximum level of function.       Plan:     Patient to be seen 5 x/week to address the above listed problems via self-care/home management, therapeutic activities, therapeutic exercises  Plan of Care Expires: 01/05/23  Plan of Care Reviewed with: patient    Subjective     Pain/Comfort:  Pain Rating 1: 2/10  Location - Side 1: Bilateral  Location - Orientation 1: lower  Location 1: back  Pain Addressed 1: Reposition, Distraction  Pain Rating Post-Intervention 1: 2/10    Objective:     Communicated with: RN prior to session.  Patient found HOB elevated with oxygen, telemetry upon OT entry to room.    General Precautions: Standard, fall    Orthopedic Precautions:N/A  Braces: N/A  Respiratory Status: Nasal cannula, flow 2 L/min     Occupational Performance:     Activities of Daily Living:  Grooming: supervision for cleaning dentures, brushing gums and washing face.      [facility] 6 Click ADL: 20    Treatment & Education:  Pt was sitting in bed eating upon entering room. Pt was able to wash  face, and clean dentures as well as gums with supervision. Pt requires verbal cues to use L hand for bilateral tasks such as opening toothpaste, reaching for items and cleaning dentures. Pt required frequent rest breaks stating that the activities were making him tired.    Patient left HOB elevated with all lines intact, call button in reach, bed alarm on, and RN notified    GOALS:   Multidisciplinary Problems       Occupational Therapy Goals          Problem: Occupational Therapy    Goal Priority Disciplines Outcome Interventions   Occupational Therapy Goal     OT, PT/OT Ongoing, Progressing    Description: Pt to perform UB dressing with MOD I seated EOB.  Pt to perform LB dressing with MOD I seated EOB.  Pt to perform self toileting with MOD I using RW and standard commode.  Pt to perform level functional transfers required for ADL's with MOD I, RW level.  Pt to demonstrate consistent adherence to breathing control and energy conservation techniques as educated by OT.  Pt to participate in standing activity for ~3 minutes for increased activity tolerance and safety in ADL.                         Time Tracking:     OT Date of Treatment: 12/29/22  OT Start Time: 0820  OT Stop Time: 0848  OT Total Time (min): 28 min    Billable Minutes:Self Care/Home Management 28    OT/PEDRO: PEDRO     PEDRO Visit Number: 4    12/29/2022

## 2022-12-29 NOTE — PLAN OF CARE
Blood sugars monitored and maintained with insulin. Patient ambulated with standby assistance. Patient resting comfortable in bed. Patient denies any pain and SOB.       Problem: Adult Inpatient Plan of Care  Goal: Plan of Care Review  Outcome: Ongoing, Progressing  Goal: Patient-Specific Goal (Individualized)  Outcome: Ongoing, Progressing  Goal: Absence of Hospital-Acquired Illness or Injury  Outcome: Ongoing, Progressing  Goal: Optimal Comfort and Wellbeing  Outcome: Ongoing, Progressing  Goal: Readiness for Transition of Care  Outcome: Ongoing, Progressing     Problem: Diabetes Comorbidity  Goal: Blood Glucose Level Within Targeted Range  Outcome: Ongoing, Progressing     Problem: Adjustment to Illness (Stroke, Ischemic/Transient Ischemic Attack)  Goal: Optimal Coping  Outcome: Ongoing, Progressing     Problem: Bowel Elimination Impaired (Stroke, Ischemic/Transient Ischemic Attack)  Goal: Effective Bowel Elimination  Outcome: Ongoing, Progressing     Problem: Cerebral Tissue Perfusion (Stroke, Ischemic/Transient Ischemic Attack)  Goal: Optimal Cerebral Tissue Perfusion  Outcome: Ongoing, Progressing     Problem: Cognitive Impairment (Stroke, Ischemic/Transient Ischemic Attack)  Goal: Optimal Cognitive Function  Outcome: Ongoing, Progressing     Problem: Communication Impairment (Stroke, Ischemic/Transient Ischemic Attack)  Goal: Improved Communication Skills  Outcome: Ongoing, Progressing     Problem: Functional Ability Impaired (Stroke, Ischemic/Transient Ischemic Attack)  Goal: Optimal Functional Ability  Outcome: Ongoing, Progressing     Problem: Respiratory Compromise (Stroke, Ischemic/Transient Ischemic Attack)  Goal: Effective Oxygenation and Ventilation  Outcome: Ongoing, Progressing     Problem: Sensorimotor Impairment (Stroke, Ischemic/Transient Ischemic Attack)  Goal: Improved Sensorimotor Function  Outcome: Ongoing, Progressing     Problem: Swallowing Impairment (Stroke, Ischemic/Transient Ischemic  Attack)  Goal: Optimal Eating and Swallowing without Aspiration  Outcome: Ongoing, Progressing     Problem: Urinary Elimination Impaired (Stroke, Ischemic/Transient Ischemic Attack)  Goal: Effective Urinary Elimination  Outcome: Ongoing, Progressing     Problem: Skin Injury Risk Increased  Goal: Skin Health and Integrity  Outcome: Ongoing, Progressing     Problem: Fall Injury Risk  Goal: Absence of Fall and Fall-Related Injury  Outcome: Ongoing, Progressing

## 2022-12-30 NOTE — ASSESSMENT & PLAN NOTE
Stroke risk factor.  SBP <180, MAP >65; remains at goal   Avoid hypotension in the setting of small vessel disease   Holding lopressor for now given hypotension during this admission in the setting of an acute stroke, will resume on Sat Dec 24th  Bp today 123/64-HR 84.    12/26: resuming home torsemide per admission plan. BP Stable and will monitor BP and labs    12/30 > hold torsemide as creat climbed >  BP 97//67.

## 2022-12-30 NOTE — PT/OT/SLP PROGRESS
Physical Therapy Treatment    Patient Name:  Vega Kohler   MRN:  [id number]    Recommendations:     Discharge Recommendations: nursing facility, skilled, home with home health, home health PT  Discharge Equipment Recommendations: bath bench  Barriers to discharge: Inaccessible home and Decreased caregiver support    Assessment:     Vega Kohler is a 87 y.o. 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  He presents with the following impairments/functional limitations: weakness, impaired endurance, impaired self care skills, gait instability, impaired balance, decreased lower extremity function, decreased safety awareness, pain.  Pt tolerated treatment session well and displayed increased independence with all gait and transfer tasks.    Rehab Prognosis: Fair; patient would benefit from acute skilled PT services to address these deficits and reach maximum level of function.    Recent Surgery: * No surgery found *      Plan:     During this hospitalization, patient to be seen daily to address the identified rehab impairments via gait training, therapeutic activities, therapeutic exercises and progress toward the following goals:    Plan of Care Expires:  01/05/23    Subjective     Chief Complaint: fatigue  Patient/Family Comments/goals: decrease fatigue  Pain/Comfort:  Pain Rating 1: 0/10      Objective:     Communicated with pt prior to session.  Patient found supine with oxygen, telemetry upon PT entry to room.     General Precautions: Standard, fall  Orthopedic Precautions: N/A  Braces: N/A  Respiratory Status: Nasal cannula, flow 1 L/min     Functional Mobility:  Bed Mobility:     Rolling Left:  contact guard assistance  Rolling Right: modified independence  Scooting: modified independence  Supine to Sit: modified independence  Sit to Supine: modified independence  Transfers:     Sit to Stand:  supervision with rolling walker  Bed to  Chair: stand by assistance with  rolling walker  using  Step Transfer  Toilet Transfer: stand by assistance with  rolling walker  using  Step Transfer  Gait: CGA 70' w/ RW      AM-PAC 6 CLICK MOBILITY  Turning over in bed (including adjusting bedclothes, sheets and blankets)?: 4  Sitting down on and standing up from a chair with arms (e.g., wheelchair, bedside commode, etc.): 4  Moving from lying on back to sitting on the side of the bed?: 4  Moving to and from a bed to a chair (including a wheelchair)?: 3  Need to walk in hospital room?: 3  Climbing 3-5 steps with a railing?: 2  Basic Mobility Total Score: 20       Treatment & Education:  Supine to sit x 2  Scooting in sitting x 9  Sit to stand x 4  Static standing 2 x 3 min  Gait 70' w/ RW  Transfer to bedside commode x 2  Self-care tasks x 3 min    Patient left supine with all lines intact and call button in reach..    GOALS:   Multidisciplinary Problems       Physical Therapy Goals          Problem: Physical Therapy    Goal Priority Disciplines Outcome Goal Variances Interventions   Physical Therapy Goal     PT, PT/OT      Description:   Patient will increase functional independence with mobility by performin. Supine <> sit with Modified Independent  2. Sit <>Stand with Modified Independent with RW  3. Bed to chair transfer with Supervision or Set-up Assistancewith or without rolling walker using Step Transfer TECHNIQUE  4. Gait  x ~100  feet with Standby Assistance with or without rolling walker.  5. Ascend/descend 4 stair steps with rails and with contact guard assistance and cues.  6. Lower extremity exercise program x10 reps per handout, with assistance as needed                          Time Tracking:     PT Received On: 22  PT Start Time: 1027     PT Stop Time: 1050  PT Total Time (min): 23 min     Billable Minutes: Therapeutic Activity 23    Treatment Type: Treatment  PT/PTA: PT     PTA Visit Number: 2     2022

## 2022-12-30 NOTE — ASSESSMENT & PLAN NOTE
Stroke risk factor. A1c 6.7.  -BG goal while inpatient 140-180  -Continue LDSSI ACHS PRN   - Stable     Bs 139 this am > Dm diet no meds .    12/26: blood sugars rising to consistently > 299 Add Levemir 10 units QHS to SSI. Cont diabetic diet    12/28   189-327  Increase detemir to 13     12/30 detemir 13 units and SSI

## 2022-12-30 NOTE — ASSESSMENT & PLAN NOTE
Creat 1.4 yesterday will check labs Mondays and Thursdays   sCr 1.9 on admission, baseline 1.5-1.9  --Creatinine 1.3 today   --Avoid nephrotoxic agents, renally dose meds when possible.  --Continue to monitor daily CMP.      12/26: repeat labs ordered for AM. Monitor    12/30   BMP  Lab Results   Component Value Date     (L) 12/30/2022    K 4.0 12/30/2022    CL 98 12/30/2022    CO2 28 12/30/2022    BUN 28 (H) 12/30/2022    CREATININE 1.6 (H) 12/30/2022    CALCIUM 9.2 12/30/2022    ANIONGAP 9 12/30/2022    EGFRNORACEVR 41 (A) 12/30/2022       Check every other day

## 2022-12-30 NOTE — PROGRESS NOTES
[facility] ([facility])  [facility] Medicine  Progress Note    Patient Name: Vega Kohler  MRN: [id number]  Patient Class: IP- Swing   Admission Date: 2022  Length of Stay: 9 days  Attending Physician: Jennifer Yoon MD  Primary Care Provider: Fabricio Mckeon MD        Subjective:     Principal Problem:Cerebrovascular accident (CVA) due to thrombosis of right middle cerebral artery        HPI:  88yo male patient with extensive medical hx. (Alzheimers, aortic stenosis, arthritis, BPH, CHF, CKD, COPD, CAD/PAD, DM2, HLD/HTN, pulm HTN, S/p TAVR and hypothyroid). [place] [facility] [place] with left hemiplegia s/p TNK 22. Pt was independent at baseline prior to this event. Exam improved following TNK. MRI with R MCA infarct. Etiology likely . Patient stepped down to NPU to await SNF placement. Metoprolol originally started but held given etiology of stroke and risk of expansion with hypoperfusion. Will plan to resume meds this Saturday and then remainder of home BP meds on Monday. Patient was discharged on DAPT with outpatient follow up. Patient remains neurologically unchanged. SBP drop < 120 but no changes on exam. Last BM yesterday. He was brought to [facility] yesterday for continued SKILL therapy with PT / OT. On plavix and statin and asa. VSS- no bp meds 123/64          Overview/Hospital Course:       Vega Kohler is a 87 y.o. male  Admitted to Skilled care yesterday for PT/OT post ischemic CVA wqtih Left hemiplegia.   VSS , aferbile,    Gait: min/contact guard assistance x ~25 feet with RW. Left LE limp due to left lower leg injury . Dec left  foot/floor clearance and fatigues easily.    : SWING admit for PT/OT post CVA with left hemiplegia. Progressing with PT.   Gait: Contact guard assistance x ~75 feet  with RW and O2 SAT monitor.  He is on 1L O2 to maintain sats.   Glucose averaging 200s.     SWING ADMIT FOR CONT PT/OT post CVA with residual left sided  hemiplegia. Progressing well with PT>>stand by assistance with  rolling walker  using  Step Transfer Gait: CGA 60' w/ RW with a goal of 100 ft, he remains on 1L NC sats 94% does have 4 steps to get into home- will need to practice stairs prior to d/c. Lives alone but son lives near by and will help take care of him at d/c    12/30 SWING admit for continued PT/OT for post CVA with residual left sided hemiplegia. He is ambulating 70ft stand by assist using RW> goal is 100ft but also needs to ascend and descend steps to get into home- (4).     Bp is a little low today. Torsemide was started Monday. Held yesterday for creat climbing was 1.8 yesterday - better today 1.6 (baseline 1.4)bp 102/59 this am. POX 86% on RA- has O2 but blowing to top of nose. He report that it dries him. Added humidification today. Has home O2 for use as needed for COPD           Review of Systems   Constitutional:  Negative for chills and fever.   HENT:  Negative for congestion and sore throat.    Respiratory:  Negative for cough and shortness of breath.    Cardiovascular:  Negative for chest pain.   Gastrointestinal:  Negative for abdominal pain, diarrhea, nausea and vomiting.   Genitourinary:  Negative for dysuria and hematuria.   Skin:  Negative for rash.        Lip bleeding   Neurological:  Positive for weakness. Negative for dizziness, syncope and light-headedness.        Left arm weakness getting better   Objective:     Vital Signs (Most Recent):  Temp: 99.1 °F (37.3 °C) (12/30/22 0702)  Pulse: 73 (12/30/22 0702)  Resp: 16 (12/30/22 0702)  BP: (!) 97/52 (12/30/22 0702)  SpO2: (!) 93 % (12/30/22 0709)   Vital Signs (24h Range):  Temp:  [98.3 °F (36.8 °C)-99.1 °F (37.3 °C)] 99.1 °F (37.3 °C)  Pulse:  [73-85] 73  Resp:  [16-18] 16  SpO2:  [93 %-95 %] 93 %  BP: ()/(52-55) 97/52     Weight: 68.6 kg (151 lb 3.8 oz)  Body mass index is 23.69 kg/m².    Physical Exam  Vitals and nursing note reviewed.   Constitutional:       General: He is  not in acute distress.     Appearance: He is well-developed.   HENT:      Head: Normocephalic and atraumatic.      Right Ear: External ear normal.      Left Ear: External ear normal.      Nose:      Comments: O2 in place  Eyes:      Conjunctiva/sclera: Conjunctivae normal.      Pupils: Pupils are equal, round, and reactive to light.   Neck:      Thyroid: No thyromegaly.   Cardiovascular:      Rate and Rhythm: Normal rate and regular rhythm.      Heart sounds: Normal heart sounds. No murmur heard.  Pulmonary:      Effort: Pulmonary effort is normal. No respiratory distress.      Breath sounds: Normal breath sounds. No wheezing.   Abdominal:      General: Bowel sounds are normal. There is no distension.      Palpations: Abdomen is soft.      Tenderness: There is no abdominal tenderness.   Musculoskeletal:         General: Normal range of motion.      Cervical back: Normal range of motion and neck supple.      Right lower leg: No edema.      Left lower leg: No edema.   Lymphadenopathy:      Cervical: No cervical adenopathy.   Skin:     General: Skin is warm and dry.      Findings: No rash.   Neurological:      General: No focal deficit present.      Mental Status: He is alert and oriented to person, place, and time.      Motor: Weakness present.      Comments: Left arm weakness much improved   Psychiatric:         Mood and Affect: Mood normal.         Behavior: Behavior normal.         CRANIAL NERVES     CN III, IV, VI   Pupils are equal, round, and reactive to light.     Significant Labs: CBC:   Recent Labs   Lab 12/29/22  0554   WBC 6.04   HGB 11.0*   HCT 33.0*          CMP:   Recent Labs   Lab 12/29/22  0554 12/30/22  0515    135*   K 4.5 4.0   CL 98 98   CO2 28 28   * 183*   BUN 30* 28*   CREATININE 1.8* 1.6*   CALCIUM 9.1 9.2   PROT 6.0  --    ALBUMIN 2.8*  --    BILITOT 0.8  --    ALKPHOS 76  --    AST 14  --    ALT 20  --    ANIONGAP 11 9         Significant Imaging:     MRI Acute deep white  matter infarct on the right without associated hemorrhage or mass effect.  Mild underlying chronic ischemic changes.         Assessment/Plan:      * Cerebrovascular accident (CVA) due to thrombosis of right middle cerebral artery  Cont PT/OT here-progressing   on asa and statin as well as plavix .      Severe malnutrition in the context of acute on chronic illness  Nutrition consulted. Most recent weight and BMI monitored-     Malnutrition Type and Energy Intake  Malnutrition Type: acute illness or injury  Energy Intake: moderate energy intake    Malnutrition (Moderate to Severe)  Weight Loss (Malnutrition): greater than 5% in 1 month  Energy Intake (Malnutrition): less than 75% for greater than or equal to 1 month  Subcutaneous Fat (Malnutrition): moderate depletion  Muscle Mass (Malnutrition): moderate depletion    Final Summary  Subcutaneous Fat Loss (Final Summary): severe protein-calorie malnutrition  Muscle Loss Evaluation (Final Summary): severe protein-calorie malnutrition    Malnutrition Final Summary  Severe Weight Loss (Malnutrition): greater than 5% in 1 month    Measurements:  Wt Readings from Last 1 Encounters:   12/30/22 68.6 kg (151 lb 3.8 oz)   Body mass index is 23.69 kg/m².    Recommendations: Recommendation/Intervention: 1. Rec'd continue diabetic and cardiac diet. 2. Rec'd encouraging Boost Glucose Control Chocolate provided during mealtimes for decreased PO intake, decreased appetite, and increased nutritional needs. 3. RD to follow and make rec's accordingly.  Goals: Pt will meet % EEN by RD follow up.    Patient has been screened and assessed by RD. RD will follow patient.      BPH (benign prostatic hyperplasia)  Cont flomax .      Dementia  namenda per home routine .      Follicular lymphoma    H/o follicular lymphoma per chart (grade 3a, stage 1); receiving rituxan (cycle 3 in 11/2022), 10/2022 PET scan positive for 3 cm lymphadenopathy in left axillary region otherwise  HUSSEIN.        -Will need follow up visit once discharge (per last hem/onc note follow up indicated around 12/17/22, delayed due to current admission for stroke)     Aortic stenosis s/p TAVR    History of aortic stenosis.    Chronic diastolic heart failure, NYHA class 2    -TTE 12/15/2022 w/ EF 68%  -GDMT when appropriate (will restart slowly with metoprolol Saturday Dec 24th and then loop diuretic on Mon Dec 26th)   -Follow up with PCP / cardiologist in outpatient setting.    12/26: resuming torsemide today per plan. BP stable. Cr with labs in AM to reassess  May have a very mild volume excess so adding diuretic may help hypoxia with exertion    12/28  Appears clinically euvolemic today     12/30 cont to hold demadex and repeat bmp in am. May be able to start PRN     Chronic kidney disease, stage 3 (moderate)  Creat 1.4 yesterday will check labs Mondays and Thursdays   sCr 1.9 on admission, baseline 1.5-1.9  --Creatinine 1.3 today   --Avoid nephrotoxic agents, renally dose meds when possible.  --Continue to monitor daily CMP.      12/26: repeat labs ordered for AM. Monitor    12/30   BMP  Lab Results   Component Value Date     (L) 12/30/2022    K 4.0 12/30/2022    CL 98 12/30/2022    CO2 28 12/30/2022    BUN 28 (H) 12/30/2022    CREATININE 1.6 (H) 12/30/2022    CALCIUM 9.2 12/30/2022    ANIONGAP 9 12/30/2022    EGFRNORACEVR 41 (A) 12/30/2022       Check every other day     Hypertension  Stroke risk factor.  SBP <180, MAP >65; remains at goal   Avoid hypotension in the setting of small vessel disease   Holding lopressor for now given hypotension during this admission in the setting of an acute stroke, will resume on Sat Dec 24th  Bp today 123/64-HR 84.    12/26: resuming home torsemide per admission plan. BP Stable and will monitor BP and labs    12/30 > hold torsemide as creat climbed >  BP 97//67.    COPD, moderate  Per 9/2022 Pulm note: sats 81% on RA and is noncompliant with home oxygen  SpO2 goal  88-92%, remains at goal with only 0.5 L/min - 1L  Continue supplemental O2 PRN    Continue home meds at discharge .      Carotid artery disease  Stroke risk factor  --S/p L carotid endarterectomy in 12/3/202  --Continue ASA, Plavix, and statin.         Diabetes mellitus, type 2  Stroke risk factor. A1c 6.7.  -BG goal while inpatient 140-180  -Continue LDSSI ACHS PRN   - Stable     Bs 139 this am > Dm diet no meds .    12/26: blood sugars rising to consistently > 299 Add Levemir 10 units QHS to SSI. Cont diabetic diet    12/28   189-327  Increase detemir to 13     12/30 detemir 13 units and SSI       Hyperlipidemia  Stroke risk factor.  , goal <70  Atorvastatin 40mg daily, continue       Hypothyroidism    -Subclinical hypothyroidism; TSH 4.4; fT4 0.95 12/14/2021  -Follow up with PCP  Not on meds .      VTE Risk Mitigation (From admission, onward)         Ordered     enoxaparin injection 30 mg  Daily         12/29/22 0752            Not walking up and down steps, yet.  Working on dispo ?[facility]    Discharge Planning   JOSE A:      Code Status: Full Code   Is the patient medically ready for discharge?:     Reason for patient still in hospital (select all that apply): PT / OT recommendations  Discharge Plan A: Home with family, Home Health                  Nkechi Kennedy MD  Department of Hospital Medicine   [place] [facility] (3rd Fl)

## 2022-12-30 NOTE — SUBJECTIVE & OBJECTIVE
Review of Systems   Constitutional:  Negative for chills and fever.   HENT:  Negative for congestion and sore throat.    Respiratory:  Negative for cough and shortness of breath.    Cardiovascular:  Negative for chest pain.   Gastrointestinal:  Negative for abdominal pain, diarrhea, nausea and vomiting.   Genitourinary:  Negative for dysuria and hematuria.   Skin:  Negative for rash.        Lip bleeding   Neurological:  Positive for weakness. Negative for dizziness, syncope and light-headedness.        Left arm weakness getting better   Objective:     Vital Signs (Most Recent):  Temp: 99.1 °F (37.3 °C) (12/30/22 0702)  Pulse: 73 (12/30/22 0702)  Resp: 16 (12/30/22 0702)  BP: (!) 97/52 (12/30/22 0702)  SpO2: (!) 93 % (12/30/22 0709)   Vital Signs (24h Range):  Temp:  [98.3 °F (36.8 °C)-99.1 °F (37.3 °C)] 99.1 °F (37.3 °C)  Pulse:  [73-85] 73  Resp:  [16-18] 16  SpO2:  [93 %-95 %] 93 %  BP: ()/(52-55) 97/52     Weight: 68.6 kg (151 lb 3.8 oz)  Body mass index is 23.69 kg/m².    Physical Exam  Vitals and nursing note reviewed.   Constitutional:       General: He is not in acute distress.     Appearance: He is well-developed.   HENT:      Head: Normocephalic and atraumatic.      Right Ear: External ear normal.      Left Ear: External ear normal.      Nose:      Comments: O2 in place  Eyes:      Conjunctiva/sclera: Conjunctivae normal.      Pupils: Pupils are equal, round, and reactive to light.   Neck:      Thyroid: No thyromegaly.   Cardiovascular:      Rate and Rhythm: Normal rate and regular rhythm.      Heart sounds: Normal heart sounds. No murmur heard.  Pulmonary:      Effort: Pulmonary effort is normal. No respiratory distress.      Breath sounds: Normal breath sounds. No wheezing.   Abdominal:      General: Bowel sounds are normal. There is no distension.      Palpations: Abdomen is soft.      Tenderness: There is no abdominal tenderness.   Musculoskeletal:         General: Normal range of motion.       Cervical back: Normal range of motion and neck supple.      Right lower leg: No edema.      Left lower leg: No edema.   Lymphadenopathy:      Cervical: No cervical adenopathy.   Skin:     General: Skin is warm and dry.      Findings: No rash.   Neurological:      General: No focal deficit present.      Mental Status: He is alert and oriented to person, place, and time.      Motor: Weakness present.      Comments: Left arm weakness much improved   Psychiatric:         Mood and Affect: Mood normal.         Behavior: Behavior normal.         CRANIAL NERVES     CN III, IV, VI   Pupils are equal, round, and reactive to light.     Significant Labs: CBC:   Recent Labs   Lab 12/29/22  0554   WBC 6.04   HGB 11.0*   HCT 33.0*          CMP:   Recent Labs   Lab 12/29/22  0554 12/30/22  0515    135*   K 4.5 4.0   CL 98 98   CO2 28 28   * 183*   BUN 30* 28*   CREATININE 1.8* 1.6*   CALCIUM 9.1 9.2   PROT 6.0  --    ALBUMIN 2.8*  --    BILITOT 0.8  --    ALKPHOS 76  --    AST 14  --    ALT 20  --    ANIONGAP 11 9         Significant Imaging:     MRI Acute deep white matter infarct on the right without associated hemorrhage or mass effect.  Mild underlying chronic ischemic changes.

## 2022-12-30 NOTE — PATIENT CARE CONFERENCE
Patient care conference concluded. CM met with Mr Jose F freeman to discuss his progress and discharge plan. At this time he is still unable to care for himself at home as he lives alone. He plans to go to his house when he leaves here and agrees that as of today he can't totally care for himself there. We discussed additional days here with continued therapy and reassessment on Tuesday 1/2. We discussed the possibility of him living with his son for a time but he doesn't feel that will be necessary. He wants to go home with home health. CM to attempt to contact his son, Terrell freeman for an update.

## 2022-12-30 NOTE — ASSESSMENT & PLAN NOTE
Nutrition consulted. Most recent weight and BMI monitored-     Malnutrition Type and Energy Intake  Malnutrition Type: acute illness or injury  Energy Intake: moderate energy intake    Malnutrition (Moderate to Severe)  Weight Loss (Malnutrition): greater than 5% in 1 month  Energy Intake (Malnutrition): less than 75% for greater than or equal to 1 month  Subcutaneous Fat (Malnutrition): moderate depletion  Muscle Mass (Malnutrition): moderate depletion    Final Summary  Subcutaneous Fat Loss (Final Summary): severe protein-calorie malnutrition  Muscle Loss Evaluation (Final Summary): severe protein-calorie malnutrition    Malnutrition Final Summary  Severe Weight Loss (Malnutrition): greater than 5% in 1 month    Measurements:  Wt Readings from Last 1 Encounters:   12/30/22 68.6 kg (151 lb 3.8 oz)   Body mass index is 23.69 kg/m².    Recommendations: Recommendation/Intervention: 1. Rec'd continue diabetic and cardiac diet. 2. Rec'd encouraging Boost Glucose Control Chocolate provided during mealtimes for decreased PO intake, decreased appetite, and increased nutritional needs. 3. RD to follow and make rec's accordingly.  Goals: Pt will meet % EEN by RD follow up.    Patient has been screened and assessed by RD. RD will follow patient.

## 2022-12-30 NOTE — PLAN OF CARE
Pt ambulated with PT.   SpO2 maintained at 93% with 1 liter of oxygen per nasal cannula.         Problem: Fall Injury Risk  Goal: Absence of Fall and Fall-Related Injury  Outcome: Ongoing, Progressing     Problem: Skin Injury Risk Increased  Goal: Skin Health and Integrity  Outcome: Ongoing, Progressing     Problem: Urinary Elimination Impaired (Stroke, Ischemic/Transient Ischemic Attack)  Goal: Effective Urinary Elimination  Outcome: Ongoing, Progressing

## 2022-12-30 NOTE — PT/OT/SLP PROGRESS
Occupational Therapy   Treatment    Name: Vega Kohler  MRN: [id number]  Admitting Diagnosis:  Cerebrovascular accident (CVA) due to thrombosis of right middle cerebral artery       Recommendations:     Discharge Recommendations: nursing facility, skilled, home with home health, home health PT, home health OT  Discharge Equipment Recommendations:  bath bench  Barriers to discharge:  Decreased caregiver support    Assessment:     Vega Kohler is a 87 y.o. male with a medical diagnosis of Cerebrovascular accident (CVA) due to thrombosis of right middle cerebral artery.  Performance deficits affecting function are weakness, impaired endurance, impaired self care skills, impaired functional mobility, gait instability, impaired balance, decreased coordination, decreased upper extremity function, decreased safety awareness, pain.     Rehab Prognosis:  Good; patient would benefit from acute skilled OT services to address these deficits and reach maximum level of function.       Plan:     Patient to be seen 5 x/week to address the above listed problems via self-care/home management, therapeutic activities, therapeutic exercises  Plan of Care Expires: 01/05/23  Plan of Care Reviewed with: patient    Subjective     Pain/Comfort:  Pain Rating 1: 0/10    Objective:     Communicated with: RN prior to session.  Patient found supine with oxygen, telemetry upon OT entry to room.    General Precautions: Standard, fall    Orthopedic Precautions:N/A  Braces: N/A  Respiratory Status: Nasal cannula, flow 2 L/min     Occupational Performance:     Bed Mobility:    Patient completed Rolling/Turning to Right with independence  Patient completed Scooting/Bridging with independence  Patient completed Supine to Sit with independence     Functional Mobility/Transfers:  Patient completed Sit <> Stand Transfer with contact guard assistance  with  hand-held assist   Patient completed Bed <> Chair Transfer using Step Transfer technique with contact  guard assistance with hand-held assist  Functional Mobility: Pt was able to stand from edge of bed, ambulate to the bedside chair with CGA using hand held assist    ADL'S:  Grooming and Hygiene: Pt required min a with cleaning dentures this date due to fatigue. Pt was able to comb hair indpendently.     [facility] 6 Click ADL: 21    Treatment & Education:  Pt rolled to R side and up to sitting on edge of bed independently. Pt required Minimal assistance with changing diaper but was able to clean self with CGA in standing. Pt was able to ambulate with CGA and hand held assistance to bedside chair. Pt cleaned dentures sitting in chair on bedside table post set up with minimal assistance due to fatigue. Pt marie hair independently. Pt was instructed in B UE there ex with the use of 2# weighted dowel for shoulder/bicep flexion/extension. Pt requested to stay up in chair and was given call button with review of how to use it. Pt was able to push nurse call button but was unable to push tv channel or volume buttons. Nursing notified.     Patient left up in chair with all lines intact, call button in reach, and RN notified    GOALS:   Multidisciplinary Problems       Occupational Therapy Goals          Problem: Occupational Therapy    Goal Priority Disciplines Outcome Interventions   Occupational Therapy Goal     OT, PT/OT Ongoing, Progressing    Description: Pt to perform UB dressing with MOD I seated EOB.  Pt to perform LB dressing with MOD I seated EOB.  Pt to perform self toileting with MOD I using RW and standard commode.  Pt to perform level functional transfers required for ADL's with MOD I, RW level.  Pt to demonstrate consistent adherence to breathing control and energy conservation techniques as educated by OT.  Pt to participate in standing activity for ~3 minutes for increased activity tolerance and safety in ADL.                         Time Tracking:     OT Date of Treatment: 12/30/22  OT Start Time: 0250  OT Stop  Time: 0333  OT Total Time (min): 43 min    Billable Minutes:Self Care/Home Management 35  Therapeutic Exercise 8    OT/PEDRO: PEDRO     PEDRO Visit Number: 5    12/30/2022

## 2022-12-30 NOTE — ASSESSMENT & PLAN NOTE
-TTE 12/15/2022 w/ EF 68%  -GDMT when appropriate (will restart slowly with metoprolol Saturday Dec 24th and then loop diuretic on Mon Dec 26th)   -Follow up with PCP / cardiologist in outpatient setting.    12/26: resuming torsemide today per plan. BP stable. Cr with labs in AM to reassess  May have a very mild volume excess so adding diuretic may help hypoxia with exertion    12/28  Appears clinically euvolemic today     12/30 cont to hold demadex and repeat bmp in am. May be able to start PRN

## 2022-12-31 NOTE — PLAN OF CARE
Pt ambulated with PT. Pt free and safe from falls.   SpO2 maintained at 93% with 1 liter of oxygen per nasal cannula.      Problem: Cognitive Impairment (Stroke, Ischemic/Transient Ischemic Attack)  Goal: Optimal Cognitive Function  Outcome: Ongoing, Progressing     Problem: Respiratory Compromise (Stroke, Ischemic/Transient Ischemic Attack)  Goal: Effective Oxygenation and Ventilation  Outcome: Ongoing, Progressing     Problem: Fall Injury Risk  Goal: Absence of Fall and Fall-Related Injury  Outcome: Ongoing, Progressing

## 2022-12-31 NOTE — PT/OT/SLP PROGRESS
"Physical Therapy Treatment    Patient Name:  Veag Kohler   MRN:  [id number]    Recommendations:     Discharge Recommendations: nursing facility, skilled, home with home health, home health PT, home health OT  Discharge Equipment Recommendations: bath bench  Barriers to discharge: Decreased caregiver support    Assessment:     Vega Kohler is a 87 y.o. 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  He presents with the following impairments/functional limitations: weakness, impaired endurance, impaired self care skills, impaired functional mobility, gait instability, impaired balance, decreased coordination, decreased upper extremity function, decreased lower extremity function, decreased safety awareness. Patient tolerated session fairly. He did require some CGA for sit to stand transfer and supine to sitting Bed mobility. Patient with improved ambulation endurance, able to ambulate 100 feet with RW and CGA, however at last 10 feet, patient began forward trunk leaning and reporting fatigue. He also attempted to sit without bed fully behind him despite multiple verbal Cues to remain standing. Required manual assistance to safely guide patient to sitting on bed. Will have to monitor patient's ambulation closely for signs of fatigue.    Rehab Prognosis: Good; patient would benefit from acute skilled PT services to address these deficits and reach maximum level of function.    Recent Surgery: * No surgery found *      Plan:     During this hospitalization, patient to be seen daily to address the identified rehab impairments via gait training, therapeutic activities, therapeutic exercises and progress toward the following goals:    Plan of Care Expires:  01/05/23    Subjective     Chief Complaint: "The bed blanket is too heavy."  Patient/Family Comments/goals: "To go back home."  Pain/Comfort:  Pain Rating 1: 0/10  Pain Rating " Post-Intervention 1: 0/10      Objective:     Communicated with Patient prior to session.  Patient found HOB elevated with oxygen, telemetry upon PT entry to room.     General Precautions: Standard, fall  Orthopedic Precautions: N/A  Braces: N/A  Respiratory Status: Nasal cannula, flow 2 L/min     Functional Mobility:  Bed Mobility:     Rolling Right: modified independence  Scooting: modified independence  Bridging: modified independence  Supine to Sit: contact guard assistance  Sit to Supine: contact guard assistance  Transfers:     Sit to Stand:  contact guard assistance with rolling walker  Gait: Patient ambulated 100 feet with RW and CGA x1 (Patient fatigues suddenly)  Balance: Sitting good, standing Fair      AM-PAC 6 CLICK MOBILITY  Turning over in bed (including adjusting bedclothes, sheets and blankets)?: 4  Sitting down on and standing up from a chair with arms (e.g., wheelchair, bedside commode, etc.): 3  Moving from lying on back to sitting on the side of the bed?: 3  Moving to and from a bed to a chair (including a wheelchair)?: 3  Need to walk in hospital room?: 3  Climbing 3-5 steps with a railing?: 2  Basic Mobility Total Score: 18     Patient left HOB elevated with all lines intact, call button in reach, and Nurse present..    GOALS:   Multidisciplinary Problems       Physical Therapy Goals          Problem: Physical Therapy    Goal Priority Disciplines Outcome Goal Variances Interventions   Physical Therapy Goal     PT, PT/OT      Description:   Patient will increase functional independence with mobility by performin. Supine <> sit with Modified Independent  2. Sit <>Stand with Modified Independent with RW  3. Bed to chair transfer with Supervision or Set-up Assistancewith or without rolling walker using Step Transfer TECHNIQUE  4. Gait  x ~100  feet with Standby Assistance with or without rolling walker.  5. Ascend/descend 4 stair steps with rails and with contact guard assistance and  cues.  6. Lower extremity exercise program x10 reps per handout, with assistance as needed                          Time Tracking:     PT Received On: 12/31/22  PT Start Time: 1125     PT Stop Time: 1145  PT Total Time (min): 20 min     Billable Minutes: Therapeutic Activity 20 min    Treatment Type: Treatment  PT/PTA: PT     PTA Visit Number: 2     12/31/2022

## 2023-01-01 ENCOUNTER — HOSPITAL ENCOUNTER (OUTPATIENT)
Facility: HOSPITAL | Age: 88
Discharge: NURSING, REHAB OR LONG-TERM CARE | End: 2023-01-19
Attending: SURGERY | Admitting: INTERNAL MEDICINE
Payer: MEDICARE

## 2023-01-01 ENCOUNTER — HOSPITAL ENCOUNTER (EMERGENCY)
Facility: HOSPITAL | Age: 88
End: 2023-02-04
Attending: SURGERY
Payer: MEDICARE

## 2023-01-01 ENCOUNTER — HOSPITAL ENCOUNTER (INPATIENT)
Facility: HOSPITAL | Age: 88
LOS: 7 days | Discharge: NURSING, REHAB OR LONG-TERM CARE | DRG: 177 | End: 2023-01-10
Attending: INTERNAL MEDICINE | Admitting: FAMILY MEDICINE
Payer: MEDICARE

## 2023-01-01 VITALS
HEART RATE: 69 BPM | HEIGHT: 67 IN | RESPIRATION RATE: 18 BRPM | SYSTOLIC BLOOD PRESSURE: 104 MMHG | TEMPERATURE: 97 F | HEIGHT: 67 IN | DIASTOLIC BLOOD PRESSURE: 56 MMHG | HEART RATE: 88 BPM | OXYGEN SATURATION: 96 % | WEIGHT: 158.94 LBS | OXYGEN SATURATION: 99 % | BODY MASS INDEX: 23.08 KG/M2 | WEIGHT: 147.06 LBS | DIASTOLIC BLOOD PRESSURE: 51 MMHG | BODY MASS INDEX: 24.94 KG/M2 | TEMPERATURE: 98 F | SYSTOLIC BLOOD PRESSURE: 114 MMHG | RESPIRATION RATE: 16 BRPM

## 2023-01-01 VITALS
DIASTOLIC BLOOD PRESSURE: 60 MMHG | OXYGEN SATURATION: 96 % | BODY MASS INDEX: 22.15 KG/M2 | SYSTOLIC BLOOD PRESSURE: 133 MMHG | RESPIRATION RATE: 20 BRPM | HEART RATE: 74 BPM | TEMPERATURE: 98 F | WEIGHT: 141.13 LBS | HEIGHT: 67 IN

## 2023-01-01 VITALS — TEMPERATURE: 96 F | WEIGHT: 147.69 LBS | OXYGEN SATURATION: 74 % | BODY MASS INDEX: 23.13 KG/M2

## 2023-01-01 DIAGNOSIS — N17.9 AKI (ACUTE KIDNEY INJURY): ICD-10-CM

## 2023-01-01 DIAGNOSIS — Z51.5 HOSPICE CARE PATIENT: ICD-10-CM

## 2023-01-01 DIAGNOSIS — R41.82 ALTERED MENTAL STATUS, UNSPECIFIED ALTERED MENTAL STATUS TYPE: ICD-10-CM

## 2023-01-01 DIAGNOSIS — E86.0 DEHYDRATION: Primary | ICD-10-CM

## 2023-01-01 DIAGNOSIS — U07.1 COVID-19: ICD-10-CM

## 2023-01-01 DIAGNOSIS — U07.1 COVID-19 VIRUS DETECTED: ICD-10-CM

## 2023-01-01 DIAGNOSIS — R47.81 SLURRED SPEECH: ICD-10-CM

## 2023-01-01 DIAGNOSIS — G93.1 ANOXIC BRAIN INJURY: ICD-10-CM

## 2023-01-01 DIAGNOSIS — I46.9 CARDIAC ARREST: ICD-10-CM

## 2023-01-01 PROBLEM — R50.9 FEVER: Status: ACTIVE | Noted: 2023-01-01

## 2023-01-01 LAB
ALBUMIN SERPL BCP-MCNC: 2.2 G/DL (ref 3.5–5.2)
ALBUMIN SERPL BCP-MCNC: 2.3 G/DL (ref 3.5–5.2)
ALBUMIN SERPL BCP-MCNC: 2.5 G/DL (ref 3.5–5.2)
ALBUMIN SERPL BCP-MCNC: 2.5 G/DL (ref 3.5–5.2)
ALBUMIN SERPL BCP-MCNC: 2.6 G/DL (ref 3.5–5.2)
ALBUMIN SERPL BCP-MCNC: 2.8 G/DL (ref 3.5–5.2)
ALBUMIN SERPL BCP-MCNC: 2.9 G/DL (ref 3.5–5.2)
ALLENS TEST: ABNORMAL
ALP SERPL-CCNC: 65 U/L (ref 55–135)
ALP SERPL-CCNC: 70 U/L (ref 55–135)
ALP SERPL-CCNC: 71 U/L (ref 55–135)
ALP SERPL-CCNC: 71 U/L (ref 55–135)
ALP SERPL-CCNC: 77 U/L (ref 55–135)
ALP SERPL-CCNC: 83 U/L (ref 55–135)
ALP SERPL-CCNC: 94 U/L (ref 55–135)
ALT SERPL W/O P-5'-P-CCNC: 13 U/L (ref 10–44)
ALT SERPL W/O P-5'-P-CCNC: 14 U/L (ref 10–44)
ALT SERPL W/O P-5'-P-CCNC: 15 U/L (ref 10–44)
ALT SERPL W/O P-5'-P-CCNC: 17 U/L (ref 10–44)
ALT SERPL W/O P-5'-P-CCNC: 17 U/L (ref 10–44)
ALT SERPL W/O P-5'-P-CCNC: 26 U/L (ref 10–44)
ALT SERPL W/O P-5'-P-CCNC: 29 U/L (ref 10–44)
ANION GAP SERPL CALC-SCNC: 10 MMOL/L (ref 8–16)
ANION GAP SERPL CALC-SCNC: 10 MMOL/L (ref 8–16)
ANION GAP SERPL CALC-SCNC: 11 MMOL/L (ref 8–16)
ANION GAP SERPL CALC-SCNC: 11 MMOL/L (ref 8–16)
ANION GAP SERPL CALC-SCNC: 16 MMOL/L (ref 8–16)
ANION GAP SERPL CALC-SCNC: 6 MMOL/L (ref 8–16)
ANION GAP SERPL CALC-SCNC: 7 MMOL/L (ref 8–16)
ANION GAP SERPL CALC-SCNC: 8 MMOL/L (ref 8–16)
ANION GAP SERPL CALC-SCNC: 9 MMOL/L (ref 8–16)
AST SERPL-CCNC: 15 U/L (ref 10–40)
AST SERPL-CCNC: 19 U/L (ref 10–40)
AST SERPL-CCNC: 24 U/L (ref 10–40)
AST SERPL-CCNC: 24 U/L (ref 10–40)
AST SERPL-CCNC: 26 U/L (ref 10–40)
AST SERPL-CCNC: 30 U/L (ref 10–40)
AST SERPL-CCNC: 42 U/L (ref 10–40)
BACTERIA #/AREA URNS HPF: ABNORMAL /HPF
BACTERIA #/AREA URNS HPF: ABNORMAL /HPF
BACTERIA #/AREA URNS HPF: NORMAL /HPF
BACTERIA BLD CULT: NORMAL
BACTERIA UR CULT: ABNORMAL
BASOPHILS # BLD AUTO: 0 K/UL (ref 0–0.2)
BASOPHILS # BLD AUTO: 0.01 K/UL (ref 0–0.2)
BASOPHILS # BLD AUTO: 0.02 K/UL (ref 0–0.2)
BASOPHILS # BLD AUTO: 0.02 K/UL (ref 0–0.2)
BASOPHILS NFR BLD: 0 % (ref 0–1.9)
BASOPHILS NFR BLD: 0.2 % (ref 0–1.9)
BASOPHILS NFR BLD: 0.2 % (ref 0–1.9)
BASOPHILS NFR BLD: 0.3 % (ref 0–1.9)
BILIRUB SERPL-MCNC: 0.4 MG/DL (ref 0.1–1)
BILIRUB SERPL-MCNC: 0.6 MG/DL (ref 0.1–1)
BILIRUB SERPL-MCNC: 0.6 MG/DL (ref 0.1–1)
BILIRUB SERPL-MCNC: 0.7 MG/DL (ref 0.1–1)
BILIRUB SERPL-MCNC: 0.7 MG/DL (ref 0.1–1)
BILIRUB SERPL-MCNC: 0.8 MG/DL (ref 0.1–1)
BILIRUB SERPL-MCNC: 1.1 MG/DL (ref 0.1–1)
BILIRUB UR QL STRIP: NEGATIVE
BNP SERPL-MCNC: 130 PG/ML (ref 0–99)
BNP SERPL-MCNC: 925 PG/ML (ref 0–99)
BUN SERPL-MCNC: 16 MG/DL (ref 8–23)
BUN SERPL-MCNC: 18 MG/DL (ref 8–23)
BUN SERPL-MCNC: 25 MG/DL (ref 8–23)
BUN SERPL-MCNC: 30 MG/DL (ref 8–23)
BUN SERPL-MCNC: 31 MG/DL (ref 8–23)
BUN SERPL-MCNC: 33 MG/DL (ref 8–23)
BUN SERPL-MCNC: 35 MG/DL (ref 8–23)
BUN SERPL-MCNC: 39 MG/DL (ref 8–23)
BUN SERPL-MCNC: 40 MG/DL (ref 8–23)
CALCIUM SERPL-MCNC: 10.1 MG/DL (ref 8.7–10.5)
CALCIUM SERPL-MCNC: 8.1 MG/DL (ref 8.7–10.5)
CALCIUM SERPL-MCNC: 8.3 MG/DL (ref 8.7–10.5)
CALCIUM SERPL-MCNC: 8.8 MG/DL (ref 8.7–10.5)
CALCIUM SERPL-MCNC: 8.8 MG/DL (ref 8.7–10.5)
CALCIUM SERPL-MCNC: 9.1 MG/DL (ref 8.7–10.5)
CALCIUM SERPL-MCNC: 9.4 MG/DL (ref 8.7–10.5)
CALCIUM SERPL-MCNC: 9.8 MG/DL (ref 8.7–10.5)
CALCIUM SERPL-MCNC: 9.9 MG/DL (ref 8.7–10.5)
CHLORIDE SERPL-SCNC: 101 MMOL/L (ref 95–110)
CHLORIDE SERPL-SCNC: 103 MMOL/L (ref 95–110)
CHLORIDE SERPL-SCNC: 104 MMOL/L (ref 95–110)
CHLORIDE SERPL-SCNC: 105 MMOL/L (ref 95–110)
CHLORIDE SERPL-SCNC: 106 MMOL/L (ref 95–110)
CHLORIDE SERPL-SCNC: 109 MMOL/L (ref 95–110)
CHLORIDE SERPL-SCNC: 111 MMOL/L (ref 95–110)
CK MB SERPL-MCNC: 0.9 NG/ML (ref 0.1–6.5)
CK MB SERPL-MCNC: 3 NG/ML (ref 0.1–6.5)
CK MB SERPL-RTO: 1.5 % (ref 0–5)
CK MB SERPL-RTO: 1.6 % (ref 0–5)
CK SERPL-CCNC: 184 U/L (ref 20–200)
CK SERPL-CCNC: 184 U/L (ref 20–200)
CK SERPL-CCNC: 61 U/L (ref 20–200)
CK SERPL-CCNC: 61 U/L (ref 20–200)
CLARITY UR: CLEAR
CO2 SERPL-SCNC: 17 MMOL/L (ref 23–29)
CO2 SERPL-SCNC: 24 MMOL/L (ref 23–29)
CO2 SERPL-SCNC: 25 MMOL/L (ref 23–29)
CO2 SERPL-SCNC: 26 MMOL/L (ref 23–29)
CO2 SERPL-SCNC: 26 MMOL/L (ref 23–29)
CO2 SERPL-SCNC: 27 MMOL/L (ref 23–29)
CO2 SERPL-SCNC: 29 MMOL/L (ref 23–29)
COLOR UR: YELLOW
CREAT SERPL-MCNC: 1.2 MG/DL (ref 0.5–1.4)
CREAT SERPL-MCNC: 1.3 MG/DL (ref 0.5–1.4)
CREAT SERPL-MCNC: 1.3 MG/DL (ref 0.5–1.4)
CREAT SERPL-MCNC: 1.5 MG/DL (ref 0.5–1.4)
CREAT SERPL-MCNC: 1.6 MG/DL (ref 0.5–1.4)
CREAT SERPL-MCNC: 1.8 MG/DL (ref 0.5–1.4)
DELSYS: ABNORMAL
DIFFERENTIAL METHOD: ABNORMAL
EOSINOPHIL # BLD AUTO: 0 K/UL (ref 0–0.5)
EOSINOPHIL # BLD AUTO: 0.1 K/UL (ref 0–0.5)
EOSINOPHIL # BLD AUTO: 0.2 K/UL (ref 0–0.5)
EOSINOPHIL # BLD AUTO: 0.3 K/UL (ref 0–0.5)
EOSINOPHIL NFR BLD: 0 % (ref 0–8)
EOSINOPHIL NFR BLD: 0.5 % (ref 0–8)
EOSINOPHIL NFR BLD: 0.5 % (ref 0–8)
EOSINOPHIL NFR BLD: 2 % (ref 0–8)
EOSINOPHIL NFR BLD: 2.1 % (ref 0–8)
EOSINOPHIL NFR BLD: 2.4 % (ref 0–8)
EOSINOPHIL NFR BLD: 3.8 % (ref 0–8)
EOSINOPHIL NFR BLD: 4.3 % (ref 0–8)
ERYTHROCYTE [DISTWIDTH] IN BLOOD BY AUTOMATED COUNT: 13.1 % (ref 11.5–14.5)
ERYTHROCYTE [DISTWIDTH] IN BLOOD BY AUTOMATED COUNT: 13.2 % (ref 11.5–14.5)
ERYTHROCYTE [DISTWIDTH] IN BLOOD BY AUTOMATED COUNT: 13.3 % (ref 11.5–14.5)
ERYTHROCYTE [DISTWIDTH] IN BLOOD BY AUTOMATED COUNT: 14.1 % (ref 11.5–14.5)
ERYTHROCYTE [DISTWIDTH] IN BLOOD BY AUTOMATED COUNT: 14.6 % (ref 11.5–14.5)
ERYTHROCYTE [DISTWIDTH] IN BLOOD BY AUTOMATED COUNT: 15.1 % (ref 11.5–14.5)
ERYTHROCYTE [DISTWIDTH] IN BLOOD BY AUTOMATED COUNT: 15.3 % (ref 11.5–14.5)
ERYTHROCYTE [DISTWIDTH] IN BLOOD BY AUTOMATED COUNT: 16.3 % (ref 11.5–14.5)
EST. GFR  (NO RACE VARIABLE): 36 ML/MIN/1.73 M^2
EST. GFR  (NO RACE VARIABLE): 41 ML/MIN/1.73 M^2
EST. GFR  (NO RACE VARIABLE): 45 ML/MIN/1.73 M^2
EST. GFR  (NO RACE VARIABLE): 53 ML/MIN/1.73 M^2
EST. GFR  (NO RACE VARIABLE): 53 ML/MIN/1.73 M^2
EST. GFR  (NO RACE VARIABLE): 59 ML/MIN/1.73 M^2
FIO2: 24 (ref 21–100)
GLUCOSE SERPL-MCNC: 132 MG/DL (ref 70–110)
GLUCOSE SERPL-MCNC: 147 MG/DL (ref 70–110)
GLUCOSE SERPL-MCNC: 168 MG/DL (ref 70–110)
GLUCOSE SERPL-MCNC: 229 MG/DL (ref 70–110)
GLUCOSE SERPL-MCNC: 243 MG/DL (ref 70–110)
GLUCOSE SERPL-MCNC: 62 MG/DL (ref 70–110)
GLUCOSE SERPL-MCNC: 88 MG/DL (ref 70–110)
GLUCOSE SERPL-MCNC: 88 MG/DL (ref 70–110)
GLUCOSE SERPL-MCNC: 93 MG/DL (ref 70–110)
GLUCOSE UR QL STRIP: ABNORMAL
GLUCOSE UR QL STRIP: NEGATIVE
GLUCOSE UR QL STRIP: NEGATIVE
GROUP A STREP, MOLECULAR: NEGATIVE
HCO3 UR-SCNC: 32.8 MMOL/L
HCT VFR BLD AUTO: 31.6 % (ref 40–54)
HCT VFR BLD AUTO: 32.5 % (ref 40–54)
HCT VFR BLD AUTO: 33.9 % (ref 40–54)
HCT VFR BLD AUTO: 34.2 % (ref 40–54)
HCT VFR BLD AUTO: 34.6 % (ref 40–54)
HCT VFR BLD AUTO: 34.7 % (ref 40–54)
HCT VFR BLD AUTO: 35.4 % (ref 40–54)
HCT VFR BLD AUTO: 35.8 % (ref 40–54)
HGB BLD-MCNC: 10.5 G/DL (ref 14–18)
HGB BLD-MCNC: 10.6 G/DL (ref 14–18)
HGB BLD-MCNC: 11 G/DL (ref 14–18)
HGB BLD-MCNC: 11 G/DL (ref 14–18)
HGB BLD-MCNC: 11.4 G/DL (ref 14–18)
HGB BLD-MCNC: 11.6 G/DL (ref 14–18)
HGB BLD-MCNC: 11.9 G/DL (ref 14–18)
HGB BLD-MCNC: 12.8 G/DL (ref 14–18)
HGB UR QL STRIP: ABNORMAL
HGB UR QL STRIP: NEGATIVE
HGB UR QL STRIP: NEGATIVE
HYALINE CASTS #/AREA URNS LPF: 0 /LPF
IMM GRANULOCYTES # BLD AUTO: 0.02 K/UL (ref 0–0.04)
IMM GRANULOCYTES # BLD AUTO: 0.03 K/UL (ref 0–0.04)
IMM GRANULOCYTES # BLD AUTO: 0.07 K/UL (ref 0–0.04)
IMM GRANULOCYTES # BLD AUTO: 0.09 K/UL (ref 0–0.04)
IMM GRANULOCYTES # BLD AUTO: 0.19 K/UL (ref 0–0.04)
IMM GRANULOCYTES NFR BLD AUTO: 0.4 % (ref 0–0.5)
IMM GRANULOCYTES NFR BLD AUTO: 0.5 % (ref 0–0.5)
IMM GRANULOCYTES NFR BLD AUTO: 0.6 % (ref 0–0.5)
IMM GRANULOCYTES NFR BLD AUTO: 0.7 % (ref 0–0.5)
IMM GRANULOCYTES NFR BLD AUTO: 0.8 % (ref 0–0.5)
IMM GRANULOCYTES NFR BLD AUTO: 1.2 % (ref 0–0.5)
IMM GRANULOCYTES NFR BLD AUTO: 1.5 % (ref 0–0.5)
IMM GRANULOCYTES NFR BLD AUTO: 2.1 % (ref 0–0.5)
INFLUENZA A, MOLECULAR: NEGATIVE
INFLUENZA A, MOLECULAR: NEGATIVE
INFLUENZA B, MOLECULAR: NEGATIVE
INFLUENZA B, MOLECULAR: NEGATIVE
KETONES UR QL STRIP: NEGATIVE
LACTATE SERPL-SCNC: 0.7 MMOL/L (ref 0.5–2.2)
LACTATE SERPL-SCNC: 0.8 MMOL/L (ref 0.5–2.2)
LACTATE SERPL-SCNC: 0.9 MMOL/L (ref 0.5–2.2)
LACTATE SERPL-SCNC: 7.5 MMOL/L (ref 0.5–2.2)
LEUKOCYTE ESTERASE UR QL STRIP: ABNORMAL
LEUKOCYTE ESTERASE UR QL STRIP: NEGATIVE
LEUKOCYTE ESTERASE UR QL STRIP: NEGATIVE
LYMPHOCYTES # BLD AUTO: 0.5 K/UL (ref 1–4.8)
LYMPHOCYTES # BLD AUTO: 0.5 K/UL (ref 1–4.8)
LYMPHOCYTES # BLD AUTO: 0.6 K/UL (ref 1–4.8)
LYMPHOCYTES # BLD AUTO: 0.6 K/UL (ref 1–4.8)
LYMPHOCYTES # BLD AUTO: 0.7 K/UL (ref 1–4.8)
LYMPHOCYTES # BLD AUTO: 1.4 K/UL (ref 1–4.8)
LYMPHOCYTES NFR BLD: 10.4 % (ref 18–48)
LYMPHOCYTES NFR BLD: 14.2 % (ref 18–48)
LYMPHOCYTES NFR BLD: 14.2 % (ref 18–48)
LYMPHOCYTES NFR BLD: 15.2 % (ref 18–48)
LYMPHOCYTES NFR BLD: 15.7 % (ref 18–48)
LYMPHOCYTES NFR BLD: 18 % (ref 18–48)
LYMPHOCYTES NFR BLD: 23.4 % (ref 18–48)
LYMPHOCYTES NFR BLD: 8.8 % (ref 18–48)
MCH RBC QN AUTO: 30.9 PG (ref 27–31)
MCH RBC QN AUTO: 32.5 PG (ref 27–31)
MCH RBC QN AUTO: 32.9 PG (ref 27–31)
MCH RBC QN AUTO: 33.2 PG (ref 27–31)
MCH RBC QN AUTO: 34.4 PG (ref 27–31)
MCH RBC QN AUTO: 34.6 PG (ref 27–31)
MCH RBC QN AUTO: 35.1 PG (ref 27–31)
MCH RBC QN AUTO: 39.3 PG (ref 27–31)
MCHC RBC AUTO-ENTMCNC: 30.6 G/DL (ref 32–36)
MCHC RBC AUTO-ENTMCNC: 32.2 G/DL (ref 32–36)
MCHC RBC AUTO-ENTMCNC: 32.4 G/DL (ref 32–36)
MCHC RBC AUTO-ENTMCNC: 33.2 G/DL (ref 32–36)
MCHC RBC AUTO-ENTMCNC: 33.6 G/DL (ref 32–36)
MCHC RBC AUTO-ENTMCNC: 33.6 G/DL (ref 32–36)
MCHC RBC AUTO-ENTMCNC: 33.8 G/DL (ref 32–36)
MCHC RBC AUTO-ENTMCNC: 36.9 G/DL (ref 32–36)
MCV RBC AUTO: 101 FL (ref 82–98)
MCV RBC AUTO: 101 FL (ref 82–98)
MCV RBC AUTO: 102 FL (ref 82–98)
MCV RBC AUTO: 102 FL (ref 82–98)
MCV RBC AUTO: 103 FL (ref 82–98)
MCV RBC AUTO: 106 FL (ref 82–98)
MCV RBC AUTO: 106 FL (ref 82–98)
MCV RBC AUTO: 98 FL (ref 82–98)
MICROSCOPIC COMMENT: ABNORMAL
MICROSCOPIC COMMENT: ABNORMAL
MICROSCOPIC COMMENT: NORMAL
MONOCYTES # BLD AUTO: 0.3 K/UL (ref 0.3–1)
MONOCYTES # BLD AUTO: 0.3 K/UL (ref 0.3–1)
MONOCYTES # BLD AUTO: 0.4 K/UL (ref 0.3–1)
MONOCYTES # BLD AUTO: 0.6 K/UL (ref 0.3–1)
MONOCYTES # BLD AUTO: 0.8 K/UL (ref 0.3–1)
MONOCYTES # BLD AUTO: 0.9 K/UL (ref 0.3–1)
MONOCYTES NFR BLD: 10.1 % (ref 4–15)
MONOCYTES NFR BLD: 11.5 % (ref 4–15)
MONOCYTES NFR BLD: 21.9 % (ref 4–15)
MONOCYTES NFR BLD: 26.2 % (ref 4–15)
MONOCYTES NFR BLD: 7.2 % (ref 4–15)
MONOCYTES NFR BLD: 7.6 % (ref 4–15)
MONOCYTES NFR BLD: 9.3 % (ref 4–15)
MONOCYTES NFR BLD: 9.8 % (ref 4–15)
NEUTROPHILS # BLD AUTO: 1.5 K/UL (ref 1.8–7.7)
NEUTROPHILS # BLD AUTO: 1.9 K/UL (ref 1.8–7.7)
NEUTROPHILS # BLD AUTO: 2.7 K/UL (ref 1.8–7.7)
NEUTROPHILS # BLD AUTO: 3.2 K/UL (ref 1.8–7.7)
NEUTROPHILS # BLD AUTO: 3.3 K/UL (ref 1.8–7.7)
NEUTROPHILS # BLD AUTO: 4.5 K/UL (ref 1.8–7.7)
NEUTROPHILS # BLD AUTO: 4.6 K/UL (ref 1.8–7.7)
NEUTROPHILS # BLD AUTO: 6.4 K/UL (ref 1.8–7.7)
NEUTROPHILS NFR BLD: 54 % (ref 38–73)
NEUTROPHILS NFR BLD: 56.9 % (ref 38–73)
NEUTROPHILS NFR BLD: 69.2 % (ref 38–73)
NEUTROPHILS NFR BLD: 72.2 % (ref 38–73)
NEUTROPHILS NFR BLD: 73.8 % (ref 38–73)
NEUTROPHILS NFR BLD: 74.8 % (ref 38–73)
NEUTROPHILS NFR BLD: 75.7 % (ref 38–73)
NEUTROPHILS NFR BLD: 75.8 % (ref 38–73)
NITRITE UR QL STRIP: NEGATIVE
NRBC BLD-RTO: 0 /100 WBC
PCO2 BLDA: 45 MMHG (ref 35–45)
PH SMN: 7.47 [PH] (ref 7.35–7.45)
PH UR STRIP: 5 [PH] (ref 5–8)
PH UR STRIP: 5 [PH] (ref 5–8)
PH UR STRIP: 6 [PH] (ref 5–8)
PLATELET # BLD AUTO: 228 K/UL (ref 150–450)
PLATELET # BLD AUTO: 255 K/UL (ref 150–450)
PLATELET # BLD AUTO: 332 K/UL (ref 150–450)
PLATELET # BLD AUTO: 372 K/UL (ref 150–450)
PLATELET # BLD AUTO: 379 K/UL (ref 150–450)
PLATELET # BLD AUTO: 388 K/UL (ref 150–450)
PLATELET # BLD AUTO: 404 K/UL (ref 150–450)
PLATELET # BLD AUTO: 407 K/UL (ref 150–450)
PMV BLD AUTO: 9.3 FL (ref 9.2–12.9)
PMV BLD AUTO: 9.4 FL (ref 9.2–12.9)
PMV BLD AUTO: 9.5 FL (ref 9.2–12.9)
PMV BLD AUTO: 9.6 FL (ref 9.2–12.9)
PMV BLD AUTO: 9.6 FL (ref 9.2–12.9)
PO2 BLDA: 69 MMHG (ref 75–100)
POC BE: 8.1 MMOL/L (ref -2–2)
POC COHB: 2 % (ref 0–3)
POC METHB: 1.2 % (ref 0–1.5)
POC O2HB ARTERIAL: 92.4 % (ref 94–100)
POC SATURATED O2: 95.5 % (ref 90–100)
POC TCO2: 34.2 MMOL/L
POC THB: 11.7 G/DL (ref 12–18)
POCT GLUCOSE: 100 MG/DL (ref 70–110)
POCT GLUCOSE: 104 MG/DL (ref 70–110)
POCT GLUCOSE: 106 MG/DL (ref 70–110)
POCT GLUCOSE: 112 MG/DL (ref 70–110)
POCT GLUCOSE: 121 MG/DL (ref 70–110)
POCT GLUCOSE: 122 MG/DL (ref 70–110)
POCT GLUCOSE: 135 MG/DL (ref 70–110)
POCT GLUCOSE: 150 MG/DL (ref 70–110)
POCT GLUCOSE: 152 MG/DL (ref 70–110)
POCT GLUCOSE: 157 MG/DL (ref 70–110)
POCT GLUCOSE: 160 MG/DL (ref 70–110)
POCT GLUCOSE: 162 MG/DL (ref 70–110)
POCT GLUCOSE: 162 MG/DL (ref 70–110)
POCT GLUCOSE: 164 MG/DL (ref 70–110)
POCT GLUCOSE: 187 MG/DL (ref 70–110)
POCT GLUCOSE: 189 MG/DL (ref 70–110)
POCT GLUCOSE: 201 MG/DL (ref 70–110)
POCT GLUCOSE: 212 MG/DL (ref 70–110)
POCT GLUCOSE: 213 MG/DL (ref 70–110)
POCT GLUCOSE: 215 MG/DL (ref 70–110)
POCT GLUCOSE: 216 MG/DL (ref 70–110)
POCT GLUCOSE: 217 MG/DL (ref 70–110)
POCT GLUCOSE: 219 MG/DL (ref 70–110)
POCT GLUCOSE: 223 MG/DL (ref 70–110)
POCT GLUCOSE: 227 MG/DL (ref 70–110)
POCT GLUCOSE: 238 MG/DL (ref 70–110)
POCT GLUCOSE: 249 MG/DL (ref 70–110)
POCT GLUCOSE: 257 MG/DL (ref 70–110)
POCT GLUCOSE: 258 MG/DL (ref 70–110)
POCT GLUCOSE: 267 MG/DL (ref 70–110)
POCT GLUCOSE: 271 MG/DL (ref 70–110)
POCT GLUCOSE: 281 MG/DL (ref 70–110)
POCT GLUCOSE: 287 MG/DL (ref 70–110)
POCT GLUCOSE: 298 MG/DL (ref 70–110)
POCT GLUCOSE: 300 MG/DL (ref 70–110)
POCT GLUCOSE: 313 MG/DL (ref 70–110)
POCT GLUCOSE: 314 MG/DL (ref 70–110)
POCT GLUCOSE: 324 MG/DL (ref 70–110)
POCT GLUCOSE: 364 MG/DL (ref 70–110)
POCT GLUCOSE: 366 MG/DL (ref 70–110)
POCT GLUCOSE: 377 MG/DL (ref 70–110)
POCT GLUCOSE: 399 MG/DL (ref 70–110)
POCT GLUCOSE: 435 MG/DL (ref 70–110)
POCT GLUCOSE: 59 MG/DL (ref 70–110)
POCT GLUCOSE: 76 MG/DL (ref 70–110)
POCT GLUCOSE: 78 MG/DL (ref 70–110)
POCT GLUCOSE: 79 MG/DL (ref 70–110)
POCT GLUCOSE: 84 MG/DL (ref 70–110)
POCT GLUCOSE: 91 MG/DL (ref 70–110)
POTASSIUM SERPL-SCNC: 3.6 MMOL/L (ref 3.5–5.1)
POTASSIUM SERPL-SCNC: 4 MMOL/L (ref 3.5–5.1)
POTASSIUM SERPL-SCNC: 4.1 MMOL/L (ref 3.5–5.1)
POTASSIUM SERPL-SCNC: 4.1 MMOL/L (ref 3.5–5.1)
POTASSIUM SERPL-SCNC: 4.3 MMOL/L (ref 3.5–5.1)
POTASSIUM SERPL-SCNC: 4.4 MMOL/L (ref 3.5–5.1)
POTASSIUM SERPL-SCNC: 4.4 MMOL/L (ref 3.5–5.1)
POTASSIUM SERPL-SCNC: 4.5 MMOL/L (ref 3.5–5.1)
POTASSIUM SERPL-SCNC: 4.6 MMOL/L (ref 3.5–5.1)
PROCALCITONIN SERPL IA-MCNC: 0.08 NG/ML
PROCALCITONIN SERPL IA-MCNC: 0.16 NG/ML
PROT SERPL-MCNC: 5.1 G/DL (ref 6–8.4)
PROT SERPL-MCNC: 5.3 G/DL (ref 6–8.4)
PROT SERPL-MCNC: 5.4 G/DL (ref 6–8.4)
PROT SERPL-MCNC: 5.7 G/DL (ref 6–8.4)
PROT SERPL-MCNC: 6 G/DL (ref 6–8.4)
PROT SERPL-MCNC: 6.1 G/DL (ref 6–8.4)
PROT SERPL-MCNC: 6.4 G/DL (ref 6–8.4)
PROT UR QL STRIP: ABNORMAL
RBC # BLD AUTO: 2.99 M/UL (ref 4.6–6.2)
RBC # BLD AUTO: 3.18 M/UL (ref 4.6–6.2)
RBC # BLD AUTO: 3.26 M/UL (ref 4.6–6.2)
RBC # BLD AUTO: 3.38 M/UL (ref 4.6–6.2)
RBC # BLD AUTO: 3.43 M/UL (ref 4.6–6.2)
RBC # BLD AUTO: 3.46 M/UL (ref 4.6–6.2)
RBC # BLD AUTO: 3.47 M/UL (ref 4.6–6.2)
RBC # BLD AUTO: 3.49 M/UL (ref 4.6–6.2)
RBC #/AREA URNS HPF: 0 /HPF (ref 0–4)
RBC #/AREA URNS HPF: 1 /HPF (ref 0–4)
SARS-COV-2 RDRP RESP QL NAA+PROBE: POSITIVE
SARS-COV-2 RDRP RESP QL NAA+PROBE: POSITIVE
SARS-COV-2 RNA RESP QL NAA+PROBE: DETECTED
SARS-COV-2- CYCLE NUMBER: 30
SITE: ABNORMAL
SODIUM SERPL-SCNC: 138 MMOL/L (ref 136–145)
SODIUM SERPL-SCNC: 138 MMOL/L (ref 136–145)
SODIUM SERPL-SCNC: 139 MMOL/L (ref 136–145)
SODIUM SERPL-SCNC: 141 MMOL/L (ref 136–145)
SODIUM SERPL-SCNC: 142 MMOL/L (ref 136–145)
SODIUM SERPL-SCNC: 144 MMOL/L (ref 136–145)
SP GR UR STRIP: 1.01 (ref 1–1.03)
SP GR UR STRIP: 1.02 (ref 1–1.03)
SP GR UR STRIP: 1.02 (ref 1–1.03)
SPECIMEN SOURCE: NORMAL
SPECIMEN SOURCE: NORMAL
TB INDURATION 48 - 72 HR READ: 0 MM
TROPONIN I SERPL DL<=0.01 NG/ML-MCNC: 0.06 NG/ML (ref 0–0.03)
TROPONIN I SERPL DL<=0.01 NG/ML-MCNC: 0.07 NG/ML (ref 0–0.03)
TROPONIN I SERPL DL<=0.01 NG/ML-MCNC: 0.08 NG/ML (ref 0–0.03)
URN SPEC COLLECT METH UR: ABNORMAL
UROBILINOGEN UR STRIP-ACNC: 1 EU/DL
UROBILINOGEN UR STRIP-ACNC: NEGATIVE EU/DL
UROBILINOGEN UR STRIP-ACNC: NEGATIVE EU/DL
WBC # BLD AUTO: 2.78 K/UL (ref 3.9–12.7)
WBC # BLD AUTO: 3.32 K/UL (ref 3.9–12.7)
WBC # BLD AUTO: 3.84 K/UL (ref 3.9–12.7)
WBC # BLD AUTO: 4.16 K/UL (ref 3.9–12.7)
WBC # BLD AUTO: 4.47 K/UL (ref 3.9–12.7)
WBC # BLD AUTO: 6.06 K/UL (ref 3.9–12.7)
WBC # BLD AUTO: 6.11 K/UL (ref 3.9–12.7)
WBC # BLD AUTO: 8.85 K/UL (ref 3.9–12.7)
WBC #/AREA URNS HPF: 1 /HPF (ref 0–5)
WBC #/AREA URNS HPF: 5 /HPF (ref 0–5)
WBC #/AREA URNS HPF: 60 /HPF (ref 0–5)
YEAST URNS QL MICRO: ABNORMAL
YEAST URNS QL MICRO: ABNORMAL

## 2023-01-01 PROCEDURE — 99900035 HC TECH TIME PER 15 MIN (STAT)

## 2023-01-01 PROCEDURE — G0378 HOSPITAL OBSERVATION PER HR: HCPCS

## 2023-01-01 PROCEDURE — 11000001 HC ACUTE MED/SURG PRIVATE ROOM

## 2023-01-01 PROCEDURE — 27000221 HC OXYGEN, UP TO 24 HOURS

## 2023-01-01 PROCEDURE — 25000003 PHARM REV CODE 250: Performed by: INTERNAL MEDICINE

## 2023-01-01 PROCEDURE — 85025 COMPLETE CBC W/AUTO DIFF WBC: CPT | Performed by: SURGERY

## 2023-01-01 PROCEDURE — 93005 ELECTROCARDIOGRAM TRACING: CPT

## 2023-01-01 PROCEDURE — 97162 PT EVAL MOD COMPLEX 30 MIN: CPT

## 2023-01-01 PROCEDURE — 94760 N-INVAS EAR/PLS OXIMETRY 1: CPT

## 2023-01-01 PROCEDURE — 85025 COMPLETE CBC W/AUTO DIFF WBC: CPT | Performed by: INTERNAL MEDICINE

## 2023-01-01 PROCEDURE — 94761 N-INVAS EAR/PLS OXIMETRY MLT: CPT

## 2023-01-01 PROCEDURE — 84484 ASSAY OF TROPONIN QUANT: CPT | Performed by: SURGERY

## 2023-01-01 PROCEDURE — 82803 BLOOD GASES ANY COMBINATION: CPT | Performed by: INTERNAL MEDICINE

## 2023-01-01 PROCEDURE — 85025 COMPLETE CBC W/AUTO DIFF WBC: CPT | Performed by: FAMILY MEDICINE

## 2023-01-01 PROCEDURE — 87040 BLOOD CULTURE FOR BACTERIA: CPT | Performed by: INTERNAL MEDICINE

## 2023-01-01 PROCEDURE — 99233 PR SUBSEQUENT HOSPITAL CARE,LEVL III: ICD-10-PCS | Mod: CR,,, | Performed by: FAMILY MEDICINE

## 2023-01-01 PROCEDURE — 99239 HOSP IP/OBS DSCHRG MGMT >30: CPT | Mod: CR,,, | Performed by: INTERNAL MEDICINE

## 2023-01-01 PROCEDURE — 27000207 HC ISOLATION

## 2023-01-01 PROCEDURE — 97535 SELF CARE MNGMENT TRAINING: CPT | Mod: CO

## 2023-01-01 PROCEDURE — 25000003 PHARM REV CODE 250: Performed by: NURSE PRACTITIONER

## 2023-01-01 PROCEDURE — 99285 EMERGENCY DEPT VISIT HI MDM: CPT | Mod: 25

## 2023-01-01 PROCEDURE — 82553 CREATINE MB FRACTION: CPT | Performed by: SURGERY

## 2023-01-01 PROCEDURE — 84484 ASSAY OF TROPONIN QUANT: CPT | Mod: 91 | Performed by: SURGERY

## 2023-01-01 PROCEDURE — 63600175 PHARM REV CODE 636 W HCPCS: Performed by: NURSE PRACTITIONER

## 2023-01-01 PROCEDURE — 25000003 PHARM REV CODE 250: Performed by: PHYSICIAN ASSISTANT

## 2023-01-01 PROCEDURE — 96375 TX/PRO/DX INJ NEW DRUG ADDON: CPT

## 2023-01-01 PROCEDURE — 80048 BASIC METABOLIC PNL TOTAL CA: CPT | Performed by: NURSE PRACTITIONER

## 2023-01-01 PROCEDURE — 99315 PR NURSING FAC DISCHRGE DAY,1-30 MIN: ICD-10-PCS | Mod: ,,, | Performed by: NURSE PRACTITIONER

## 2023-01-01 PROCEDURE — U0002 COVID-19 LAB TEST NON-CDC: HCPCS | Performed by: INTERNAL MEDICINE

## 2023-01-01 PROCEDURE — 96361 HYDRATE IV INFUSION ADD-ON: CPT

## 2023-01-01 PROCEDURE — 63600175 PHARM REV CODE 636 W HCPCS: Performed by: INTERNAL MEDICINE

## 2023-01-01 PROCEDURE — 97116 GAIT TRAINING THERAPY: CPT

## 2023-01-01 PROCEDURE — 80053 COMPREHEN METABOLIC PANEL: CPT | Performed by: NURSE PRACTITIONER

## 2023-01-01 PROCEDURE — 81000 URINALYSIS NONAUTO W/SCOPE: CPT | Performed by: SURGERY

## 2023-01-01 PROCEDURE — 99223 PR INITIAL HOSPITAL CARE,LEVL III: ICD-10-PCS | Mod: ,,, | Performed by: FAMILY MEDICINE

## 2023-01-01 PROCEDURE — 83605 ASSAY OF LACTIC ACID: CPT | Performed by: SURGERY

## 2023-01-01 PROCEDURE — 84145 PROCALCITONIN (PCT): CPT | Performed by: FAMILY MEDICINE

## 2023-01-01 PROCEDURE — 99315 NF DSCHRG MGMT 30 MIN/LESS: CPT | Mod: ,,, | Performed by: NURSE PRACTITIONER

## 2023-01-01 PROCEDURE — 99222 PR INITIAL HOSPITAL CARE,LEVL II: ICD-10-PCS | Mod: AI,CR,, | Performed by: FAMILY MEDICINE

## 2023-01-01 PROCEDURE — 80053 COMPREHEN METABOLIC PANEL: CPT | Performed by: PHYSICIAN ASSISTANT

## 2023-01-01 PROCEDURE — 99239 HOSP IP/OBS DSCHRG MGMT >30: CPT | Mod: ,,, | Performed by: STUDENT IN AN ORGANIZED HEALTH CARE EDUCATION/TRAINING PROGRAM

## 2023-01-01 PROCEDURE — 30200315 PPD INTRADERMAL TEST REV CODE 302: Performed by: NURSE PRACTITIONER

## 2023-01-01 PROCEDURE — 83880 ASSAY OF NATRIURETIC PEPTIDE: CPT | Performed by: SURGERY

## 2023-01-01 PROCEDURE — 93010 EKG 12-LEAD: ICD-10-PCS | Mod: ,,, | Performed by: INTERNAL MEDICINE

## 2023-01-01 PROCEDURE — S0166 INJ OLANZAPINE 2.5MG: HCPCS | Performed by: FAMILY MEDICINE

## 2023-01-01 PROCEDURE — 63700000 PHARM REV CODE 250 ALT 637 W/O HCPCS: Performed by: FAMILY MEDICINE

## 2023-01-01 PROCEDURE — 97530 THERAPEUTIC ACTIVITIES: CPT | Mod: CO

## 2023-01-01 PROCEDURE — 36415 COLL VENOUS BLD VENIPUNCTURE: CPT | Performed by: FAMILY MEDICINE

## 2023-01-01 PROCEDURE — 80048 BASIC METABOLIC PNL TOTAL CA: CPT | Performed by: FAMILY MEDICINE

## 2023-01-01 PROCEDURE — 99232 PR SUBSEQUENT HOSPITAL CARE,LEVL II: ICD-10-PCS | Mod: CR,,, | Performed by: STUDENT IN AN ORGANIZED HEALTH CARE EDUCATION/TRAINING PROGRAM

## 2023-01-01 PROCEDURE — 83605 ASSAY OF LACTIC ACID: CPT | Performed by: INTERNAL MEDICINE

## 2023-01-01 PROCEDURE — 93010 ELECTROCARDIOGRAM REPORT: CPT | Mod: ,,, | Performed by: INTERNAL MEDICINE

## 2023-01-01 PROCEDURE — 11000004 HC SNF PRIVATE

## 2023-01-01 PROCEDURE — 80053 COMPREHEN METABOLIC PANEL: CPT | Performed by: SURGERY

## 2023-01-01 PROCEDURE — 25000003 PHARM REV CODE 250: Performed by: FAMILY MEDICINE

## 2023-01-01 PROCEDURE — 96360 HYDRATION IV INFUSION INIT: CPT

## 2023-01-01 PROCEDURE — 97530 THERAPEUTIC ACTIVITIES: CPT

## 2023-01-01 PROCEDURE — 96376 TX/PRO/DX INJ SAME DRUG ADON: CPT

## 2023-01-01 PROCEDURE — 36415 COLL VENOUS BLD VENIPUNCTURE: CPT | Performed by: SURGERY

## 2023-01-01 PROCEDURE — 99222 1ST HOSP IP/OBS MODERATE 55: CPT | Mod: AI,CR,, | Performed by: FAMILY MEDICINE

## 2023-01-01 PROCEDURE — 87086 URINE CULTURE/COLONY COUNT: CPT | Performed by: NURSE PRACTITIONER

## 2023-01-01 PROCEDURE — 36415 COLL VENOUS BLD VENIPUNCTURE: CPT | Performed by: NURSE PRACTITIONER

## 2023-01-01 PROCEDURE — 97110 THERAPEUTIC EXERCISES: CPT | Mod: CO

## 2023-01-01 PROCEDURE — 80053 COMPREHEN METABOLIC PANEL: CPT | Performed by: INTERNAL MEDICINE

## 2023-01-01 PROCEDURE — 97110 THERAPEUTIC EXERCISES: CPT

## 2023-01-01 PROCEDURE — 92610 EVALUATE SWALLOWING FUNCTION: CPT

## 2023-01-01 PROCEDURE — 99239 PR HOSPITAL DISCHARGE DAY,>30 MIN: ICD-10-PCS | Mod: CR,,, | Performed by: INTERNAL MEDICINE

## 2023-01-01 PROCEDURE — 63600175 PHARM REV CODE 636 W HCPCS: Performed by: FAMILY MEDICINE

## 2023-01-01 PROCEDURE — 25000003 PHARM REV CODE 250: Performed by: SURGERY

## 2023-01-01 PROCEDURE — 84145 PROCALCITONIN (PCT): CPT | Performed by: SURGERY

## 2023-01-01 PROCEDURE — 99232 PR SUBSEQUENT HOSPITAL CARE,LEVL II: ICD-10-PCS | Mod: CR,,, | Performed by: FAMILY MEDICINE

## 2023-01-01 PROCEDURE — 87502 INFLUENZA DNA AMP PROBE: CPT | Performed by: FAMILY MEDICINE

## 2023-01-01 PROCEDURE — 99309 PR NURSING FAC CARE, SUBSEQ, SIGNIF COMPLIC: ICD-10-PCS | Mod: ,,, | Performed by: FAMILY MEDICINE

## 2023-01-01 PROCEDURE — 27000492 HC SLEEVE, SCD T/L

## 2023-01-01 PROCEDURE — 87040 BLOOD CULTURE FOR BACTERIA: CPT | Performed by: FAMILY MEDICINE

## 2023-01-01 PROCEDURE — 81000 URINALYSIS NONAUTO W/SCOPE: CPT | Performed by: FAMILY MEDICINE

## 2023-01-01 PROCEDURE — 96372 THER/PROPH/DIAG INJ SC/IM: CPT | Performed by: INTERNAL MEDICINE

## 2023-01-01 PROCEDURE — 63600175 PHARM REV CODE 636 W HCPCS: Performed by: STUDENT IN AN ORGANIZED HEALTH CARE EDUCATION/TRAINING PROGRAM

## 2023-01-01 PROCEDURE — 86580 TB INTRADERMAL TEST: CPT | Performed by: NURSE PRACTITIONER

## 2023-01-01 PROCEDURE — 99223 1ST HOSP IP/OBS HIGH 75: CPT | Mod: ,,, | Performed by: FAMILY MEDICINE

## 2023-01-01 PROCEDURE — 85025 COMPLETE CBC W/AUTO DIFF WBC: CPT | Performed by: NURSE PRACTITIONER

## 2023-01-01 PROCEDURE — 36415 COLL VENOUS BLD VENIPUNCTURE: CPT | Performed by: INTERNAL MEDICINE

## 2023-01-01 PROCEDURE — 99222 PR INITIAL HOSPITAL CARE,LEVL II: ICD-10-PCS | Mod: CR,,, | Performed by: PSYCHIATRY & NEUROLOGY

## 2023-01-01 PROCEDURE — 80053 COMPREHEN METABOLIC PANEL: CPT | Performed by: FAMILY MEDICINE

## 2023-01-01 PROCEDURE — U0005 INFEC AGEN DETEC AMPLI PROBE: HCPCS | Performed by: FAMILY MEDICINE

## 2023-01-01 PROCEDURE — 87088 URINE BACTERIA CULTURE: CPT | Performed by: NURSE PRACTITIONER

## 2023-01-01 PROCEDURE — 99309 PR NURSING FAC CARE, SUBSEQ, SIGNIF COMPLIC: ICD-10-PCS | Mod: ,,, | Performed by: INTERNAL MEDICINE

## 2023-01-01 PROCEDURE — 82962 GLUCOSE BLOOD TEST: CPT

## 2023-01-01 PROCEDURE — 87502 INFLUENZA DNA AMP PROBE: CPT | Performed by: SURGERY

## 2023-01-01 PROCEDURE — 99232 SBSQ HOSP IP/OBS MODERATE 35: CPT | Mod: CR,,, | Performed by: STUDENT IN AN ORGANIZED HEALTH CARE EDUCATION/TRAINING PROGRAM

## 2023-01-01 PROCEDURE — 99232 PR SUBSEQUENT HOSPITAL CARE,LEVL II: ICD-10-PCS | Mod: CR,,, | Performed by: INTERNAL MEDICINE

## 2023-01-01 PROCEDURE — 83605 ASSAY OF LACTIC ACID: CPT | Performed by: FAMILY MEDICINE

## 2023-01-01 PROCEDURE — 99900031 HC PATIENT EDUCATION (STAT)

## 2023-01-01 PROCEDURE — 99232 SBSQ HOSP IP/OBS MODERATE 35: CPT | Mod: CR,,, | Performed by: INTERNAL MEDICINE

## 2023-01-01 PROCEDURE — 81000 URINALYSIS NONAUTO W/SCOPE: CPT | Performed by: NURSE PRACTITIONER

## 2023-01-01 PROCEDURE — 99309 SBSQ NF CARE MODERATE MDM 30: CPT | Mod: ,,, | Performed by: INTERNAL MEDICINE

## 2023-01-01 PROCEDURE — 87651 STREP A DNA AMP PROBE: CPT | Performed by: SURGERY

## 2023-01-01 PROCEDURE — 87106 FUNGI IDENTIFICATION YEAST: CPT | Performed by: NURSE PRACTITIONER

## 2023-01-01 PROCEDURE — 36415 COLL VENOUS BLD VENIPUNCTURE: CPT | Performed by: PHYSICIAN ASSISTANT

## 2023-01-01 PROCEDURE — 87040 BLOOD CULTURE FOR BACTERIA: CPT | Performed by: SURGERY

## 2023-01-01 PROCEDURE — 99309 SBSQ NF CARE MODERATE MDM 30: CPT | Mod: ,,, | Performed by: FAMILY MEDICINE

## 2023-01-01 PROCEDURE — 63600175 PHARM REV CODE 636 W HCPCS: Performed by: SURGERY

## 2023-01-01 PROCEDURE — 97165 OT EVAL LOW COMPLEX 30 MIN: CPT

## 2023-01-01 PROCEDURE — 85025 COMPLETE CBC W/AUTO DIFF WBC: CPT | Performed by: PHYSICIAN ASSISTANT

## 2023-01-01 PROCEDURE — U0002 COVID-19 LAB TEST NON-CDC: HCPCS | Performed by: SURGERY

## 2023-01-01 PROCEDURE — 99239 PR HOSPITAL DISCHARGE DAY,>30 MIN: ICD-10-PCS | Mod: ,,, | Performed by: STUDENT IN AN ORGANIZED HEALTH CARE EDUCATION/TRAINING PROGRAM

## 2023-01-01 PROCEDURE — 96374 THER/PROPH/DIAG INJ IV PUSH: CPT

## 2023-01-01 PROCEDURE — 99233 SBSQ HOSP IP/OBS HIGH 50: CPT | Mod: CR,,, | Performed by: FAMILY MEDICINE

## 2023-01-01 PROCEDURE — 87040 BLOOD CULTURE FOR BACTERIA: CPT | Mod: 59 | Performed by: SURGERY

## 2023-01-01 PROCEDURE — 99232 SBSQ HOSP IP/OBS MODERATE 35: CPT | Mod: CR,,, | Performed by: FAMILY MEDICINE

## 2023-01-01 PROCEDURE — 99222 1ST HOSP IP/OBS MODERATE 55: CPT | Mod: CR,,, | Performed by: PSYCHIATRY & NEUROLOGY

## 2023-01-01 PROCEDURE — 36600 WITHDRAWAL OF ARTERIAL BLOOD: CPT

## 2023-01-01 RX ORDER — ONDANSETRON 2 MG/ML
4 INJECTION INTRAMUSCULAR; INTRAVENOUS
Status: COMPLETED | OUTPATIENT
Start: 2023-01-01 | End: 2023-01-01

## 2023-01-01 RX ORDER — ELECTROLYTES/DEXTROSE
SOLUTION, ORAL ORAL 2 TIMES DAILY
Status: CANCELLED | OUTPATIENT
Start: 2023-01-01

## 2023-01-01 RX ORDER — IBUPROFEN 200 MG
16 TABLET ORAL
Status: DISCONTINUED | OUTPATIENT
Start: 2023-01-01 | End: 2023-01-01

## 2023-01-01 RX ORDER — GUAIFENESIN 600 MG/1
1200 TABLET, EXTENDED RELEASE ORAL 2 TIMES DAILY
Qty: 60 TABLET | Refills: 0 | Status: SHIPPED | OUTPATIENT
Start: 2023-01-01

## 2023-01-01 RX ORDER — POLYETHYLENE GLYCOL 3350 17 G/17G
17 POWDER, FOR SOLUTION ORAL DAILY
Status: CANCELLED | OUTPATIENT
Start: 2023-01-01

## 2023-01-01 RX ORDER — SODIUM CHLORIDE 0.9 % (FLUSH) 0.9 %
10 SYRINGE (ML) INJECTION
Status: DISCONTINUED | OUTPATIENT
Start: 2023-01-01 | End: 2023-01-01 | Stop reason: HOSPADM

## 2023-01-01 RX ORDER — IBUPROFEN 200 MG
16 TABLET ORAL
Status: CANCELLED | OUTPATIENT
Start: 2023-01-01

## 2023-01-01 RX ORDER — CLOPIDOGREL BISULFATE 75 MG/1
75 TABLET ORAL DAILY
Status: CANCELLED | OUTPATIENT
Start: 2023-01-01

## 2023-01-01 RX ORDER — GUAIFENESIN 600 MG/1
1200 TABLET, EXTENDED RELEASE ORAL 2 TIMES DAILY
Qty: 60 TABLET | Refills: 0 | OUTPATIENT
Start: 2023-01-01 | End: 2023-01-01 | Stop reason: SDUPTHER

## 2023-01-01 RX ORDER — HYDROCODONE BITARTRATE AND ACETAMINOPHEN 5; 325 MG/1; MG/1
1 TABLET ORAL EVERY 4 HOURS PRN
Status: DISCONTINUED | OUTPATIENT
Start: 2023-01-01 | End: 2023-01-01 | Stop reason: HOSPADM

## 2023-01-01 RX ORDER — INSULIN ASPART 100 [IU]/ML
0-5 INJECTION, SOLUTION INTRAVENOUS; SUBCUTANEOUS
Status: DISCONTINUED | OUTPATIENT
Start: 2023-01-01 | End: 2023-01-01

## 2023-01-01 RX ORDER — FLUCONAZOLE 100 MG/1
100 TABLET ORAL DAILY
Status: DISCONTINUED | OUTPATIENT
Start: 2023-01-01 | End: 2023-01-01

## 2023-01-01 RX ORDER — HYDROCODONE BITARTRATE AND ACETAMINOPHEN 5; 325 MG/1; MG/1
1 TABLET ORAL EVERY 4 HOURS PRN
Status: CANCELLED | OUTPATIENT
Start: 2023-01-01

## 2023-01-01 RX ORDER — IBUPROFEN 200 MG
24 TABLET ORAL
Status: DISCONTINUED | OUTPATIENT
Start: 2023-01-01 | End: 2023-01-01 | Stop reason: HOSPADM

## 2023-01-01 RX ORDER — TAMSULOSIN HYDROCHLORIDE 0.4 MG/1
0.4 CAPSULE ORAL DAILY
Status: DISCONTINUED | OUTPATIENT
Start: 2023-01-01 | End: 2023-01-01 | Stop reason: HOSPADM

## 2023-01-01 RX ORDER — ENOXAPARIN SODIUM 100 MG/ML
40 INJECTION SUBCUTANEOUS EVERY 24 HOURS
Status: DISCONTINUED | OUTPATIENT
Start: 2023-01-01 | End: 2023-01-01 | Stop reason: DRUGHIGH

## 2023-01-01 RX ORDER — ELECTROLYTES/DEXTROSE
SOLUTION, ORAL ORAL 2 TIMES DAILY
Status: DISCONTINUED | OUTPATIENT
Start: 2023-01-01 | End: 2023-01-01 | Stop reason: HOSPADM

## 2023-01-01 RX ORDER — NAPROXEN SODIUM 220 MG/1
81 TABLET, FILM COATED ORAL DAILY
Status: CANCELLED | OUTPATIENT
Start: 2023-01-01

## 2023-01-01 RX ORDER — LEVOFLOXACIN 5 MG/ML
750 INJECTION, SOLUTION INTRAVENOUS
Status: DISCONTINUED | OUTPATIENT
Start: 2023-01-01 | End: 2023-01-01 | Stop reason: HOSPADM

## 2023-01-01 RX ORDER — LEVOFLOXACIN 5 MG/ML
750 INJECTION, SOLUTION INTRAVENOUS
Status: CANCELLED | OUTPATIENT
Start: 2023-01-01

## 2023-01-01 RX ORDER — ACETAMINOPHEN 325 MG/1
650 TABLET ORAL EVERY 8 HOURS PRN
Status: DISCONTINUED | OUTPATIENT
Start: 2023-01-01 | End: 2023-01-01 | Stop reason: HOSPADM

## 2023-01-01 RX ORDER — OLANZAPINE 10 MG/2ML
2.5 INJECTION, POWDER, FOR SOLUTION INTRAMUSCULAR EVERY 8 HOURS PRN
Status: DISCONTINUED | OUTPATIENT
Start: 2023-01-01 | End: 2023-01-01 | Stop reason: HOSPADM

## 2023-01-01 RX ORDER — GLUCAGON 1 MG
1 KIT INJECTION
Status: CANCELLED | OUTPATIENT
Start: 2023-01-01

## 2023-01-01 RX ORDER — ATORVASTATIN CALCIUM 40 MG/1
40 TABLET, FILM COATED ORAL DAILY
Status: CANCELLED | OUTPATIENT
Start: 2023-01-01

## 2023-01-01 RX ORDER — SODIUM CHLORIDE 9 MG/ML
INJECTION, SOLUTION INTRAVENOUS CONTINUOUS
Status: DISCONTINUED | OUTPATIENT
Start: 2023-01-01 | End: 2023-01-01

## 2023-01-01 RX ORDER — ATORVASTATIN CALCIUM 40 MG/1
40 TABLET, FILM COATED ORAL DAILY
Status: DISCONTINUED | OUTPATIENT
Start: 2023-01-01 | End: 2023-01-01 | Stop reason: HOSPADM

## 2023-01-01 RX ORDER — DEXAMETHASONE SODIUM PHOSPHATE 4 MG/ML
6 INJECTION, SOLUTION INTRA-ARTICULAR; INTRALESIONAL; INTRAMUSCULAR; INTRAVENOUS; SOFT TISSUE EVERY 24 HOURS
Status: DISCONTINUED | OUTPATIENT
Start: 2023-01-01 | End: 2023-01-01 | Stop reason: HOSPADM

## 2023-01-01 RX ORDER — ENOXAPARIN SODIUM 100 MG/ML
30 INJECTION SUBCUTANEOUS EVERY 24 HOURS
Status: DISCONTINUED | OUTPATIENT
Start: 2023-01-01 | End: 2023-01-01 | Stop reason: HOSPADM

## 2023-01-01 RX ORDER — LEVOFLOXACIN 5 MG/ML
750 INJECTION, SOLUTION INTRAVENOUS
Status: DISCONTINUED | OUTPATIENT
Start: 2023-01-01 | End: 2023-01-01

## 2023-01-01 RX ORDER — INSULIN ASPART 100 [IU]/ML
10 INJECTION, SOLUTION INTRAVENOUS; SUBCUTANEOUS ONCE
Status: COMPLETED | OUTPATIENT
Start: 2023-01-01 | End: 2023-01-01

## 2023-01-01 RX ORDER — CLOPIDOGREL BISULFATE 75 MG/1
75 TABLET ORAL DAILY
Status: DISCONTINUED | OUTPATIENT
Start: 2023-01-01 | End: 2023-01-01 | Stop reason: HOSPADM

## 2023-01-01 RX ORDER — ACETAMINOPHEN 325 MG/1
650 TABLET ORAL EVERY 6 HOURS PRN
Status: CANCELLED | OUTPATIENT
Start: 2023-01-01

## 2023-01-01 RX ORDER — GLUCAGON 1 MG
1 KIT INJECTION
Status: DISCONTINUED | OUTPATIENT
Start: 2023-01-01 | End: 2023-01-01

## 2023-01-01 RX ORDER — INSULIN ASPART 100 [IU]/ML
0-5 INJECTION, SOLUTION INTRAVENOUS; SUBCUTANEOUS
Status: DISCONTINUED | OUTPATIENT
Start: 2023-01-01 | End: 2023-01-01 | Stop reason: HOSPADM

## 2023-01-01 RX ORDER — MEMANTINE HYDROCHLORIDE 10 MG/1
10 TABLET ORAL DAILY
Status: DISCONTINUED | OUTPATIENT
Start: 2023-01-01 | End: 2023-01-01 | Stop reason: HOSPADM

## 2023-01-01 RX ORDER — SODIUM CHLORIDE 9 MG/ML
INJECTION, SOLUTION INTRAVENOUS
Status: COMPLETED | OUTPATIENT
Start: 2023-01-01 | End: 2023-01-01

## 2023-01-01 RX ORDER — SODIUM CHLORIDE 9 MG/ML
INJECTION, SOLUTION INTRAVENOUS CONTINUOUS
Status: DISCONTINUED | OUTPATIENT
Start: 2023-01-01 | End: 2023-01-01 | Stop reason: HOSPADM

## 2023-01-01 RX ORDER — IBUPROFEN 200 MG
24 TABLET ORAL
Status: DISCONTINUED | OUTPATIENT
Start: 2023-01-01 | End: 2023-01-01

## 2023-01-01 RX ORDER — INSULIN GLARGINE AND LIXISENATIDE 100; 33 U/ML; UG/ML
15 INJECTION, SOLUTION SUBCUTANEOUS
Qty: 3 PEN | Refills: 3 | Status: SHIPPED | OUTPATIENT
Start: 2023-01-01

## 2023-01-01 RX ORDER — POLYETHYLENE GLYCOL 3350 17 G/17G
17 POWDER, FOR SOLUTION ORAL DAILY
Status: DISCONTINUED | OUTPATIENT
Start: 2023-01-01 | End: 2023-01-01 | Stop reason: HOSPADM

## 2023-01-01 RX ORDER — IBUPROFEN 200 MG
24 TABLET ORAL
Status: CANCELLED | OUTPATIENT
Start: 2023-01-01

## 2023-01-01 RX ORDER — INSULIN ASPART 100 [IU]/ML
0-5 INJECTION, SOLUTION INTRAVENOUS; SUBCUTANEOUS
Status: CANCELLED | OUTPATIENT
Start: 2023-01-01

## 2023-01-01 RX ORDER — DEXAMETHASONE SODIUM PHOSPHATE 4 MG/ML
6 INJECTION, SOLUTION INTRA-ARTICULAR; INTRALESIONAL; INTRAMUSCULAR; INTRAVENOUS; SOFT TISSUE EVERY 24 HOURS
Status: COMPLETED | OUTPATIENT
Start: 2023-01-01 | End: 2023-01-01

## 2023-01-01 RX ORDER — OLANZAPINE 10 MG/2ML
2.5 INJECTION, POWDER, FOR SOLUTION INTRAMUSCULAR 3 TIMES DAILY PRN
Qty: 30 EACH | Refills: 0 | Status: SHIPPED | OUTPATIENT
Start: 2023-01-01 | End: 2024-01-09

## 2023-01-01 RX ORDER — HYDROMORPHONE HYDROCHLORIDE 1 MG/ML
2 INJECTION, SOLUTION INTRAMUSCULAR; INTRAVENOUS; SUBCUTANEOUS
Status: COMPLETED | OUTPATIENT
Start: 2023-01-01 | End: 2023-01-01

## 2023-01-01 RX ORDER — OLANZAPINE 10 MG/2ML
2.5 INJECTION, POWDER, FOR SOLUTION INTRAMUSCULAR 3 TIMES DAILY PRN
Status: DISCONTINUED | OUTPATIENT
Start: 2023-01-01 | End: 2023-01-01 | Stop reason: HOSPADM

## 2023-01-01 RX ORDER — NAPROXEN SODIUM 220 MG/1
81 TABLET, FILM COATED ORAL DAILY
Status: DISCONTINUED | OUTPATIENT
Start: 2023-01-01 | End: 2023-01-01 | Stop reason: HOSPADM

## 2023-01-01 RX ORDER — DEXAMETHASONE SODIUM PHOSPHATE 4 MG/ML
6 INJECTION, SOLUTION INTRA-ARTICULAR; INTRALESIONAL; INTRAMUSCULAR; INTRAVENOUS; SOFT TISSUE EVERY 24 HOURS
Status: CANCELLED | OUTPATIENT
Start: 2023-01-01

## 2023-01-01 RX ORDER — TAMSULOSIN HYDROCHLORIDE 0.4 MG/1
0.4 CAPSULE ORAL DAILY
Status: CANCELLED | OUTPATIENT
Start: 2023-01-01

## 2023-01-01 RX ORDER — IBUPROFEN 200 MG
16 TABLET ORAL
Status: DISCONTINUED | OUTPATIENT
Start: 2023-01-01 | End: 2023-01-01 | Stop reason: HOSPADM

## 2023-01-01 RX ORDER — ENOXAPARIN SODIUM 100 MG/ML
30 INJECTION SUBCUTANEOUS EVERY 24 HOURS
Status: DISCONTINUED | OUTPATIENT
Start: 2023-01-01 | End: 2023-01-01

## 2023-01-01 RX ORDER — ONDANSETRON 2 MG/ML
4 INJECTION INTRAMUSCULAR; INTRAVENOUS EVERY 8 HOURS PRN
Status: DISCONTINUED | OUTPATIENT
Start: 2023-01-01 | End: 2023-01-01 | Stop reason: HOSPADM

## 2023-01-01 RX ORDER — ACETAMINOPHEN 325 MG/1
650 TABLET ORAL EVERY 6 HOURS PRN
Status: DISCONTINUED | OUTPATIENT
Start: 2023-01-01 | End: 2023-01-01 | Stop reason: HOSPADM

## 2023-01-01 RX ORDER — ENOXAPARIN SODIUM 100 MG/ML
40 INJECTION SUBCUTANEOUS EVERY 24 HOURS
Status: DISCONTINUED | OUTPATIENT
Start: 2023-01-01 | End: 2023-01-01 | Stop reason: HOSPADM

## 2023-01-01 RX ORDER — SODIUM CHLORIDE 9 MG/ML
INJECTION, SOLUTION INTRAVENOUS CONTINUOUS
Status: CANCELLED | OUTPATIENT
Start: 2023-01-01

## 2023-01-01 RX ORDER — ENOXAPARIN SODIUM 100 MG/ML
30 INJECTION SUBCUTANEOUS EVERY 24 HOURS
Status: CANCELLED | OUTPATIENT
Start: 2023-01-01

## 2023-01-01 RX ORDER — SODIUM CHLORIDE 0.9 % (FLUSH) 0.9 %
10 SYRINGE (ML) INJECTION
Status: CANCELLED | OUTPATIENT
Start: 2023-01-01

## 2023-01-01 RX ORDER — TALC
6 POWDER (GRAM) TOPICAL NIGHTLY PRN
Status: DISCONTINUED | OUTPATIENT
Start: 2023-01-01 | End: 2023-01-01 | Stop reason: HOSPADM

## 2023-01-01 RX ORDER — HYDROMORPHONE HYDROCHLORIDE 1 MG/ML
1 INJECTION, SOLUTION INTRAMUSCULAR; INTRAVENOUS; SUBCUTANEOUS
Status: DISCONTINUED | OUTPATIENT
Start: 2023-01-01 | End: 2023-01-01 | Stop reason: HOSPADM

## 2023-01-01 RX ORDER — POLYETHYLENE GLYCOL 3350 17 G/17G
17 POWDER, FOR SOLUTION ORAL DAILY
Qty: 30 PACKET | Refills: 0 | Status: SHIPPED | OUTPATIENT
Start: 2023-01-01

## 2023-01-01 RX ORDER — GLUCAGON 1 MG
1 KIT INJECTION
Status: DISCONTINUED | OUTPATIENT
Start: 2023-01-01 | End: 2023-01-01 | Stop reason: HOSPADM

## 2023-01-01 RX ORDER — MEMANTINE HYDROCHLORIDE 10 MG/1
10 TABLET ORAL DAILY
Status: CANCELLED | OUTPATIENT
Start: 2023-01-01

## 2023-01-01 RX ORDER — INSULIN ASPART 100 [IU]/ML
5 INJECTION, SOLUTION INTRAVENOUS; SUBCUTANEOUS
Status: DISCONTINUED | OUTPATIENT
Start: 2023-01-01 | End: 2023-01-01

## 2023-01-01 RX ADMIN — CLOPIDOGREL BISULFATE 75 MG: 75 TABLET ORAL at 09:01

## 2023-01-01 RX ADMIN — INSULIN DETEMIR 15 UNITS: 100 INJECTION, SOLUTION SUBCUTANEOUS at 09:01

## 2023-01-01 RX ADMIN — ASPIRIN 81 MG 81 MG: 81 TABLET ORAL at 08:01

## 2023-01-01 RX ADMIN — INSULIN ASPART 3 UNITS: 100 INJECTION, SOLUTION INTRAVENOUS; SUBCUTANEOUS at 11:01

## 2023-01-01 RX ADMIN — POLYETHYLENE GLYCOL (3350) 17 G: 17 POWDER, FOR SOLUTION ORAL at 08:01

## 2023-01-01 RX ADMIN — MEMANTINE 10 MG: 10 TABLET ORAL at 08:01

## 2023-01-01 RX ADMIN — HYDROCODONE BITARTRATE AND ACETAMINOPHEN 1 TABLET: 5; 325 TABLET ORAL at 01:01

## 2023-01-01 RX ADMIN — HYDROCORTISONE: 1 CREAM TOPICAL at 09:01

## 2023-01-01 RX ADMIN — HYDROMORPHONE HYDROCHLORIDE 1 MG: 1 INJECTION, SOLUTION INTRAMUSCULAR; INTRAVENOUS; SUBCUTANEOUS at 12:02

## 2023-01-01 RX ADMIN — SODIUM CHLORIDE: 9 INJECTION, SOLUTION INTRAVENOUS at 09:01

## 2023-01-01 RX ADMIN — CLOPIDOGREL BISULFATE 75 MG: 75 TABLET ORAL at 08:01

## 2023-01-01 RX ADMIN — Medication 6 MG: at 12:01

## 2023-01-01 RX ADMIN — ONDANSETRON HYDROCHLORIDE 4 MG: 2 SOLUTION INTRAMUSCULAR; INTRAVENOUS at 10:02

## 2023-01-01 RX ADMIN — ATORVASTATIN CALCIUM 40 MG: 40 TABLET, FILM COATED ORAL at 08:01

## 2023-01-01 RX ADMIN — HYDROCORTISONE: 1 CREAM TOPICAL at 08:01

## 2023-01-01 RX ADMIN — REMDESIVIR 100 MG: 100 INJECTION, POWDER, LYOPHILIZED, FOR SOLUTION INTRAVENOUS at 10:01

## 2023-01-01 RX ADMIN — ATORVASTATIN CALCIUM 40 MG: 40 TABLET, FILM COATED ORAL at 09:01

## 2023-01-01 RX ADMIN — INSULIN ASPART 1 UNITS: 100 INJECTION, SOLUTION INTRAVENOUS; SUBCUTANEOUS at 09:01

## 2023-01-01 RX ADMIN — ENOXAPARIN SODIUM 40 MG: 40 INJECTION SUBCUTANEOUS at 05:01

## 2023-01-01 RX ADMIN — INSULIN DETEMIR 15 UNITS: 100 INJECTION, SOLUTION SUBCUTANEOUS at 08:01

## 2023-01-01 RX ADMIN — INSULIN ASPART 1 UNITS: 100 INJECTION, SOLUTION INTRAVENOUS; SUBCUTANEOUS at 08:01

## 2023-01-01 RX ADMIN — LEVOFLOXACIN 750 MG: 750 INJECTION, SOLUTION INTRAVENOUS at 01:01

## 2023-01-01 RX ADMIN — INSULIN ASPART 3 UNITS: 100 INJECTION, SOLUTION INTRAVENOUS; SUBCUTANEOUS at 09:01

## 2023-01-01 RX ADMIN — SODIUM CHLORIDE 1000 ML: 9 INJECTION, SOLUTION INTRAVENOUS at 11:01

## 2023-01-01 RX ADMIN — REMDESIVIR 100 MG: 100 INJECTION, POWDER, LYOPHILIZED, FOR SOLUTION INTRAVENOUS at 09:01

## 2023-01-01 RX ADMIN — HYDROCODONE BITARTRATE AND ACETAMINOPHEN 1 TABLET: 5; 325 TABLET ORAL at 11:01

## 2023-01-01 RX ADMIN — POLYETHYLENE GLYCOL (3350) 17 G: 17 POWDER, FOR SOLUTION ORAL at 09:01

## 2023-01-01 RX ADMIN — SODIUM CHLORIDE: 0.9 INJECTION, SOLUTION INTRAVENOUS at 11:01

## 2023-01-01 RX ADMIN — ASPIRIN 81 MG 81 MG: 81 TABLET ORAL at 09:01

## 2023-01-01 RX ADMIN — ACETAMINOPHEN 650 MG: 325 TABLET ORAL at 12:01

## 2023-01-01 RX ADMIN — FLUCONAZOLE 100 MG: 100 TABLET ORAL at 05:01

## 2023-01-01 RX ADMIN — ENOXAPARIN SODIUM 30 MG: 30 INJECTION SUBCUTANEOUS at 05:01

## 2023-01-01 RX ADMIN — ENOXAPARIN SODIUM 30 MG: 30 INJECTION SUBCUTANEOUS at 04:01

## 2023-01-01 RX ADMIN — INSULIN ASPART 4 UNITS: 100 INJECTION, SOLUTION INTRAVENOUS; SUBCUTANEOUS at 11:01

## 2023-01-01 RX ADMIN — INSULIN ASPART 2 UNITS: 100 INJECTION, SOLUTION INTRAVENOUS; SUBCUTANEOUS at 11:01

## 2023-01-01 RX ADMIN — INSULIN ASPART 2 UNITS: 100 INJECTION, SOLUTION INTRAVENOUS; SUBCUTANEOUS at 05:01

## 2023-01-01 RX ADMIN — INSULIN ASPART 4 UNITS: 100 INJECTION, SOLUTION INTRAVENOUS; SUBCUTANEOUS at 04:01

## 2023-01-01 RX ADMIN — SODIUM CHLORIDE: 9 INJECTION, SOLUTION INTRAVENOUS at 12:01

## 2023-01-01 RX ADMIN — DEXAMETHASONE SODIUM PHOSPHATE 6 MG: 4 INJECTION, SOLUTION INTRA-ARTICULAR; INTRALESIONAL; INTRAMUSCULAR; INTRAVENOUS; SOFT TISSUE at 10:01

## 2023-01-01 RX ADMIN — LEVOFLOXACIN 750 MG: 750 INJECTION, SOLUTION INTRAVENOUS at 02:01

## 2023-01-01 RX ADMIN — INSULIN ASPART 5 UNITS: 100 INJECTION, SOLUTION INTRAVENOUS; SUBCUTANEOUS at 05:01

## 2023-01-01 RX ADMIN — Medication 6 MG: at 09:01

## 2023-01-01 RX ADMIN — TUBERCULIN PURIFIED PROTEIN DERIVATIVE 5 UNITS: 5 INJECTION, SOLUTION INTRADERMAL at 04:01

## 2023-01-01 RX ADMIN — HYDROCODONE BITARTRATE AND ACETAMINOPHEN 1 TABLET: 5; 325 TABLET ORAL at 12:01

## 2023-01-01 RX ADMIN — INSULIN ASPART 5 UNITS: 100 INJECTION, SOLUTION INTRAVENOUS; SUBCUTANEOUS at 04:01

## 2023-01-01 RX ADMIN — DEXAMETHASONE SODIUM PHOSPHATE 6 MG: 4 INJECTION, SOLUTION INTRA-ARTICULAR; INTRALESIONAL; INTRAMUSCULAR; INTRAVENOUS; SOFT TISSUE at 09:01

## 2023-01-01 RX ADMIN — MEMANTINE 10 MG: 10 TABLET ORAL at 09:01

## 2023-01-01 RX ADMIN — TAMSULOSIN HYDROCHLORIDE 0.4 MG: 0.4 CAPSULE ORAL at 08:01

## 2023-01-01 RX ADMIN — INSULIN ASPART 3 UNITS: 100 INJECTION, SOLUTION INTRAVENOUS; SUBCUTANEOUS at 05:01

## 2023-01-01 RX ADMIN — HYDROCODONE BITARTRATE AND ACETAMINOPHEN 1 TABLET: 5; 325 TABLET ORAL at 02:01

## 2023-01-01 RX ADMIN — HYDROCODONE BITARTRATE AND ACETAMINOPHEN 1 TABLET: 5; 325 TABLET ORAL at 08:01

## 2023-01-01 RX ADMIN — ACETAMINOPHEN 650 MG: 325 TABLET ORAL at 09:01

## 2023-01-01 RX ADMIN — ASPIRIN 81 MG: 81 TABLET, CHEWABLE ORAL at 09:01

## 2023-01-01 RX ADMIN — TAMSULOSIN HYDROCHLORIDE 0.4 MG: 0.4 CAPSULE ORAL at 09:01

## 2023-01-01 RX ADMIN — INSULIN ASPART 0 UNITS: 100 INJECTION, SOLUTION INTRAVENOUS; SUBCUTANEOUS at 12:01

## 2023-01-01 RX ADMIN — INSULIN ASPART 2 UNITS: 100 INJECTION, SOLUTION INTRAVENOUS; SUBCUTANEOUS at 08:01

## 2023-01-01 RX ADMIN — OLANZAPINE 2.5 MG: 10 INJECTION, POWDER, FOR SOLUTION INTRAMUSCULAR at 04:01

## 2023-01-01 RX ADMIN — SODIUM CHLORIDE: 9 INJECTION, SOLUTION INTRAVENOUS at 01:01

## 2023-01-01 RX ADMIN — HYDROCODONE BITARTRATE AND ACETAMINOPHEN 1 TABLET: 5; 325 TABLET ORAL at 10:01

## 2023-01-01 RX ADMIN — SODIUM CHLORIDE: 9 INJECTION, SOLUTION INTRAVENOUS at 11:01

## 2023-01-01 RX ADMIN — REMDESIVIR 200 MG: 100 INJECTION, POWDER, LYOPHILIZED, FOR SOLUTION INTRAVENOUS at 11:01

## 2023-01-01 RX ADMIN — INSULIN ASPART 3 UNITS: 100 INJECTION, SOLUTION INTRAVENOUS; SUBCUTANEOUS at 08:01

## 2023-01-01 RX ADMIN — ASPIRIN 81 MG: 81 TABLET, CHEWABLE ORAL at 08:01

## 2023-01-01 RX ADMIN — HYDROCODONE BITARTRATE AND ACETAMINOPHEN 1 TABLET: 5; 325 TABLET ORAL at 09:01

## 2023-01-01 RX ADMIN — REMDESIVIR 100 MG: 100 INJECTION, POWDER, LYOPHILIZED, FOR SOLUTION INTRAVENOUS at 08:01

## 2023-01-01 RX ADMIN — DEXAMETHASONE SODIUM PHOSPHATE 6 MG: 4 INJECTION, SOLUTION INTRA-ARTICULAR; INTRALESIONAL; INTRAMUSCULAR; INTRAVENOUS; SOFT TISSUE at 11:01

## 2023-01-01 RX ADMIN — HYDROMORPHONE HYDROCHLORIDE 2 MG: 1 INJECTION, SOLUTION INTRAMUSCULAR; INTRAVENOUS; SUBCUTANEOUS at 10:02

## 2023-01-01 RX ADMIN — INSULIN ASPART 10 UNITS: 100 INJECTION, SOLUTION INTRAVENOUS; SUBCUTANEOUS at 05:01

## 2023-01-01 RX ADMIN — ACETAMINOPHEN 650 MG: 325 TABLET ORAL at 02:01

## 2023-01-01 RX ADMIN — INSULIN ASPART 10 UNITS: 100 INJECTION, SOLUTION INTRAVENOUS; SUBCUTANEOUS at 08:01

## 2023-01-01 RX ADMIN — FLUCONAZOLE 100 MG: 100 TABLET ORAL at 09:01

## 2023-01-01 RX ADMIN — SODIUM CHLORIDE: 9 INJECTION, SOLUTION INTRAVENOUS at 04:01

## 2023-01-01 NOTE — PROGRESS NOTES
Pharmacist Renal Dose Adjustment Note    Vega Kohler is a 87 y.o. male being treated with the medication Lovenox    Patient Data:    Vital Signs (Most Recent):  Temp: 99.3 °F (37.4 °C) (01/01/23 0828)  Pulse: 87 (01/01/23 0828)  Resp: (!) 24 (01/01/23 0828)  BP: (!) 119/58 (01/01/23 0828)  SpO2: (!) 94 % (01/01/23 0828)   Vital Signs (72h Range):  Temp:  [98.3 °F (36.8 °C)-100.3 °F (37.9 °C)]   Pulse:  [73-95]   Resp:  [16-24]   BP: ()/(51-58)   SpO2:  [92 %-95 %]      Recent Labs   Lab 12/29/22  0554 12/30/22  0515 12/31/22  0356   CREATININE 1.8* 1.6* 1.5*     Serum creatinine: 1.5 mg/dL (H) 12/31/22 0356  Estimated creatinine clearance: 32.4 mL/min (A)    Medication:Lovenox dose: 30mg frequency Q24 will be changed to   Medication:Lovenox dose: 40mg frequency:Q24    Pharmacist's Name: Yesi Perry  Pharmacist's Extension: [phone]

## 2023-01-01 NOTE — SUBJECTIVE & OBJECTIVE
Review of Systems   Constitutional:  Positive for fever. Negative for chills.   HENT:  Negative for congestion and sore throat.    Respiratory:  Positive for cough. Negative for shortness of breath.    Cardiovascular:  Negative for chest pain.   Gastrointestinal:  Negative for abdominal pain, diarrhea, nausea and vomiting.   Genitourinary:  Negative for dysuria and hematuria.   Skin:  Negative for rash.        Lip bleeding   Neurological:  Positive for weakness. Negative for dizziness, syncope and light-headedness.        Left arm weakness getting better   Objective:     Vital Signs (Most Recent):  Temp: (!) 101 °F (38.3 °C) (01/01/23 1100)  Pulse: 87 (01/01/23 0828)  Resp: (!) 24 (01/01/23 0828)  BP: (!) 119/58 (01/01/23 0828)  SpO2: (!) 94 % (01/01/23 0828)   Vital Signs (24h Range):  Temp:  [98.9 °F (37.2 °C)-101 °F (38.3 °C)] 101 °F (38.3 °C)  Pulse:  [87-95] 87  Resp:  [18-24] 24  SpO2:  [92 %-94 %] 94 %  BP: (113-119)/(53-58) 119/58     Weight: 68.1 kg (150 lb 2.1 oz)  Body mass index is 23.51 kg/m².    Physical Exam  Vitals and nursing note reviewed.   Constitutional:       General: He is not in acute distress.     Appearance: He is well-developed.   HENT:      Head: Normocephalic and atraumatic.      Right Ear: External ear normal.      Left Ear: External ear normal.      Nose:      Comments: O2 in place  Eyes:      Conjunctiva/sclera: Conjunctivae normal.      Pupils: Pupils are equal, round, and reactive to light.   Neck:      Thyroid: No thyromegaly.   Cardiovascular:      Rate and Rhythm: Normal rate and regular rhythm.      Heart sounds: Normal heart sounds. No murmur heard.  Pulmonary:      Effort: Pulmonary effort is normal. No respiratory distress.      Breath sounds: Rales present. No wheezing.   Abdominal:      General: Bowel sounds are normal. There is no distension.      Palpations: Abdomen is soft.      Tenderness: There is no abdominal tenderness.   Musculoskeletal:         General: Normal  range of motion.      Cervical back: Normal range of motion and neck supple.      Right lower leg: No edema.      Left lower leg: No edema.   Lymphadenopathy:      Cervical: No cervical adenopathy.   Skin:     General: Skin is warm and dry.      Findings: No rash.   Neurological:      General: No focal deficit present.      Mental Status: He is alert and oriented to person, place, and time.      Motor: Weakness present.      Comments: Left arm weakness much improved   Psychiatric:         Mood and Affect: Mood normal.         Behavior: Behavior normal.         CRANIAL NERVES     CN III, IV, VI   Pupils are equal, round, and reactive to light.     Significant Labs: CBC:   Recent Labs   Lab 12/31/22 0356   WBC 5.14   HGB 10.7*   HCT 31.8*          CMP:   Recent Labs   Lab 12/31/22 0356      K 3.8   CL 99   CO2 28   *   BUN 30*   CREATININE 1.5*   CALCIUM 9.4   PROT 6.0   ALBUMIN 2.7*   BILITOT 0.7   ALKPHOS 79   AST 10   ALT 15   ANIONGAP 9         Significant Imaging:     MRI Acute deep white matter infarct on the right without associated hemorrhage or mass effect.  Mild underlying chronic ischemic changes.

## 2023-01-01 NOTE — PROGRESS NOTES
[facility] ([facility])  [facility] Medicine  Progress Note    Patient Name: Vega Kohler  MRN: [id number]  Patient Class: IP- Swing   Admission Date: 2022  Length of Stay: 11 days  Attending Physician: Jennifer Yoon MD  Primary Care Provider: Fabricio Mckeon MD        Subjective:     Principal Problem:Cerebrovascular accident (CVA) due to thrombosis of right middle cerebral artery        HPI:  88yo male patient with extensive medical hx. (Alzheimers, aortic stenosis, arthritis, BPH, CHF, CKD, COPD, CAD/PAD, DM2, HLD/HTN, pulm HTN, S/p TAVR and hypothyroid). He was treated acutely [facility] [place] for ischemic CVA with left hemiplegia s/p TNK 22. Pt was independent at baseline prior to this event. Exam improved following TNK. MRI with R MCA infarct. Etiology likely . Patient stepped down to NPU to await SNF placement. Metoprolol originally started but held given etiology of stroke and risk of expansion with hypoperfusion. Will plan to resume meds this Saturday and then remainder of home BP meds on Monday. Patient was discharged on DAPT with outpatient follow up. Patient remains neurologically unchanged. SBP drop < 120 but no changes on exam. Last BM yesterday. He was brought to [facility] yesterday for continued SKILL therapy with PT / OT. On plavix and statin and asa. VSS- no bp meds 123/64          Overview/Hospital Course:       Veag Kohler is a 87 y.o. male  Admitted to Skilled care yesterday for PT/OT post ischemic CVA wqtih Left hemiplegia.   VSS , aferbile,    Gait: min/contact guard assistance x ~25 feet with RW. Left LE limp due to left lower leg injury . Dec left  foot/floor clearance and fatigues easily.    : SWING admit for PT/OT post CVA with left hemiplegia. Progressing with PT.   Gait: Contact guard assistance x ~75 feet  with RW and O2 SAT monitor.  He is on 1L O2 to maintain sats.   Glucose averaging 200s.     SWING ADMIT FOR CONT PT/OT post CVA with residual left sided  hemiplegia. Progressing well with PT>>stand by assistance with  rolling walker  using  Step Transfer Gait: CGA 60' w/ RW with a goal of 100 ft, he remains on 1L NC sats 94% does have 4 steps to get into home- will need to practice stairs prior to d/c. Lives alone but son lives near by and will help take care of him at d/c    12/30 SWING admit for continued PT/OT for post CVA with residual left sided hemiplegia. He is ambulating 70ft stand by assist using RW> goal is 100ft but also needs to ascend and descend steps to get into home- (4).     Bp is a little low today. Torsemide was started Monday. Held yesterday for creat climbing was 1.8 yesterday - better today 1.6 (baseline 1.4)bp 102/59 this am. POX 86% on RA- has O2 but blowing to top of nose. He report that it dries him. Added humidification today. Has home O2 for use as needed for COPD     1/1  Bp has remained okay. Fever this morning, coughing slightly more. Sats about the same. Denies any new complaints or issues.          Review of Systems   Constitutional:  Positive for fever. Negative for chills.   HENT:  Negative for congestion and sore throat.    Respiratory:  Positive for cough. Negative for shortness of breath.    Cardiovascular:  Negative for chest pain.   Gastrointestinal:  Negative for abdominal pain, diarrhea, nausea and vomiting.   Genitourinary:  Negative for dysuria and hematuria.   Skin:  Negative for rash.        Lip bleeding   Neurological:  Positive for weakness. Negative for dizziness, syncope and light-headedness.        Left arm weakness getting better   Objective:     Vital Signs (Most Recent):  Temp: (!) 101 °F (38.3 °C) (01/01/23 1100)  Pulse: 87 (01/01/23 0828)  Resp: (!) 24 (01/01/23 0828)  BP: (!) 119/58 (01/01/23 0828)  SpO2: (!) 94 % (01/01/23 0828)   Vital Signs (24h Range):  Temp:  [98.9 °F (37.2 °C)-101 °F (38.3 °C)] 101 °F (38.3 °C)  Pulse:  [87-95] 87  Resp:  [18-24] 24  SpO2:  [92 %-94 %] 94 %  BP: (113-119)/(53-58) 119/58      Weight: 68.1 kg (150 lb 2.1 oz)  Body mass index is 23.51 kg/m².    Physical Exam  Vitals and nursing note reviewed.   Constitutional:       General: He is not in acute distress.     Appearance: He is well-developed.   HENT:      Head: Normocephalic and atraumatic.      Right Ear: External ear normal.      Left Ear: External ear normal.      Nose:      Comments: O2 in place  Eyes:      Conjunctiva/sclera: Conjunctivae normal.      Pupils: Pupils are equal, round, and reactive to light.   Neck:      Thyroid: No thyromegaly.   Cardiovascular:      Rate and Rhythm: Normal rate and regular rhythm.      Heart sounds: Normal heart sounds. No murmur heard.  Pulmonary:      Effort: Pulmonary effort is normal. No respiratory distress.      Breath sounds: Rales present. No wheezing.   Abdominal:      General: Bowel sounds are normal. There is no distension.      Palpations: Abdomen is soft.      Tenderness: There is no abdominal tenderness.   Musculoskeletal:         General: Normal range of motion.      Cervical back: Normal range of motion and neck supple.      Right lower leg: No edema.      Left lower leg: No edema.   Lymphadenopathy:      Cervical: No cervical adenopathy.   Skin:     General: Skin is warm and dry.      Findings: No rash.   Neurological:      General: No focal deficit present.      Mental Status: He is alert and oriented to person, place, and time.      Motor: Weakness present.      Comments: Left arm weakness much improved   Psychiatric:         Mood and Affect: Mood normal.         Behavior: Behavior normal.         CRANIAL NERVES     CN III, IV, VI   Pupils are equal, round, and reactive to light.     Significant Labs: CBC:   Recent Labs   Lab 12/31/22  0356   WBC 5.14   HGB 10.7*   HCT 31.8*          CMP:   Recent Labs   Lab 12/31/22  0356      K 3.8   CL 99   CO2 28   *   BUN 30*   CREATININE 1.5*   CALCIUM 9.4   PROT 6.0   ALBUMIN 2.7*   BILITOT 0.7   ALKPHOS 79   AST 10    ALT 15   ANIONGAP 9         Significant Imaging:     MRI Acute deep white matter infarct on the right without associated hemorrhage or mass effect.  Mild underlying chronic ischemic changes.         Assessment/Plan:      * Cerebrovascular accident (CVA) due to thrombosis of right middle cerebral artery  Cont PT/OT here-progressing   on asa and statin as well as plavix .      Fever  Check CXR, procal, blood cultures, flu and covid.      Severe malnutrition in the context of acute on chronic illness  Nutrition consulted. Most recent weight and BMI monitored-     Malnutrition Type and Energy Intake  Malnutrition Type: acute illness or injury  Energy Intake: moderate energy intake    Malnutrition (Moderate to Severe)  Weight Loss (Malnutrition): greater than 5% in 1 month  Energy Intake (Malnutrition): less than 75% for greater than or equal to 1 month  Subcutaneous Fat (Malnutrition): moderate depletion  Muscle Mass (Malnutrition): moderate depletion    Final Summary  Subcutaneous Fat Loss (Final Summary): severe protein-calorie malnutrition  Muscle Loss Evaluation (Final Summary): severe protein-calorie malnutrition    Malnutrition Final Summary  Severe Weight Loss (Malnutrition): greater than 5% in 1 month    Measurements:  Wt Readings from Last 1 Encounters:   12/30/22 68.6 kg (151 lb 3.8 oz)   Body mass index is 23.69 kg/m².    Recommendations: Recommendation/Intervention: 1. Rec'd continue diabetic and cardiac diet. 2. Rec'd encouraging Boost Glucose Control Chocolate provided during mealtimes for decreased PO intake, decreased appetite, and increased nutritional needs. 3. RD to follow and make rec's accordingly.  Goals: Pt will meet % EEN by RD follow up.    Patient has been screened and assessed by RD. RD will follow patient.      BPH (benign prostatic hyperplasia)  Cont flomax .      Dementia  namenda per home routine .      Follicular lymphoma    H/o follicular lymphoma per chart (grade 3a, stage 1);  receiving rituxan (cycle 3 in 11/2022), 10/2022 PET scan positive for 3 cm lymphadenopathy in left axillary region otherwise HUSSEIN.        -Will need follow up visit once discharge (per last hem/onc note follow up indicated around 12/17/22, delayed due to current admission for stroke)     Aortic stenosis s/p TAVR    History of aortic stenosis.    Chronic diastolic heart failure, NYHA class 2    -TTE 12/15/2022 w/ EF 68%  -GDMT when appropriate (will restart slowly with metoprolol Saturday Dec 24th and then loop diuretic on Mon Dec 26th)   -Follow up with PCP / cardiologist in outpatient setting.    12/26: resuming torsemide today per plan. BP stable. Cr with labs in AM to reassess  May have a very mild volume excess so adding diuretic may help hypoxia with exertion    12/28  Appears clinically euvolemic today     12/30 cont to hold demadex and repeat bmp in am. May be able to start PRN     Chronic kidney disease, stage 3 (moderate)  Creat 1.4 yesterday will check labs Mondays and Thursdays   sCr 1.9 on admission, baseline 1.5-1.9  --Creatinine 1.3 today   --Avoid nephrotoxic agents, renally dose meds when possible.  --Continue to monitor daily CMP.      12/26: repeat labs ordered for AM. Monitor    12/30   BMP  Lab Results   Component Value Date     (L) 12/30/2022    K 4.0 12/30/2022    CL 98 12/30/2022    CO2 28 12/30/2022    BUN 28 (H) 12/30/2022    CREATININE 1.6 (H) 12/30/2022    CALCIUM 9.2 12/30/2022    ANIONGAP 9 12/30/2022    EGFRNORACEVR 41 (A) 12/30/2022       Check every other day     Hypertension  Stroke risk factor.  SBP <180, MAP >65; remains at goal   Avoid hypotension in the setting of small vessel disease   Holding lopressor for now given hypotension during this admission in the setting of an acute stroke, will resume on Sat Dec 24th  Bp today 123/64-HR 84.    12/26: resuming home torsemide per admission plan. BP Stable and will monitor BP and labs    12/30 > hold torsemide as creat climbed >   BP 97//67.    COPD, moderate  Per 9/2022 Pulm note: sats 81% on RA and is noncompliant with home oxygen  SpO2 goal 88-92%, remains at goal with only 0.5 L/min - 1L  Continue supplemental O2 PRN    Continue home meds at discharge .      Carotid artery disease  Stroke risk factor  --S/p L carotid endarterectomy in 12/3/202  --Continue ASA, Plavix, and statin.         Diabetes mellitus, type 2  Stroke risk factor. A1c 6.7.  -BG goal while inpatient 140-180  -Continue LDSSI ACHS PRN   - Stable     Bs 139 this am > Dm diet no meds .    12/26: blood sugars rising to consistently > 299 Add Levemir 10 units QHS to SSI. Cont diabetic diet    12/28   189-327  Increase detemir to 13     12/30 detemir 13 units and SSI       Hyperlipidemia  Stroke risk factor.  , goal <70  Atorvastatin 40mg daily, continue       Hypothyroidism    -Subclinical hypothyroidism; TSH 4.4; fT4 0.95 12/14/2021  -Follow up with PCP  Not on meds .      VTE Risk Mitigation (From admission, onward)         Ordered     enoxaparin injection 40 mg  Daily         01/01/23 0848                Discharge Planning   JOSE A:      Code Status: Full Code   Is the patient medically ready for discharge?:     Reason for patient still in hospital (select all that apply): Laboratory test, Treatment and Imaging  Discharge Plan A: Home with family, Home Health                  Nkechi eKnnedy MD  Department of Hospital Medicine   [place] [facility] S[facility] (3rd Fl)

## 2023-01-02 PROBLEM — U07.1 COVID-19: Status: ACTIVE | Noted: 2023-01-01

## 2023-01-02 NOTE — PT/OT/SLP PROGRESS
Physical Therapy Treatment    Patient Name:  Vega Kohler   MRN:  [id number]    Recommendations:     Discharge Recommendations: nursing facility, skilled, home with home health, home health PT, home health OT  Discharge Equipment Recommendations: bath bench  Barriers to discharge: Inaccessible home and Decreased caregiver support    Assessment:     Vega Kohler is a 87 y.o. 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  He presents with the following impairments/functional limitations: weakness, impaired endurance, impaired self care skills, impaired functional mobility, gait instability, impaired balance, decreased upper extremity function, decreased lower extremity function, decreased coordination, decreased safety awareness, impaired cardiopulmonary response to activity. Patient noted lethargic and feeling bad per patient. Patient agreed and tolerated AAROM ex on B LE fairly today. Patient declined bed mobility today. Maintained O2 SAT at 91% - 93% with O2 supplement.     Rehab Prognosis: Fair; patient would benefit from acute skilled PT services to address these deficits and reach maximum level of function.    Recent Surgery: * No surgery found *      Plan:     During this hospitalization, patient to be seen daily to address the identified rehab impairments via gait training, therapeutic activities, therapeutic exercises and progress toward the following goals:    Plan of Care Expires:  01/05/23    Subjective     Chief Complaint: Feeling bad today- per patient  Patient/Family Comments/goals: none stated  Pain/Comfort:  Pain Rating 1: 0/10      Objective:     Communicated with patient prior to session.  Patient found HOB elevated with bed alarm, peripheral IV, telemetry, oxygen upon PT entry to room.     General Precautions: Standard, airborne, contact, droplet, fall  Orthopedic Precautions: N/A  Braces: N/A  Respiratory Status: Nasal  cannula, flow 2 L/min     Functional Mobility:  Performed AAROM ex on B LE in different possible planes at supine position.      AM-PAC 6 CLICK MOBILITY  Turning over in bed (including adjusting bedclothes, sheets and blankets)?: 2 (clinical judgement; pt not willing to perform the task due to feeling bad today.)  Sitting down on and standing up from a chair with arms (e.g., wheelchair, bedside commode, etc.): 1  Moving from lying on back to sitting on the side of the bed?: 2  Moving to and from a bed to a chair (including a wheelchair)?: 1  Need to walk in hospital room?: 1  Climbing 3-5 steps with a railing?: 1  Basic Mobility Total Score: 8       Treatment & Education:  Provided AAROM ex on B LE in different planes with with rest periods.Repositioned pt on the bed.    Patient left HOB elevated with all lines intact, call button in reach, bed alarm on, and nursing and medical team  notified..    GOALS:   Multidisciplinary Problems       Physical Therapy Goals          Problem: Physical Therapy    Goal Priority Disciplines Outcome Goal Variances Interventions   Physical Therapy Goal     PT, PT/OT      Description:   Patient will increase functional independence with mobility by performin. Supine <> sit with Modified Independent  2. Sit <>Stand with Modified Independent with RW  3. Bed to chair transfer with Supervision or Set-up Assistancewith or without rolling walker using Step Transfer TECHNIQUE  4. Gait  x ~100  feet with Standby Assistance with or without rolling walker.  5. Ascend/descend 4 stair steps with rails and with contact guard assistance and cues.  6. Lower extremity exercise program x10 reps per handout, with assistance as needed                          Time Tracking:     PT Received On: 23  PT Start Time: 930     PT Stop Time: 945  PT Total Time (min): 15 min     Billable Minutes: Therapeutic Exercise 15    Treatment Type: Treatment  PT/PTA: PT     PTA Visit Number: 2      01/02/2023

## 2023-01-02 NOTE — SUBJECTIVE & OBJECTIVE
Review of Systems   Constitutional:  Negative for chills and fever (yesterday).   HENT:  Negative for congestion and sore throat.    Respiratory:  Positive for cough. Negative for shortness of breath.    Cardiovascular:  Negative for chest pain.   Gastrointestinal:  Negative for abdominal pain, diarrhea, nausea and vomiting.   Genitourinary:  Negative for dysuria and hematuria.   Skin:  Negative for rash.        Lip bleeding   Neurological:  Positive for weakness. Negative for dizziness, syncope and light-headedness.        Left arm weakness getting better   Objective:     Vital Signs (Most Recent):  Temp: 98.2 °F (36.8 °C) (01/02/23 0728)  Pulse: 88 (01/02/23 0728)  Resp: 18 (01/02/23 0728)  BP: (!) 121/58 (01/02/23 0728)  SpO2: 96 % (01/02/23 0728)   Vital Signs (24h Range):  Temp:  [98.2 °F (36.8 °C)-101 °F (38.3 °C)] 98.2 °F (36.8 °C)  Pulse:  [70-88] 88  Resp:  [16-18] 18  SpO2:  [96 %-98 %] 96 %  BP: (109-121)/(51-58) 121/58     Weight: 66.6 kg (146 lb 13.2 oz)  Body mass index is 23 kg/m².    Intake/Output Summary (Last 24 hours) at 1/2/2023 0940  Last data filed at 1/2/2023 0524  Gross per 24 hour   Intake 388.68 ml   Output --   Net 388.68 ml      Physical Exam  Vitals and nursing note reviewed.   Constitutional:       General: He is not in acute distress.     Appearance: He is well-developed. He is ill-appearing and toxic-appearing.   HENT:      Head: Normocephalic and atraumatic.      Right Ear: External ear normal.      Left Ear: External ear normal.      Nose:      Comments: O2 in place  Eyes:      Conjunctiva/sclera: Conjunctivae normal.      Pupils: Pupils are equal, round, and reactive to light.   Neck:      Thyroid: No thyromegaly.   Cardiovascular:      Rate and Rhythm: Normal rate and regular rhythm.      Heart sounds: Normal heart sounds. No murmur heard.  Pulmonary:      Effort: Pulmonary effort is normal. No respiratory distress.      Breath sounds: Rales present. No wheezing.   Abdominal:       General: Bowel sounds are normal. There is no distension.      Palpations: Abdomen is soft.      Tenderness: There is no abdominal tenderness.   Musculoskeletal:         General: Normal range of motion.      Cervical back: Normal range of motion and neck supple.      Right lower leg: No edema.      Left lower leg: No edema.   Lymphadenopathy:      Cervical: No cervical adenopathy.   Skin:     General: Skin is warm and dry.      Findings: No rash.   Neurological:      General: No focal deficit present.      Mental Status: He is alert and oriented to person, place, and time.      Motor: Weakness present.      Comments: Left arm weakness much improved   Psychiatric:         Mood and Affect: Mood normal.         Behavior: Behavior normal.       Significant Labs: All pertinent labs within the past 24 hours have been reviewed.  ABGs: No results for input(s): PH, PCO2, HCO3, POCSATURATED, BE, TOTALHB, COHB, METHB, O2HB, POCFIO2, PO2 in the last 48 hours.  CBC:   Recent Labs   Lab 01/02/23  0621   WBC 3.32*   HGB 11.0*   HCT 34.2*        CMP:   Recent Labs   Lab 01/02/23  0621      K 4.4      CO2 27   *   BUN 35*   CREATININE 1.8*   CALCIUM 10.1   PROT 6.4   ALBUMIN 2.8*   BILITOT 0.6   ALKPHOS 77   AST 15   ALT 17   ANIONGAP 11       Significant Imaging: I have reviewed all pertinent imaging results/findings within the past 24 hours.  CXR: I have reviewed all pertinent results/findings within the past 24 hours and my personal findings are:     Reviewed     Consolidative opacity in the left lower lobe consistent with known lipoid pneumonia.  Left greater than right hazy airspace opacities, could be due to multifocal infection, aspiration or edema.

## 2023-01-02 NOTE — ASSESSMENT & PLAN NOTE
Nutrition consulted. Most recent weight and BMI monitored-     Malnutrition Type and Energy Intake  Malnutrition Type: acute illness or injury  Energy Intake: moderate energy intake    Malnutrition (Moderate to Severe)  Weight Loss (Malnutrition): greater than 5% in 1 month  Energy Intake (Malnutrition): less than 75% for greater than or equal to 1 month  Subcutaneous Fat (Malnutrition): moderate depletion  Muscle Mass (Malnutrition): moderate depletion    Final Summary  Subcutaneous Fat Loss (Final Summary): severe protein-calorie malnutrition  Muscle Loss Evaluation (Final Summary): severe protein-calorie malnutrition    Malnutrition Final Summary  Severe Weight Loss (Malnutrition): greater than 5% in 1 month    Measurements:  Wt Readings from Last 1 Encounters:   12/30/22 68.6 kg (151 lb 3.8 oz)   Body mass index is 23 kg/m².    Recommendations: Recommendation/Intervention: 1. Rec'd continue diabetic and cardiac diet. 2. Rec'd encouraging Boost Glucose Control Chocolate provided during mealtimes for decreased PO intake, decreased appetite, and increased nutritional needs. 3. RD to follow and make rec's accordingly.  Goals: Pt will meet % EEN by RD follow up.    Patient has been screened and assessed by RD. RD will follow patient.

## 2023-01-02 NOTE — ASSESSMENT & PLAN NOTE
Per 9/2022 Pulm note: sats 81% on RA and is noncompliant with home oxygen  SpO2 goal 88-92%, remains at goal with only 0.5 L/min - 1L  Continue supplemental O2 PRN    Continue home meds at discharge ..

## 2023-01-02 NOTE — ASSESSMENT & PLAN NOTE
-TTE 12/15/2022 w/ EF 68%  -GDMT when appropriate (will restart slowly with metoprolol Saturday Dec 24th and then loop diuretic on Mon Dec 26th)   -Follow up with PCP / cardiologist in outpatient setting.    12/26: resuming torsemide today per plan. BP stable. Cr with labs in AM to reassess  May have a very mild volume excess so adding diuretic may help hypoxia with exertion    12/28  Appears clinically euvolemic today     12/30 cont to hold demadex and repeat bmp in am. May be able to start PRN .    1/2  Looks dehydrated   IVF 50 cc hr

## 2023-01-02 NOTE — ASSESSMENT & PLAN NOTE
Stroke risk factor.  SBP <180, MAP >65; remains at goal   Avoid hypotension in the setting of small vessel disease   Holding lopressor for now given hypotension during this admission in the setting of an acute stroke, will resume on Sat Dec 24th  Bp today 123/64-HR 84.    12/26: resuming home torsemide per admission plan. BP Stable and will monitor BP and labs    12/30 > hold torsemide as creat climbed >  BP 97//67..

## 2023-01-02 NOTE — ASSESSMENT & PLAN NOTE
Stroke risk factor. A1c 6.7.  -BG goal while inpatient 140-180  -Continue LDSSI ACHS PRN   - Stable     Bs 139 this am > Dm diet no meds .    12/26: blood sugars rising to consistently > 299 Add Levemir 10 units QHS to SSI. Cont diabetic diet    12/28   189-327  Increase detemir to 13     12/30 detemir 13 units and SSI     1/2  With steroids I anticipate  about sugars going up   Will cover with insulin

## 2023-01-02 NOTE — ASSESSMENT & PLAN NOTE
Stroke risk factor  --S/p L carotid endarterectomy in 12/3/202  --Continue ASA, Plavix, and statin.   .

## 2023-01-02 NOTE — PLAN OF CARE
Pt alert and oriented to name and place. Pt sleepy throughout shift. Pt with a fever of 101F. Gave prn tylenol and temp came down. Pt with lower lobe crackles. Xray completed. Started pt on Levaquin Q48. Flu and covid test completed.   Pt with a scrotal ulcer. Applied Calmoseptine ointment. PT and OT on case.   Pt remains on 1L nasal canula      Problem: Adult Inpatient Plan of Care  Goal: Plan of Care Review  Outcome: Ongoing, Progressing  Goal: Patient-Specific Goal (Individualized)  Outcome: Ongoing, Progressing  Goal: Absence of Hospital-Acquired Illness or Injury  Outcome: Ongoing, Progressing  Goal: Optimal Comfort and Wellbeing  Outcome: Ongoing, Progressing  Goal: Readiness for Transition of Care  Outcome: Ongoing, Progressing     Problem: Diabetes Comorbidity  Goal: Blood Glucose Level Within Targeted Range  Outcome: Ongoing, Progressing     Problem: Adjustment to Illness (Stroke, Ischemic/Transient Ischemic Attack)  Goal: Optimal Coping  Outcome: Ongoing, Progressing     Problem: Bowel Elimination Impaired (Stroke, Ischemic/Transient Ischemic Attack)  Goal: Effective Bowel Elimination  Outcome: Ongoing, Progressing     Problem: Cerebral Tissue Perfusion (Stroke, Ischemic/Transient Ischemic Attack)  Goal: Optimal Cerebral Tissue Perfusion  Outcome: Ongoing, Progressing     Problem: Cognitive Impairment (Stroke, Ischemic/Transient Ischemic Attack)  Goal: Optimal Cognitive Function  Outcome: Ongoing, Progressing     Problem: Communication Impairment (Stroke, Ischemic/Transient Ischemic Attack)  Goal: Improved Communication Skills  Outcome: Ongoing, Progressing     Problem: Functional Ability Impaired (Stroke, Ischemic/Transient Ischemic Attack)  Goal: Optimal Functional Ability  Outcome: Ongoing, Progressing     Problem: Respiratory Compromise (Stroke, Ischemic/Transient Ischemic Attack)  Goal: Effective Oxygenation and Ventilation  Outcome: Ongoing, Progressing     Problem: Sensorimotor Impairment (Stroke,  Ischemic/Transient Ischemic Attack)  Goal: Improved Sensorimotor Function  Outcome: Ongoing, Progressing     Problem: Swallowing Impairment (Stroke, Ischemic/Transient Ischemic Attack)  Goal: Optimal Eating and Swallowing without Aspiration  Outcome: Ongoing, Progressing     Problem: Urinary Elimination Impaired (Stroke, Ischemic/Transient Ischemic Attack)  Goal: Effective Urinary Elimination  Outcome: Ongoing, Progressing     Problem: Skin Injury Risk Increased  Goal: Skin Health and Integrity  Outcome: Ongoing, Progressing     Problem: Fall Injury Risk  Goal: Absence of Fall and Fall-Related Injury  Outcome: Ongoing, Progressing     Problem: Impaired Wound Healing  Goal: Optimal Wound Healing  Outcome: Ongoing, Progressing

## 2023-01-02 NOTE — ASSESSMENT & PLAN NOTE
Check CXR, procal, blood cultures, flu and covid.    1/2  CXR ; LLL infiltrate  covid positive   On levaquin for bacterial pneumonia

## 2023-01-02 NOTE — PROGRESS NOTES
Pharmacist Renal Dose Adjustment Note    Vega Kohler is a 87 y.o. male being treated with the medication LOVENOX    Patient Data:    Vital Signs (Most Recent):  Temp: 100.2 °F (37.9 °C) (01/02/23 1105)  Pulse: 77 (01/02/23 1105)  Resp: 18 (01/02/23 1105)  BP: (!) 102/51 (01/02/23 1105)  SpO2: (!) 94 % (01/02/23 1105)   Vital Signs (72h Range):  Temp:  [98.2 °F (36.8 °C)-101 °F (38.3 °C)]   Pulse:  [70-95]   Resp:  [16-24]   BP: ()/(51-58)   SpO2:  [92 %-98 %]      Recent Labs   Lab 12/30/22  0515 12/31/22  0356 01/02/23  0621   CREATININE 1.6* 1.5* 1.8*     Serum creatinine: 1.8 mg/dL (H) 01/02/23 0621  Estimated creatinine clearance: 27 mL/min (A)    Medication: LOVENOX  dose:  40MG  frequency  Q24H will be changed to medication: LOVENOX  dose: 30MG  frequency: Q24H      Pharmacist's Name: Stefany Galvez, PharmD   Pharmacist's Extension: [phone]

## 2023-01-02 NOTE — ASSESSMENT & PLAN NOTE
Creat 1.4 yesterday will check labs Mondays and Thursdays   sCr 1.9 on admission, baseline 1.5-1.9  --Creatinine 1.3 today   --Avoid nephrotoxic agents, renally dose meds when possible.  --Continue to monitor daily CMP.      12/26: repeat labs ordered for AM. Monitor  .  12/30   BMP  Lab Results   Component Value Date     01/02/2023    K 4.4 01/02/2023     01/02/2023    CO2 27 01/02/2023    BUN 35 (H) 01/02/2023    CREATININE 1.8 (H) 01/02/2023    CALCIUM 10.1 01/02/2023    ANIONGAP 11 01/02/2023    EGFRNORACEVR 36 (A) 01/02/2023       Check every other day

## 2023-01-02 NOTE — ASSESSMENT & PLAN NOTE
H/o follicular lymphoma per chart (grade 3a, stage 1); receiving rituxan (cycle 3 in 11/2022), 10/2022 PET scan positive for 3 cm lymphadenopathy in left axillary region otherwise HUSSEIN.        -Will need follow up visit once discharge (per last hem/onc note follow up indicated around 12/17/22, delayed due to current admission for stroke) .

## 2023-01-02 NOTE — PROGRESS NOTES
[facility] ([facility])  [facility] Medicine  Progress Note    Patient Name: Vega Kohler  MRN: [id number]  Patient Class: IP- Swing   Admission Date: 2022  Length of Stay: 12 days  Attending Physician: Jennifer Yoon MD  Primary Care Provider: Fabricio Mckeon MD        Subjective:     Principal Problem:Cerebrovascular accident (CVA) due to thrombosis of right middle cerebral artery        HPI:  86yo male patient with extensive medical hx. (Alzheimers, aortic stenosis, arthritis, BPH, CHF, CKD, COPD, CAD/PAD, DM2, HLD/HTN, pulm HTN, S/p TAVR and hypothyroid). He was [place] ac[place] [facility] [place] with left hemiplegia s/p TNK 22. Pt was independent at baseline prior to this event. Exam improved following TNK. MRI with R MCA infarct. Etiology likely . Patient stepped down to NPU to await SNF placement. Metoprolol originally started but held given etiology of stroke and risk of expansion with hypoperfusion. Will plan to resume meds this Saturday and then remainder of home BP meds on Monday. Patient was discharged on DAPT with outpatient follow up. Patient remains neurologically unchanged. SBP drop < 120 but no changes on exam. Last BM yesterday. He was brought to [facility] yesterday for continued SKILL therapy with PT / OT. On plavix and statin and asa. VSS- no bp meds 123/64          Overview/Hospital Course:       Vega Kohler is a 87 y.o. male  Admitted to Skilled care yesterday for PT/OT post ischemic CVA wqtih Left hemiplegia.   VSS , aferbile,    Gait: min/contact guard assistance x ~25 feet with RW. Left LE limp due to left lower leg injury . Dec left  foot/floor clearance and fatigues easily.    : SWING admit for PT/OT post CVA with left hemiplegia. Progressing with PT.   Gait: Contact guard assistance x ~75 feet  with RW and O2 SAT monitor.  He is on 1L O2 to maintain sats.   Glucose averaging 200s.     SWING ADMIT FOR CONT PT/OT post CVA with residual left sided  hemiplegia. Progressing well with PT>>stand by assistance with  rolling walker  using  Step Transfer Gait: CGA 60' w/ RW with a goal of 100 ft, he remains on 1L NC sats 94% does have 4 steps to get into home- will need to practice stairs prior to d/c. Lives alone but son lives near by and will help take care of him at d/c    12/30 SWING admit for continued PT/OT for post CVA with residual left sided hemiplegia. He is ambulating 70ft stand by assist using RW> goal is 100ft but also needs to ascend and descend steps to get into home- (4).     Bp is a little low today. Torsemide was started Monday. Held yesterday for creat climbing was 1.8 yesterday - better today 1.6 (baseline 1.4)bp 102/59 this am. POX 86% on RA- has O2 but blowing to top of nose. He report that it dries him. Added humidification today. Has home O2 for use as needed for COPD     1/1  Bp has remained okay. Fever this morning, coughing slightly more. Sats about the same. Denies any new complaints or issues.      1/2/23  Vega Kohler is a 87 y.o. male  Her on swing admit   Nurse reports he has been more sluggish   Using oxygen ; CXR with infiltrates   BC negative  Influenza negative    COVID POSITIVE                      Review of Systems   Constitutional:  Negative for chills and fever (yesterday).   HENT:  Negative for congestion and sore throat.    Respiratory:  Positive for cough. Negative for shortness of breath.    Cardiovascular:  Negative for chest pain.   Gastrointestinal:  Negative for abdominal pain, diarrhea, nausea and vomiting.   Genitourinary:  Negative for dysuria and hematuria.   Skin:  Negative for rash.        Lip bleeding   Neurological:  Positive for weakness. Negative for dizziness, syncope and light-headedness.        Left arm weakness getting better   Objective:     Vital Signs (Most Recent):  Temp: 98.2 °F (36.8 °C) (01/02/23 0728)  Pulse: 88 (01/02/23 0728)  Resp: 18 (01/02/23 0728)  BP: (!) 121/58 (01/02/23 0728)  SpO2: 96  % (01/02/23 0728)   Vital Signs (24h Range):  Temp:  [98.2 °F (36.8 °C)-101 °F (38.3 °C)] 98.2 °F (36.8 °C)  Pulse:  [70-88] 88  Resp:  [16-18] 18  SpO2:  [96 %-98 %] 96 %  BP: (109-121)/(51-58) 121/58     Weight: 66.6 kg (146 lb 13.2 oz)  Body mass index is 23 kg/m².    Intake/Output Summary (Last 24 hours) at 1/2/2023 0940  Last data filed at 1/2/2023 0524  Gross per 24 hour   Intake 388.68 ml   Output --   Net 388.68 ml      Physical Exam  Vitals and nursing note reviewed.   Constitutional:       General: He is not in acute distress.     Appearance: He is well-developed. He is ill-appearing and toxic-appearing.   HENT:      Head: Normocephalic and atraumatic.      Right Ear: External ear normal.      Left Ear: External ear normal.      Nose:      Comments: O2 in place  Eyes:      Conjunctiva/sclera: Conjunctivae normal.      Pupils: Pupils are equal, round, and reactive to light.   Neck:      Thyroid: No thyromegaly.   Cardiovascular:      Rate and Rhythm: Normal rate and regular rhythm.      Heart sounds: Normal heart sounds. No murmur heard.  Pulmonary:      Effort: Pulmonary effort is normal. No respiratory distress.      Breath sounds: Rales present. No wheezing.   Abdominal:      General: Bowel sounds are normal. There is no distension.      Palpations: Abdomen is soft.      Tenderness: There is no abdominal tenderness.   Musculoskeletal:         General: Normal range of motion.      Cervical back: Normal range of motion and neck supple.      Right lower leg: No edema.      Left lower leg: No edema.   Lymphadenopathy:      Cervical: No cervical adenopathy.   Skin:     General: Skin is warm and dry.      Findings: No rash.   Neurological:      General: No focal deficit present.      Mental Status: He is alert and oriented to person, place, and time.      Motor: Weakness present.      Comments: Left arm weakness much improved   Psychiatric:         Mood and Affect: Mood normal.         Behavior: Behavior  normal.       Significant Labs: All pertinent labs within the past 24 hours have been reviewed.  ABGs: No results for input(s): PH, PCO2, HCO3, POCSATURATED, BE, TOTALHB, COHB, METHB, O2HB, POCFIO2, PO2 in the last 48 hours.  CBC:   Recent Labs   Lab 01/02/23  0621   WBC 3.32*   HGB 11.0*   HCT 34.2*        CMP:   Recent Labs   Lab 01/02/23  0621      K 4.4      CO2 27   *   BUN 35*   CREATININE 1.8*   CALCIUM 10.1   PROT 6.4   ALBUMIN 2.8*   BILITOT 0.6   ALKPHOS 77   AST 15   ALT 17   ANIONGAP 11       Significant Imaging: I have reviewed all pertinent imaging results/findings within the past 24 hours.  CXR: I have reviewed all pertinent results/findings within the past 24 hours and my personal findings are:     Reviewed     Consolidative opacity in the left lower lobe consistent with known lipoid pneumonia.  Left greater than right hazy airspace opacities, could be due to multifocal infection, aspiration or edema.      Assessment/Plan:      * Cerebrovascular accident (CVA) due to thrombosis of right middle cerebral artery  Cont PT/OT here-progressing   on asa and statin as well as plavix .      COVID-19  Start remedesivir   Start steroids.  Continue oxygen         Fever  Check CXR, procal, blood cultures, flu and covid.    1/2  CXR ; LLL infiltrate  covid positive   On levaquin for bacterial pneumonia          Severe malnutrition in the context of acute on chronic illness  Nutrition consulted. Most recent weight and BMI monitored-     Malnutrition Type and Energy Intake  Malnutrition Type: acute illness or injury  Energy Intake: moderate energy intake    Malnutrition (Moderate to Severe)  Weight Loss (Malnutrition): greater than 5% in 1 month  Energy Intake (Malnutrition): less than 75% for greater than or equal to 1 month  Subcutaneous Fat (Malnutrition): moderate depletion  Muscle Mass (Malnutrition): moderate depletion    Final Summary  Subcutaneous Fat Loss (Final Summary): severe  protein-calorie malnutrition  Muscle Loss Evaluation (Final Summary): severe protein-calorie malnutrition    Malnutrition Final Summary  Severe Weight Loss (Malnutrition): greater than 5% in 1 month    Measurements:  Wt Readings from Last 1 Encounters:   12/30/22 68.6 kg (151 lb 3.8 oz)   Body mass index is 23 kg/m².    Recommendations: Recommendation/Intervention: 1. Rec'd continue diabetic and cardiac diet. 2. Rec'd encouraging Boost Glucose Control Chocolate provided during mealtimes for decreased PO intake, decreased appetite, and increased nutritional needs. 3. RD to follow and make rec's accordingly.  Goals: Pt will meet % EEN by RD follow up.    Patient has been screened and assessed by RD. RD will follow patient.      BPH (benign prostatic hyperplasia)  Cont flomax .      Dementia  namenda per home routine .      Follicular lymphoma    H/o follicular lymphoma per chart (grade 3a, stage 1); receiving rituxan (cycle 3 in 11/2022), 10/2022 PET scan positive for 3 cm lymphadenopathy in left axillary region otherwise HSUSEIN.        -Will need follow up visit once discharge (per last hem/onc note follow up indicated around 12/17/22, delayed due to current admission for stroke) .    Aortic stenosis s/p TAVR    History of aortic stenosis.    Chronic diastolic heart failure, NYHA class 2    -TTE 12/15/2022 w/ EF 68%  -GDMT when appropriate (will restart slowly with metoprolol Saturday Dec 24th and then loop diuretic on Mon Dec 26th)   -Follow up with PCP / cardiologist in outpatient setting.    12/26: resuming torsemide today per plan. BP stable. Cr with labs in AM to reassess  May have a very mild volume excess so adding diuretic may help hypoxia with exertion    12/28  Appears clinically euvolemic today     12/30 cont to hold demadex and repeat bmp in am. May be able to start PRN .    1/2  Looks dehydrated   IVF 50 cc hr     Chronic kidney disease, stage 3 (moderate)  Creat 1.4 yesterday will check labs Mondays  and Thursdays   sCr 1.9 on admission, baseline 1.5-1.9  --Creatinine 1.3 today   --Avoid nephrotoxic agents, renally dose meds when possible.  --Continue to monitor daily CMP.      12/26: repeat labs ordered for AM. Monitor  .  12/30   BMP  Lab Results   Component Value Date     01/02/2023    K 4.4 01/02/2023     01/02/2023    CO2 27 01/02/2023    BUN 35 (H) 01/02/2023    CREATININE 1.8 (H) 01/02/2023    CALCIUM 10.1 01/02/2023    ANIONGAP 11 01/02/2023    EGFRNORACEVR 36 (A) 01/02/2023       Check every other day     Hypertension  Stroke risk factor.  SBP <180, MAP >65; remains at goal   Avoid hypotension in the setting of small vessel disease   Holding lopressor for now given hypotension during this admission in the setting of an acute stroke, will resume on Sat Dec 24th  Bp today 123/64-HR 84.    12/26: resuming home torsemide per admission plan. BP Stable and will monitor BP and labs    12/30 > hold torsemide as creat climbed >  BP 97//67..    COPD, moderate  Per 9/2022 Pulm note: sats 81% on RA and is noncompliant with home oxygen  SpO2 goal 88-92%, remains at goal with only 0.5 L/min - 1L  Continue supplemental O2 PRN    Continue home meds at discharge ..      Carotid artery disease  Stroke risk factor  --S/p L carotid endarterectomy in 12/3/202  --Continue ASA, Plavix, and statin.   .      Diabetes mellitus, type 2  Stroke risk factor. A1c 6.7.  -BG goal while inpatient 140-180  -Continue LDSSI ACHS PRN   - Stable     Bs 139 this am > Dm diet no meds .    12/26: blood sugars rising to consistently > 299 Add Levemir 10 units QHS to SSI. Cont diabetic diet    12/28   189-327  Increase detemir to 13     12/30 detemir 13 units and SSI     1/2  With steroids I anticipate  about sugars going up   Will cover with insulin       Hyperlipidemia  Stroke risk factor.  , goal <70  Atorvastatin 40mg daily, continue .      Hypothyroidism    -Subclinical hypothyroidism; TSH 4.4; fT4 0.95  12/14/2021  -Follow up with PCP  Not on meds .      VTE Risk Mitigation (From admission, onward)         Ordered     enoxaparin injection 40 mg  Daily         01/01/23 0848                Discharge Planning   JOSE A:      Code Status: Full Code   Is the patient medically ready for discharge?:     Reason for patient still in hospital (select all that apply): Treatment  Discharge Plan A: Home with family, Home Health                  Jennifer Yoon MD  Department of Hospital Medicine   [place] [facility] (3rd Fl)

## 2023-01-03 NOTE — ASSESSMENT & PLAN NOTE
Check CXR, procal, blood cultures, flu and covid.    1/2  CXR ; LLL infiltrate  covid positive   On levaquin for bacterial pneumonia q 48- 2nd dose due today

## 2023-01-03 NOTE — ASSESSMENT & PLAN NOTE
Creat 1.4 yesterday will check labs Mondays and Thursdays   sCr 1.9 on admission, baseline 1.5-1.9  --Creatinine 1.3 today   --Avoid nephrotoxic agents, renally dose meds when possible.  --Continue to monitor daily CMP.      12/26: repeat labs ordered for AM. Monitor  .  12/30   BMP  Lab Results   Component Value Date     01/03/2023    K 4.4 01/03/2023     01/03/2023    CO2 25 01/03/2023    BUN 39 (H) 01/03/2023    CREATININE 1.5 (H) 01/03/2023    CALCIUM 9.4 01/03/2023    ANIONGAP 11 01/03/2023    EGFRNORACEVR 45 (A) 01/03/2023       Check every other day

## 2023-01-03 NOTE — PLAN OF CARE
Problem: Adult Inpatient Plan of Care  Goal: Plan of Care Review  Outcome: Ongoing, Progressing  Goal: Patient-Specific Goal (Individualized)  Outcome: Ongoing, Progressing  Goal: Absence of Hospital-Acquired Illness or Injury  Outcome: Ongoing, Progressing  Goal: Optimal Comfort and Wellbeing  Outcome: Ongoing, Progressing  Goal: Readiness for Transition of Care  Outcome: Ongoing, Progressing     Problem: Diabetes Comorbidity  Goal: Blood Glucose Level Within Targeted Range  Outcome: Ongoing, Progressing     Problem: Adjustment to Illness (Stroke, Ischemic/Transient Ischemic Attack)  Goal: Optimal Coping  Outcome: Ongoing, Progressing     Problem: Bowel Elimination Impaired (Stroke, Ischemic/Transient Ischemic Attack)  Goal: Effective Bowel Elimination  Outcome: Ongoing, Progressing     Problem: Cerebral Tissue Perfusion (Stroke, Ischemic/Transient Ischemic Attack)  Goal: Optimal Cerebral Tissue Perfusion  Outcome: Ongoing, Progressing     Problem: Cognitive Impairment (Stroke, Ischemic/Transient Ischemic Attack)  Goal: Optimal Cognitive Function  Outcome: Ongoing, Progressing     Problem: Communication Impairment (Stroke, Ischemic/Transient Ischemic Attack)  Goal: Improved Communication Skills  Outcome: Ongoing, Progressing     Problem: Functional Ability Impaired (Stroke, Ischemic/Transient Ischemic Attack)  Goal: Optimal Functional Ability  Outcome: Ongoing, Progressing     Problem: Respiratory Compromise (Stroke, Ischemic/Transient Ischemic Attack)  Goal: Effective Oxygenation and Ventilation  Outcome: Ongoing, Progressing     Problem: Sensorimotor Impairment (Stroke, Ischemic/Transient Ischemic Attack)  Goal: Improved Sensorimotor Function  Outcome: Ongoing, Progressing     Problem: Swallowing Impairment (Stroke, Ischemic/Transient Ischemic Attack)  Goal: Optimal Eating and Swallowing without Aspiration  Outcome: Ongoing, Progressing     Problem: Urinary Elimination Impaired (Stroke, Ischemic/Transient  Ischemic Attack)  Goal: Effective Urinary Elimination  Outcome: Ongoing, Progressing     Problem: Skin Injury Risk Increased  Goal: Skin Health and Integrity  Outcome: Ongoing, Progressing     Problem: Fall Injury Risk  Goal: Absence of Fall and Fall-Related Injury  Outcome: Ongoing, Progressing     Problem: Impaired Wound Healing  Goal: Optimal Wound Healing  Outcome: Ongoing, Progressing     Problem: Infection  Goal: Absence of Infection Signs and Symptoms  Outcome: Ongoing, Progressing

## 2023-01-03 NOTE — PT/OT/SLP PROGRESS
Occupational Therapy   Treatment    Name: Vega Kohler  MRN: [id number]  Admitting Diagnosis:  Cerebrovascular accident (CVA) due to thrombosis of right middle cerebral artery       Recommendations:     Discharge Recommendations: nursing facility, skilled, home with home health, home health PT, home health OT  Discharge Equipment Recommendations:  bath bench  Barriers to discharge:  Decreased caregiver support    Assessment:     Vega Kohler is a 87 y.o. male with a medical diagnosis of Cerebrovascular accident (CVA) due to thrombosis of right middle cerebral artery.  Performance deficits affecting function are weakness, impaired endurance, impaired self care skills, impaired functional mobility, gait instability, impaired balance, decreased safety awareness, decreased lower extremity function, decreased upper extremity function, impaired cardiopulmonary response to activity.     Rehab Prognosis:  Good; patient would benefit from acute skilled OT services to address these deficits and reach maximum level of function.       Plan:     Patient to be seen 5 x/week to address the above listed problems via self-care/home management, therapeutic activities, therapeutic exercises  Plan of Care Expires: 01/05/23  Plan of Care Reviewed with: patient    Subjective     Pain/Comfort:  Pain Rating 1: 9/10  Location - Side 1: Bilateral  Location - Orientation 1: upper  Location 1: back  Pain Addressed 1: Reposition, Distraction, Nurse notified  Pain Rating Post-Intervention 1: 9/10    Objective:     Communicated with: RN prior to session.  Patient found supine with bed alarm, peripheral IV, telemetry upon OT entry to room.    General Precautions: Standard, airborne, contact, droplet, fall    Orthopedic Precautions:N/A  Braces: N/A  Respiratory Status: Room air     Occupational Performance:     Bed Mobility:    Patient completed Rolling/Turning to Right with minimum assistance  Patient completed Scooting/Bridging with modified  independence  Patient completed Supine to Sit with minimum assistance  Patient completed Sit to Supine with modified independence     Activities of Daily Living:  Feeding:  stand by assistance for opening items and for verbal cues to use L hand.      [facility] 6 Click ADL: 20    Treatment & Education:  Pt c/o upper back pain upon entering room. Pt rolled to R side with Minimal assistance and up to sitting edge of bed with minimal assistance. Pt was able to scoot to edge of bed with Modified I and was able to sit unsupported to eat breakfast at bedside table. Pt required Stand by assistance for feeding and requires verbal cues to use L hand to reach for items. Pt required assistance with opening milk and butter but was able to feed self with no assistance. Pt was able to scoot along side the bed to HOB with supervision and went from sitting edge of bed to supine with Mod I.     Patient left HOB elevated with all lines intact, call button in reach, and RN notified    GOALS:   Multidisciplinary Problems       Occupational Therapy Goals          Problem: Occupational Therapy    Goal Priority Disciplines Outcome Interventions   Occupational Therapy Goal     OT, PT/OT Ongoing, Progressing    Description: Pt to perform UB dressing with MOD I seated EOB.  Pt to perform LB dressing with MOD I seated EOB.  Pt to perform self toileting with MOD I using RW and standard commode.  Pt to perform level functional transfers required for ADL's with MOD I, RW level.  Pt to demonstrate consistent adherence to breathing control and energy conservation techniques as educated by OT.  Pt to participate in standing activity for ~3 minutes for increased activity tolerance and safety in ADL.                         Time Tracking:     OT Date of Treatment: 01/03/23  OT Start Time: 0750  OT Stop Time: 0825  OT Total Time (min): 35 min    Billable Minutes:Therapeutic Activity 35    OT/PEDRO: PEDRO     PEDRO Visit Number: 5    1/3/2023

## 2023-01-03 NOTE — PLAN OF CARE
[facility] - Med Surg (3rd Fl)  Initial Discharge Assessment       Primary Care Provider: Fabricio Mckeon MD    Admission Diagnosis: No admission diagnoses are documented for this encounter.    Admission Date: 1/3/2023  Expected Discharge Date:     Discharge Barriers Identified: None    Payor: MEDICARE / Plan: MEDICARE PART A & B / Product Type: Government /     Extended Emergency Contact Information  Primary Emergency Contact: Eliseo Kohler   [place]  Mobile Phone: [phone]  Relation: Son  Secondary Emergency Contact: Sandie Kohler  Mobile Phone: [phone]  Relation: Relative    Discharge Plan A: Skilled Nursing Facility  Discharge Plan B: Home with family, Home Health      [facility] [id number]  [place], [place] - [address] [facility] [id number]  [place] [address]  Phone: [phone] Fax: [phone]      Initial Assessment (most recent)       Adult Discharge Assessment - 01/03/23 1253          Discharge Assessment    Assessment Type Discharge Planning Assessment     Confirmed/corrected address, phone number and insurance Yes     Confirmed Demographics Correct on Facesheet     Source of Information family;health record     Communicated JOSE A with patient/caregiver Date not available/Unable to determine     Reason For Admission COVID     People in Home alone     Facility Arrived From: [facility]     Do you expect to return to your current living situation? Other (see comments)     Do you have help at home or someone to help you manage your care at home? Yes     Who are your caregiver(s) and their phone number(s)? Zay (Son) [phone]     Walking or Climbing Stairs ambulation difficulty, requires equipment;ambulation difficulty, assistance 1 person;transferring difficulty, requires equipment     Dressing/Bathing bathing difficulty, requires equipment;bathing difficulty, assistance 1 person     Number of Stairs, Main Entrance four     Stair Railings, Main Entrance railings on both sides of  stairs     Equipment Currently Used at Home walker, rolling;cane, straight;wheelchair;bedside commode;oxygen     Readmission within 30 days? Yes     Patient currently being followed by outpatient case management? No     Do you currently have service(s) that help you manage your care at home? No     Do you have prescription coverage? Yes     Coverage BCBS     Do you have any problems affording any of your prescribed medications? No     How do you get to doctors appointments? family or friend will provide     Are you on dialysis? No     Do you take coumadin? No     Discharge Plan A Skilled Nursing Facility     Discharge Plan B Home with family;Home Health     DME Needed Upon Discharge  other (see comments)   TBD    Discharge Plan discussed with: Adult children     Discharge Barriers Identified None                          Discharge assessment completed. Patient discharged from [facility] today and returned to acute care with new COVID diagnosis. , VITOR Melissa RN, spoke with patient's son who is in agreement with SNF placement at a nursing home when medically ready. [facility] has obtained a 142 and PASRR from previous facility for nursing home placement. Referrals sent to [facility] and [facility]. [facility] states that patient has to be 'out of isolation for COVID.' [facility] has yet to respond. A new referral will be sent as this admission is a new encounter. SW to remain available.

## 2023-01-03 NOTE — PT/OT/SLP PROGRESS
Physical Therapy Treatment    Patient Name:  Vega Kohler   MRN:  [id number]    Recommendations:     Discharge Recommendations: nursing facility, skilled, home with home health, home health PT, home health OT  Discharge Equipment Recommendations: bath bench  Barriers to discharge: Inaccessible home and Decreased caregiver support    Assessment:     Vega Kohler is a 87 y.o. 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  He presents with the following impairments/functional limitations: weakness, impaired endurance, impaired self care skills, impaired functional mobility, gait instability, impaired balance, decreased safety awareness, decreased upper extremity function, decreased lower extremity function, impaired cardiopulmonary response to activity  Patient is more alert, participated and tolerated PT tx today. Ambulated with RW  and O2 supplement x ~50 feet with contact guard assistance. Fatigue at the end distance. Monitored O2 SAT based line from  90% to 93%.    Rehab Prognosis: Fair; patient would benefit from acute skilled PT services to address these deficits and reach maximum level of function.    Recent Surgery: * No surgery found *      Plan:     During this hospitalization, patient to be seen daily to address the identified rehab impairments via gait training, therapeutic activities, therapeutic exercises and progress toward the following goals:    Plan of Care Expires:  01/05/23    Subjective     Chief Complaint: weakness and fatigue  Patient/Family Comments/goals: gain strength  Pain/Comfort:  Pain Rating 1: 0/10      Objective:     Communicated with patient  prior to session.  Patient found HOB elevated with bed alarm, peripheral IV, telemetry, oxygen upon PT entry to room.     General Precautions: Standard, airborne, contact, droplet, fall, other (see comments) (avasys camera)  Orthopedic Precautions: N/A  Braces:  N/A  Respiratory Status: Nasal cannula, flow 2 L/min     Functional Mobility:  Bed Mobility:     Rolling Left:  contact guard assistance  Rolling Right: contact guard assistance  Scooting: contact guard assistance  Supine to Sit: contact guard assistance  Sit to Supine: contact guard assistance  Transfers:     Sit to Stand:  minimum assistance with rolling walker  Gait: Contact Guard Assistance and cues x ~50 feet with RW and O2 supplement. Decrease foot/floor clearance and gets fatigue at the end distance.      AM-PAC 6 CLICK MOBILITY  Turning over in bed (including adjusting bedclothes, sheets and blankets)?: 3  Sitting down on and standing up from a chair with arms (e.g., wheelchair, bedside commode, etc.): 3  Moving from lying on back to sitting on the side of the bed?: 3  Moving to and from a bed to a chair (including a wheelchair)?: 3  Need to walk in hospital room?: 3  Climbing 3-5 steps with a railing?: 1  Basic Mobility Total Score: 16       Treatment & Education:  Performed B LE strengthening ROM ex x 5 minutes, rolling to sides x 5 on each side, sitting activity at edge of the bed, sit <>stand with RW x 2; Gait trng x ~50 feet with RW and O2 supplement with contact guard assistance.    Patient left HOB elevated with all lines intact, call button in reach, bed alarm on, and nursing/CPT notified for hygiene care.     GOALS:   Multidisciplinary Problems       Physical Therapy Goals          Problem: Physical Therapy    Goal Priority Disciplines Outcome Goal Variances Interventions   Physical Therapy Goal     PT, PT/OT      Description:   Patient will increase functional independence with mobility by performin. Supine <> sit with Modified Independent  2. Sit <>Stand with Modified Independent with RW  3. Bed to chair transfer with Supervision or Set-up Assistancewith or without rolling walker using Step Transfer TECHNIQUE  4. Gait  x ~100  feet with Standby Assistance with or without rolling walker.  5.  Ascend/descend 4 stair steps with rails and with contact guard assistance and cues.  6. Lower extremity exercise program x10 reps per handout, with assistance as needed                          Time Tracking:     PT Received On: 01/03/23  PT Start Time: 0823     PT Stop Time: 0855  PT Total Time (min): 32 min     Billable Minutes: Gait Training 15 and Therapeutic Activity 17    Treatment Type: Treatment  PT/PTA: PT     PTA Visit Number: 2     01/03/2023

## 2023-01-03 NOTE — CONSULTS
CM consulted for discharge planning.  At this time patient currently on acute bed for new Dx of Covid. Once medically stable, patient will either discharge back to [facility] bed here at [place], or if we have an accepting nursing home, as per family request, patient will transition there after discharge.  CM to remain available for any discharge needs/concerns.

## 2023-01-03 NOTE — DISCHARGE SUMMARY
[facility] ([facility])  [facility] Medicine  Discharge Summary      Patient Name: Vega Kohler  MRN: [id number]  TESSA: [id number]  Patient Class: IP- Swing  Admission Date: 2022  Hospital Length of Stay: 13 days  Discharge Date and Time:  2023 12:08 PM  Attending Physician: Jennifer Yoon MD   Discharging Provider: Tamara Villasenor NP  Primary Care Provider: Fabricio Mckeon MD    Primary Care Team: Networked reference to record PCT     HPI:   88yo male patient with extensive medical hx. (Alzheimers, aortic stenosis, arthritis, BPH, CHF, CKD, COPD, CAD/PAD, DM2, HLD/HTN, pulm HTN, S/p TAVR and hypothyroid). He was treated acutely [facility] for ischemic CVA with left hemiplegia s/p TNK 22. Pt was independent at baseline prior to this event. Exam improved following TNK. MRI with R MCA infarct. Etiology likely . Patient stepped down to NPU to await SNF placement. Metoprolol originally started but held given etiology of stroke and risk of expansion with hypoperfusion. Will plan to resume meds this Saturday and then remainder of home BP meds on Monday. Patient was discharged on DAPT with outpatient follow up. Patient remains neurologically unchanged. SBP drop < 120 but no changes on exam. Last BM yesterday. He was brought to [facility] yesterday for continued SKILL therapy with PT / OT. On plavix and statin and asa. VSS- no bp meds 123/64        Yesterday 22 pt tested positive for COVID; staff noting patient more sluggish   Requiring oxygen ; CXR with infiltrates   BC negative  Influenza negative  COVID POSITIVE- day 2 Remdesivir, dexamehtasone and levofloxacin   Transfer back to skilled       * No surgery found *      Hospital Course:        Vega Kohler is a 87 y.o. male  Admitted to Skilled care yesterday for PT/OT post ischemic CVA wqtih Left hemiplegia.   VSS , aferbile,    Gait: min/contact guard assistance x ~25 feet with RW. Left LE limp due to left lower leg injury . Dec left   foot/floor clearance and fatigues easily.    12/26: SWING admit for PT/OT post CVA with left hemiplegia. Progressing with PT.   Gait: Contact guard assistance x ~75 feet  with RW and O2 SAT monitor.  He is on 1L O2 to maintain sats.   Glucose averaging 200s.    12/28 SWING ADMIT FOR CONT PT/OT post CVA with residual left sided hemiplegia. Progressing well with PT>>stand by assistance with  rolling walker  using  Step Transfer Gait: CGA 60' w/ RW with a goal of 100 ft, he remains on 1L NC sats 94% does have 4 steps to get into home- will need to practice stairs prior to d/c. Lives alone but son lives near by and will help take care of him at d/c    12/30 SWING admit for continued PT/OT for post CVA with residual left sided hemiplegia. He is ambulating 70ft stand by assist using RW> goal is 100ft but also needs to ascend and descend steps to get into home- (4).     Bp is a little low today. Torsemide was started Monday. Held yesterday for creat climbing was 1.8 yesterday - better today 1.6 (baseline 1.4)bp 102/59 this am. POX 86% on RA- has O2 but blowing to top of nose. He report that it dries him. Added humidification today. Has home O2 for use as needed for COPD     1/1  Bp has remained okay. Fever this morning, coughing slightly more. Sats about the same. Denies any new complaints or issues.      1/2/23  Vega Kohler is a 87 y.o. male  Her on swing admit   Nurse reports he has been more sluggish   Using oxygen ; CXR with infiltrates   BC negative  Influenza negative  COVID POSITIVE      1/3/23 here for skilled nursing after CVA but developed resp symptoms and tested positive for COVID yesterday   Day 2 Remdesevir ,tmax 100.2 , WBC 2.78    Gait: Contact Guard Assistance and cues x ~50 feet with RW and O2 supplement. Decrease foot/floor clearance and gets fatigue at the end distance  Will need to transfer back to acute for COVID tx;            Goals of Care Treatment Preferences:  Code Status: Full  Code      Consults:   Consults (From admission, onward)        Status Ordering Provider     IP consult to case management  Once        Provider:  (Not yet assigned)    Completed ANITRA KNOX          * Cerebrovascular accident (CVA) due to thrombosis of right middle cerebral artery  Cont PT/OT here-progressing   on asa and statin as well as plavix .      COVID-19  Start remedesivir day 2   Start steroids. Dexamethasone day 2   Continue oxygen         Fever  Check CXR, procal, blood cultures, flu and covid.    1/2  CXR ; LLL infiltrate  covid positive   On levaquin for bacterial pneumonia q 48- 2nd dose due today     Severe malnutrition in the context of acute on chronic illness  Nutrition consulted. Most recent weight and BMI monitored-     Malnutrition Type and Energy Intake  Malnutrition Type: acute illness or injury  Energy Intake: moderate energy intake    Malnutrition (Moderate to Severe)  Weight Loss (Malnutrition): greater than 5% in 1 month  Energy Intake (Malnutrition): less than 75% for greater than or equal to 1 month  Subcutaneous Fat (Malnutrition): moderate depletion  Muscle Mass (Malnutrition): moderate depletion    Final Summary  Subcutaneous Fat Loss (Final Summary): severe protein-calorie malnutrition  Muscle Loss Evaluation (Final Summary): severe protein-calorie malnutrition    Malnutrition Final Summary  Severe Weight Loss (Malnutrition): greater than 5% in 1 month    Measurements:  Wt Readings from Last 1 Encounters:   12/30/22 68.6 kg (151 lb 3.8 oz)   Body mass index is 24.9 kg/m².    Recommendations: Recommendation/Intervention: 1. Rec'd continue diabetic and cardiac diet. 2. Rec'd encouraging Boost Glucose Control Chocolate provided during mealtimes for decreased PO intake, decreased appetite, and increased nutritional needs. 3. RD to follow and make rec's accordingly.  Goals: Pt will meet % EEN by RD follow up.    Patient has been screened and assessed by RD. RD will follow  patient.      BPH (benign prostatic hyperplasia)  Cont flomax .      Dementia  namenda per home routine .      Follicular lymphoma    H/o follicular lymphoma per chart (grade 3a, stage 1); receiving rituxan (cycle 3 in 11/2022), 10/2022 PET scan positive for 3 cm lymphadenopathy in left axillary region otherwise HUSSEIN.        -Will need follow up visit once discharge (per last hem/onc note follow up indicated around 12/17/22, delayed due to current admission for stroke) .    Aortic stenosis s/p TAVR    History of aortic stenosis.    Chronic diastolic heart failure, NYHA class 2    -TTE 12/15/2022 w/ EF 68%  -GDMT when appropriate (will restart slowly with metoprolol Saturday Dec 24th and then loop diuretic on Mon Dec 26th)   -Follow up with PCP / cardiologist in outpatient setting.    12/26: resuming torsemide today per plan. BP stable. Cr with labs in AM to reassess  May have a very mild volume excess so adding diuretic may help hypoxia with exertion    12/28  Appears clinically euvolemic today     12/30 cont to hold demadex and repeat bmp in am. May be able to start PRN .    1/2  Looks dehydrated   IVF 50 cc hr     Chronic kidney disease, stage 3 (moderate)  Creat 1.4 yesterday will check labs Mondays and Thursdays   sCr 1.9 on admission, baseline 1.5-1.9  --Creatinine 1.3 today   --Avoid nephrotoxic agents, renally dose meds when possible.  --Continue to monitor daily CMP.      12/26: repeat labs ordered for AM. Monitor  .  12/30   BMP  Lab Results   Component Value Date     01/03/2023    K 4.4 01/03/2023     01/03/2023    CO2 25 01/03/2023    BUN 39 (H) 01/03/2023    CREATININE 1.5 (H) 01/03/2023    CALCIUM 9.4 01/03/2023    ANIONGAP 11 01/03/2023    EGFRNORACEVR 45 (A) 01/03/2023       Check every other day     Hypertension  Stroke risk factor.  SBP <180, MAP >65; remains at goal   Avoid hypotension in the setting of small vessel disease   Holding lopressor for now given hypotension during this  admission in the setting of an acute stroke, will resume on Sat Dec 24th  Bp today 123/64-HR 84.    12/26: resuming home torsemide per admission plan. BP Stable and will monitor BP and labs    12/30 > hold torsemide as creat climbed >  BP 97//67..    COPD, moderate  Per 9/2022 Pulm note: sats 81% on RA and is noncompliant with home oxygen  SpO2 goal 88-92%, remains at goal with only 0.5 L/min - 1L  Continue supplemental O2 PRN    Continue home meds at discharge ..      Carotid artery disease  Stroke risk factor  --S/p L carotid endarterectomy in 12/3/202  --Continue ASA, Plavix, and statin.   .      Diabetes mellitus, type 2  Stroke risk factor. A1c 6.7.  -BG goal while inpatient 140-180  -Continue LDSSI ACHS PRN   - Stable     Bs 139 this am > Dm diet no meds .    12/26: blood sugars rising to consistently > 299 Add Levemir 10 units QHS to SSI. Cont diabetic diet    12/28   189-327  Increase detemir to 13     12/30 detemir 13 units and SSI     1/2  With steroids I anticipate  about sugars going up   Will cover with insulin   1/3 glucsoe 135-140s       Hyperlipidemia  Stroke risk factor.  , goal <70  Atorvastatin 40mg daily, continue .      Hypothyroidism    -Subclinical hypothyroidism; TSH 4.4; fT4 0.95 12/14/2021  -Follow up with PCP  Not on meds .      Final Active Diagnoses:    Diagnosis Date Noted POA    PRINCIPAL PROBLEM:  Cerebrovascular accident (CVA) due to thrombosis of right middle cerebral artery [I63.311] 12/14/2022 Yes    COVID-19 [U07.1] 01/02/2023 Yes    Fever [R50.9] 01/01/2023 No    Severe malnutrition in the context of acute on chronic illness [E43] 12/29/2022 Yes    BPH (benign prostatic hyperplasia) [N40.0] 12/14/2022 Yes     Chronic    Dementia [F03.90] 12/14/2022 Yes     Chronic    Follicular lymphoma [C82.90] 10/06/2022 Yes     Chronic    Aortic stenosis s/p TAVR [Z95.2]  Not Applicable     Chronic    Chronic diastolic heart failure, NYHA class 2 [I50.32] 08/31/2018 Yes      Chronic    Hyperlipidemia [E78.5]  Yes     Chronic    Diabetes mellitus, type 2 [E11.9]  Yes    Hypothyroidism [E03.9] 02/09/2017 Yes     Chronic    Chronic kidney disease, stage 3 (moderate) [N18.30] 11/09/2016 Yes     Chronic    Hypertension [I10] 11/09/2016 Yes     Chronic    COPD, moderate [J44.9] 11/09/2016 Yes     Chronic    Carotid artery disease [I77.9] 11/09/2016 Yes     Chronic      Problems Resolved During this Admission:       Discharged Condition: stable    Disposition: Short Term Hospital    Follow Up:    Patient Instructions:   No discharge procedures on file.    Significant Diagnostic Studies:  Significant Labs: All pertinent labs within the past 24 hours have been reviewed.  ABGs: No results for input(s): PH, PCO2, HCO3, POCSATURATED, BE, TOTALHB, COHB, METHB, O2HB, POCFIO2, PO2 in the last 48 hours.  CBC:   Recent Labs   Lab 01/02/23  0621 01/03/23  0554   WBC 3.32* 2.78*   HGB 11.0* 11.6*   HCT 34.2* 35.8*    404       CMP:   Recent Labs   Lab 01/02/23  0621 01/03/23  0554    141   K 4.4 4.4    105   CO2 27 25   * 147*   BUN 35* 39*   CREATININE 1.8* 1.5*   CALCIUM 10.1 9.4   PROT 6.4 6.0   ALBUMIN 2.8* 2.6*   BILITOT 0.6 0.6   ALKPHOS 77 70   AST 15 24   ALT 17 15   ANIONGAP 11 11         Significant Imaging: I have reviewed all pertinent imaging results/findings within the past 24 hours.  CXR: I have reviewed all pertinent results/findings within the past 24 hours and my personal findings are:    Reviewed     Consolidative opacity in the left lower lobe consistent with known lipoid pneumonia.  Left greater than right hazy airspace opacities, could be due to multifocal infection, aspiration or edema.    Pending Diagnostic Studies:     None         Medications:  Reconciled Home Medications:      Medication List      ASK your doctor about these medications    albuterol 90 mcg/actuation inhaler  Commonly known as: VENTOLIN HFA  Inhale 1-2 puffs into the lungs  "every 6 (six) hours as needed for Wheezing or Shortness of Breath. Rescue     aspirin 81 MG Chew  Take 1 tablet (81 mg total) by mouth once daily.     atorvastatin 40 MG tablet  Commonly known as: LIPITOR  Take 1 tablet (40 mg total) by mouth once daily.     clopidogreL 75 mg tablet  Commonly known as: PLAVIX  Take 1 tablet (75 mg total) by mouth once daily.     ipratropium 42 mcg (0.06 %) nasal spray  Commonly known as: ATROVENT  ipratropium bromide 42 mcg (0.06 %) nasal spray     meclizine 12.5 mg tablet  Commonly known as: ANTIVERT  Take 2 tablets (25 mg total) by mouth 3 (three) times daily.     memantine 10 MG Tab  Commonly known as: NAMENDA  Take 10 mg by mouth once daily.     metoprolol succinate 25 MG 24 hr tablet  Commonly known as: TOPROL-XL  Take 1 tablet (25 mg total) by mouth every evening.     MYRBETRIQ 25 mg Tb24 ER tablet  Generic drug: mirabegron  Take 25 mg by mouth once daily. 11/23/20     nitroGLYCERIN 0.4 MG SL tablet  Commonly known as: NITROSTAT  Place 1 tablet (0.4 mg total) under the tongue every 5 (five) minutes as needed for Chest pain.     ondansetron 8 MG Tbdl  Commonly known as: ZOFRAN-ODT  Take 1 tablet (8 mg total) by mouth 3 (three) times daily as needed (for nausea).     pen needle, diabetic 32 gauge x 5/32" Ndle  BD Adrienne 2nd Gen Pen Needle 32 gauge x 5/32"   USE 1 PEN NEEDLE ONCE DAILY USE AS DIRECTED     SOLIQUA 100/33 100 unit-33 mcg/mL Inpn pen  Generic drug: insulin glargine-lixisenatide  INJECT 30 UNITS SUBCUTANEOUSLY ONCE DAILY     tamsulosin 0.4 mg Cap  Commonly known as: FLOMAX  Take 0.4 mg by mouth once daily.     torsemide 10 MG Tab  Commonly known as: DEMADEX  Take 1 tablet (10 mg total) by mouth once daily.     UNABLE TO FIND  medication name: prevagen extra strenght daily            Indwelling Lines/Drains at time of discharge:   Lines/Drains/Airways     None                 Time spent on the discharge of patient: 25 minutes         Tamara iVllasenor NP  Department " of [facility] Medicine  [facility] Surg (3rd Fl)

## 2023-01-03 NOTE — PLAN OF CARE
VS stable. Pt free and safe from falls. Pt complaining of increased back pain. Norco administered x 2.   Scrotum wound cleansed and Calmoseptine applied.     Problem: Skin Injury Risk Increased  Goal: Skin Health and Integrity  Outcome: Ongoing, Progressing     Problem: Fall Injury Risk  Goal: Absence of Fall and Fall-Related Injury  Outcome: Ongoing, Progressing     Problem: Impaired Wound Healing  Goal: Optimal Wound Healing  Outcome: Ongoing, Progressing

## 2023-01-03 NOTE — PT/OT/SLP EVAL
"Occupational Therapy   Evaluation    Name: Vega Kohler  MRN: [id number]  Admitting Diagnosis: <principal problem not specified>  Recent Surgery: * No surgery found *      Recommendations:     Discharge Recommendations: nursing facility, skilled, home health PT, home with home health, home health OT  Discharge Equipment Recommendations:  bath bench  Barriers to discharge:  Decreased caregiver support    Assessment:     Vega Kohler is a 87 y.o. male with a medical diagnosis of <principal problem not specified>.  He presents with performance deficits affecting function: weakness, impaired endurance, gait instability, impaired functional mobility, impaired self care skills, impaired balance, decreased lower extremity function, decreased upper extremity function, decreased safety awareness, impaired cardiopulmonary response to activity.  Pt with good participation in therapy evaluation completing bed mobility with CGA and demonstrating good seated balance. Throughout evaluation no reports of fatigue noted with Pt on 2L supplemental oxygen. Weakness noted in left upper extremity more than right during evaluation with some upper extremity weakness noted during transfers and EOB scooting. Pt would benefit from continued skilled OT to increase strength and activity tolerance for improved safety and independence upon discharge.    Rehab Prognosis: Fair; patient would benefit from acute skilled OT services to address these deficits and reach maximum level of function.       Plan:     Patient to be seen 5 x/week to address the above listed problems via self-care/home management, therapeutic activities, therapeutic exercises  Plan of Care Expires: 01/17/23  Plan of Care Reviewed with: patient    Subjective     Chief Complaint: "I feel tired."  Patient/Family Comments/goals: To feel better.    Occupational Profile:  Living Environment: Mobile home with 4STE and handrails. Son lives near by.  Previous level of function: MOD I " with AD listed below.  Equipment Used at Home: walker, rolling, cane, straight, wheelchair, bedside commode, oxygen  Assistance upon Discharge: Son    Pain/Comfort:  Pain Rating 1: 0/10  Location - Side 1: Bilateral  Location - Orientation 1: upper  Location 1: back  Pain Addressed 1: Reposition  Pain Rating Post-Intervention 1: 0/10 (No pain reported.)    Patients cultural, spiritual, [place] conflicts given the current situation:      Objective:     Communicated with: Nursing prior to session.  Patient found HOB elevated with bed alarm, telemetry, peripheral IV upon OT entry to room.    General Precautions: Standard, airborne, contact, droplet, fall, other (see comments) ([employer])  Orthopedic Precautions: N/A  Braces: N/A  Respiratory Status: Nasal cannula, flow 2 L/min    Occupational Performance:    Bed Mobility:    Patient completed Rolling/Turning to Right with stand by assistance  Patient completed Scooting/Bridging with stand by assistance  Patient completed Supine to Sit with stand by assistance  Patient completed Sit to Supine with stand by assistance    Functional Mobility/Transfers:  Patient completed Sit <> Stand Transfer with contact guard assistance  with  no assistive device and rolling walker   Patient completed Toilet Transfer Step Transfer technique with contact guard assistance with  rolling walker  Functional Mobility: Ambulated ~15 feet to and from bedside to BSC using RW and CGA. Pt ambulated to BSC to perform toilet transfer and returned to bedside. Sit > stand completed without RW to perform standing ADL at table side. Following standing ADL Pt requested to return to BSC to use bathroom using RW to ambulate. 3x sit > stands completed with CGA.    Activities of Daily Living:  Grooming: modified independence for setup to complete standing ADL face washing and oral care at tabletop.  Lower Body Dressing: minimum assistance to don socks seated EOB.  Toileting: maximal assistance for  clothes management and hygiene. Pt required assist to unstrap brief and remove from waist. Pt able to complete 2 wipes using sanitary napkins following bowel movement using right upper extremity but required assist for complete clean.     Cognitive/Visual Perceptual:  Cognitive/Psychosocial Skills:     -       Oriented to: Person, Place, Time, and Situation   -       Follows Commands/attention:Follows multistep  commands  -       Communication: clear/fluent  -       Memory: No Deficits noted    Physical Exam:  Upper Extremity Range of Motion:     -       Right Upper Extremity: WFL  -       Left Upper Extremity: Deficits: Pt able to reach ~90 degrees shoulder flexion. All other movements WFL.  Upper Extremity Strength:    -       Right Upper Extremity: WFL  -       Left Upper Extremity: Deficits: -4/5 shoulder flexion noted with WFL in all other movements.    Strength:    -       Right Upper Extremity: WFL  -       Left Upper Extremity: WFL    AMPAC 6 Click ADL:  AMPAC Total Score: 16    Treatment & Education:  OT evaluation completed today with Pt. Role of OT and POC discussed. No reports of fatigue throughout evaluation with Pt on 2L nasal cannula. Pt continues with left sided weakness in shoulder.    Patient left HOB elevated with all lines intact, call button in reach, and nursing notified    GOALS:   Multidisciplinary Problems       Occupational Therapy Goals          Problem: Occupational Therapy    Goal Priority Disciplines Outcome Interventions   Occupational Therapy Goal     OT, PT/OT     Description: Pt to perform UB dressing with MOD I seated EOB.  Pt to perform LB dressing with MOD I seated EOB.   Pt to perform self toileting with Min A for hygiene and clothes management using BSC  Pt to perform level functional transfers required for ADL's with MOD I, RW level.  Pt to demonstrate Fair + dynamic standing balance as required to perform ADL's from standing level.  Pt to demonstrate Fair + BUE strength  during functional task   Pt to demonstrate consistent adherence to breathing control and energy conservation techniques as educated by OT.   Pt to participate in standing ADL task for ~3 minutes to increase activity and safety prior to discharge.                        History:     Past Medical History:   Diagnosis Date    Abnormal barium swallow     Alzheimer's dementia, late onset     Anal stenosis     Anticoagulant long-term use     Aortic stenosis     Arthritis     Aspiration pneumonitis     Benign prostatic hyperplasia     Carotid artery disease     CHF (congestive heart failure)     Chronic diastolic heart failure     Chronic kidney disease (CKD), stage III (moderate)     CKD (chronic kidney disease)     Colon polyp     COPD (chronic obstructive pulmonary disease)     Coronary artery disease     Diabetes mellitus, type 2     Diverticulosis     Dysphagia     Hearing aid worn 01/23/2020    Received bilateral hearing aides today    Heart disease     History of CEA (carotid endarterectomy)     [place] (hard of hearing)     Hyperlipidemia     Hypertension     Hypertensive heart disease     Hypothyroidism     Joint disease     Memory loss     PAD (peripheral artery disease)     Presence of drug coated stent in right coronary artery     Pulmonary hypertension     S/P TAVR (transcatheter aortic valve replacement)     Small bowel obstruction     Wears dentures     UPPER AND LOWER    Wears glasses          Past Surgical History:   Procedure Laterality Date    anal fissure repair      ANKLE FUSION Left 08/15/2016    X 2    APPENDECTOMY      BACK SURGERY      X 4    CAROTID ENDARTERECTOMY Right     CAROTID ENDARTERECTOMY Left     CAROTID ENDARTERECTOMY Left 12/3/2021    Procedure: ENDARTERECTOMY-CAROTID;  Surgeon: Tim Castillo MD;  Location: [facility] OR;  Service: Cardiology;  Laterality: Left;  pt unsure if he stopped plavix, Dr. Castillo ok to proceed    CARPAL TUNNEL RELEASE Right     CHOLECYSTECTOMY       COLONOSCOPY N/A 7/26/2018    Procedure: HIGH RISK SCREENING COLONOSCOPY;  Surgeon: Connor Murrell MD;  Location: [facility] ENDO;  Service: Endoscopy;  Laterality: N/A;    CORONARY ANGIOPLASTY WITH STENT PLACEMENT      ELBOW SURGERY Bilateral     EXCISIONAL HEMORRHOIDECTOMY      x3    EXPLORATORY LAPAROTOMY W/ BOWEL RESECTION  11/17/2011    EYE SURGERY      cataract bilaterally    history of bowel resection      KNEE SURGERY Left     LAMINECTOMY AND MICRODISCECTOMY LUMBAR SPINE  07/21/2011    L5-S1    LEFT HEART CATHETERIZATION Left 8/31/2018    Procedure: Left heart cath;  Surgeon: Rex Chris MD;  Location: [facility] CATH;  Service: Cardiology;  Laterality: Left;    LUMBAR FUSION  11/09/2011    Anterior approach--L5-S1    LUNG BIOPSY Right 07/21/2009    VATS with wedge ofr right lower lobe    LUNG BIOPSY Left 3/16/2020    Procedure: BIOPSY, LUNG;  Surgeon: [facility] Diagnostic Provider;  Location: [facility] OR;  Service: General;  Laterality: Left;    PERCUTANEOUS TRANSCATHETER AORTIC VALVE REPLACEMENT (TAVR) Left 10/30/2018    Procedure: REPLACEMENT, AORTIC VALVE, PERCUTANEOUS, TRANSCATHETER;  Surgeon: Rex Chris MD;  Location: [facility] OR;  Service: Cardiology;  Laterality: Left;    PERCUTANEOUS TRANSCATHETER AORTIC VALVE REPLACEMENT (TAVR)  10/30/2018    Procedure: REPLACEMENT, AORTIC VALVE, PERCUTANEOUS, TRANSCATHETER;  Surgeon: Tim Castillo MD;  Location: [facility] OR;  Service: Cardiovascular;;    RIGHT HEART CATHETERIZATION Right 8/31/2018    Procedure: INSERTION, CATHETER, RIGHT HEART;  Surgeon: Rex Chris MD;  Location: [facility] CATH;  Service: Cardiology;  Laterality: Right;    ROTATOR CUFF REPAIR Left     SINUS SURGERY      SPINE SURGERY      back surgery    TENDON RELEASE Bilateral     TONSILLECTOMY         Time Tracking:     OT Date of Treatment: 01/03/23  OT Start Time: 1405  OT Stop Time: 1445  OT Total Time (min): 40 min    Billable Minutes:Evaluation 40    1/3/2023

## 2023-01-03 NOTE — ASSESSMENT & PLAN NOTE
Nutrition consulted. Most recent weight and BMI monitored-     Malnutrition Type and Energy Intake  Malnutrition Type: acute illness or injury  Energy Intake: moderate energy intake    Malnutrition (Moderate to Severe)  Weight Loss (Malnutrition): greater than 5% in 1 month  Energy Intake (Malnutrition): less than 75% for greater than or equal to 1 month  Subcutaneous Fat (Malnutrition): moderate depletion  Muscle Mass (Malnutrition): moderate depletion    Final Summary  Subcutaneous Fat Loss (Final Summary): severe protein-calorie malnutrition  Muscle Loss Evaluation (Final Summary): severe protein-calorie malnutrition    Malnutrition Final Summary  Severe Weight Loss (Malnutrition): greater than 5% in 1 month    Measurements:  Wt Readings from Last 1 Encounters:   12/30/22 68.6 kg (151 lb 3.8 oz)   Body mass index is 24.9 kg/m².    Recommendations: Recommendation/Intervention: 1. Rec'd continue diabetic and cardiac diet. 2. Rec'd encouraging Boost Glucose Control Chocolate provided during mealtimes for decreased PO intake, decreased appetite, and increased nutritional needs. 3. RD to follow and make rec's accordingly.  Goals: Pt will meet % EEN by RD follow up.    Patient has been screened and assessed by RD. RD will follow patient.

## 2023-01-03 NOTE — PLAN OF CARE
01/03/23 1223   Medicare Message   Important Message from Medicare regarding Discharge Appeal Rights Explained to patient/caregiver;Other (comments)  (NOMNOC completed via phone with son Zay.)   Date IMM was signed 01/03/23   Time IMM was signed 1224

## 2023-01-03 NOTE — SUBJECTIVE & OBJECTIVE
Review of Systems   Constitutional:  Negative for chills and fever (yesterday).   HENT:  Negative for congestion and sore throat.    Respiratory:  Positive for cough. Negative for shortness of breath.    Cardiovascular:  Negative for chest pain.   Gastrointestinal:  Negative for abdominal pain, diarrhea, nausea and vomiting.   Genitourinary:  Negative for dysuria and hematuria.   Skin:  Negative for rash.        Lip bleeding   Neurological:  Positive for weakness. Negative for dizziness, syncope and light-headedness.        Left arm weakness getting better   Objective:     Vital Signs (Most Recent):  Temp: 97.1 °F (36.2 °C) (01/03/23 1100)  Pulse: 69 (01/03/23 1100)  Resp: 16 (01/03/23 1100)  BP: (!) 114/56 (01/03/23 1100)  SpO2: 96 % (01/03/23 1100)   Vital Signs (24h Range):  Temp:  [97.1 °F (36.2 °C)-98.7 °F (37.1 °C)] 97.1 °F (36.2 °C)  Pulse:  [66-71] 69  Resp:  [16-20] 16  SpO2:  [96 %-97 %] 96 %  BP: (114-144)/(56-69) 114/56     Weight: 72.1 kg (158 lb 15.2 oz)  Body mass index is 24.9 kg/m².  No intake or output data in the 24 hours ending 01/03/23 1209     Physical Exam  Vitals and nursing note reviewed.   Constitutional:       General: He is not in acute distress.     Appearance: He is well-developed. He is ill-appearing and toxic-appearing.   HENT:      Head: Normocephalic and atraumatic.      Right Ear: External ear normal.      Left Ear: External ear normal.      Nose:      Comments: O2 in place  Eyes:      Conjunctiva/sclera: Conjunctivae normal.      Pupils: Pupils are equal, round, and reactive to light.   Neck:      Thyroid: No thyromegaly.   Cardiovascular:      Rate and Rhythm: Normal rate and regular rhythm.      Heart sounds: Normal heart sounds. No murmur heard.  Pulmonary:      Effort: Pulmonary effort is normal. No respiratory distress.      Breath sounds: Rales present. No wheezing.   Abdominal:      General: Bowel sounds are normal. There is no distension.      Palpations: Abdomen is  soft.      Tenderness: There is no abdominal tenderness.   Musculoskeletal:         General: Normal range of motion.      Cervical back: Normal range of motion and neck supple.      Right lower leg: No edema.      Left lower leg: No edema.   Lymphadenopathy:      Cervical: No cervical adenopathy.   Skin:     General: Skin is warm and dry.      Findings: No rash.   Neurological:      General: No focal deficit present.      Mental Status: He is alert and oriented to person, place, and time.      Motor: Weakness present.      Comments: Left arm weakness much improved   Psychiatric:         Mood and Affect: Mood normal.         Behavior: Behavior normal.       Significant Labs: All pertinent labs within the past 24 hours have been reviewed.  ABGs: No results for input(s): PH, PCO2, HCO3, POCSATURATED, BE, TOTALHB, COHB, METHB, O2HB, POCFIO2, PO2 in the last 48 hours.  CBC:   Recent Labs   Lab 01/02/23  0621 01/03/23  0554   WBC 3.32* 2.78*   HGB 11.0* 11.6*   HCT 34.2* 35.8*    404       CMP:   Recent Labs   Lab 01/02/23  0621 01/03/23  0554    141   K 4.4 4.4    105   CO2 27 25   * 147*   BUN 35* 39*   CREATININE 1.8* 1.5*   CALCIUM 10.1 9.4   PROT 6.4 6.0   ALBUMIN 2.8* 2.6*   BILITOT 0.6 0.6   ALKPHOS 77 70   AST 15 24   ALT 17 15   ANIONGAP 11 11         Significant Imaging: I have reviewed all pertinent imaging results/findings within the past 24 hours.  CXR: I have reviewed all pertinent results/findings within the past 24 hours and my personal findings are:     Reviewed     Consolidative opacity in the left lower lobe consistent with known lipoid pneumonia.  Left greater than right hazy airspace opacities, could be due to multifocal infection, aspiration or edema.

## 2023-01-03 NOTE — ASSESSMENT & PLAN NOTE
Stroke risk factor. A1c 6.7.  -BG goal while inpatient 140-180  -Continue LDSSI ACHS PRN   - Stable     Bs 139 this am > Dm diet no meds .    12/26: blood sugars rising to consistently > 299 Add Levemir 10 units QHS to SSI. Cont diabetic diet    12/28   189-327  Increase detemir to 13     12/30 detemir 13 units and SSI     1/2  With steroids I anticipate  about sugars going up   Will cover with insulin   1/3 glucsoe 135-140s

## 2023-01-03 NOTE — SUBJECTIVE & OBJECTIVE
Review of Systems   Constitutional:  Negative for chills and fever (yesterday).   HENT:  Negative for congestion and sore throat.    Respiratory:  Positive for cough. Negative for shortness of breath.    Cardiovascular:  Negative for chest pain.   Gastrointestinal:  Negative for abdominal pain, diarrhea, nausea and vomiting.   Genitourinary:  Negative for dysuria and hematuria.   Skin:  Negative for rash.        Lip bleeding   Neurological:  Positive for weakness. Negative for dizziness, syncope and light-headedness.        Left arm weakness getting better   Objective:     Vital Signs (Most Recent):  Temp: 97.6 °F (36.4 °C) (01/03/23 0743)  Pulse: 66 (01/03/23 0743)  Resp: 16 (01/03/23 0903)  BP: 121/60 (01/03/23 0743)  SpO2: 96 % (01/03/23 0743)   Vital Signs (24h Range):  Temp:  [97.2 °F (36.2 °C)-100.2 °F (37.9 °C)] 97.6 °F (36.4 °C)  Pulse:  [66-77] 66  Resp:  [16-20] 16  SpO2:  [94 %-97 %] 96 %  BP: (102-144)/(51-69) 121/60     Weight: 72.1 kg (158 lb 15.2 oz)  Body mass index is 24.9 kg/m².  No intake or output data in the 24 hours ending 01/03/23 1046     Physical Exam  Vitals and nursing note reviewed.   Constitutional:       General: He is not in acute distress.     Appearance: He is well-developed. He is ill-appearing and toxic-appearing.   HENT:      Head: Normocephalic and atraumatic.      Right Ear: External ear normal.      Left Ear: External ear normal.      Nose:      Comments: O2 in place  Eyes:      Conjunctiva/sclera: Conjunctivae normal.      Pupils: Pupils are equal, round, and reactive to light.   Neck:      Thyroid: No thyromegaly.   Cardiovascular:      Rate and Rhythm: Normal rate and regular rhythm.      Heart sounds: Normal heart sounds. No murmur heard.  Pulmonary:      Effort: Pulmonary effort is normal. No respiratory distress.      Breath sounds: Rales present. No wheezing.   Abdominal:      General: Bowel sounds are normal. There is no distension.      Palpations: Abdomen is soft.       Tenderness: There is no abdominal tenderness.   Musculoskeletal:         General: Normal range of motion.      Cervical back: Normal range of motion and neck supple.      Right lower leg: No edema.      Left lower leg: No edema.   Lymphadenopathy:      Cervical: No cervical adenopathy.   Skin:     General: Skin is warm and dry.      Findings: No rash.   Neurological:      General: No focal deficit present.      Mental Status: He is alert and oriented to person, place, and time.      Motor: Weakness present.      Comments: Left arm weakness much improved   Psychiatric:         Mood and Affect: Mood normal.         Behavior: Behavior normal.       Significant Labs: All pertinent labs within the past 24 hours have been reviewed.  ABGs: No results for input(s): PH, PCO2, HCO3, POCSATURATED, BE, TOTALHB, COHB, METHB, O2HB, POCFIO2, PO2 in the last 48 hours.  CBC:   Recent Labs   Lab 01/02/23  0621 01/03/23  0554   WBC 3.32* 2.78*   HGB 11.0* 11.6*   HCT 34.2* 35.8*    404       CMP:   Recent Labs   Lab 01/02/23  0621 01/03/23  0554    141   K 4.4 4.4    105   CO2 27 25   * 147*   BUN 35* 39*   CREATININE 1.8* 1.5*   CALCIUM 10.1 9.4   PROT 6.4 6.0   ALBUMIN 2.8* 2.6*   BILITOT 0.6 0.6   ALKPHOS 77 70   AST 15 24   ALT 17 15   ANIONGAP 11 11         Significant Imaging: I have reviewed all pertinent imaging results/findings within the past 24 hours.  CXR: I have reviewed all pertinent results/findings within the past 24 hours and my personal findings are:     Reviewed     Consolidative opacity in the left lower lobe consistent with known lipoid pneumonia.  Left greater than right hazy airspace opacities, could be due to multifocal infection, aspiration or edema.

## 2023-01-03 NOTE — PLAN OF CARE
[place] - Med Surg (3rd Fl)  Discharge Reassessment    Primary Care Provider: Fabricoi Mckeon MD    Expected Discharge Date: 1/3/2023    Reassessment (most recent)       Discharge Reassessment - 01/03/23 1220          Discharge Reassessment    Assessment Type Discharge Planning Reassessment (P)      Did the patient's condition or plan change since previous assessment? Yes (P)      Discharge Plan discussed with: Adult children (P)      Communicated JOSE A with patient/caregiver Yes (P)      Discharge Plan A Skilled Nursing Facility (P)      Discharge Plan B [facility] placement [facility] (P)      DME Needed Upon Discharge  other (see comments) (P)    TBD    Discharge Barriers Identified None (P)      Why the patient remains in the hospital Requires continued medical care (P)         Post-Acute Status    Post-Acute Authorization Placement (P)      Post-Acute Placement Status Pending post-acute provider review/more information requested (P)      Coverage Medicare with suppliment (P)      Discharge Delays None known at this time (P)                        Patient has new dx of Covid-19. This has set patient back. He is weaker, and acutely ill. He will discharge from [facility] and admit to inpatient for acute treatment. Once he is stable acutely we can re-visit skilled care.  Spoke with Son, Zay, who states he understands the above.  CM/SW will continue trying to place patient as well.

## 2023-01-03 NOTE — PLAN OF CARE
[place] - Med Surg (3rd Fl)  Discharge Final Note    Primary Care Provider: Fabricio Mckeon MD    Expected Discharge Date: 1/3/2023    Final Discharge Note (most recent)       Final Note - 01/03/23 1225          Final Note    Assessment Type Final Discharge Note (P)      Anticipated Discharge Disposition Short Term Hospital (P)         Post-Acute Status    Post-Acute Authorization Placement (P)      Post-Acute Placement Status Pending post-acute provider review/more information requested (P)      Discharge Delays None known at this time (P)                      Important Message from Medicare  Important Message from Medicare regarding Discharge Appeal Rights: Explained to patient/caregiver, Other (comments) (NOMNOC completed via phone with son Zay.)     Date IMM was signed: 01/03/23  Time IMM was signed: 1224

## 2023-01-04 PROBLEM — R53.81 DEBILITY: Status: ACTIVE | Noted: 2023-01-01

## 2023-01-04 NOTE — PLAN OF CARE
Problem: Occupational Therapy  Goal: Occupational Therapy Goal  Description: Pt to perform UB dressing with MOD I seated EOB.  Pt to perform LB dressing with MOD I seated EOB.   Pt to perform self toileting with Min A for hygiene and clothes management using BSC  Pt to perform level functional transfers required for ADL's with MOD I, RW level.  Pt to demonstrate Fair + dynamic standing balance as required to perform ADL's from standing level.  Pt to demonstrate Fair + BUE strength during functional task   Pt to demonstrate consistent adherence to breathing control and energy conservation techniques as educated by OT.   Pt to participate in standing ADL task for ~3 minutes to increase activity and safety prior to discharge.   Outcome: Ongoing, Progressing   Cont with OT POC

## 2023-01-04 NOTE — PLAN OF CARE
VS stable. Pt free and safe from falls. % SpO2 on 2 liters of oxygen.   Scrotum wound cleansed and Calmoseptine applied.       Problem: Skin Injury Risk Increased  Goal: Skin Health and Integrity  Outcome: Ongoing, Progressing     Problem: Fall Injury Risk  Goal: Absence of Fall and Fall-Related Injury  Outcome: Ongoing, Progressing     Problem: Impaired Wound Healing  Goal: Optimal Wound Healing  Outcome: Ongoing, Progressing

## 2023-01-04 NOTE — ASSESSMENT & PLAN NOTE
Stroke risk factor.  SBP <180, MAP >65; remains at goal   Avoid hypotension in the setting of small vessel disease   Holding lopressor for now given hypotension during this admission in the setting of an acute stroke, will resume on Sat Dec 24th  Bp today 123/64-HR 84.     Torsemide has been on hold due to ^ Creat now stable  1/4 P 111/57- 131/64- HR 60s well controlled

## 2023-01-04 NOTE — ASSESSMENT & PLAN NOTE
Patient's FSGs are controlled on current medication regimen.  Last A1c reviewed-   Lab Results   Component Value Date    HGBA1C 6.7 (H) 12/14/2022     Most recent fingerstick glucose reviewed-   Recent Labs   Lab 01/03/23  1626 01/03/23  1922 01/04/23  0717 01/04/23  1113   POCTGLUCOSE 377* 324* 160* 249*     Current correctional scale  Low  Maintain anti-hyperglycemic dose as follows-   Antihyperglycemics (From admission, onward)    Start     Stop Route Frequency Ordered    01/03/23 2100  insulin detemir U-100 pen 15 Units         -- SubQ Nightly 01/03/23 1248    01/03/23 1248  insulin aspart U-100 pen 0-5 Units         -- SubQ Before meals & nightly PRN 01/03/23 1248        Hold Oral hypoglycemics while patient is in the hospital.

## 2023-01-04 NOTE — ASSESSMENT & PLAN NOTE
Admitting dx post tx at [facility]- admitted here for debility skilled care intially  Continue asa, plavix statin  PT/OT

## 2023-01-04 NOTE — HPI
HPI:   88yo male patient with extensive medical hx. (Alzheimers, aortic stenosis, arthritis, BPH, CHF, CKD, COPD, CAD/PAD, DM2, HLD/HTN, pulm HTN, S/p TAVR and hypothyroid). He was treated acutely [facility] for ischemic CVA with left hemiplegia s/p TNK 22. Pt was independent at baseline prior to this event. Exam improved following TNK. MRI with R MCA infarct. Etiology likely . Patient stepped down to NPU to await SNF placement. Metoprolol originally started but held given etiology of stroke and risk of expansion with hypoperfusion. Will plan to resume meds this Saturday and then remainder of home BP meds on Monday. Patient was discharged on DAPT with outpatient follow up. Patient remains neurologically unchanged. SBP drop < 120 but no changes on exam. Last BM yesterday. He was brought to [facility] yesterday for continued SKILL therapy with PT / OT. On plavix and statin and asa. VSS- no bp meds 123/64         22 pt tested positive for COVID; staff noting patient more sluggish & Requiring oxygen ; CXR with infiltrates   BC negative, influenza negative  COVID POSITIVE-d/c to acute care    22 day 3 Remdesivir, dexamehtasone and levofloxacin   Afebrile, WBC low, CXR reviewed, procal normal ; all appears viral will stop levofloxacin

## 2023-01-04 NOTE — PT/OT/SLP PROGRESS
Occupational Therapy   Treatment    Name: Vega Kohler  MRN: [id number]  Admitting Diagnosis:  COVID-19       Recommendations:     Discharge Recommendations: nursing facility, skilled, home with home health, home health OT, home health PT  Discharge Equipment Recommendations:  bath bench  Barriers to discharge:  Decreased caregiver support    Assessment:     Vega Kohler is a 87 y.o. male with a medical diagnosis of COVID-19.  Performance deficits affecting function are weakness, impaired endurance, impaired self care skills, impaired functional mobility, impaired balance, gait instability, decreased upper extremity function, decreased lower extremity function, decreased safety awareness, impaired cardiopulmonary response to activity.     Rehab Prognosis:  Good; patient would benefit from acute skilled OT services to address these deficits and reach maximum level of function.       Plan:     Patient to be seen 5 x/week to address the above listed problems via self-care/home management, therapeutic activities, therapeutic exercises  Plan of Care Expires: 01/17/23  Plan of Care Reviewed with: patient    Subjective     Pain/Comfort:  Pain Rating 1: 0/10    Objective:     Communicated with: RN prior to session.  Patient found HOB elevated with bed alarm, telemetry, peripheral IV upon OT entry to room.    General Precautions: Standard, airborne, contact, fall    Orthopedic Precautions:N/A  Braces: N/A  Respiratory Status: Nasal cannula, flow 2 L/min     Occupational Performance:     Bed Mobility:    Patient completed Rolling/Turning to Right with contact guard assistance  Patient completed Scooting/Bridging with modified independence  Patient completed Supine to Sit with modified independence  Patient completed Sit to Supine with contact guard assistance     Functional Mobility/Transfers:  Patient completed Sit <> Stand Transfer with supervision  with  no assistive device   Patient completed Toilet Transfer Step Transfer  technique with contact guard assistance with  hand-held assist  Functional Mobility: Pt was able to perform sit to stand from edge of bed with supervision and no assistive device and was able to transfer to and from bedside commode with CGA.    Activities of Daily Living:  Toileting: stand by assistance Pt was able to use bedside commode and clean self after toileting with CGA    [facility] 6 Click ADL:      Treatment & Education:  Pt rolled to R side with CGA and up to sitting on edge of bed with Modified independence. Pt was able to scoot to the edge of bed with modified independence to position self with feet on the floor. Pt was instructed in there activity reaching activity to incorporate use of L hand for strength, coordination and endurance using bedside table. Pt was instructed in B UE there ex 1 set of 10 reps of shoulder flex/ext, ab/adduction, bicep flex/ext and tricep flex/ext.Pt transferred from bed to bedside commode with contact guard assistance for toileting. Pt was able to stand and clean self after toileting with CGA. Pt has difficulty with donning and doffing diaper and requires max assistance to get it on. Pt was able to transfer back to bed with CGA and performed 5  sit to stand exercises with supervision and no c/o fatigue. Pt returned to bed insupine position with CGA and repositioned self in bed with Mod I.     Patient left HOB elevated with all lines intact, call button in reach, bed alarm on, and RN notified    GOALS:   Multidisciplinary Problems       Occupational Therapy Goals          Problem: Occupational Therapy    Goal Priority Disciplines Outcome Interventions   Occupational Therapy Goal     OT, PT/OT Ongoing, Progressing    Description: Pt to perform UB dressing with MOD I seated EOB.  Pt to perform LB dressing with MOD I seated EOB.   Pt to perform self toileting with Min A for hygiene and clothes management using BSC  Pt to perform level functional transfers required for ADL's with MOD  I, RW level.  Pt to demonstrate Fair + dynamic standing balance as required to perform ADL's from standing level.  Pt to demonstrate Fair + BUE strength during functional task   Pt to demonstrate consistent adherence to breathing control and energy conservation techniques as educated by OT.   Pt to participate in standing ADL task for ~3 minutes to increase activity and safety prior to discharge.                        Time Tracking:     OT Date of Treatment: 01/04/23  OT Start Time: 1430  OT Stop Time: 1530  OT Total Time (min): 60 min    Billable Minutes:Self Care/Home Management 30  Therapeutic Exercise 30    OT/PEDRO: PEDRO     PEDRO Visit Number: 1 1/4/2023   wine

## 2023-01-04 NOTE — ASSESSMENT & PLAN NOTE
Patient is identified as Severe COVID-19 based on hypoxemia with O2 saturations <94% on room air or on ambulation   Initiate standard COVID protocols; COVID-19 testing Collection Date: 1/1/2023 Collection Time:  12:34 PM ,Infection Control notification  and isolation- respiratory, contact and droplet per protocol    Diagnostics: (leukopenia, hyponatremia, hyperferritinemia, elevated troponin, elevated d-dimer, age, and comorbidities are significant predictors of poor clinical outcome)  CBC, CMP, Ferritin, CRP and Portable CXR    Management: Inhaled bronchodilators as needed for shortness of breath.   Day 3 Remdesivir, dexamehtasone and levofloxacin   Afebrile, WBC low, CXR reviewed, procal normal ; all appears viral will stop levofloxacin   Repeat labs in am

## 2023-01-04 NOTE — H&P
[facility] ([facility])  [facility] Medicine  History & Physical    Patient Name: Vega Kohler  MRN: [id number]  Patient Class: IP- Inpatient  Admission Date: 1/3/2023  Attending Physician: Destin Jimenez MD   Primary Care Provider: Fabricio Mckeon MD         Patient information was obtained from ER records and primary team.     Subjective:     Principal Problem:COVID-19    Chief Complaint: No chief complaint on file.       HPI:   HPI:   86yo male patient with extensive medical hx. (Alzheimers, aortic stenosis, arthritis, BPH, CHF, CKD, COPD, CAD/PAD, DM2, HLD/HTN, pulm HTN, S/p TAVR and hypothyroid). [place] [facility] [place] with left hemiplegia s/p TNK 22. Pt was independent at baseline prior to this event. Exam improved following TNK. MRI with R MCA infarct. Etiology likely . Patient stepped down to NPU to await SNF placement. Metoprolol originally started but held given etiology of stroke and risk of expansion with hypoperfusion. Will plan to resume meds this Saturday and then remainder of home BP meds on Monday. Patient was discharged on DAPT with outpatient follow up. Patient remains neurologically unchanged. SBP drop < 120 but no changes on exam. Last BM yesterday. He was brought to [facility] yesterday for continued SKILL therapy with PT / OT. On plavix and statin and asa. VSS- no bp meds 123/64         22 pt tested positive for COVID; staff noting patient more sluggish & Requiring oxygen ; CXR with infiltrates   BC negative, influenza negative  COVID POSITIVE-d/c to acute care    22 day 3 Remdesivir, dexamehtasone and levofloxacin   Afebrile, WBC low, CXR reviewed, procal normal ; all appears viral will stop levofloxacin              Past Medical History:   Diagnosis Date    Abnormal barium swallow     Alzheimer's dementia, late onset     Anal stenosis     Anticoagulant long-term use     Aortic stenosis     Arthritis     Aspiration pneumonitis     Benign  prostatic hyperplasia     Carotid artery disease     CHF (congestive heart failure)     Chronic diastolic heart failure     Chronic kidney disease (CKD), stage III (moderate)     CKD (chronic kidney disease)     Colon polyp     COPD (chronic obstructive pulmonary disease)     Coronary artery disease     Diabetes mellitus, type 2     Diverticulosis     Dysphagia     Hearing aid worn 01/23/2020    Received bilateral hearing aides today    Heart disease     History of CEA (carotid endarterectomy)     [place] (hard of hearing)     Hyperlipidemia     Hypertension     Hypertensive heart disease     Hypothyroidism     Joint disease     Memory loss     PAD (peripheral artery disease)     Presence of drug coated stent in right coronary artery     Pulmonary hypertension     S/P TAVR (transcatheter aortic valve replacement)     Small bowel obstruction     Wears dentures     UPPER AND LOWER    Wears glasses        Past Surgical History:   Procedure Laterality Date    anal fissure repair      ANKLE FUSION Left 08/15/2016    X 2    APPENDECTOMY      BACK SURGERY      X 4    CAROTID ENDARTERECTOMY Right     CAROTID ENDARTERECTOMY Left     CAROTID ENDARTERECTOMY Left 12/3/2021    Procedure: ENDARTERECTOMY-CAROTID;  Surgeon: Tim Castillo MD;  Location: [facility] OR;  Service: Cardiology;  Laterality: Left;  pt unsure if he stopped plavix, Dr. Castillo ok to proceed    CARPAL TUNNEL RELEASE Right     CHOLECYSTECTOMY      COLONOSCOPY N/A 7/26/2018    Procedure: HIGH RISK SCREENING COLONOSCOPY;  Surgeon: Connor Murrell MD;  Location: [facility] ENDO;  Service: Endoscopy;  Laterality: N/A;    CORONARY ANGIOPLASTY WITH STENT PLACEMENT      ELBOW SURGERY Bilateral     EXCISIONAL HEMORRHOIDECTOMY      x3    EXPLORATORY LAPAROTOMY W/ BOWEL RESECTION  11/17/2011    EYE SURGERY      cataract bilaterally    history of bowel resection      KNEE SURGERY Left     LAMINECTOMY AND MICRODISCECTOMY  LUMBAR SPINE  07/21/2011    L5-S1    LEFT HEART CATHETERIZATION Left 8/31/2018    Procedure: Left heart cath;  Surgeon: Rex Chris MD;  [facility];  Service: Cardiology;  Laterality: Left;    LUMBAR FUSION  11/09/2011    Anterior approach--L5-S1    LUNG BIOPSY Right 07/21/2009    VATS with wedge ofr right lower lobe    LUNG BIOPSY Left 3/16/2020    Procedure: BIOPSY, LUNG;  Surgeon: [facility] Diagnostic Provider;  [facility];  Service: General;  Laterality: Left;    PERCUTANEOUS TRANSCATHETER AORTIC VALVE REPLACEMENT (TAVR) Left 10/30/2018    Procedure: REPLACEMENT, AORTIC VALVE, PERCUTANEOUS, TRANSCATHETER;  Surgeon: Rex Chris MD;  [facility];  Service: Cardiology;  Laterality: Left;    PERCUTANEOUS TRANSCATHETER AORTIC VALVE REPLACEMENT (TAVR)  10/30/2018    Procedure: REPLACEMENT, AORTIC VALVE, PERCUTANEOUS, TRANSCATHETER;  Surgeon: Tim Castillo MD;  [facility];  Service: Cardiovascular;;    RIGHT HEART CATHETERIZATION Right 8/31/2018    Procedure: INSERTION, CATHETER, RIGHT HEART;  Surgeon: Rex Chris MD;  [facility];  Service: Cardiology;  Laterality: Right;    ROTATOR CUFF REPAIR Left     SINUS SURGERY      SPINE SURGERY      back surgery    TENDON RELEASE Bilateral     TONSILLECTOMY         Review of patient's allergies indicates:   Allergen Reactions    Sulfur Itching    Penicillins Swelling and Rash       Current Facility-Administered Medications on File Prior to Encounter   Medication    [DISCONTINUED] 0.9%  NaCl infusion    [DISCONTINUED] acetaminophen tablet 650 mg    [DISCONTINUED] aspirin chewable tablet 81 mg    [DISCONTINUED] atorvastatin tablet 40 mg    [DISCONTINUED] clopidogreL tablet 75 mg    [DISCONTINUED] dexAMETHasone injection 6 mg    [DISCONTINUED] dextrose 10% bolus 125 mL 125 mL    [DISCONTINUED] dextrose 10% bolus 250 mL 250 mL    [DISCONTINUED] enoxaparin injection 30 mg    [DISCONTINUED]  glucagon (human recombinant) injection 1 mg    [DISCONTINUED] glucose chewable tablet 16 g    [DISCONTINUED] glucose chewable tablet 24 g    [DISCONTINUED] HYDROcodone-acetaminophen 5-325 mg per tablet 1 tablet    [DISCONTINUED] hydrocortisone-aloe vera 1 % cream    [DISCONTINUED] insulin aspart U-100 pen 0-5 Units    [DISCONTINUED] insulin detemir U-100 pen 15 Units    [DISCONTINUED] levoFLOXacin 750 mg/150 mL IVPB 750 mg    [DISCONTINUED] memantine tablet 10 mg    [DISCONTINUED] polyethylene glycol packet 17 g    [DISCONTINUED] remdesivir 100 mg in sodium chloride 0.9% 250 mL infusion    [DISCONTINUED] sars-cov-2 (covid-19 omicron) 50 mcg/0.5 mL injection 0.5 mL    [DISCONTINUED] tamsulosin 24 hr capsule 0.4 mg     Current Outpatient Medications on File Prior to Encounter   Medication Sig    albuterol (VENTOLIN HFA) 90 mcg/actuation inhaler Inhale 1-2 puffs into the lungs every 6 (six) hours as needed for Wheezing or Shortness of Breath. Rescue    aspirin 81 MG Chew Take 1 tablet (81 mg total) by mouth once daily.    atorvastatin (LIPITOR) 40 MG tablet Take 1 tablet (40 mg total) by mouth once daily.    clopidogreL (PLAVIX) 75 mg tablet Take 1 tablet (75 mg total) by mouth once daily.    ipratropium (ATROVENT) 42 mcg (0.06 %) nasal spray ipratropium bromide 42 mcg (0.06 %) nasal spray    meclizine (ANTIVERT) 12.5 mg tablet Take 2 tablets (25 mg total) by mouth 3 (three) times daily.    memantine (NAMENDA) 10 MG Tab Take 10 mg by mouth once daily.    metoprolol succinate (TOPROL-XL) 25 MG 24 hr tablet Take 1 tablet (25 mg total) by mouth every evening.    MYRBETRIQ 25 mg Tb24 ER tablet Take 25 mg by mouth once daily. 11/23/20    nitroGLYCERIN (NITROSTAT) 0.4 MG SL tablet Place 1 tablet (0.4 mg total) under the tongue every 5 (five) minutes as needed for Chest pain.    ondansetron (ZOFRAN-ODT) 8 MG TbDL Take 1 tablet (8 mg total) by mouth 3 (three) times daily as needed (for nausea).    pen  "needle, diabetic 32 gauge x 5" Ndle BD Adrienne 2nd Gen Pen Needle 32 gauge x 5/"   USE 1 PEN NEEDLE ONCE DAILY USE AS DIRECTED    SOLIQUA 100/33 100 unit-33 mcg/mL InPn pen INJECT 30 UNITS SUBCUTANEOUSLY ONCE DAILY    tamsulosin (FLOMAX) 0.4 mg Cap Take 0.4 mg by mouth once daily.    torsemide (DEMADEX) 10 MG Tab Take 1 tablet (10 mg total) by mouth once daily.    UNABLE TO FIND medication name: prevagen extra strenght daily     Family History       Problem Relation (Age of Onset)    Cancer Mother    Diabetes Brother    Heart disease Father, Brother          Tobacco Use    Smoking status: Former     Packs/day: 1.50     Years: 20.00     Pack years: 30.00     Types: Cigarettes     Start date:      Quit date:      Years since quittin.0    Smokeless tobacco: Never   Substance and Sexual Activity    Alcohol use: No    Drug use: No    Sexual activity: Not on file     Review of Systems   Constitutional:  Negative for chills and fever (yesterday).   HENT:  Negative for congestion and sore throat.    Respiratory:  Positive for cough. Negative for shortness of breath.    Cardiovascular:  Negative for chest pain.   Gastrointestinal:  Negative for abdominal pain, diarrhea, nausea and vomiting.   Genitourinary:  Negative for dysuria and hematuria.   Skin:  Negative for rash.   Neurological:  Positive for weakness. Negative for dizziness, syncope and light-headedness.        Left arm weakness getting better   Objective:     Vital Signs (Most Recent):  Temp: 97.8 °F (36.6 °C) (23)  Pulse: 63 (23)  Resp: 16 (23)  BP: (!) 111/57 (23)  SpO2: 95 % (23)   Vital Signs (24h Range):  Temp:  [96.6 °F (35.9 °C)-97.9 °F (36.6 °C)] 97.8 °F (36.6 °C)  Pulse:  [63-96] 63  Resp:  [12-21] 16  SpO2:  [95 %-98 %] 95 %  BP: (110-132)/(54-65) 111/57     Weight: 69.2 kg (152 lb 8.9 oz)  Body mass index is 23.89 kg/m².    Physical Exam  Vitals and nursing note reviewed. "   Constitutional:       General: He is not in acute distress.     Appearance: He is well-developed. He is ill-appearing and toxic-appearing.   HENT:      Head: Normocephalic and atraumatic.      Right Ear: External ear normal.      Left Ear: External ear normal.      Nose:      Comments: O2 in place  Eyes:      Conjunctiva/sclera: Conjunctivae normal.      Pupils: Pupils are equal, round, and reactive to light.   Neck:      Thyroid: No thyromegaly.   Cardiovascular:      Rate and Rhythm: Normal rate and regular rhythm.      Heart sounds: Normal heart sounds. No murmur heard.  Pulmonary:      Effort: Pulmonary effort is normal. No respiratory distress.      Breath sounds: Rales present. No wheezing.   Abdominal:      General: Bowel sounds are normal. There is no distension.      Palpations: Abdomen is soft.      Tenderness: There is no abdominal tenderness.   Musculoskeletal:         General: Normal range of motion.      Cervical back: Normal range of motion and neck supple.      Right lower leg: No edema.      Left lower leg: No edema.   Lymphadenopathy:      Cervical: No cervical adenopathy.   Skin:     General: Skin is warm and dry.      Findings: No rash.   Neurological:      General: No focal deficit present.      Mental Status: He is alert and oriented to person, place, and time.      Motor: Weakness present.      Comments: Left arm weakness much improved   Psychiatric:         Mood and Affect: Mood normal.         Behavior: Behavior normal.         CRANIAL NERVES     CN III, IV, VI   Pupils are equal, round, and reactive to light.     Significant Labs: All pertinent labs within the past 24 hours have been reviewed.  A1C:   Recent Labs   Lab 12/14/22  1552   HGBA1C 6.7*     ABGs: No results for input(s): PH, PCO2, HCO3, POCSATURATED, BE, TOTALHB, COHB, METHB, O2HB, POCFIO2, PO2 in the last 48 hours.  Bilirubin:   Recent Labs   Lab 12/27/22  0602 12/29/22  0554 12/31/22  0356 01/02/23  0621 01/03/23  0554    BILITOT 0.7 0.8 0.7 0.6 0.6     Blood Culture:   Recent Labs   Lab 01/02/23  1942   LABBLOO No Growth to date  No Growth to date     CBC:   Recent Labs   Lab 01/03/23  0554   WBC 2.78*   HGB 11.6*   HCT 35.8*        CMP:   Recent Labs   Lab 01/03/23  0554      K 4.4      CO2 25   *   BUN 39*   CREATININE 1.5*   CALCIUM 9.4   PROT 6.0   ALBUMIN 2.6*   BILITOT 0.6   ALKPHOS 70   AST 24   ALT 15   ANIONGAP 11     Cardiac Markers: No results for input(s): CKMB, MYOGLOBIN, BNP, TROPISTAT in the last 48 hours.  Lactic Acid:   Recent Labs   Lab 01/02/23  1941   LACTATE 0.9     Magnesium: No results for input(s): MG in the last 48 hours.  POCT Glucose:   Recent Labs   Lab 01/03/23  1922 01/04/23  0717 01/04/23  1113   POCTGLUCOSE 324* 160* 249*     Troponin: No results for input(s): TROPONINI, TROPONINIHS in the last 48 hours.  TSH:   Recent Labs   Lab 12/14/22  1009   TSH 4.401*     Urine Culture: No results for input(s): LABURIN in the last 48 hours.  Urine Studies: No results for input(s): COLORU, APPEARANCEUA, PHUR, SPECGRAV, PROTEINUA, GLUCUA, KETONESU, BILIRUBINUA, OCCULTUA, NITRITE, UROBILINOGEN, LEUKOCYTESUR, RBCUA, WBCUA, BACTERIA, SQUAMEPITHEL, HYALINECASTS in the last 48 hours.    Invalid input(s): WRIGHTSUR    Significant Imaging: I have reviewed and interpreted all pertinent imaging results/findings within the past 24 hours.  Consolidative opacity in the left lower lobe consistent with known lipoid pneumonia.  Left greater than right hazy airspace opacities, could be due to multifocal infection, aspiration or edema.         Assessment/Plan:     * COVID-19  Patient is identified as Severe COVID-19 based on hypoxemia with O2 saturations <94% on room air or on ambulation   Initiate standard COVID protocols; COVID-19 testing Collection Date: 1/1/2023 Collection Time:  12:34 PM ,Infection Control notification  and isolation- respiratory, contact and droplet per protocol    Diagnostics:  (leukopenia, hyponatremia, hyperferritinemia, elevated troponin, elevated d-dimer, age, and comorbidities are significant predictors of poor clinical outcome)  CBC, CMP, Ferritin, CRP and Portable CXR    Management: Inhaled bronchodilators as needed for shortness of breath.   Day 3 Remdesivir, dexamehtasone and levofloxacin   Afebrile, WBC low, CXR reviewed, procal normal ; all appears viral will stop levofloxacin   Repeat labs in am     Debility  Continue PT/OT post CVA       BPH (benign prostatic hyperplasia)  Continue flomax       Dementia  Continue namenda       Cerebrovascular accident (CVA) due to thrombosis of right middle cerebral artery  Admitting dx post tx at [facility] [place]- admitted here for debility skilled care intially  Continue asa, plavix statin  PT/OT       Follicular lymphoma  H/o follicular lymphoma per chart (grade 3a, stage 1); receiving rituxan (cycle 3 in 11/2022), 10/2022 PET scan positive for 3 cm lymphadenopathy in left axillary region otherwise HUSSEIN.        -Will need follow up visit once discharge (per last hem/onc note follow up indicated around 12/17/22, delayed due to current admission for stroke) .         Chronic diastolic heart failure, NYHA class 2  -TTE 12/15/2022 w/ EF 68%  -GDMT when appropriate (will restart slowly with metoprolol Saturday Dec 24th and then loop diuretic on Mon Dec 26th)   -Follow up with PCP / cardiologist in outpatient setting.      Chronic kidney disease, stage 3 (moderate)  Monitor BUN/SCr.  Monitor I/Os.  Monitor electrolytes.    Avoid non-steroidal anti-inflammatory medications.  Stable       Hypertension  Stroke risk factor.  SBP <180, MAP >65; remains at goal   Avoid hypotension in the setting of small vessel disease   Holding lopressor for now given hypotension during this admission in the setting of an acute stroke, will resume on Sat Dec 24th  Bp today 123/64-HR 84.     Torsemide has been on hold due to ^ Creat now stable  1/4 P 111/57- 131/64- HR 60s well  controlled     Hyperlipidemia  Continue statin       Hypothyroidism  Continue synthroid       Type 2 diabetes mellitus  Patient's FSGs are controlled on current medication regimen.  Last A1c reviewed-   Lab Results   Component Value Date    HGBA1C 6.7 (H) 12/14/2022     Most recent fingerstick glucose reviewed-   Recent Labs   Lab 01/03/23  1626 01/03/23  1922 01/04/23  0717 01/04/23  1113   POCTGLUCOSE 377* 324* 160* 249*     Current correctional scale  Low  Maintain anti-hyperglycemic dose as follows-   Antihyperglycemics (From admission, onward)    Start     Stop Route Frequency Ordered    01/03/23 2100  insulin detemir U-100 pen 15 Units         -- SubQ Nightly 01/03/23 1248    01/03/23 1248  insulin aspart U-100 pen 0-5 Units         -- SubQ Before meals & nightly PRN 01/03/23 1248        Hold Oral hypoglycemics while patient is in the hospital.      VTE Risk Mitigation (From admission, onward)         Ordered     enoxaparin injection 30 mg  Daily         01/03/23 1248                   Destin Jimenez MD  Department of Hospital Medicine   [place] [facility] (3rd Fl)

## 2023-01-04 NOTE — SUBJECTIVE & OBJECTIVE
Past Medical History:   Diagnosis Date    Abnormal barium swallow     Alzheimer's dementia, late onset     Anal stenosis     Anticoagulant long-term use     Aortic stenosis     Arthritis     Aspiration pneumonitis     Benign prostatic hyperplasia     Carotid artery disease     CHF (congestive heart failure)     Chronic diastolic heart failure     Chronic kidney disease (CKD), stage III (moderate)     CKD (chronic kidney disease)     Colon polyp     COPD (chronic obstructive pulmonary disease)     Coronary artery disease     Diabetes mellitus, type 2     Diverticulosis     Dysphagia     Hearing aid worn 01/23/2020    Received bilateral hearing aides today    Heart disease     History of CEA (carotid endarterectomy)     [place] (hard of hearing)     Hyperlipidemia     Hypertension     Hypertensive heart disease     Hypothyroidism     Joint disease     Memory loss     PAD (peripheral artery disease)     Presence of drug coated stent in right coronary artery     Pulmonary hypertension     S/P TAVR (transcatheter aortic valve replacement)     Small bowel obstruction     Wears dentures     UPPER AND LOWER    Wears glasses        Past Surgical History:   Procedure Laterality Date    anal fissure repair      ANKLE FUSION Left 08/15/2016    X 2    APPENDECTOMY      BACK SURGERY      X 4    CAROTID ENDARTERECTOMY Right     CAROTID ENDARTERECTOMY Left     CAROTID ENDARTERECTOMY Left 12/3/2021    Procedure: ENDARTERECTOMY-CAROTID;  Surgeon: Tim Castillo MD;  Location: [facility] OR;  Service: Cardiology;  Laterality: Left;  pt unsure if he stopped plavix, Dr. Castillo ok to proceed    CARPAL TUNNEL RELEASE Right     CHOLECYSTECTOMY      COLONOSCOPY N/A 7/26/2018    Procedure: HIGH RISK SCREENING COLONOSCOPY;  Surgeon: Connor Murrell MD;  Location: [facility] ENDO;  Service: Endoscopy;  Laterality: N/A;    CORONARY ANGIOPLASTY WITH STENT PLACEMENT      ELBOW SURGERY Bilateral     EXCISIONAL HEMORRHOIDECTOMY      x3     EXPLORATORY LAPAROTOMY W/ BOWEL RESECTION  11/17/2011    EYE SURGERY      cataract bilaterally    history of bowel resection      KNEE SURGERY Left     LAMINECTOMY AND MICRODISCECTOMY LUMBAR SPINE  07/21/2011    L5-S1    LEFT HEART CATHETERIZATION Left 8/31/2018    Procedure: Left heart cath;  Surgeon: Rex Chris MD;  Location: [facility] CATH;  Service: Cardiology;  Laterality: Left;    LUMBAR FUSION  11/09/2011    Anterior approach--L5-S1    LUNG BIOPSY Right 07/21/2009    VATS with wedge ofr right lower lobe    LUNG BIOPSY Left 3/16/2020    Procedure: BIOPSY, LUNG;  Surgeon: [facility] Diagnostic Provider;  Location: [facility] OR;  Service: General;  Laterality: Left;    PERCUTANEOUS TRANSCATHETER AORTIC VALVE REPLACEMENT (TAVR) Left 10/30/2018    Procedure: REPLACEMENT, AORTIC VALVE, PERCUTANEOUS, TRANSCATHETER;  Surgeon: eRx Chris MD;  Location: [facility] OR;  Service: Cardiology;  Laterality: Left;    PERCUTANEOUS TRANSCATHETER AORTIC VALVE REPLACEMENT (TAVR)  10/30/2018    Procedure: REPLACEMENT, AORTIC VALVE, PERCUTANEOUS, TRANSCATHETER;  Surgeon: Tim Castillo MD;  Location: [facility] OR;  Service: Cardiovascular;;    RIGHT HEART CATHETERIZATION Right 8/31/2018    Procedure: INSERTION, CATHETER, RIGHT HEART;  Surgeon: Rex Chris MD;  Location: [facility] CATH;  Service: Cardiology;  Laterality: Right;    ROTATOR CUFF REPAIR Left     SINUS SURGERY      SPINE SURGERY      back surgery    TENDON RELEASE Bilateral     TONSILLECTOMY         Review of patient's allergies indicates:   Allergen Reactions    Sulfur Itching    Penicillins Swelling and Rash       Current Facility-Administered Medications on File Prior to Encounter   Medication    [DISCONTINUED] 0.9%  NaCl infusion    [DISCONTINUED] acetaminophen tablet 650 mg    [DISCONTINUED] aspirin chewable tablet 81 mg    [DISCONTINUED] atorvastatin tablet 40 mg    [DISCONTINUED] clopidogreL tablet 75 mg    [DISCONTINUED] dexAMETHasone injection 6 mg     [DISCONTINUED] dextrose 10% bolus 125 mL 125 mL    [DISCONTINUED] dextrose 10% bolus 250 mL 250 mL    [DISCONTINUED] enoxaparin injection 30 mg    [DISCONTINUED] glucagon (human recombinant) injection 1 mg    [DISCONTINUED] glucose chewable tablet 16 g    [DISCONTINUED] glucose chewable tablet 24 g    [DISCONTINUED] HYDROcodone-acetaminophen 5-325 mg per tablet 1 tablet    [DISCONTINUED] hydrocortisone-aloe vera 1 % cream    [DISCONTINUED] insulin aspart U-100 pen 0-5 Units    [DISCONTINUED] insulin detemir U-100 pen 15 Units    [DISCONTINUED] levoFLOXacin 750 mg/150 mL IVPB 750 mg    [DISCONTINUED] memantine tablet 10 mg    [DISCONTINUED] polyethylene glycol packet 17 g    [DISCONTINUED] remdesivir 100 mg in sodium chloride 0.9% 250 mL infusion    [DISCONTINUED] sars-cov-2 (covid-19 omicron) 50 mcg/0.5 mL injection 0.5 mL    [DISCONTINUED] tamsulosin 24 hr capsule 0.4 mg     Current Outpatient Medications on File Prior to Encounter   Medication Sig    albuterol (VENTOLIN HFA) 90 mcg/actuation inhaler Inhale 1-2 puffs into the lungs every 6 (six) hours as needed for Wheezing or Shortness of Breath. Rescue    aspirin 81 MG Chew Take 1 tablet (81 mg total) by mouth once daily.    atorvastatin (LIPITOR) 40 MG tablet Take 1 tablet (40 mg total) by mouth once daily.    clopidogreL (PLAVIX) 75 mg tablet Take 1 tablet (75 mg total) by mouth once daily.    ipratropium (ATROVENT) 42 mcg (0.06 %) nasal spray ipratropium bromide 42 mcg (0.06 %) nasal spray    meclizine (ANTIVERT) 12.5 mg tablet Take 2 tablets (25 mg total) by mouth 3 (three) times daily.    memantine (NAMENDA) 10 MG Tab Take 10 mg by mouth once daily.    metoprolol succinate (TOPROL-XL) 25 MG 24 hr tablet Take 1 tablet (25 mg total) by mouth every evening.    MYRBETRIQ 25 mg Tb24 ER tablet Take 25 mg by mouth once daily. 11/23/20    nitroGLYCERIN (NITROSTAT) 0.4 MG SL tablet Place 1 tablet (0.4 mg total) under the tongue every 5 (five) minutes as needed for  "Chest pain.    ondansetron (ZOFRAN-ODT) 8 MG TbDL Take 1 tablet (8 mg total) by mouth 3 (three) times daily as needed (for nausea).    pen needle, diabetic 32 gauge x 5/32" Ndle BD Adrienne 2nd Gen Pen Needle 32 gauge x 5/32"   USE 1 PEN NEEDLE ONCE DAILY USE AS DIRECTED    SOLIQUA 100/33 100 unit-33 mcg/mL InPn pen INJECT 30 UNITS SUBCUTANEOUSLY ONCE DAILY    tamsulosin (FLOMAX) 0.4 mg Cap Take 0.4 mg by mouth once daily.    torsemide (DEMADEX) 10 MG Tab Take 1 tablet (10 mg total) by mouth once daily.    UNABLE TO FIND medication name: prevagen extra strenght daily     Family History       Problem Relation (Age of Onset)    Cancer Mother    Diabetes Brother    Heart disease Father, Brother          Tobacco Use    Smoking status: Former     Packs/day: 1.50     Years: 20.00     Pack years: 30.00     Types: Cigarettes     Start date:      Quit date:      Years since quittin.0    Smokeless tobacco: Never   Substance and Sexual Activity    Alcohol use: No    Drug use: No    Sexual activity: Not on file     Review of Systems   Constitutional:  Negative for chills and fever (yesterday).   HENT:  Negative for congestion and sore throat.    Respiratory:  Positive for cough. Negative for shortness of breath.    Cardiovascular:  Negative for chest pain.   Gastrointestinal:  Negative for abdominal pain, diarrhea, nausea and vomiting.   Genitourinary:  Negative for dysuria and hematuria.   Skin:  Negative for rash.   Neurological:  Positive for weakness. Negative for dizziness, syncope and light-headedness.        Left arm weakness getting better   Objective:     Vital Signs (Most Recent):  Temp: 97.8 °F (36.6 °C) (23)  Pulse: 63 (23)  Resp: 16 (23)  BP: (!) 111/57 (23)  SpO2: 95 % (23)   Vital Signs (24h Range):  Temp:  [96.6 °F (35.9 °C)-97.9 °F (36.6 °C)] 97.8 °F (36.6 °C)  Pulse:  [63-96] 63  Resp:  [12-21] 16  SpO2:  [95 %-98 %] 95 %  BP: (110-132)/(54-65) " 111/57     Weight: 69.2 kg (152 lb 8.9 oz)  Body mass index is 23.89 kg/m².    Physical Exam  Vitals and nursing note reviewed.   Constitutional:       General: He is not in acute distress.     Appearance: He is well-developed. He is ill-appearing and toxic-appearing.   HENT:      Head: Normocephalic and atraumatic.      Right Ear: External ear normal.      Left Ear: External ear normal.      Nose:      Comments: O2 in place  Eyes:      Conjunctiva/sclera: Conjunctivae normal.      Pupils: Pupils are equal, round, and reactive to light.   Neck:      Thyroid: No thyromegaly.   Cardiovascular:      Rate and Rhythm: Normal rate and regular rhythm.      Heart sounds: Normal heart sounds. No murmur heard.  Pulmonary:      Effort: Pulmonary effort is normal. No respiratory distress.      Breath sounds: Rales present. No wheezing.   Abdominal:      General: Bowel sounds are normal. There is no distension.      Palpations: Abdomen is soft.      Tenderness: There is no abdominal tenderness.   Musculoskeletal:         General: Normal range of motion.      Cervical back: Normal range of motion and neck supple.      Right lower leg: No edema.      Left lower leg: No edema.   Lymphadenopathy:      Cervical: No cervical adenopathy.   Skin:     General: Skin is warm and dry.      Findings: No rash.   Neurological:      General: No focal deficit present.      Mental Status: He is alert and oriented to person, place, and time.      Motor: Weakness present.      Comments: Left arm weakness much improved   Psychiatric:         Mood and Affect: Mood normal.         Behavior: Behavior normal.         CRANIAL NERVES     CN III, IV, VI   Pupils are equal, round, and reactive to light.     Significant Labs: All pertinent labs within the past 24 hours have been reviewed.  A1C:   Recent Labs   Lab 12/14/22  1552   HGBA1C 6.7*     ABGs: No results for input(s): PH, PCO2, HCO3, POCSATURATED, BE, TOTALHB, COHB, METHB, O2HB, POCFIO2, PO2 in the  last 48 hours.  Bilirubin:   Recent Labs   Lab 12/27/22  0602 12/29/22  0554 12/31/22  0356 01/02/23  0621 01/03/23  0554   BILITOT 0.7 0.8 0.7 0.6 0.6     Blood Culture:   Recent Labs   Lab 01/02/23 1942   LABBLOO No Growth to date  No Growth to date     CBC:   Recent Labs   Lab 01/03/23  0554   WBC 2.78*   HGB 11.6*   HCT 35.8*        CMP:   Recent Labs   Lab 01/03/23  0554      K 4.4      CO2 25   *   BUN 39*   CREATININE 1.5*   CALCIUM 9.4   PROT 6.0   ALBUMIN 2.6*   BILITOT 0.6   ALKPHOS 70   AST 24   ALT 15   ANIONGAP 11     Cardiac Markers: No results for input(s): CKMB, MYOGLOBIN, BNP, TROPISTAT in the last 48 hours.  Lactic Acid:   Recent Labs   Lab 01/02/23 1941   LACTATE 0.9     Magnesium: No results for input(s): MG in the last 48 hours.  POCT Glucose:   Recent Labs   Lab 01/03/23  1922 01/04/23  0717 01/04/23  1113   POCTGLUCOSE 324* 160* 249*     Troponin: No results for input(s): TROPONINI, TROPONINIHS in the last 48 hours.  TSH:   Recent Labs   Lab 12/14/22  1009   TSH 4.401*     Urine Culture: No results for input(s): LABURIN in the last 48 hours.  Urine Studies: No results for input(s): COLORU, APPEARANCEUA, PHUR, SPECGRAV, PROTEINUA, GLUCUA, KETONESU, BILIRUBINUA, OCCULTUA, NITRITE, UROBILINOGEN, LEUKOCYTESUR, RBCUA, WBCUA, BACTERIA, SQUAMEPITHEL, HYALINECASTS in the last 48 hours.    Invalid input(s): WRIGHTSUR    Significant Imaging: I have reviewed and interpreted all pertinent imaging results/findings within the past 24 hours.  Consolidative opacity in the left lower lobe consistent with known lipoid pneumonia.  Left greater than right hazy airspace opacities, could be due to multifocal infection, aspiration or edema.

## 2023-01-04 NOTE — PT/OT/SLP EVAL
"Physical Therapy Evaluation    Patient Name:  Vega Kohler   MRN:  [id number]    Recommendations:     Discharge Recommendations: nursing facility, skilled, home with home health, home health PT, home health OT   Discharge Equipment Recommendations: bath bench   Barriers to discharge: Decreased caregiver support    Assessment:     Vega Kohler is a 87 y.o. male admitted with a medical diagnosis of <principal problem not specified>.  He presents with the following impairments/functional limitations: weakness, impaired endurance, gait instability, impaired self care skills, impaired functional mobility, impaired balance, decreased upper extremity function, decreased lower extremity function, decreased safety awareness, impaired cardiopulmonary response to activity . Patient will benefit from skilled PT tx to inc safety and inc functional mobility and to return to previous level of functions.    Rehab Prognosis: Fair; patient would benefit from acute skilled PT services to address these deficits and reach maximum level of function.    Recent Surgery: * No surgery found *      Plan:     During this hospitalization, patient to be seen 5 x/week to address the identified rehab impairments via gait training, therapeutic activities, therapeutic exercises and progress toward the following goals:    Plan of Care Expires:       Subjective     Chief Complaint: weakness and gets tired  Patient/Family Comments/goals: "To get strength and get better".Pateint  Pain/Comfort:  Pain Rating 1: 0/10    Patients cultural, spiritual, Hindu conflicts given the current situation: no    Living Environment:  Living Environment: Mobile home with 4STE and handrails. Son lives near by.  Prior to admission, patients level of function was  MOD I with AD listed below..  Equipment used at home: cane, straight, walker, rolling, wheelchair, oxygen, bedside commode.  DME owned (not currently used): none.  Upon discharge, patient will have " assistance from son.    Objective:     Communicated with patient prior to session.  Patient found HOB elevated with bed alarm, telemetry, peripheral IV  upon PT entry to room.    General Precautions: Standard, airborne, contact, droplet, fall  Orthopedic Precautions:N/A   Braces: N/A  Respiratory Status: Nasal cannula, flow 2 L/min    Exams:  Cognitive Exam:  Patient is oriented to Person, Place, and Time  Fine Motor Coordination:    -       Intact  Gross Motor Coordination:  WFL  Postural Exam:  Patient presented with the following abnormalities:    -       Rounded shoulders  -       Forward head  Skin Integrity/Edema:      -       Skin integrity: Visible skin intact  RLE ROM: WFL  RLE Strength: WFL  LLE ROM: WFL  LLE Strength: WFL    Functional Mobility:  Bed Mobility:     Rolling Left:  contact guard assistance  Rolling Right: contact guard assistance  Scooting: minimum assistance  Supine to Sit: minimum assistance  Sit to Supine: minimum assistance  Transfers:     Sit to Stand:  moderate assistance with rolling walker  Bed to Chair: minimum assistance with  rolling walker  using  Step Transfer  Gait: Minimal Assistance x ~25 feet with RW and O2 supplement . Fatigues easily      AM-PAC 6 CLICK MOBILITY  Total Score:13       Treatment & Education:  PT eval. Reviewed and performed B LE mgt/ strneghtening ex  on bed and at sitting; Rolling to sides ex x 5 on each side; Transfer trng; Gait trng x ~25 feet with RW Fernando    Patient left up in chair with all lines intact, call button in reach, nursing  notified, and avasys camera present.    GOALS:   Multidisciplinary Problems       Physical Therapy Goals          Problem: Physical Therapy    Goal Priority Disciplines Outcome Goal Variances Interventions   Physical Therapy Goal     PT, PT/OT      Description:   Patient will increase functional independence with mobility by performin. Supine <> sit with Modified Independent  2. Sit <> Stand  with Supervision or  Set-up Assistance with RW  3. Bed to chair transfer with Supervision or Set-up Assistancewith or without rolling walker using Step Transfer TECHNIQUE  4. Gait  x ~100  feet with Standby Assistance with or without rolling walker  5. Lower extremity exercise program x10 reps per handout, with assistance as needed                          History:     Past Medical History:   Diagnosis Date    Abnormal barium swallow     Alzheimer's dementia, late onset     Anal stenosis     Anticoagulant long-term use     Aortic stenosis     Arthritis     Aspiration pneumonitis     Benign prostatic hyperplasia     Carotid artery disease     CHF (congestive heart failure)     Chronic diastolic heart failure     Chronic kidney disease (CKD), stage III (moderate)     CKD (chronic kidney disease)     Colon polyp     COPD (chronic obstructive pulmonary disease)     Coronary artery disease     Diabetes mellitus, type 2     Diverticulosis     Dysphagia     Hearing aid worn 01/23/2020    Received bilateral hearing aides today    Heart disease     History of CEA (carotid endarterectomy)     [place] (hard of hearing)     Hyperlipidemia     Hypertension     Hypertensive heart disease     Hypothyroidism     Joint disease     Memory loss     PAD (peripheral artery disease)     Presence of drug coated stent in right coronary artery     Pulmonary hypertension     S/P TAVR (transcatheter aortic valve replacement)     Small bowel obstruction     Wears dentures     UPPER AND LOWER    Wears glasses        Past Surgical History:   Procedure Laterality Date    anal fissure repair      ANKLE FUSION Left 08/15/2016    X 2    APPENDECTOMY      BACK SURGERY      X 4    CAROTID ENDARTERECTOMY Right     CAROTID ENDARTERECTOMY Left     CAROTID ENDARTERECTOMY Left 12/3/2021    Procedure: ENDARTERECTOMY-CAROTID;  Surgeon: Tim Castillo MD;  Location: [facility];  Service: Cardiology;  Laterality: Left;  pt unsure if he stopped plavix, Dr. Castillo ok to proceed     CARPAL TUNNEL RELEASE Right     CHOLECYSTECTOMY      COLONOSCOPY N/A 7/26/2018    Procedure: HIGH RISK SCREENING COLONOSCOPY;  Surgeon: Connor Murrell MD;  Location: [facility] ENDO;  Service: Endoscopy;  Laterality: N/A;    CORONARY ANGIOPLASTY WITH STENT PLACEMENT      ELBOW SURGERY Bilateral     EXCISIONAL HEMORRHOIDECTOMY      x3    EXPLORATORY LAPAROTOMY W/ BOWEL RESECTION  11/17/2011    EYE SURGERY      cataract bilaterally    history of bowel resection      KNEE SURGERY Left     LAMINECTOMY AND MICRODISCECTOMY LUMBAR SPINE  07/21/2011    L5-S1    LEFT HEART CATHETERIZATION Left 8/31/2018    Procedure: Left heart cath;  Surgeon: Rex Chris MD;  Location: [facility] CATH;  Service: Cardiology;  Laterality: Left;    LUMBAR FUSION  11/09/2011    Anterior approach--L5-S1    LUNG BIOPSY Right 07/21/2009    VATS with wedge ofr right lower lobe    LUNG BIOPSY Left 3/16/2020    Procedure: BIOPSY, LUNG;  Surgeon: [facility] Diagnostic Provider;  Location: [facility] OR;  Service: General;  Laterality: Left;    PERCUTANEOUS TRANSCATHETER AORTIC VALVE REPLACEMENT (TAVR) Left 10/30/2018    Procedure: REPLACEMENT, AORTIC VALVE, PERCUTANEOUS, TRANSCATHETER;  Surgeon: Rex Chris MD;  Location: [facility] OR;  Service: Cardiology;  Laterality: Left;    PERCUTANEOUS TRANSCATHETER AORTIC VALVE REPLACEMENT (TAVR)  10/30/2018    Procedure: REPLACEMENT, AORTIC VALVE, PERCUTANEOUS, TRANSCATHETER;  Surgeon: Tim Castillo MD;  Location: [facility] OR;  Service: Cardiovascular;;    RIGHT HEART CATHETERIZATION Right 8/31/2018    Procedure: INSERTION, CATHETER, RIGHT HEART;  Surgeon: Rex Chris MD;  Location: [facility] CATH;  Service: Cardiology;  Laterality: Right;    ROTATOR CUFF REPAIR Left     SINUS SURGERY      SPINE SURGERY      back surgery    TENDON RELEASE Bilateral     TONSILLECTOMY         Time Tracking:     PT Received On: 01/04/23  PT Start Time: 0853     PT Stop Time: 0923  PT Total Time (min): 30 min     Billable  Minutes: Evaluation 15 and Therapeutic Activity 15      01/04/2023

## 2023-01-04 NOTE — PT/OT/SLP DISCHARGE
Physical Therapy Discharge Summary    Name: Vega Kohler  MRN: [id number]   Principal Problem: <principal problem not specified>     Patient Discharged from acute Physical Therapy on 1/3/23.  Please refer to prior PT noted date on 1/3/23 for functional status.     Assessment:     Patient transferred to lower level of care secondary to new Dx of Covid and patient has declined in functions.    Objective:     GOALS:   Multidisciplinary Problems       Physical Therapy Goals       Not on file                    Reasons for Discontinuation of Therapy Services  Transfer to alternate level of care.      Plan:     Patient Discharged to:  Acute bed  .      1/4/2023

## 2023-01-04 NOTE — PLAN OF CARE
CM rounded with MD today.  Patient will remain for continued treatment today. He is currently day 3 of Remdesivir.   Plan for patient is to transfer to nursing home to continue skilled care, per son.    No needs or concerns voiced at this time. CM will continue to follow and assist as needed.

## 2023-01-04 NOTE — ASSESSMENT & PLAN NOTE
Monitor BUN/SCr.  Monitor I/Os.  Monitor electrolytes.    Avoid non-steroidal anti-inflammatory medications.  Stable

## 2023-01-05 NOTE — PLAN OF CARE
01/05/23 1347   Post-Acute Status   Post-Acute Authorization Placement   Post-Acute Placement Status Referrals Sent   Discharge Delays None known at this time         SW contacted patient's son, Zay, to discuss discharge planning. He is requesting patient be placed in a SNF facility for continued PT and OT. Zay is concerned that this may be a long-term placement for patient but ultimate goal would be for patient to return home. Discussed potential difficulty in finding placement with patient's positive COVID test. Son stated understanding. Top two choices for SNF to include [facility] and [facility]. Referrals sent through [facility]. Awaiting response. SW also sent patient referral to [facility] and [facility]. [facility] is listed as 'able to accept COVID patients' in [facility] and [facility] does not specify. Awaiting responses.

## 2023-01-05 NOTE — PROGRESS NOTES
[facility] ([facility])  [facility] Medicine  Progress Note    Patient Name: Vega Kohler  MRN: [id number]  Patient Class: IP- Inpatient   Admission Date: 1/3/2023  Length of Stay: 2 days  Attending Physician: Destin Jimenez MD  Primary Care Provider: Fabricio Mckeon MD        Subjective:     Principal Problem:COVID-19        HPI:    HPI:   86yo male patient with extensive medical hx. (Alzheimers, aortic stenosis, arthritis, BPH, CHF, CKD, COPD, CAD/PAD, DM2, HLD/HTN, pulm HTN, S/p TAVR and hypothyroid). He was treated acutely [facility] for ischemic CVA with left hemiplegia s/p TNK 22. Pt was independent at baseline prior to this event. Exam improved following TNK. MRI with R MCA infarct. Etiology likely . Patient stepped down to NPU to await SNF placement. Metoprolol originally started but held given etiology of stroke and risk of expansion with hypoperfusion. Will plan to resume meds this Saturday and then remainder of home BP meds on Monday. Patient was discharged on DAPT with outpatient follow up. Patient remains neurologically unchanged. SBP drop < 120 but no changes on exam. Last BM yesterday. He was brought to [facility] yesterday for continued SKILL therapy with PT / OT. On plavix and statin and asa. VSS- no bp meds 123/64         22 pt tested positive for COVID; staff noting patient more sluggish & Requiring oxygen ; CXR with infiltrates   BC negative, influenza negative  COVID POSITIVE-d/c to acute care    22 day 3 Remdesivir, dexamehtasone and levofloxacin   Afebrile, WBC low, CXR reviewed, procal normal ; all appears viral will stop levofloxacin              Overview/Hospital Course:  23 Vega Kohler is a 87 y.o. male  brought to [facility]  for continued SKILL therapy with PT / OT post CVA . On plavix and statin and asa. VSS- no bp meds w stable BP.   On  noted to require incresed oxygen and more sluggish , testing positive for COVID 19; discharged back to acute for tx;  Day 4  Remdesivir, dexamethasone   O2 sat % on 2LNC, afebrile, WBC 2.78>3.84 , LFTs stable   Creat 1.5.>1.3    Gait: Minimal Assistance x ~25 feet with RW and O2 supplement . Fatigues easily      Past Medical History:   Diagnosis Date    Abnormal barium swallow     Alzheimer's dementia, late onset     Anal stenosis     Anticoagulant long-term use     Aortic stenosis     Arthritis     Aspiration pneumonitis     Benign prostatic hyperplasia     Carotid artery disease     CHF (congestive heart failure)     Chronic diastolic heart failure     Chronic kidney disease (CKD), stage III (moderate)     CKD (chronic kidney disease)     Colon polyp     COPD (chronic obstructive pulmonary disease)     Coronary artery disease     Diabetes mellitus, type 2     Diverticulosis     Dysphagia     Hearing aid worn 01/23/2020    Received bilateral hearing aides today    Heart disease     History of CEA (carotid endarterectomy)     [place] (hard of hearing)     Hyperlipidemia     Hypertension     Hypertensive heart disease     Hypothyroidism     Joint disease     Memory loss     PAD (peripheral artery disease)     Presence of drug coated stent in right coronary artery     Pulmonary hypertension     S/P TAVR (transcatheter aortic valve replacement)     Small bowel obstruction     Wears dentures     UPPER AND LOWER    Wears glasses        Past Surgical History:   Procedure Laterality Date    anal fissure repair      ANKLE FUSION Left 08/15/2016    X 2    APPENDECTOMY      BACK SURGERY      X 4    CAROTID ENDARTERECTOMY Right     CAROTID ENDARTERECTOMY Left     CAROTID ENDARTERECTOMY Left 12/3/2021    Procedure: ENDARTERECTOMY-CAROTID;  Surgeon: Tim Castillo MD;  Location: [facility] OR;  Service: Cardiology;  Laterality: Left;  pt unsure if he stopped plavix, Dr. Castillo ok to proceed    CARPAL TUNNEL RELEASE Right     CHOLECYSTECTOMY      COLONOSCOPY N/A 7/26/2018    Procedure: HIGH RISK  SCREENING COLONOSCOPY;  Surgeon: Connor Murrell MD;  Location: [facility] ENDO;  Service: Endoscopy;  Laterality: N/A;    CORONARY ANGIOPLASTY WITH STENT PLACEMENT      ELBOW SURGERY Bilateral     EXCISIONAL HEMORRHOIDECTOMY      x3    EXPLORATORY LAPAROTOMY W/ BOWEL RESECTION  11/17/2011    EYE SURGERY      cataract bilaterally    history of bowel resection      KNEE SURGERY Left     LAMINECTOMY AND MICRODISCECTOMY LUMBAR SPINE  07/21/2011    L5-S1    LEFT HEART CATHETERIZATION Left 8/31/2018    Procedure: Left heart cath;  Surgeon: Rex Chris MD;  Location: [facility] CATH;  Service: Cardiology;  Laterality: Left;    LUMBAR FUSION  11/09/2011    Anterior approach--L5-S1    LUNG BIOPSY Right 07/21/2009    VATS with wedge ofr right lower lobe    LUNG BIOPSY Left 3/16/2020    Procedure: BIOPSY, LUNG;  Surgeon: [facility] Diagnostic Provider;  Location: [facility] OR;  Service: General;  Laterality: Left;    PERCUTANEOUS TRANSCATHETER AORTIC VALVE REPLACEMENT (TAVR) Left 10/30/2018    Procedure: REPLACEMENT, AORTIC VALVE, PERCUTANEOUS, TRANSCATHETER;  Surgeon: Rex Chris MD;  Location: [facility] OR;  Service: Cardiology;  Laterality: Left;    PERCUTANEOUS TRANSCATHETER AORTIC VALVE REPLACEMENT (TAVR)  10/30/2018    Procedure: REPLACEMENT, AORTIC VALVE, PERCUTANEOUS, TRANSCATHETER;  Surgeon: Tim Castillo MD;  Location: [facility] OR;  Service: Cardiovascular;;    RIGHT HEART CATHETERIZATION Right 8/31/2018    Procedure: INSERTION, CATHETER, RIGHT HEART;  Surgeon: Rex Chris MD;  Location: [facility] CATH;  Service: Cardiology;  Laterality: Right;    ROTATOR CUFF REPAIR Left     SINUS SURGERY      SPINE SURGERY      back surgery    TENDON RELEASE Bilateral     TONSILLECTOMY         Review of patient's allergies indicates:   Allergen Reactions    Sulfur Itching    Penicillins Swelling and Rash       No current facility-administered medications on file prior to encounter.     Current Outpatient  "Medications on File Prior to Encounter   Medication Sig    albuterol (VENTOLIN HFA) 90 mcg/actuation inhaler Inhale 1-2 puffs into the lungs every 6 (six) hours as needed for Wheezing or Shortness of Breath. Rescue    aspirin 81 MG Chew Take 1 tablet (81 mg total) by mouth once daily.    atorvastatin (LIPITOR) 40 MG tablet Take 1 tablet (40 mg total) by mouth once daily.    clopidogreL (PLAVIX) 75 mg tablet Take 1 tablet (75 mg total) by mouth once daily.    ipratropium (ATROVENT) 42 mcg (0.06 %) nasal spray ipratropium bromide 42 mcg (0.06 %) nasal spray    meclizine (ANTIVERT) 12.5 mg tablet Take 2 tablets (25 mg total) by mouth 3 (three) times daily.    memantine (NAMENDA) 10 MG Tab Take 10 mg by mouth once daily.    metoprolol succinate (TOPROL-XL) 25 MG 24 hr tablet Take 1 tablet (25 mg total) by mouth every evening.    MYRBETRIQ 25 mg Tb24 ER tablet Take 25 mg by mouth once daily. 11/23/20    nitroGLYCERIN (NITROSTAT) 0.4 MG SL tablet Place 1 tablet (0.4 mg total) under the tongue every 5 (five) minutes as needed for Chest pain.    ondansetron (ZOFRAN-ODT) 8 MG TbDL Take 1 tablet (8 mg total) by mouth 3 (three) times daily as needed (for nausea).    pen needle, diabetic 32 gauge x 5/32" Ndle BD Adrienne 2nd Gen Pen Needle 32 gauge x 5/32"   USE 1 PEN NEEDLE ONCE DAILY USE AS DIRECTED    SOLIQUA 100/33 100 unit-33 mcg/mL InPn pen INJECT 30 UNITS SUBCUTANEOUSLY ONCE DAILY    tamsulosin (FLOMAX) 0.4 mg Cap Take 0.4 mg by mouth once daily.    torsemide (DEMADEX) 10 MG Tab Take 1 tablet (10 mg total) by mouth once daily.    UNABLE TO FIND medication name: prevagen extra strenght daily     Family History       Problem Relation (Age of Onset)    Cancer Mother    Diabetes Brother    Heart disease Father, Brother          Tobacco Use    Smoking status: Former     Packs/day: 1.50     Years: 20.00     Pack years: 30.00     Types: Cigarettes     Start date: 1952     Quit date: 1998     Years since quitting: " 25.0    Smokeless tobacco: Never   Substance and Sexual Activity    Alcohol use: No    Drug use: No    Sexual activity: Not on file     Review of Systems   Constitutional:  Negative for chills and fever.   HENT:  Negative for congestion and sore throat.    Respiratory:  Positive for cough and shortness of breath (with exertion).    Cardiovascular:  Negative for chest pain.   Gastrointestinal:  Negative for abdominal pain, diarrhea, nausea and vomiting.   Genitourinary:  Negative for dysuria and hematuria.   Skin:  Negative for rash.   Neurological:  Positive for weakness. Negative for dizziness, syncope and light-headedness.        Left arm weakness getting better   Objective:     Vital Signs (Most Recent):  Temp: 97.9 °F (36.6 °C) (01/05/23 0325)  Pulse: 73 (01/05/23 0325)  Resp: 20 (01/05/23 0325)  BP: 129/68 (01/05/23 0325)  SpO2: 97 % (01/05/23 0325)   Vital Signs (24h Range):  Temp:  [97.5 °F (36.4 °C)-97.9 °F (36.6 °C)] 97.9 °F (36.6 °C)  Pulse:  [67-77] 73  Resp:  [16-20] 20  SpO2:  [97 %-100 %] 97 %  BP: (129-177)/(64-77) 129/68     Weight: 67.5 kg (148 lb 13 oz)  Body mass index is 23.31 kg/m².    Physical Exam  Vitals and nursing note reviewed.   Constitutional:       General: He is not in acute distress.     Appearance: He is well-developed. He is not toxic-appearing.   HENT:      Head: Normocephalic and atraumatic.      Right Ear: External ear normal.      Left Ear: External ear normal.      Nose:      Comments: O2 in place  Eyes:      Conjunctiva/sclera: Conjunctivae normal.      Pupils: Pupils are equal, round, and reactive to light.   Neck:      Thyroid: No thyromegaly.   Cardiovascular:      Rate and Rhythm: Normal rate and regular rhythm.      Heart sounds: Normal heart sounds. No murmur heard.  Pulmonary:      Effort: Pulmonary effort is normal. No respiratory distress.      Breath sounds: Rales (bibasilar) present. No wheezing.   Abdominal:      General: Bowel sounds are normal. There is no  distension.      Palpations: Abdomen is soft.      Tenderness: There is no abdominal tenderness.   Musculoskeletal:         General: Normal range of motion.      Cervical back: Normal range of motion and neck supple.      Right lower leg: No edema.      Left lower leg: No edema.   Lymphadenopathy:      Cervical: No cervical adenopathy.   Skin:     General: Skin is warm and dry.      Findings: No rash.   Neurological:      General: No focal deficit present.      Mental Status: He is alert and oriented to person, place, and time.      Motor: Weakness present.      Comments: Left arm weakness much improved   Psychiatric:         Mood and Affect: Mood normal.         Behavior: Behavior normal.         CRANIAL NERVES     CN III, IV, VI   Pupils are equal, round, and reactive to light.     Significant Labs: All pertinent labs within the past 24 hours have been reviewed.  A1C:   Recent Labs   Lab 12/14/22  1552   HGBA1C 6.7*       ABGs: No results for input(s): PH, PCO2, HCO3, POCSATURATED, BE, TOTALHB, COHB, METHB, O2HB, POCFIO2, PO2 in the last 48 hours.  Bilirubin:   Recent Labs   Lab 12/29/22  0554 12/31/22  0356 01/02/23  0621 01/03/23  0554 01/05/23  0604   BILITOT 0.8 0.7 0.6 0.6 0.4       Blood Culture:   No results for input(s): LABBLOO in the last 48 hours.    CBC:   Recent Labs   Lab 01/05/23  0604   WBC 3.84*   HGB 11.4*   HCT 33.9*          CMP:   Recent Labs   Lab 01/05/23  0604      K 4.3      CO2 27   *   BUN 40*   CREATININE 1.3   CALCIUM 9.1   PROT 5.4*   ALBUMIN 2.5*   BILITOT 0.4   ALKPHOS 71   AST 19   ALT 17   ANIONGAP 8       Cardiac Markers: No results for input(s): CKMB, MYOGLOBIN, BNP, TROPISTAT in the last 48 hours.  Lactic Acid:   No results for input(s): LACTATE in the last 48 hours.    Magnesium: No results for input(s): MG in the last 48 hours.  POCT Glucose:   Recent Labs   Lab 01/04/23  1623 01/04/23  1957 01/05/23  0712   POCTGLUCOSE 366* 364* 227*       Troponin:  No results for input(s): TROPONINI, TROPONINIHS in the last 48 hours.  TSH:   Recent Labs   Lab 12/14/22  1009   TSH 4.401*       Urine Culture: No results for input(s): LABURIN in the last 48 hours.  Urine Studies: No results for input(s): COLORU, APPEARANCEUA, PHUR, SPECGRAV, PROTEINUA, GLUCUA, KETONESU, BILIRUBINUA, OCCULTUA, NITRITE, UROBILINOGEN, LEUKOCYTESUR, RBCUA, WBCUA, BACTERIA, SQUAMEPITHEL, HYALINECASTS in the last 48 hours.    Invalid input(s): WRIGHTSUR    Significant Imaging: I have reviewed and interpreted all pertinent imaging results/findings within the past 24 hours.  Consolidative opacity in the left lower lobe consistent with known lipoid pneumonia.  Left greater than right hazy airspace opacities, could be due to multifocal infection, aspiration or edema.           Assessment/Plan:      * COVID-19  Patient is identified as Severe COVID-19 based on hypoxemia with O2 saturations <94% on room air or on ambulation   Initiate standard COVID protocols; COVID-19 testing Collection Date: 1/1/2023 Collection Time:  12:34 PM ,Infection Control notification  and isolation- respiratory, contact and droplet per protocol    Diagnostics: (leukopenia, hyponatremia, hyperferritinemia, elevated troponin, elevated d-dimer, age, and comorbidities are significant predictors of poor clinical outcome)  CBC, CMP, Ferritin, CRP and Portable CXR    Management: Inhaled bronchodilators as needed for shortness of breath.   Day 4 Remdesivir, dexamehtasone and levofloxacin   Afebrile, WBC low, CXR reviewed, procal normal ; all appears viral  Repeat labs in am     Debility  Continue PT/OT post CVA   1/5/23 Gait: Minimal Assistance x ~25 feet with RW and O2 supplement . Fatigues easily      BPH (benign prostatic hyperplasia)  Continue flomax       Dementia  Continue namenda       Cerebrovascular accident (CVA) due to thrombosis of right middle cerebral artery  Admitting dx post tx at [facility] [place]- admitted here for debility skilled  care intially  Continue asa, plavix statin  PT/OT       Follicular lymphoma  H/o follicular lymphoma per chart (grade 3a, stage 1); receiving rituxan (cycle 3 in 11/2022), 10/2022 PET scan positive for 3 cm lymphadenopathy in left axillary region otherwise HUSSEIN.        -Will need follow up visit once discharge (per last hem/onc note follow up indicated around 12/17/22, delayed due to current admission for stroke) .         Chronic diastolic heart failure, NYHA class 2  -TTE 12/15/2022 w/ EF 68%  -GDMT when appropriate (will restart slowly with metoprolol Saturday Dec 24th and then loop diuretic on Mon Dec 26th)   -Follow up with PCP / cardiologist in outpatient setting.      Chronic kidney disease, stage 3 (moderate)  Monitor BUN/SCr.  Monitor I/Os.  Monitor electrolytes.    Avoid non-steroidal anti-inflammatory medications.  Stable       Hypertension  Stroke risk factor.  SBP <180, MAP >65; remains at goal   Avoid hypotension in the setting of small vessel disease   Holding lopressor for now given hypotension during this admission in the setting of an acute stroke, will resume on Sat Dec 24th  Bp today 123/64-HR 84.     Torsemide has been on hold due to ^ Creat now stable  1/4 P 111/57- 131/64- HR 60s well controlled   1/5 140/65    Hyperlipidemia  Continue statin       Hypothyroidism  Continue synthroid       Type 2 diabetes mellitus  Patient's FSGs are controlled on current medication regimen.  Last A1c reviewed-   Lab Results   Component Value Date    HGBA1C 6.7 (H) 12/14/2022     Most recent fingerstick glucose reviewed-   Recent Labs   Lab 01/03/23  1626 01/03/23  1922 01/04/23  0717 01/04/23  1113   POCTGLUCOSE 377* 324* 160* 249*     Current correctional scale  Low  Maintain anti-hyperglycemic dose as follows-   Antihyperglycemics (From admission, onward)    Start     Stop Route Frequency Ordered    01/03/23 2100  insulin detemir U-100 pen 15 Units         -- SubQ Nightly 01/03/23 1248    01/03/23 1248   insulin aspart U-100 pen 0-5 Units         -- SubQ Before meals & nightly PRN 01/03/23 1248        Hold Oral hypoglycemics while patient is in the hospital.      VTE Risk Mitigation (From admission, onward)         Ordered     enoxaparin injection 30 mg  Daily         01/03/23 1248                Discharge Planning   JOSE A:      Code Status: Full Code   Is the patient medically ready for discharge?:     Reason for patient still in hospital (select all that apply): Patient trending condition and Treatment  Discharge Plan A: Skilled Nursing Facility   Discharge Delays: None known at this time              Thom Callejas MD  Department of Hospital Medicine   [place] [facility] S[facility] (3rd Fl)

## 2023-01-05 NOTE — ASSESSMENT & PLAN NOTE
Admitting dx post tx at [facility]- admitted here for debility skilled care intially  Continue asa, plavix statin  PT/OT

## 2023-01-05 NOTE — SUBJECTIVE & OBJECTIVE
Past Medical History:   Diagnosis Date    Abnormal barium swallow     Alzheimer's dementia, late onset     Anal stenosis     Anticoagulant long-term use     Aortic stenosis     Arthritis     Aspiration pneumonitis     Benign prostatic hyperplasia     Carotid artery disease     CHF (congestive heart failure)     Chronic diastolic heart failure     Chronic kidney disease (CKD), stage III (moderate)     CKD (chronic kidney disease)     Colon polyp     COPD (chronic obstructive pulmonary disease)     Coronary artery disease     Diabetes mellitus, type 2     Diverticulosis     Dysphagia     Hearing aid worn 01/23/2020    Received bilateral hearing aides today    Heart disease     History of CEA (carotid endarterectomy)     [place] (hard of hearing)     Hyperlipidemia     Hypertension     Hypertensive heart disease     Hypothyroidism     Joint disease     Memory loss     PAD (peripheral artery disease)     Presence of drug coated stent in right coronary artery     Pulmonary hypertension     S/P TAVR (transcatheter aortic valve replacement)     Small bowel obstruction     Wears dentures     UPPER AND LOWER    Wears glasses        Past Surgical History:   Procedure Laterality Date    anal fissure repair      ANKLE FUSION Left 08/15/2016    X 2    APPENDECTOMY      BACK SURGERY      X 4    CAROTID ENDARTERECTOMY Right     CAROTID ENDARTERECTOMY Left     CAROTID ENDARTERECTOMY Left 12/3/2021    Procedure: ENDARTERECTOMY-CAROTID;  Surgeon: Tim Castillo MD;  Location: [facility] OR;  Service: Cardiology;  Laterality: Left;  pt unsure if he stopped plavix, Dr. Castillo ok to proceed    CARPAL TUNNEL RELEASE Right     CHOLECYSTECTOMY      COLONOSCOPY N/A 7/26/2018    Procedure: HIGH RISK SCREENING COLONOSCOPY;  Surgeon: Connor Murrell MD;  Location: [facility] ENDO;  Service: Endoscopy;  Laterality: N/A;    CORONARY ANGIOPLASTY WITH STENT PLACEMENT      ELBOW SURGERY Bilateral     EXCISIONAL HEMORRHOIDECTOMY      x3     EXPLORATORY LAPAROTOMY W/ BOWEL RESECTION  11/17/2011    EYE SURGERY      cataract bilaterally    history of bowel resection      KNEE SURGERY Left     LAMINECTOMY AND MICRODISCECTOMY LUMBAR SPINE  07/21/2011    L5-S1    LEFT HEART CATHETERIZATION Left 8/31/2018    Procedure: Left heart cath;  Surgeon: Rex Chris MD;  Location: [facility] CATH;  Service: Cardiology;  Laterality: Left;    LUMBAR FUSION  11/09/2011    Anterior approach--L5-S1    LUNG BIOPSY Right 07/21/2009    VATS with wedge ofr right lower lobe    LUNG BIOPSY Left 3/16/2020    Procedure: BIOPSY, LUNG;  Surgeon: [facility];  Location: [facility] OR;  Service: General;  Laterality: Left;    PERCUTANEOUS TRANSCATHETER AORTIC VALVE REPLACEMENT (TAVR) Left 10/30/2018    Procedure: REPLACEMENT, AORTIC VALVE, PERCUTANEOUS, TRANSCATHETER;  Surgeon: Rex Chris MD;  Location: [facility] OR;  Service: Cardiology;  Laterality: Left;    PERCUTANEOUS TRANSCATHETER AORTIC VALVE REPLACEMENT (TAVR)  10/30/2018    Procedure: REPLACEMENT, AORTIC VALVE, PERCUTANEOUS, TRANSCATHETER;  Surgeon: Tim Castillo MD;  Location: [facility] OR;  Service: Cardiovascular;;    RIGHT HEART CATHETERIZATION Right 8/31/2018    Procedure: INSERTION, CATHETER, RIGHT HEART;  Surgeon: Rex Chris MD;  Location: [facility] CATH;  Service: Cardiology;  Laterality: Right;    ROTATOR CUFF REPAIR Left     SINUS SURGERY      SPINE SURGERY      back surgery    TENDON RELEASE Bilateral     TONSILLECTOMY         Review of patient's allergies indicates:   Allergen Reactions    Sulfur Itching    Penicillins Swelling and Rash       No current facility-administered medications on file prior to encounter.     Current Outpatient Medications on File Prior to Encounter   Medication Sig    albuterol (VENTOLIN HFA) 90 mcg/actuation inhaler Inhale 1-2 puffs into the lungs every 6 (six) hours as needed for Wheezing or Shortness of Breath. Rescue    aspirin 81 MG Chew Take 1 tablet (81 mg  "total) by mouth once daily.    atorvastatin (LIPITOR) 40 MG tablet Take 1 tablet (40 mg total) by mouth once daily.    clopidogreL (PLAVIX) 75 mg tablet Take 1 tablet (75 mg total) by mouth once daily.    ipratropium (ATROVENT) 42 mcg (0.06 %) nasal spray ipratropium bromide 42 mcg (0.06 %) nasal spray    meclizine (ANTIVERT) 12.5 mg tablet Take 2 tablets (25 mg total) by mouth 3 (three) times daily.    memantine (NAMENDA) 10 MG Tab Take 10 mg by mouth once daily.    metoprolol succinate (TOPROL-XL) 25 MG 24 hr tablet Take 1 tablet (25 mg total) by mouth every evening.    MYRBETRIQ 25 mg Tb24 ER tablet Take 25 mg by mouth once daily. 20    nitroGLYCERIN (NITROSTAT) 0.4 MG SL tablet Place 1 tablet (0.4 mg total) under the tongue every 5 (five) minutes as needed for Chest pain.    ondansetron (ZOFRAN-ODT) 8 MG TbDL Take 1 tablet (8 mg total) by mouth 3 (three) times daily as needed (for nausea).    pen needle, diabetic 32 gauge x 5/32" Ndle BD Adrienne 2nd Gen Pen Needle 32 gauge x 5/32"   USE 1 PEN NEEDLE ONCE DAILY USE AS DIRECTED    SOLIQUA 100/33 100 unit-33 mcg/mL InPn pen INJECT 30 UNITS SUBCUTANEOUSLY ONCE DAILY    tamsulosin (FLOMAX) 0.4 mg Cap Take 0.4 mg by mouth once daily.    torsemide (DEMADEX) 10 MG Tab Take 1 tablet (10 mg total) by mouth once daily.    UNABLE TO FIND medication name: prevagen extra strenght daily     Family History       Problem Relation (Age of Onset)    Cancer Mother    Diabetes Brother    Heart disease Father, Brother          Tobacco Use    Smoking status: Former     Packs/day: 1.50     Years: 20.00     Pack years: 30.00     Types: Cigarettes     Start date:      Quit date:      Years since quittin.0    Smokeless tobacco: Never   Substance and Sexual Activity    Alcohol use: No    Drug use: No    Sexual activity: Not on file     Review of Systems   Constitutional:  Negative for chills and fever.   HENT:  Negative for congestion and sore throat.    Respiratory:  " Positive for cough and shortness of breath (with exertion).    Cardiovascular:  Negative for chest pain.   Gastrointestinal:  Negative for abdominal pain, diarrhea, nausea and vomiting.   Genitourinary:  Negative for dysuria and hematuria.   Skin:  Negative for rash.   Neurological:  Positive for weakness. Negative for dizziness, syncope and light-headedness.        Left arm weakness getting better   Objective:     Vital Signs (Most Recent):  Temp: 97.9 °F (36.6 °C) (01/05/23 0325)  Pulse: 73 (01/05/23 0325)  Resp: 20 (01/05/23 0325)  BP: 129/68 (01/05/23 0325)  SpO2: 97 % (01/05/23 0325)   Vital Signs (24h Range):  Temp:  [97.5 °F (36.4 °C)-97.9 °F (36.6 °C)] 97.9 °F (36.6 °C)  Pulse:  [67-77] 73  Resp:  [16-20] 20  SpO2:  [97 %-100 %] 97 %  BP: (129-177)/(64-77) 129/68     Weight: 67.5 kg (148 lb 13 oz)  Body mass index is 23.31 kg/m².    Physical Exam  Vitals and nursing note reviewed.   Constitutional:       General: He is not in acute distress.     Appearance: He is well-developed. He is not toxic-appearing.   HENT:      Head: Normocephalic and atraumatic.      Right Ear: External ear normal.      Left Ear: External ear normal.      Nose:      Comments: O2 in place  Eyes:      Conjunctiva/sclera: Conjunctivae normal.      Pupils: Pupils are equal, round, and reactive to light.   Neck:      Thyroid: No thyromegaly.   Cardiovascular:      Rate and Rhythm: Normal rate and regular rhythm.      Heart sounds: Normal heart sounds. No murmur heard.  Pulmonary:      Effort: Pulmonary effort is normal. No respiratory distress.      Breath sounds: Rales (bibasilar) present. No wheezing.   Abdominal:      General: Bowel sounds are normal. There is no distension.      Palpations: Abdomen is soft.      Tenderness: There is no abdominal tenderness.   Musculoskeletal:         General: Normal range of motion.      Cervical back: Normal range of motion and neck supple.      Right lower leg: No edema.      Left lower leg: No  edema.   Lymphadenopathy:      Cervical: No cervical adenopathy.   Skin:     General: Skin is warm and dry.      Findings: No rash.   Neurological:      General: No focal deficit present.      Mental Status: He is alert and oriented to person, place, and time.      Motor: Weakness present.      Comments: Left arm weakness much improved   Psychiatric:         Mood and Affect: Mood normal.         Behavior: Behavior normal.         CRANIAL NERVES     CN III, IV, VI   Pupils are equal, round, and reactive to light.     Significant Labs: All pertinent labs within the past 24 hours have been reviewed.  A1C:   Recent Labs   Lab 12/14/22  1552   HGBA1C 6.7*       ABGs: No results for input(s): PH, PCO2, HCO3, POCSATURATED, BE, TOTALHB, COHB, METHB, O2HB, POCFIO2, PO2 in the last 48 hours.  Bilirubin:   Recent Labs   Lab 12/29/22  0554 12/31/22  0356 01/02/23  0621 01/03/23  0554 01/05/23  0604   BILITOT 0.8 0.7 0.6 0.6 0.4       Blood Culture:   No results for input(s): LABBLOO in the last 48 hours.    CBC:   Recent Labs   Lab 01/05/23  0604   WBC 3.84*   HGB 11.4*   HCT 33.9*          CMP:   Recent Labs   Lab 01/05/23  0604      K 4.3      CO2 27   *   BUN 40*   CREATININE 1.3   CALCIUM 9.1   PROT 5.4*   ALBUMIN 2.5*   BILITOT 0.4   ALKPHOS 71   AST 19   ALT 17   ANIONGAP 8       Cardiac Markers: No results for input(s): CKMB, MYOGLOBIN, BNP, TROPISTAT in the last 48 hours.  Lactic Acid:   No results for input(s): LACTATE in the last 48 hours.    Magnesium: No results for input(s): MG in the last 48 hours.  POCT Glucose:   Recent Labs   Lab 01/04/23  1623 01/04/23  1957 01/05/23  0712   POCTGLUCOSE 366* 364* 227*       Troponin: No results for input(s): TROPONINI, TROPONINIHS in the last 48 hours.  TSH:   Recent Labs   Lab 12/14/22  1009   TSH 4.401*       Urine Culture: No results for input(s): LABURIN in the last 48 hours.  Urine Studies: No results for input(s): COLORU, APPEARANCEUA, PHUR,  SPECGRAV, PROTEINUA, GLUCUA, KETONESU, BILIRUBINUA, OCCULTUA, NITRITE, UROBILINOGEN, LEUKOCYTESUR, RBCUA, WBCUA, BACTERIA, SQUAMEPITHEL, HYALINECASTS in the last 48 hours.    Invalid input(s): LEILA    Significant Imaging: I have reviewed and interpreted all pertinent imaging results/findings within the past 24 hours.  Consolidative opacity in the left lower lobe consistent with known lipoid pneumonia.  Left greater than right hazy airspace opacities, could be due to multifocal infection, aspiration or edema.

## 2023-01-05 NOTE — CONSULTS
[place] - Med Surg (3rd Fl)  Adult Nutrition  Consult Note    SUMMARY     Recommendations    Recommendation/Intervention:   1. Rec'd continue diabetic and cardiac diet.   2. Rec'd encouraging Boost Glucose Control Chocolate provided during mealtimes for decreased PO intake, decreased appetite, and increased nutritional needs.   3. Rec'd honoring pt's food preferences within diet order to encourage PO intake.   4. RD to follow and make rec's accordingly.    Goals: Pt will meet at least 75% ENN by RD follow up.  Nutrition Goal Status: new  Communication of RD Recs: other (comment) (POC)    Assessment and Plan    Nutrition Problem  Severe malnutrition in the context of acute on chronic illness     Related to (etiology):   Inadequate oral intake, decreased appetite, and increased nutritional needs s/t follicular lymphoma and radiation tx     Signs and Symptoms (as evidenced by):   < 75% EER in > 1 month, 8.4% weight loss in 3 months, moderate fat loss, and moderate muscle wasting     Interventions/Recommendations (treatment strategy):  Recommendation/Intervention:   1. Rec'd continue diabetic and cardiac diet.   2. Rec'd encouraging Boost Glucose Control Chocolate provided during mealtimes for decreased PO intake, decreased appetite, and increased nutritional needs.   3. Rec'd honoring pt's food preferences within diet order to encourage PO intake.   4. RD to follow and make rec's accordingly.    Goals: Pt will meet at least 75% ENN by RD follow up.  Nutrition Goal Status: new       Nutrition Diagnosis Status:   Continues    Malnutrition Assessment  Malnutrition Type: acute illness or injury  Energy Intake: moderate energy intake  Eyes (Micronutrient): eyelid(s) pallor  Teeth (Micronutrient): none  Tongue (Micronutrient): fissured   Micronutrient Evaluation: suspected deficiency  Micronutrient Evaluation Comments: anemia of neoplastic disease noted in non-problem hospital list   Weight Loss (Malnutrition): greater  "than 7.5% in 3 months  Energy Intake (Malnutrition): less than 75% for greater than or equal to 1 month  Subcutaneous Fat (Malnutrition): moderate depletion  Muscle Mass (Malnutrition): moderate depletion   Orbital Region (Subcutaneous Fat Loss): moderate depletion  Upper Arm Region (Subcutaneous Fat Loss): moderate depletion   [place] Region (Muscle Loss): moderate depletion  Clavicle Bone Region (Muscle Loss): mild depletion  Clavicle and Acromion Bone Region (Muscle Loss): mild depletion  Dorsal Hand (Muscle Loss): moderate depletion       Subcutaneous Fat Loss (Final Summary): severe protein-calorie malnutrition  Muscle Loss Evaluation (Final Summary): severe protein-calorie malnutrition    Severe Weight Loss (Malnutrition): greater than 7.5% in 3 months    Reason for Assessment    Reason For Assessment: consult (nutritional assessment / diabetic diet if applicable)  Diagnosis: infection/sepsis (COVID-19)  Relevant Medical History: alzheimer's disease, CHF, CKD3, diverticulosis, dysphagia, [place], HTN, HLD, COPD, CAD, anal stenosis  Interdisciplinary Rounds: did not attend  General Information Comments: Pt re-admitted for COVID-19. Was following this pt s/t LOS. Pt was diagnosed with malnutrition during last visit. Spoke with SONIA Hendrix, about pt's meal and ONS intake. PO and ONS variable at this time due to pt sleeping during mealtimes. Will continue to monitor pt for any nutritional changes.  Nutrition Discharge Planning: Continue diabetic and cardiac diet with Boost Glucose Control Chocolate TID upon discharge.    Nutrition Risk Screen    Nutrition Risk Screen: unintentional loss of 10 lbs or more in the past 2 months, reduced oral intake over the last month    Nutrition/Diet History    Spiritual, Cultural Beliefs, Restorationism Practices, Values that Affect Care: no  Food Allergies: NKFA    Anthropometrics    Temp: 97.9 °F (36.6 °C)  Height: 5' 7" (170.2 cm)  Height (inches): 67 in  Weight Method: Bed Scale  Weight: " 67.5 kg (148 lb 13 oz)  Weight (lb): 148.81 lb  Ideal Body Weight (IBW), Male: 148 lb  % Ideal Body Weight, Male (lb): 107.4 %  BMI (Calculated): 23.3  BMI Grade: 18.5-24.9 - normal  Weight Loss: unintentional (8.4% in 3 months)  Usual Body Weight (UBW), k.66 kg  Weight Change Amount: 13 lb 9 oz  % Usual Body Weight: 91.83       Lab/Procedures/Meds    Pertinent Labs Reviewed: reviewed  Pertinent Labs Comments: MCH: 32.9 (H), RBC: 3.47 (L), H&H: 11.4/33.9 (L), BUN: 40 (H), glucose: 298-364 (H), GFR: 53 (L)  Pertinent Medications Reviewed: reviewed  Pertinent Medications Comments: steroid, insulin, polyethylene glycol, remdesivir, NS @ 50 mL/hour continuous    Physical Findings/Assessment         Estimated/Assessed Needs    Weight Used For Calorie Calculations: 67.5 kg (148 lb 13 oz)  Energy Calorie Requirements (kcal): 8250-8283  Energy Need Method: Kcal/kg (25-30 kcal/kg for cancer)  Protein Requirements: 68-84 (1-1.25 g/kg/day for OA and cancer)  Weight Used For Protein Calculations: 67.5 kg (148 lb 13 oz)  Fluid Requirements (mL): 8  Estimated Fluid Requirement Method: RDA Method (or perMD rec's)  RDA Method (mL):   CHO Requirement: 209-255 (45-55% of total kcals)      Nutrition Prescription Ordered    Current Diet Order: diabetic; cardiac  Current Nutrition Support Frequency Ordered: TID  Oral Nutrition Supplement: Boost Glucose Control Chocolate    Evaluation of Received Nutrient/Fluid Intake    % Kcal Needs: 25-50% (intake as of 2:45 PM today)  % Protein Needs: 50-75% (as of 2:45 PM today)  IV Fluid (mL): 1200 (NS @ 50 mL/hour continuous)  Energy Calories Required: not meeting needs  Protein Required: not meeting needs  Tolerance: tolerating  % Intake of Estimated Energy Needs: 25 - 50 %  % Meal Intake: 100% breakfast today    Nutrition Risk    Level of Risk/Frequency of Follow-up: moderate - high (2 x per week)       Monitor and Evaluation    Food and Nutrient Intake: energy intake, food and  beverage intake, enteral nutrition intake  Food and Nutrient Adminstration: diet order  Knowledge/Beliefs/Attitudes: food and nutrition knowledge/skill, beliefs and attitudes  Physical Activity and Function: nutrition-related ADLs and IADLs, factors affecting access to physical activity  Anthropometric Measurements: height/length, weight, weight change, body mass index  Biochemical Data, Medical Tests and Procedures: electrolyte and renal panel, gastrointestinal profile, glucose/endocrine profile, inflammatory profile, lipid profile  Nutrition-Focused Physical Findings: overall appearance       Nutrition Follow-Up    RD Follow-up?: Yes

## 2023-01-05 NOTE — ASSESSMENT & PLAN NOTE
Nutrition Problem  Severe malnutrition in the context of acute on chronic illness     Related to (etiology):   Inadequate oral intake, decreased appetite, and increased nutritional needs s/t follicular lymphoma and radiation tx     Signs and Symptoms (as evidenced by):   < 75% EER in > 1 month, 8.4% weight loss in 3 months, moderate fat loss, and moderate muscle wasting     Interventions/Recommendations (treatment strategy):  Recommendation/Intervention:   1. Rec'd continue diabetic and cardiac diet.   2. Rec'd encouraging Boost Glucose Control Chocolate provided during mealtimes for decreased PO intake, decreased appetite, and increased nutritional needs.   3. Rec'd honoring pt's food preferences within diet order to encourage PO intake.   4. RD to follow and make rec's accordingly.     Goals: Pt will meet at least 75% ENN by RD follow up.  Nutrition Goal Status: new        Nutrition Diagnosis Status:   Continues

## 2023-01-05 NOTE — PT/OT/SLP PROGRESS
Physical Therapy Treatment    Patient Name:  Vega Kohler   MRN:  [id number]    Recommendations:     Discharge Recommendations: nursing facility, skilled, home with home health, home health PT, home health OT  Discharge Equipment Recommendations: bath bench  Barriers to discharge: Decreased caregiver support    Assessment:     Vega Kohler is a 87 y.o. male admitted with a medical diagnosis of COVID-19.  He presents with the following impairments/functional limitations: weakness, impaired endurance, impaired self care skills, impaired functional mobility, gait instability, impaired balance, decreased safety awareness, decreased upper extremity function, decreased lower extremity function, impaired cardiopulmonary response to activity. Patient tolerated increase gait distance x ~80 feet (1 rest period) with O2 supplement  using RW with Standby Assistance. Gets fatigue at the end distance. Monitored O2 SAT from 93% to 87% and returned back after ~2 minutes rest with breathing ex.    Rehab Prognosis: Fair; patient would benefit from acute skilled PT services to address these deficits and reach maximum level of function.    Recent Surgery: * No surgery found *      Plan:     During this hospitalization, patient to be seen 5 x/week to address the identified rehab impairments via gait training, therapeutic activities, therapeutic exercises and progress toward the following goals:    Plan of Care Expires:       Subjective     Chief Complaint: gets tired easily   Patient/Family Comments/goals: none stated  Pain/Comfort:  Pain Rating 1: 0/10      Objective:     Communicated with patient prior to session.  Patient found HOB elevated with bed alarm, oxygen, peripheral IV, Other (comments) (avasys camera) upon PT entry to room.     General Precautions: Standard, airborne, contact, droplet, fall  Orthopedic Precautions: N/A  Braces: N/A  Respiratory Status: Nasal cannula, flow 1 L/min     Functional Mobility:  Bed Mobility:      Rolling Left:  modified independence  Rolling Right: modified independence  Scooting: modified independence  Supine to Sit: supervision  Sit to Supine: supervision  Transfers:     Sit to Stand:  stand by assistance with rolling walker  Bed to Chair: stand by assistance with  rolling walker  using  Step Transfer  Gait: Standby Assistance x ~80 feet( 1 rest period) with O2 supplement using RW. Fatigues at the end distance.       AM-PAC 6 CLICK MOBILITY  Turning over in bed (including adjusting bedclothes, sheets and blankets)?: 4  Sitting down on and standing up from a chair with arms (e.g., wheelchair, bedside commode, etc.): 3  Moving from lying on back to sitting on the side of the bed?: 4  Moving to and from a bed to a chair (including a wheelchair)?: 3  Need to walk in hospital room?: 3  Climbing 3-5 steps with a railing?: 2 (clinical judgement)  Basic Mobility Total Score: 19       Treatment & Education:  Performed B LE Mgt/Strengthening ex x 10 mins ; rolling to sides x 5 reps on each side; Sit<<>Stand x 3 with RW; Gait trng x ~80 feet with RW and O2 supplement with SBA    Patient left sitting edge of bed with all lines intact, call button in reach, and nurse in charge present..    GOALS:   Multidisciplinary Problems       Physical Therapy Goals          Problem: Physical Therapy    Goal Priority Disciplines Outcome Goal Variances Interventions   Physical Therapy Goal     PT, PT/OT      Description:   Patient will increase functional independence with mobility by performin. Supine <> sit with Modified Independent  2. Sit <> Stand  with Supervision or Set-up Assistance with RW  3. Bed to chair transfer with Supervision or Set-up Assistancewith or without rolling walker using Step Transfer TECHNIQUE  4. Gait  x ~100  feet with Standby Assistance with or without rolling walker  5. Lower extremity exercise program x10 reps per handout, with assistance as needed                          Time Tracking:      PT Received On: 01/05/23  PT Start Time: 0935     PT Stop Time: 1005  PT Total Time (min): 30 min     Billable Minutes: Gait Training 15 and Therapeutic Activity 15    Treatment Type: Treatment  PT/PTA: PT           01/05/2023

## 2023-01-05 NOTE — HOSPITAL COURSE
1/5/23 Vega Kohler is a 87 y.o. male  brought to [facility]  for continued SKILL therapy with PT / OT post CVA . On plavix and statin and asa. VSS- no bp meds w stable BP.   On 1/2 noted to require incresed oxygen and more sluggish , testing positive for COVID 19; discharged back to acute for tx;  Day 4 Remdesivir, dexamethasone   O2 sat % on 2LNC, afebrile, WBC 2.78>3.84 , LFTs stable   Creat 1.5.>1.3   Gait: Minimal Assistance x ~25 feet with RW and O2 supplement . Fatigues easily    1/6/23 Vega Kohler is a 87 y.o. male  brought to [facility]  for continued SKILL therapy with PT / OT post CVA . On plavix and statin and asa. VSS- no bp meds w stable BP.   On 1/2 noted to require incresed oxygen and more sluggish , testing positive for COVID 19; discharged back to acute for tx;  Day 5 Remdesivir, dexamethasone complete today   O2 sat 93-97% on 1 LNC, ( Has home oxygen) afebrile,    Gait: Standby Assistance x ~80 feet( 1 rest period) with O2 supplement using RW. Fatigues at the end distance.  Plan for d/c to nursing home for skilled therapy on Monday with [place]      1/7 - patient completed therapy for COVID.  He developed some delirium yesterday.  IM Zyprexa was given which seems to help.  He is doing better today.  Sometimes he gets agitated and threatened nursing staff.  Overall he is working with therapy.  I consulted psych and they feel this is delirium.  Since the Zyprexa helped they will sign off on the case.  Patient required extra insulin because his blood sugars spiked to 435.  This is probably from the Decadron.  Decadron has been discontinued.    1/8-patient seems more sluggish today and is running a low-grade temperature.  He is very sleepy.  I repeated a lactic acid, CBC, CMP, urinalysis which all appeared normal.  Chest x-ray reveals a persistent left lower lobe infiltrate/atelectasis.  Doubt this is pneumonia.    1/9 PT was here for skill therapy after acute CVA 12/14 treated at [facility]. He  contracted covid 1/2 and was treated 3 days remdesivir and dexamethasone. VSS/afebrile. Sats 91-95% on 1L NC. CXR with left basilar atelectasis.PT has hoim ambulating 85ft with stand by assist. He plans to transfer to [facility] [place] today    1/10/22  Waiting for NH placement   Case management working on it   Patient has no complaints

## 2023-01-05 NOTE — ASSESSMENT & PLAN NOTE
Continue PT/OT post CVA   1/5/23 Gait: Minimal Assistance x ~25 feet with RW and O2 supplement . Fatigues easily

## 2023-01-05 NOTE — PLAN OF CARE
Recommendation/Intervention:   1. Rec'd continue diabetic and cardiac diet.   2. Rec'd encouraging Boost Glucose Control Chocolate provided during mealtimes for decreased PO intake, decreased appetite, and increased nutritional needs.   3. Rec'd honoring pt's food preferences within diet order to encourage PO intake.   4. RD to follow and make rec's accordingly.    Goals: Pt will meet at least 75% ENN by RD follow up.  Nutrition Goal Status: new

## 2023-01-05 NOTE — PT/OT/SLP PROGRESS
Physical Therapy      Patient Name:  Vega Kohler   MRN:  [id number]    Patient not seen today secondary to Nursing care. Patient is getting bed bath with PCT at this time. Will follow-up later.

## 2023-01-05 NOTE — ASSESSMENT & PLAN NOTE
Patient is identified as Severe COVID-19 based on hypoxemia with O2 saturations <94% on room air or on ambulation   Initiate standard COVID protocols; COVID-19 testing Collection Date: 1/1/2023 Collection Time:  12:34 PM ,Infection Control notification  and isolation- respiratory, contact and droplet per protocol    Diagnostics: (leukopenia, hyponatremia, hyperferritinemia, elevated troponin, elevated d-dimer, age, and comorbidities are significant predictors of poor clinical outcome)  CBC, CMP, Ferritin, CRP and Portable CXR    Management: Inhaled bronchodilators as needed for shortness of breath.   Day 4 Remdesivir, dexamehtasone and levofloxacin   Afebrile, WBC low, CXR reviewed, procal normal ; all appears viral  Repeat labs in am

## 2023-01-05 NOTE — PT/OT/SLP PROGRESS
Occupational Therapy   Treatment    Name: Vega Kohler  MRN: [id number]  Admitting Diagnosis:  COVID-19       Recommendations:     Discharge Recommendations: nursing facility, skilled, home with home health, home health PT, home health OT  Discharge Equipment Recommendations:  bath bench  Barriers to discharge:  Decreased caregiver support    Assessment:     Vega Kohler is a 87 y.o. male with a medical diagnosis of COVID-19. Performance deficits affecting function are weakness, impaired endurance, impaired self care skills, impaired functional mobility, gait instability, impaired balance, decreased upper extremity function, decreased lower extremity function, decreased safety awareness, impaired cardiopulmonary response to activity.     Rehab Prognosis:  Good; patient would benefit from acute skilled OT services to address these deficits and reach maximum level of function.       Plan:     Patient to be seen 5 x/week to address the above listed problems via self-care/home management, therapeutic exercises, therapeutic activities  Plan of Care Expires: 01/17/23  Plan of Care Reviewed with: patient    Subjective     Pain/Comfort:  Pain Rating 1: 0/10    Objective:     Communicated with: RN prior to session.  Patient found with bed in chair position with bed alarm, telemetry, peripheral IV upon OT entry to room.    General Precautions: Standard, airborne, contact, fall    Orthopedic Precautions:N/A  Braces: N/A  Respiratory Status: Nasal cannula, flow 2 L/min     Occupational Performance:     Bed Mobility:    Patient completed Rolling/Turning to Right with modified independence  Patient completed Scooting/Bridging with modified independence  Patient completed Supine to Sit with modified independence  Patient completed Sit to Supine with modified independence     Functional Mobility/Transfers:  Patient completed Sit <> Stand Transfer with supervision  with  no assistive device   Functional Mobility: Pt was able to  stand from edge of bed with supervision due to c/o lightheadedness.     Activities of Daily Living:  Feeding:  independence pt was able to feed self independently this date which includes opening packages.      [facility] 6 Click ADL: 18    Treatment & Education:  Pt rolled to R side with modified independence and up from supine to sitting edge of bed with modified independence as well. Pt was able to scoot to edge of bed and along side of the bed with modified independence. Pt was using L UE more consistently this date without verbal cues reaching for items and using it for bilateral tasks such as eating, opening packages and stabilizing items. Pt was instructed in B UE there ex for shoulder flex/ext, abb/adduction and bicep/tricep flex/ext for 2 sets of 10 reps while sitting bedside unsupported. Pt performed sit to stand exercises with supervision for 2 sets of 5 reps with no c/o SOB. Pt was able to return to bed in supine from sitting EOB with modified independence and reposition self in bed with modified independence as well.     Patient left HOB elevated with all lines intact, call button in reach, bed alarm on, and RN notified    GOALS:   Multidisciplinary Problems       Occupational Therapy Goals          Problem: Occupational Therapy    Goal Priority Disciplines Outcome Interventions   Occupational Therapy Goal     OT, PT/OT Ongoing, Progressing    Description: Pt to perform UB dressing with MOD I seated EOB.  Pt to perform LB dressing with MOD I seated EOB.   Pt to perform self toileting with Min A for hygiene and clothes management using BSC  Pt to perform level functional transfers required for ADL's with MOD I, RW level.  Pt to demonstrate Fair + dynamic standing balance as required to perform ADL's from standing level.  Pt to demonstrate Fair + BUE strength during functional task   Pt to demonstrate consistent adherence to breathing control and energy conservation techniques as educated by OT.   Pt to  participate in standing ADL task for ~3 minutes to increase activity and safety prior to discharge.                        Time Tracking:     OT Date of Treatment: 01/05/23  OT Start Time: 0821  OT Stop Time: 0858  OT Total Time (min): 37 min    Billable Minutes:Therapeutic Activity 18  Therapeutic Exercise 19    OT/PEDRO: PEDRO     PEDRO Visit Number: 2    1/5/2023

## 2023-01-06 NOTE — ASSESSMENT & PLAN NOTE
Nutrition consulted. Most recent weight and BMI monitored-     Malnutrition Type and Energy Intake  Malnutrition Type: acute illness or injury  Energy Intake: moderate energy intake    Malnutrition (Moderate to Severe)  Weight Loss (Malnutrition): greater than 7.5% in 3 months  Energy Intake (Malnutrition): less than 75% for greater than or equal to 1 month  Subcutaneous Fat (Malnutrition): moderate depletion  Muscle Mass (Malnutrition): moderate depletion    Final Summary  Subcutaneous Fat Loss (Final Summary): severe protein-calorie malnutrition  Muscle Loss Evaluation (Final Summary): severe protein-calorie malnutrition    Malnutrition Final Summary  Severe Weight Loss (Malnutrition): greater than 7.5% in 3 months    Measurements:  Wt Readings from Last 1 Encounters:   01/06/23 67.3 kg (148 lb 5.9 oz)   Body mass index is 23.24 kg/m².    Recommendations: Recommendation/Intervention: 1. Rec'd continue diabetic and cardiac diet. 2. Rec'd encouraging Boost Glucose Control Chocolate provided during mealtimes for decreased PO intake, decreased appetite, and increased nutritional needs. 3. Rec'd honoring pt's food preferences within diet order to encourage PO intake. 4. RD to follow and make rec's accordingly.  Goals: Pt will meet at least 75% ENN by RD follow up.    Patient has been screened and assessed by RD. RD will follow patient.

## 2023-01-06 NOTE — PT/OT/SLP PROGRESS
Occupational Therapy   Treatment    Name: Vega Kohler  MRN: [id number]  Admitting Diagnosis:  COVID-19       Recommendations:     Discharge Recommendations: nursing facility, skilled, home with home health, home health PT, home health OT  Discharge Equipment Recommendations:  bath bench  Barriers to discharge:  Decreased caregiver support    Assessment:     Vega Kohler is a 87 y.o. male with a medical diagnosis of COVID-19.   Performance deficits affecting function are weakness, impaired endurance, impaired self care skills, impaired functional mobility, gait instability, impaired balance, impaired cognition, decreased upper extremity function, decreased lower extremity function, decreased safety awareness, decreased ROM, impaired cardiopulmonary response to activity.     Rehab Prognosis:  Good; patient would benefit from acute skilled OT services to address these deficits and reach maximum level of function.       Plan:     Patient to be seen 5 x/week to address the above listed problems via self-care/home management, therapeutic activities, therapeutic exercises  Plan of Care Expires: 01/17/23  Plan of Care Reviewed with: patient    Subjective     Pain/Comfort:  Pain Rating 1: 0/10    Objective:     Communicated with: RN prior to session.  Patient found HOB elevated with bed alarm, peripheral IV, oxygen upon OT entry to room.    General Precautions: Standard, airborne, contact, droplet    Orthopedic Precautions:N/A  Braces: N/A  Respiratory Status: Nasal cannula, flow 2 L/min     Occupational Performance:       [facility] 6 Click ADL: 18    Treatment & Education:  Pt was lying in bed upon entrance to room. Pt was visually hallucinating and was preoccupied with seeing flies and things on the ceiling. Pt was instructed in B UE there ex for B shoulder flex/ ex triceps flex/ext and shoulder ab/adduction but had difficulty following instructions and staying focused stating that he wanted to take care of finding his  ""pumper" that sprays water to get rid of the flies he sees. Pt was not oriented to day, time of place. At end of session patient was attempting to get out of bed without assistance to go and look for his "pumper". Nursing was notified of patients hallucinations and of pt trying to get up out of bed and were aware.     Patient left HOB elevated with all lines intact, call button in reach, bed alarm on, and RN notified    GOALS:   Multidisciplinary Problems       Occupational Therapy Goals          Problem: Occupational Therapy    Goal Priority Disciplines Outcome Interventions   Occupational Therapy Goal     OT, PT/OT Ongoing, Progressing    Description: Pt to perform UB dressing with MOD I seated EOB.  Pt to perform LB dressing with MOD I seated EOB.   Pt to perform self toileting with Min A for hygiene and clothes management using BSC  Pt to perform level functional transfers required for ADL's with MOD I, RW level.  Pt to demonstrate Fair + dynamic standing balance as required to perform ADL's from standing level.  Pt to demonstrate Fair + BUE strength during functional task   Pt to demonstrate consistent adherence to breathing control and energy conservation techniques as educated by OT.   Pt to participate in standing ADL task for ~3 minutes to increase activity and safety prior to discharge.                        Time Tracking:     OT Date of Treatment: 01/06/23  OT Start Time: 0229  OT Stop Time: 0244  OT Total Time (min): 15 min    Billable Minutes:Therapeutic Exercise 15    OT/PEDRO: PEDRO VASQUEZ Visit Number: 2    1/6/2023  "

## 2023-01-06 NOTE — ASSESSMENT & PLAN NOTE
Patient's FSGs are controlled on current medication regimen.  Last A1c reviewed-   Lab Results   Component Value Date    HGBA1C 6.7 (H) 12/14/2022     Most recent fingerstick glucose reviewed-   Recent Labs   Lab 01/05/23  1130 01/05/23  1614 01/05/23  2120 01/06/23  0156   POCTGLUCOSE 298* 399* 267* 219*     Current correctional scale  Low  Maintain anti-hyperglycemic dose as follows-   Antihyperglycemics (From admission, onward)    Start     Stop Route Frequency Ordered    01/05/23 2100  insulin detemir U-100 pen 20 Units         -- SubQ Nightly 01/05/23 1238    01/05/23 1339  insulin aspart U-100 pen 0-5 Units         -- SubQ Before meals & nightly PRN 01/05/23 1240        Hold Oral hypoglycemics while patient is in the hospital.    1/6 last dose of dexamethasone today ; getting insulin 20 units nightly   bs 258 this am; expect BS to improve after stoppign dexamethasone   Will watch BS over weekend to calculate needs and adjust once off steroids

## 2023-01-06 NOTE — PT/OT/SLP PROGRESS
"Physical Therapy Treatment    Patient Name:  Vega Kohler   MRN:  [id number]    Recommendations:     Discharge Recommendations: nursing facility, skilled, home with home health, home health PT, home health OT  Discharge Equipment Recommendations: bath bench  Barriers to discharge: Decreased caregiver support    Assessment:     Vega Kohler is a 87 y.o. male admitted with a medical diagnosis of COVID-19.  He presents with the following impairments/functional limitations: weakness, impaired endurance, impaired self care skills, impaired functional mobility, gait instability, impaired balance, decreased upper extremity function, decreased lower extremity function, decreased safety awareness, pain, impaired cardiopulmonary response to activity . Patient tolerated PT sessions today. Performed gait functions using RW SBA and O2 supplement x ~85 feet with rest period. Monitored O2 SAT from 96% to 88% and returned back after a minute rest.    Rehab Prognosis: Fair; patient would benefit from acute skilled PT services to address these deficits and reach maximum level of function.    Recent Surgery: * No surgery found *      Plan:     During this hospitalization, patient to be seen 5 x/week to address the identified rehab impairments via gait training, therapeutic activities, therapeutic exercises and progress toward the following goals:    Plan of Care Expires:       Subjective     Chief Complaint: lower back pain but relief after body repositioning and sitting up activity.  Patient/Family Comments/goals: "To get better".  Pain/Comfort:  Pain Rating 1:  (did not quantify)  Location - Orientation 1: lower  Location 1: back  Pain Addressed 1: Reposition, Cessation of Activity  Pain Rating Post-Intervention 1: 0/10      Objective:     Communicated with patient  prior to session.  Patient found HOB elevated with bed alarm, peripheral IV, oxygen, Other (comments) (avasys camera) upon PT entry to room.     General Precautions: " Standard, airborne, contact, droplet, fall  Orthopedic Precautions: N/A  Braces: N/A  Respiratory Status: Nasal cannula, flow 1 L/min     Functional Mobility:  Bed Mobility:     Rolling Left:  independence  Rolling Right: independence  Scooting: modified independence  Supine to Sit: supervision  Transfers:     Sit to Stand:  supervision with rolling walker  Bed to Chair: stand by assistance with  rolling walker  using  Step Transfer  Gait: Standby Assistance x ~85 feet(rest period) with RW and O2 supplement. Gets tired at the end distance.      AM-PAC 6 CLICK MOBILITY  Turning over in bed (including adjusting bedclothes, sheets and blankets)?: 4  Sitting down on and standing up from a chair with arms (e.g., wheelchair, bedside commode, etc.): 3  Moving from lying on back to sitting on the side of the bed?: 4  Moving to and from a bed to a chair (including a wheelchair)?: 3  Need to walk in hospital room?: 3  Climbing 3-5 steps with a railing?: 3  Basic Mobility Total Score: 20       Treatment & Education:  Performed B LE strengthening ROM ex and supine and sitting up at EOB x 10 mins; worked on body sequence in out of bed activity and transfers; gait trng RW x ~85 feet(1 rest period) with SBA    Patient left up in chair with all lines intact, call button in reach, and PCT present..    GOALS:   Multidisciplinary Problems       Physical Therapy Goals          Problem: Physical Therapy    Goal Priority Disciplines Outcome Goal Variances Interventions   Physical Therapy Goal     PT, PT/OT      Description:   Patient will increase functional independence with mobility by performin. Supine <> sit with Modified Independent  2. Sit <> Stand  with Supervision or Set-up Assistance with RW  3. Bed to chair transfer with Supervision or Set-up Assistancewith or without rolling walker using Step Transfer TECHNIQUE  4. Gait  x ~100  feet with Standby Assistance with or without rolling walker  5. Lower extremity exercise  program x10 reps per handout, with assistance as needed                          Time Tracking:     PT Received On: 01/06/23  PT Start Time: 0820     PT Stop Time: 0850  PT Total Time (min): 30 min     Billable Minutes: Gait Training 15 and Therapeutic Activity 15    Treatment Type: Treatment  PT/PTA: PT           01/06/2023

## 2023-01-06 NOTE — PLAN OF CARE
Patient is safe and free from falls and active ROM is being done Q2hrs. V/S stable. 20units of levemir given tonight with 1 unit of asapart.   Problem: Diabetes Comorbidity  Goal: Blood Glucose Level Within Targeted Range  Outcome: Ongoing, Progressing     Problem: Communication Impairment (Stroke, Ischemic/Transient Ischemic Attack)  Goal: Improved Communication Skills  Outcome: Ongoing, Progressing     Problem: Respiratory Compromise (Stroke, Ischemic/Transient Ischemic Attack)  Goal: Effective Oxygenation and Ventilation  Outcome: Ongoing, Progressing     Problem: Urinary Elimination Impaired (Stroke, Ischemic/Transient Ischemic Attack)  Goal: Effective Urinary Elimination  Outcome: Ongoing, Progressing

## 2023-01-06 NOTE — SUBJECTIVE & OBJECTIVE
Past Medical History:   Diagnosis Date    Abnormal barium swallow     Alzheimer's dementia, late onset     Anal stenosis     Anticoagulant long-term use     Aortic stenosis     Arthritis     Aspiration pneumonitis     Benign prostatic hyperplasia     Carotid artery disease     CHF (congestive heart failure)     Chronic diastolic heart failure     Chronic kidney disease (CKD), stage III (moderate)     CKD (chronic kidney disease)     Colon polyp     COPD (chronic obstructive pulmonary disease)     Coronary artery disease     Diabetes mellitus, type 2     Diverticulosis     Dysphagia     Hearing aid worn 01/23/2020    Received bilateral hearing aides today    Heart disease     History of CEA (carotid endarterectomy)     [place] (hard of hearing)     Hyperlipidemia     Hypertension     Hypertensive heart disease     Hypothyroidism     Joint disease     Memory loss     PAD (peripheral artery disease)     Presence of drug coated stent in right coronary artery     Pulmonary hypertension     S/P TAVR (transcatheter aortic valve replacement)     Small bowel obstruction     Wears dentures     UPPER AND LOWER    Wears glasses        Past Surgical History:   Procedure Laterality Date    anal fissure repair      ANKLE FUSION Left 08/15/2016    X 2    APPENDECTOMY      BACK SURGERY      X 4    CAROTID ENDARTERECTOMY Right     CAROTID ENDARTERECTOMY Left     CAROTID ENDARTERECTOMY Left 12/3/2021    Procedure: ENDARTERECTOMY-CAROTID;  Surgeon: Tim Castillo MD;  Location: [facility] OR;  Service: Cardiology;  Laterality: Left;  pt unsure if he stopped plavix, Dr. Castillo ok to proceed    CARPAL TUNNEL RELEASE Right     CHOLECYSTECTOMY      COLONOSCOPY N/A 7/26/2018    Procedure: HIGH RISK SCREENING COLONOSCOPY;  Surgeon: Connor Murrell MD;  Location: [facility] ENDO;  Service: Endoscopy;  Laterality: N/A;    CORONARY ANGIOPLASTY WITH STENT PLACEMENT      ELBOW SURGERY Bilateral     EXCISIONAL HEMORRHOIDECTOMY      x3     EXPLORATORY LAPAROTOMY W/ BOWEL RESECTION  11/17/2011    EYE SURGERY      cataract bilaterally    history of bowel resection      KNEE SURGERY Left     LAMINECTOMY AND MICRODISCECTOMY LUMBAR SPINE  07/21/2011    L5-S1    LEFT HEART CATHETERIZATION Left 8/31/2018    Procedure: Left heart cath;  Surgeon: Rex Chris MD;  Location: [facility] CATH;  Service: Cardiology;  Laterality: Left;    LUMBAR FUSION  11/09/2011    Anterior approach--L5-S1    LUNG BIOPSY Right 07/21/2009    VATS with wedge ofr right lower lobe    LUNG BIOPSY Left 3/16/2020    Procedure: BIOPSY, LUNG;  Surgeon: [facility] Diagnostic Provider;  Location: [facility] OR;  Service: General;  Laterality: Left;    PERCUTANEOUS TRANSCATHETER AORTIC VALVE REPLACEMENT (TAVR) Left 10/30/2018    Procedure: REPLACEMENT, AORTIC VALVE, PERCUTANEOUS, TRANSCATHETER;  Surgeon: Rex Chris MD;  Location: [facility] OR;  Service: Cardiology;  Laterality: Left;    PERCUTANEOUS TRANSCATHETER AORTIC VALVE REPLACEMENT (TAVR)  10/30/2018    Procedure: REPLACEMENT, AORTIC VALVE, PERCUTANEOUS, TRANSCATHETER;  Surgeon: Tim Castillo MD;  Location: [facility] OR;  Service: Cardiovascular;;    RIGHT HEART CATHETERIZATION Right 8/31/2018    Procedure: INSERTION, CATHETER, RIGHT HEART;  Surgeon: Rex Chris MD;  Location: [facility] CATH;  Service: Cardiology;  Laterality: Right;    ROTATOR CUFF REPAIR Left     SINUS SURGERY      SPINE SURGERY      back surgery    TENDON RELEASE Bilateral     TONSILLECTOMY         Review of patient's allergies indicates:   Allergen Reactions    Sulfur Itching    Penicillins Swelling and Rash       No current facility-administered medications on file prior to encounter.     Current Outpatient Medications on File Prior to Encounter   Medication Sig    albuterol (VENTOLIN HFA) 90 mcg/actuation inhaler Inhale 1-2 puffs into the lungs every 6 (six) hours as needed for Wheezing or Shortness of Breath. Rescue    aspirin 81 MG Chew Take 1 tablet (81 mg  "total) by mouth once daily.    atorvastatin (LIPITOR) 40 MG tablet Take 1 tablet (40 mg total) by mouth once daily.    clopidogreL (PLAVIX) 75 mg tablet Take 1 tablet (75 mg total) by mouth once daily.    ipratropium (ATROVENT) 42 mcg (0.06 %) nasal spray ipratropium bromide 42 mcg (0.06 %) nasal spray    meclizine (ANTIVERT) 12.5 mg tablet Take 2 tablets (25 mg total) by mouth 3 (three) times daily.    memantine (NAMENDA) 10 MG Tab Take 10 mg by mouth once daily.    metoprolol succinate (TOPROL-XL) 25 MG 24 hr tablet Take 1 tablet (25 mg total) by mouth every evening.    MYRBETRIQ 25 mg Tb24 ER tablet Take 25 mg by mouth once daily. 20    nitroGLYCERIN (NITROSTAT) 0.4 MG SL tablet Place 1 tablet (0.4 mg total) under the tongue every 5 (five) minutes as needed for Chest pain.    ondansetron (ZOFRAN-ODT) 8 MG TbDL Take 1 tablet (8 mg total) by mouth 3 (three) times daily as needed (for nausea).    pen needle, diabetic 32 gauge x 5/32" Ndle BD Adrienne 2nd Gen Pen Needle 32 gauge x 5/32"   USE 1 PEN NEEDLE ONCE DAILY USE AS DIRECTED    SOLIQUA 100/33 100 unit-33 mcg/mL InPn pen INJECT 30 UNITS SUBCUTANEOUSLY ONCE DAILY    tamsulosin (FLOMAX) 0.4 mg Cap Take 0.4 mg by mouth once daily.    torsemide (DEMADEX) 10 MG Tab Take 1 tablet (10 mg total) by mouth once daily.    UNABLE TO FIND medication name: prevagen extra strenght daily     Family History       Problem Relation (Age of Onset)    Cancer Mother    Diabetes Brother    Heart disease Father, Brother          Tobacco Use    Smoking status: Former     Packs/day: 1.50     Years: 20.00     Pack years: 30.00     Types: Cigarettes     Start date:      Quit date:      Years since quittin.0    Smokeless tobacco: Never   Substance and Sexual Activity    Alcohol use: No    Drug use: No    Sexual activity: Not on file     Review of Systems   Constitutional:  Negative for chills and fever.   HENT:  Negative for congestion and sore throat.    Respiratory:  " Negative for cough and shortness of breath (with exertion).    Cardiovascular:  Negative for chest pain.   Gastrointestinal:  Negative for abdominal pain, diarrhea, nausea and vomiting.   Genitourinary:  Negative for dysuria and hematuria.   Skin:  Negative for rash.   Neurological:  Positive for weakness. Negative for dizziness, syncope and light-headedness.        Left arm weakness getting better   Psychiatric/Behavioral:  Positive for decreased concentration.    Objective:     Vital Signs (Most Recent):  Temp: 97.7 °F (36.5 °C) (01/06/23 0310)  Pulse: 82 (01/06/23 0310)  Resp: 16 (01/06/23 0310)  BP: (!) 141/66 (01/06/23 0310)  SpO2: 97 % (01/06/23 0310)   Vital Signs (24h Range):  Temp:  [97.1 °F (36.2 °C)-97.9 °F (36.6 °C)] 97.7 °F (36.5 °C)  Pulse:  [70-89] 82  Resp:  [16-20] 16  SpO2:  [93 %-97 %] 97 %  BP: (128-161)/(57-68) 141/66     Weight: 67.3 kg (148 lb 5.9 oz)  Body mass index is 23.24 kg/m².    Physical Exam  Vitals and nursing note reviewed.   Constitutional:       General: He is not in acute distress.     Appearance: He is well-developed. He is not toxic-appearing.   HENT:      Head: Normocephalic and atraumatic.      Right Ear: External ear normal.      Left Ear: External ear normal.      Nose:      Comments: O2 in place  Eyes:      Conjunctiva/sclera: Conjunctivae normal.      Pupils: Pupils are equal, round, and reactive to light.   Neck:      Thyroid: No thyromegaly.   Cardiovascular:      Rate and Rhythm: Normal rate and regular rhythm.      Heart sounds: Normal heart sounds. No murmur heard.  Pulmonary:      Effort: Pulmonary effort is normal. No respiratory distress.      Breath sounds: Rales (bibasilar) present. No wheezing.   Abdominal:      General: Bowel sounds are normal. There is no distension.      Palpations: Abdomen is soft.      Tenderness: There is no abdominal tenderness.   Musculoskeletal:         General: Normal range of motion.      Cervical back: Normal range of motion and  neck supple.      Right lower leg: No edema.      Left lower leg: No edema.   Lymphadenopathy:      Cervical: No cervical adenopathy.   Skin:     General: Skin is warm and dry.      Findings: No rash.   Neurological:      Mental Status: He is alert. Mental status is at baseline. He is disoriented.      Cranial Nerves: No cranial nerve deficit.      Motor: No weakness.      Comments: Left arm weakness much improved   Psychiatric:         Mood and Affect: Mood normal.         Behavior: Behavior normal.         Cognition and Memory: Cognition is impaired. Memory is impaired.         CRANIAL NERVES     CN III, IV, VI   Pupils are equal, round, and reactive to light.     Significant Labs: All pertinent labs within the past 24 hours have been reviewed.  A1C:   Recent Labs   Lab 12/14/22  1552   HGBA1C 6.7*       ABGs: No results for input(s): PH, PCO2, HCO3, POCSATURATED, BE, TOTALHB, COHB, METHB, O2HB, POCFIO2, PO2 in the last 48 hours.  Bilirubin:   Recent Labs   Lab 12/29/22  0554 12/31/22  0356 01/02/23  0621 01/03/23  0554 01/05/23  0604   BILITOT 0.8 0.7 0.6 0.6 0.4       Blood Culture:   No results for input(s): LABBLOO in the last 48 hours.    CBC:   Recent Labs   Lab 01/05/23  0604   WBC 3.84*   HGB 11.4*   HCT 33.9*          CMP:   Recent Labs   Lab 01/05/23  0604      K 4.3      CO2 27   *   BUN 40*   CREATININE 1.3   CALCIUM 9.1   PROT 5.4*   ALBUMIN 2.5*   BILITOT 0.4   ALKPHOS 71   AST 19   ALT 17   ANIONGAP 8       Cardiac Markers: No results for input(s): CKMB, MYOGLOBIN, BNP, TROPISTAT in the last 48 hours.  Lactic Acid:   No results for input(s): LACTATE in the last 48 hours.    Magnesium: No results for input(s): MG in the last 48 hours.  POCT Glucose:   Recent Labs   Lab 01/05/23  1614 01/05/23  2120 01/06/23  0156   POCTGLUCOSE 399* 267* 219*       Troponin: No results for input(s): TROPONINI, TROPONINIHS in the last 48 hours.  TSH:   Recent Labs   Lab 12/14/22  1009   TSH  4.401*       Urine Culture: No results for input(s): LABURIN in the last 48 hours.  Urine Studies:   Recent Labs   Lab 01/05/23  1446   COLORU Yellow   APPEARANCEUA Clear   PHUR 6.0   SPECGRAV 1.015   PROTEINUA Trace*   GLUCUA 3+*   KETONESU Negative   BILIRUBINUA Negative   OCCULTUA Trace*   NITRITE Negative   UROBILINOGEN 1.0   LEUKOCYTESUR Negative   RBCUA 0   WBCUA 60*   BACTERIA Few*       Significant Imaging: I have reviewed and interpreted all pertinent imaging results/findings within the past 24 hours.  Consolidative opacity in the left lower lobe consistent with known lipoid pneumonia.  Left greater than right hazy airspace opacities, could be due to multifocal infection, aspiration or edema.

## 2023-01-06 NOTE — PLAN OF CARE
01/06/23 1423   Post-Acute Status   Post-Acute Authorization Placement   Post-Acute Placement Status Set-up Complete/Auth obtained   Discharge Delays None known at this time         Patient accepted by [facility] for SNF. They are able to accept on Monday, 1/9/22. CARLOS faxed PASRR and [phone] to [facility] for their records. Patient's son, Zay, updated.

## 2023-01-06 NOTE — ASSESSMENT & PLAN NOTE
Admitting dx post tx at [facility]- admitted here for debility skilled care intially  Continue asa, plavix statin  PT/OT

## 2023-01-06 NOTE — PROGRESS NOTES
[facility] ([facility])  [facility] Medicine  Progress Note    Patient Name: Vega Kohler  MRN: [id number]  Patient Class: IP- Inpatient   Admission Date: 1/3/2023  Length of Stay: 3 days  Attending Physician: Destin Jimenez MD  Primary Care Provider: Fabricio Mckeon MD        Subjective:     Principal Problem:COVID-19        HPI:    HPI:   86yo male patient with extensive medical hx. (Alzheimers, aortic stenosis, arthritis, BPH, CHF, CKD, COPD, CAD/PAD, DM2, HLD/HTN, pulm HTN, S/p TAVR and hypothyroid). He was treated acutely [facility] for ischemic CVA with left hemiplegia s/p TNK 22. Pt was independent at baseline prior to this event. Exam improved following TNK. MRI with R MCA infarct. Etiology likely . Patient stepped down to NPU to await SNF placement. Metoprolol originally started but held given etiology of stroke and risk of expansion with hypoperfusion. Will plan to resume meds this Saturday and then remainder of home BP meds on Monday. Patient was discharged on DAPT with outpatient follow up. Patient remains neurologically unchanged. SBP drop < 120 but no changes on exam. Last BM yesterday. He was brought to [facility] yesterday for continued SKILL therapy with PT / OT. On plavix and statin and asa. VSS- no bp meds 123/64         22 pt tested positive for COVID; staff noting patient more sluggish & Requiring oxygen ; CXR with infiltrates   BC negative, influenza negative  COVID POSITIVE-d/c to acute care    22 day 3 Remdesivir, dexamehtasone and levofloxacin   Afebrile, WBC low, CXR reviewed, procal normal ; all appears viral will stop levofloxacin              Overview/Hospital Course:  23 Vega Kohler is a 87 y.o. male  brought to [facility]  for continued SKILL therapy with PT / OT post CVA . On plavix and statin and asa. VSS- no bp meds w stable BP.   On  noted to require incresed oxygen and more sluggish , testing positive for COVID 19; discharged back to acute for tx;  Day 4  Remdesivir, dexamethasone   O2 sat % on 2LNC, afebrile, WBC 2.78>3.84 , LFTs stable   Creat 1.5.>1.3    Gait: Minimal Assistance x ~25 feet with RW and O2 supplement . Fatigues easily    1/6/23 Vega Kohler is a 87 y.o. male  brought to [facility]  for continued SKILL therapy with PT / OT post CVA . On plavix and statin and asa. VSS- no bp meds w stable BP.   On 1/2 noted to require incresed oxygen and more sluggish , testing positive for COVID 19; discharged back to acute for tx;  Day 5 Remdesivir, dexamethasone complete today   O2 sat 93-97% on 1 LNC, ( Has home oxygen) afebrile,    Gait: Standby Assistance x ~80 feet( 1 rest period) with O2 supplement using RW. Fatigues at the end distance.  Plan for d/c to nursing home for skilled therapy on Monday with [facility]        Past Medical History:   Diagnosis Date    Abnormal barium swallow     Alzheimer's dementia, late onset     Anal stenosis     Anticoagulant long-term use     Aortic stenosis     Arthritis     Aspiration pneumonitis     Benign prostatic hyperplasia     Carotid artery disease     CHF (congestive heart failure)     Chronic diastolic heart failure     Chronic kidney disease (CKD), stage III (moderate)     CKD (chronic kidney disease)     Colon polyp     COPD (chronic obstructive pulmonary disease)     Coronary artery disease     Diabetes mellitus, type 2     Diverticulosis     Dysphagia     Hearing aid worn 01/23/2020    Received bilateral hearing aides today    Heart disease     History of CEA (carotid endarterectomy)     [place] (hard of hearing)     Hyperlipidemia     Hypertension     Hypertensive heart disease     Hypothyroidism     Joint disease     Memory loss     PAD (peripheral artery disease)     Presence of drug coated stent in right coronary artery     Pulmonary hypertension     S/P TAVR (transcatheter aortic valve replacement)     Small bowel obstruction     Wears dentures     UPPER AND LOWER    Wears  glasses        Past Surgical History:   Procedure Laterality Date    anal fissure repair      ANKLE FUSION Left 08/15/2016    X 2    APPENDECTOMY      BACK SURGERY      X 4    CAROTID ENDARTERECTOMY Right     CAROTID ENDARTERECTOMY Left     CAROTID ENDARTERECTOMY Left 12/3/2021    Procedure: ENDARTERECTOMY-CAROTID;  Surgeon: Tim Castillo MD;  Location: [facility];  Service: Cardiology;  Laterality: Left;  pt unsure if he stopped plavix, Dr. Castillo ok to proceed    CARPAL TUNNEL RELEASE Right     CHOLECYSTECTOMY      COLONOSCOPY N/A 7/26/2018    Procedure: HIGH RISK SCREENING COLONOSCOPY;  Surgeon: Connor Murrell MD;  Location: [facility] ENDO;  Service: Endoscopy;  Laterality: N/A;    CORONARY ANGIOPLASTY WITH STENT PLACEMENT      ELBOW SURGERY Bilateral     EXCISIONAL HEMORRHOIDECTOMY      x3    EXPLORATORY LAPAROTOMY W/ BOWEL RESECTION  11/17/2011    EYE SURGERY      cataract bilaterally    history of bowel resection      KNEE SURGERY Left     LAMINECTOMY AND MICRODISCECTOMY LUMBAR SPINE  07/21/2011    L5-S1    LEFT HEART CATHETERIZATION Left 8/31/2018    Procedure: Left heart cath;  Surgeon: Rex Chris MD;  Location: [facility] CATH;  Service: Cardiology;  Laterality: Left;    LUMBAR FUSION  11/09/2011    Anterior approach--L5-S1    LUNG BIOPSY Right 07/21/2009    VATS with wedge ofr right lower lobe    LUNG BIOPSY Left 3/16/2020    Procedure: BIOPSY, LUNG;  Surgeon: [facility] Diagnostic Provider;  Location: [facility] OR;  Service: General;  Laterality: Left;    PERCUTANEOUS TRANSCATHETER AORTIC VALVE REPLACEMENT (TAVR) Left 10/30/2018    Procedure: REPLACEMENT, AORTIC VALVE, PERCUTANEOUS, TRANSCATHETER;  Surgeon: Rex Chris MD;  Location: [facility];  Service: Cardiology;  Laterality: Left;    PERCUTANEOUS TRANSCATHETER AORTIC VALVE REPLACEMENT (TAVR)  10/30/2018    Procedure: REPLACEMENT, AORTIC VALVE, PERCUTANEOUS, TRANSCATHETER;  Surgeon: Tim Castillo MD;  Location:  "[facility] OR;  Service: Cardiovascular;;    RIGHT HEART CATHETERIZATION Right 8/31/2018    Procedure: INSERTION, CATHETER, RIGHT HEART;  Surgeon: Rex Chris MD;  Location: [facility] CATH;  Service: Cardiology;  Laterality: Right;    ROTATOR CUFF REPAIR Left     SINUS SURGERY      SPINE SURGERY      back surgery    TENDON RELEASE Bilateral     TONSILLECTOMY         Review of patient's allergies indicates:   Allergen Reactions    Sulfur Itching    Penicillins Swelling and Rash       No current facility-administered medications on file prior to encounter.     Current Outpatient Medications on File Prior to Encounter   Medication Sig    albuterol (VENTOLIN HFA) 90 mcg/actuation inhaler Inhale 1-2 puffs into the lungs every 6 (six) hours as needed for Wheezing or Shortness of Breath. Rescue    aspirin 81 MG Chew Take 1 tablet (81 mg total) by mouth once daily.    atorvastatin (LIPITOR) 40 MG tablet Take 1 tablet (40 mg total) by mouth once daily.    clopidogreL (PLAVIX) 75 mg tablet Take 1 tablet (75 mg total) by mouth once daily.    ipratropium (ATROVENT) 42 mcg (0.06 %) nasal spray ipratropium bromide 42 mcg (0.06 %) nasal spray    meclizine (ANTIVERT) 12.5 mg tablet Take 2 tablets (25 mg total) by mouth 3 (three) times daily.    memantine (NAMENDA) 10 MG Tab Take 10 mg by mouth once daily.    metoprolol succinate (TOPROL-XL) 25 MG 24 hr tablet Take 1 tablet (25 mg total) by mouth every evening.    MYRBETRIQ 25 mg Tb24 ER tablet Take 25 mg by mouth once daily. 11/23/20    nitroGLYCERIN (NITROSTAT) 0.4 MG SL tablet Place 1 tablet (0.4 mg total) under the tongue every 5 (five) minutes as needed for Chest pain.    ondansetron (ZOFRAN-ODT) 8 MG TbDL Take 1 tablet (8 mg total) by mouth 3 (three) times daily as needed (for nausea).    pen needle, diabetic 32 gauge x 5/32" Ndle BD Adrienne 2nd Gen Pen Needle 32 gauge x 5/32"   USE 1 PEN NEEDLE ONCE DAILY USE AS DIRECTED    SOLIQUA 100/33 100 unit-33 mcg/mL " InPn pen INJECT 30 UNITS SUBCUTANEOUSLY ONCE DAILY    tamsulosin (FLOMAX) 0.4 mg Cap Take 0.4 mg by mouth once daily.    torsemide (DEMADEX) 10 MG Tab Take 1 tablet (10 mg total) by mouth once daily.    UNABLE TO FIND medication name: prevagen extra strenght daily     Family History       Problem Relation (Age of Onset)    Cancer Mother    Diabetes Brother    Heart disease Father, Brother          Tobacco Use    Smoking status: Former     Packs/day: 1.50     Years: 20.00     Pack years: 30.00     Types: Cigarettes     Start date:      Quit date:      Years since quittin.0    Smokeless tobacco: Never   Substance and Sexual Activity    Alcohol use: No    Drug use: No    Sexual activity: Not on file     Review of Systems   Constitutional:  Negative for chills and fever.   HENT:  Negative for congestion and sore throat.    Respiratory:  Negative for cough and shortness of breath (with exertion).    Cardiovascular:  Negative for chest pain.   Gastrointestinal:  Negative for abdominal pain, diarrhea, nausea and vomiting.   Genitourinary:  Negative for dysuria and hematuria.   Skin:  Negative for rash.   Neurological:  Positive for weakness. Negative for dizziness, syncope and light-headedness.        Left arm weakness getting better   Psychiatric/Behavioral:  Positive for decreased concentration.    Objective:     Vital Signs (Most Recent):  Temp: 97.7 °F (36.5 °C) (23)  Pulse: 82 (23)  Resp: 16 (23)  BP: (!) 141/66 (23)  SpO2: 97 % (23)   Vital Signs (24h Range):  Temp:  [97.1 °F (36.2 °C)-97.9 °F (36.6 °C)] 97.7 °F (36.5 °C)  Pulse:  [70-89] 82  Resp:  [16-20] 16  SpO2:  [93 %-97 %] 97 %  BP: (128-161)/(57-68) 141/66     Weight: 67.3 kg (148 lb 5.9 oz)  Body mass index is 23.24 kg/m².    Physical Exam  Vitals and nursing note reviewed.   Constitutional:       General: He is not in acute distress.     Appearance: He is well-developed. He is not  toxic-appearing.   HENT:      Head: Normocephalic and atraumatic.      Right Ear: External ear normal.      Left Ear: External ear normal.      Nose:      Comments: O2 in place  Eyes:      Conjunctiva/sclera: Conjunctivae normal.      Pupils: Pupils are equal, round, and reactive to light.   Neck:      Thyroid: No thyromegaly.   Cardiovascular:      Rate and Rhythm: Normal rate and regular rhythm.      Heart sounds: Normal heart sounds. No murmur heard.  Pulmonary:      Effort: Pulmonary effort is normal. No respiratory distress.      Breath sounds: Rales (bibasilar) present. No wheezing.   Abdominal:      General: Bowel sounds are normal. There is no distension.      Palpations: Abdomen is soft.      Tenderness: There is no abdominal tenderness.   Musculoskeletal:         General: Normal range of motion.      Cervical back: Normal range of motion and neck supple.      Right lower leg: No edema.      Left lower leg: No edema.   Lymphadenopathy:      Cervical: No cervical adenopathy.   Skin:     General: Skin is warm and dry.      Findings: No rash.   Neurological:      Mental Status: He is alert. Mental status is at baseline. He is disoriented.      Cranial Nerves: No cranial nerve deficit.      Motor: No weakness.      Comments: Left arm weakness much improved   Psychiatric:         Mood and Affect: Mood normal.         Behavior: Behavior normal.         Cognition and Memory: Cognition is impaired. Memory is impaired.         CRANIAL NERVES     CN III, IV, VI   Pupils are equal, round, and reactive to light.     Significant Labs: All pertinent labs within the past 24 hours have been reviewed.  A1C:   Recent Labs   Lab 12/14/22  1552   HGBA1C 6.7*       ABGs: No results for input(s): PH, PCO2, HCO3, POCSATURATED, BE, TOTALHB, COHB, METHB, O2HB, POCFIO2, PO2 in the last 48 hours.  Bilirubin:   Recent Labs   Lab 12/29/22  0554 12/31/22  0356 01/02/23  0621 01/03/23  0554 01/05/23  0604   BILITOT 0.8 0.7 0.6 0.6 0.4        Blood Culture:   No results for input(s): LABBLOO in the last 48 hours.    CBC:   Recent Labs   Lab 01/05/23  0604   WBC 3.84*   HGB 11.4*   HCT 33.9*          CMP:   Recent Labs   Lab 01/05/23  0604      K 4.3      CO2 27   *   BUN 40*   CREATININE 1.3   CALCIUM 9.1   PROT 5.4*   ALBUMIN 2.5*   BILITOT 0.4   ALKPHOS 71   AST 19   ALT 17   ANIONGAP 8       Cardiac Markers: No results for input(s): CKMB, MYOGLOBIN, BNP, TROPISTAT in the last 48 hours.  Lactic Acid:   No results for input(s): LACTATE in the last 48 hours.    Magnesium: No results for input(s): MG in the last 48 hours.  POCT Glucose:   Recent Labs   Lab 01/05/23  1614 01/05/23  2120 01/06/23  0156   POCTGLUCOSE 399* 267* 219*       Troponin: No results for input(s): TROPONINI, TROPONINIHS in the last 48 hours.  TSH:   Recent Labs   Lab 12/14/22  1009   TSH 4.401*       Urine Culture: No results for input(s): LABURIN in the last 48 hours.  Urine Studies:   Recent Labs   Lab 01/05/23  1446   COLORU Yellow   APPEARANCEUA Clear   PHUR 6.0   SPECGRAV 1.015   PROTEINUA Trace*   GLUCUA 3+*   KETONESU Negative   BILIRUBINUA Negative   OCCULTUA Trace*   NITRITE Negative   UROBILINOGEN 1.0   LEUKOCYTESUR Negative   RBCUA 0   WBCUA 60*   BACTERIA Few*       Significant Imaging: I have reviewed and interpreted all pertinent imaging results/findings within the past 24 hours.  Consolidative opacity in the left lower lobe consistent with known lipoid pneumonia.  Left greater than right hazy airspace opacities, could be due to multifocal infection, aspiration or edema.           Assessment/Plan:      * COVID-19  Patient is identified as Severe COVID-19 based on hypoxemia with O2 saturations <94% on room air or on ambulation   Initiate standard COVID protocols; COVID-19 testing Collection Date: 1/1/2023 Collection Time:  12:34 PM ,Infection Control notification  and isolation- respiratory, contact and droplet per protocol    Diagnostics:  (leukopenia, hyponatremia, hyperferritinemia, elevated troponin, elevated d-dimer, age, and comorbidities are significant predictors of poor clinical outcome)  CBC, CMP, Ferritin, CRP and Portable CXR    Management: Inhaled bronchodilators as needed for shortness of breath.   Day 4 Remdesivir, dexamehtasone and levofloxacin   Afebrile, WBC low, CXR reviewed, procal normal ; all appears viral  Repeat labs in am     1/6-treatment for COVID completed.  Discontinue Decadron, remdesivir    Debility  Continue PT/OT post CVA   1/5/23 Gait: Minimal Assistance x ~25 feet with RW and O2 supplement . Fatigues easily  Patient will be admitted to the nursing home on Monday.      Severe malnutrition in the context of acute on chronic illness  Nutrition consulted. Most recent weight and BMI monitored-     Malnutrition Type and Energy Intake  Malnutrition Type: acute illness or injury  Energy Intake: moderate energy intake    Malnutrition (Moderate to Severe)  Weight Loss (Malnutrition): greater than 7.5% in 3 months  Energy Intake (Malnutrition): less than 75% for greater than or equal to 1 month  Subcutaneous Fat (Malnutrition): moderate depletion  Muscle Mass (Malnutrition): moderate depletion    Final Summary  Subcutaneous Fat Loss (Final Summary): severe protein-calorie malnutrition  Muscle Loss Evaluation (Final Summary): severe protein-calorie malnutrition    Malnutrition Final Summary  Severe Weight Loss (Malnutrition): greater than 7.5% in 3 months    Measurements:  Wt Readings from Last 1 Encounters:   01/06/23 67.3 kg (148 lb 5.9 oz)   Body mass index is 23.24 kg/m².    Recommendations: Recommendation/Intervention: 1. Rec'd continue diabetic and cardiac diet. 2. Rec'd encouraging Boost Glucose Control Chocolate provided during mealtimes for decreased PO intake, decreased appetite, and increased nutritional needs. 3. Rec'd honoring pt's food preferences within diet order to encourage PO intake. 4. RD to follow and make  rec's accordingly.  Goals: Pt will meet at least 75% ENN by RD follow up.    Patient has been screened and assessed by RD. RD will follow patient.      BPH (benign prostatic hyperplasia)  Continue flomax       Dementia  Continue namenda       Cerebrovascular accident (CVA) due to thrombosis of right middle cerebral artery  [place] [facility] for debility skilled care intially  Continue asa, plavix statin  PT/OT       Follicular lymphoma  H/o follicular lymphoma per chart (grade 3a, stage 1); receiving rituxan (cycle 3 in 11/2022), 10/2022 PET scan positive for 3 cm lymphadenopathy in left axillary region otherwise HUSSEIN.        -Will need follow up visit once discharge (per last hem/onc note follow up indicated around 12/17/22, delayed due to current admission for stroke) .     1/6 Pt with dementia and advance age; will not pursue any cancer treatments going forward     Chronic diastolic heart failure, NYHA class 2  -TTE 12/15/2022 w/ EF 68%  -GDMT when appropriate (will restart slowly with metoprolol Saturday Dec 24th and then loop diuretic on Mon Dec 26th)   -Follow up with PCP / cardiologist in outpatient setting.      Chronic kidney disease, stage 3 (moderate)  Monitor BUN/SCr.  Monitor I/Os.  Monitor electrolytes.    Avoid non-steroidal anti-inflammatory medications.  Stable       Hypertension  Stroke risk factor.  SBP <180, MAP >65; remains at goal   Avoid hypotension in the setting of small vessel disease   Holding lopressor for now given hypotension during this admission in the setting of an acute stroke, will resume on Sat Dec 24th  Bp today 123/64-HR 84.     Torsemide has been on hold due to ^ Creat now stable  1/4 P 111/57- 131/64- HR 60s well controlled   1/5 140/65    Hyperlipidemia  Continue statin       Hypothyroidism  Continue synthroid       Type 2 diabetes mellitus  Patient's FSGs are controlled on current medication regimen.  Last A1c reviewed-   Lab Results   Component Value  Date    HGBA1C 6.7 (H) 12/14/2022     Most recent fingerstick glucose reviewed-   Recent Labs   Lab 01/05/23  1130 01/05/23  1614 01/05/23  2120 01/06/23  0156   POCTGLUCOSE 298* 399* 267* 219*     Current correctional scale  Low  Maintain anti-hyperglycemic dose as follows-   Antihyperglycemics (From admission, onward)    Start     Stop Route Frequency Ordered    01/05/23 2100  insulin detemir U-100 pen 20 Units         -- SubQ Nightly 01/05/23 1238    01/05/23 1339  insulin aspart U-100 pen 0-5 Units         -- SubQ Before meals & nightly PRN 01/05/23 1240        Hold Oral hypoglycemics while patient is in the hospital.    1/6 last dose of dexamethasone today ; getting insulin 20 units nightly   bs 258 this am; expect BS to improve after stoppign dexamethasone   Will watch BS over weekend to calculate needs and adjust once off steroids       VTE Risk Mitigation (From admission, onward)         Ordered     enoxaparin injection 30 mg  Daily         01/03/23 1248                Discharge Planning   JOSE A:      Code Status: Full Code   Is the patient medically ready for discharge?:     Reason for patient still in hospital (select all that apply): Patient trending condition and Treatment  Discharge Plan A: Skilled Nursing Facility   Discharge Delays: None known at this time              Destin Jimenez MD  Department of Hospital Medicine   [facility] (3rd Fl)

## 2023-01-06 NOTE — ASSESSMENT & PLAN NOTE
Patient is identified as Severe COVID-19 based on hypoxemia with O2 saturations <94% on room air or on ambulation   Initiate standard COVID protocols; COVID-19 testing Collection Date: 1/1/2023 Collection Time:  12:34 PM ,Infection Control notification  and isolation- respiratory, contact and droplet per protocol    Diagnostics: (leukopenia, hyponatremia, hyperferritinemia, elevated troponin, elevated d-dimer, age, and comorbidities are significant predictors of poor clinical outcome)  CBC, CMP, Ferritin, CRP and Portable CXR    Management: Inhaled bronchodilators as needed for shortness of breath.   Day 4 Remdesivir, dexamehtasone and levofloxacin   Afebrile, WBC low, CXR reviewed, procal normal ; all appears viral  Repeat labs in am     1/6-treatment for COVID completed.  Discontinue Decadron, remdesivir

## 2023-01-06 NOTE — ASSESSMENT & PLAN NOTE
Continue PT/OT post CVA   1/5/23 Gait: Minimal Assistance x ~25 feet with RW and O2 supplement . Fatigues easily  Patient will be admitted to the nursing home on Monday.

## 2023-01-06 NOTE — PLAN OF CARE
Problem: Diabetes Comorbidity  Goal: Blood Glucose Level Within Targeted Range  Outcome: Ongoing, Not Progressing    Blood glucose remains high. Additional insulin ordered.      Problem: Adult Inpatient Plan of Care  Goal: Plan of Care Review  Outcome: Ongoing, Progressing  Goal: Optimal Comfort and Wellbeing  Outcome: Ongoing, Progressing     Problem: Impaired Wound Healing  Goal: Optimal Wound Healing  Outcome: Ongoing, Progressing     Problem: Fall Injury Risk  Goal: Absence of Fall and Fall-Related Injury  Outcome: Ongoing, Progressing     Problem: Skin Injury Risk Increased  Goal: Skin Health and Integrity  Outcome: Ongoing, Progressing

## 2023-01-06 NOTE — ASSESSMENT & PLAN NOTE
H/o follicular lymphoma per chart (grade 3a, stage 1); receiving rituxan (cycle 3 in 11/2022), 10/2022 PET scan positive for 3 cm lymphadenopathy in left axillary region otherwise HUSSEIN.        -Will need follow up visit once discharge (per last hem/onc note follow up indicated around 12/17/22, delayed due to current admission for stroke) .     1/6 Pt with dementia and advance age; will not pursue any cancer treatments going forward

## 2023-01-06 NOTE — ASSESSMENT & PLAN NOTE
Stroke risk factor.  SBP <180, MAP >65; remains at goal   Avoid hypotension in the setting of small vessel disease   Holding lopressor for now given hypotension during this admission in the setting of an acute stroke, will resume on Sat Dec 24th  Bp today 123/64-HR 84.     Torsemide has been on hold due to ^ Creat now stable  1/4 P 111/57- 131/64- HR 60s well controlled   1/5 140/65

## 2023-01-07 NOTE — ASSESSMENT & PLAN NOTE
Addendum  created 01/31/20 0712 by Kaylah Evans MD    Clinical Note Signed       Patient's FSGs are controlled on current medication regimen.  Last A1c reviewed-   Lab Results   Component Value Date    HGBA1C 6.7 (H) 12/14/2022     Most recent fingerstick glucose reviewed-   Recent Labs   Lab 01/06/23  1117 01/06/23  1618 01/06/23  1931 01/07/23  0722   POCTGLUCOSE 258* 313* 435* 84     Current correctional scale  Low  Maintain anti-hyperglycemic dose as follows-   Antihyperglycemics (From admission, onward)    Start     Stop Route Frequency Ordered    01/07/23 0715  insulin aspart U-100 pen 5 Units         -- SubQ 3 times daily with meals 01/06/23 2001 01/06/23 2100  insulin detemir U-100 pen 30 Units         -- SubQ Nightly 01/06/23 1959 01/05/23 1339  insulin aspart U-100 pen 0-5 Units         -- SubQ Before meals & nightly PRN 01/05/23 1240        Hold Oral hypoglycemics while patient is in the hospital.    1/6 last dose of dexamethasone today ; getting insulin 20 units nightly   bs 258 this am; expect BS to improve after stoppign dexamethasone   Will watch BS over weekend to calculate needs and adjust once off steroids     1/7 patient had a high glucose of 435 yesterday.  Extra insulin was given.  Now his blood sugars have improved.  We will continue the basal bolus and sliding scale.  The high blood sugar was probably caused by the Decadron.  It has now been stopped.  I will lower the insulin dosage as I do not want hypoglycemia to occur.

## 2023-01-07 NOTE — ASSESSMENT & PLAN NOTE
Patient is identified as Severe COVID-19 based on hypoxemia with O2 saturations <94% on room air or on ambulation   Initiate standard COVID protocols; COVID-19 testing Collection Date: 1/1/2023 Collection Time:  12:34 PM ,Infection Control notification  and isolation- respiratory, contact and droplet per protocol    Diagnostics: (leukopenia, hyponatremia, hyperferritinemia, elevated troponin, elevated d-dimer, age, and comorbidities are significant predictors of poor clinical outcome)  CBC, CMP, Ferritin, CRP and Portable CXR    Management: Inhaled bronchodilators as needed for shortness of breath.   Day 4 Remdesivir, dexamehtasone and levofloxacin   Afebrile, WBC low, CXR reviewed, procal normal ; all appears viral  Repeat labs in am     1/6-treatment for COVID completed.  Discontinue Decadron, remdesivir  1/7-no more COVID symptoms.

## 2023-01-07 NOTE — ASSESSMENT & PLAN NOTE
Nutrition consulted. Most recent weight and BMI monitored-     Malnutrition Type and Energy Intake  Malnutrition Type: acute illness or injury  Energy Intake: moderate energy intake    Malnutrition (Moderate to Severe)  Weight Loss (Malnutrition): greater than 7.5% in 3 months  Energy Intake (Malnutrition): less than 75% for greater than or equal to 1 month  Subcutaneous Fat (Malnutrition): moderate depletion  Muscle Mass (Malnutrition): moderate depletion    Final Summary  Subcutaneous Fat Loss (Final Summary): severe protein-calorie malnutrition  Muscle Loss Evaluation (Final Summary): severe protein-calorie malnutrition    Malnutrition Final Summary  Severe Weight Loss (Malnutrition): greater than 7.5% in 3 months    Measurements:  Wt Readings from Last 1 Encounters:   01/07/23 66.9 kg (147 lb 7.8 oz)   Body mass index is 23.1 kg/m².    Recommendations: Recommendation/Intervention: 1. Rec'd continue diabetic and cardiac diet. 2. Rec'd encouraging Boost Glucose Control Chocolate provided during mealtimes for decreased PO intake, decreased appetite, and increased nutritional needs. 3. Rec'd honoring pt's food preferences within diet order to encourage PO intake. 4. RD to follow and make rec's accordingly.  Goals: Pt will meet at least 75% ENN by RD follow up.    Patient has been screened and assessed by RD. RD will follow patient.

## 2023-01-07 NOTE — SUBJECTIVE & OBJECTIVE
Past Medical History:   Diagnosis Date    Abnormal barium swallow     Alzheimer's dementia, late onset     Anal stenosis     Anticoagulant long-term use     Aortic stenosis     Arthritis     Aspiration pneumonitis     Benign prostatic hyperplasia     Carotid artery disease     CHF (congestive heart failure)     Chronic diastolic heart failure     Chronic kidney disease (CKD), stage III (moderate)     CKD (chronic kidney disease)     Colon polyp     COPD (chronic obstructive pulmonary disease)     Coronary artery disease     Diabetes mellitus, type 2     Diverticulosis     Dysphagia     Hearing aid worn 01/23/2020    Received bilateral hearing aides today    Heart disease     History of CEA (carotid endarterectomy)     [place] (hard of hearing)     Hyperlipidemia     Hypertension     Hypertensive heart disease     Hypothyroidism     Joint disease     Memory loss     PAD (peripheral artery disease)     Presence of drug coated stent in right coronary artery     Pulmonary hypertension     S/P TAVR (transcatheter aortic valve replacement)     Small bowel obstruction     Wears dentures     UPPER AND LOWER    Wears glasses        Past Surgical History:   Procedure Laterality Date    anal fissure repair      ANKLE FUSION Left 08/15/2016    X 2    APPENDECTOMY      BACK SURGERY      X 4    CAROTID ENDARTERECTOMY Right     CAROTID ENDARTERECTOMY Left     CAROTID ENDARTERECTOMY Left 12/3/2021    Procedure: ENDARTERECTOMY-CAROTID;  Surgeon: Tim Castillo MD;  [facility];  Service: Cardiology;  Laterality: Left;  pt unsure if he stopped plavix, Dr. Castillo ok to proceed    CARPAL TUNNEL RELEASE Right     CHOLECYSTECTOMY      COLONOSCOPY N/A 7/26/2018    Procedure: HIGH RISK SCREENING COLONOSCOPY;  Surgeon: Connor Murrell MD;  [facility];  Service: Endoscopy;  Laterality: N/A;    CORONARY ANGIOPLASTY WITH STENT PLACEMENT      ELBOW SURGERY Bilateral     EXCISIONAL HEMORRHOIDECTOMY      x3     EXPLORATORY LAPAROTOMY W/ BOWEL RESECTION  11/17/2011    EYE SURGERY      cataract bilaterally    history of bowel resection      KNEE SURGERY Left     LAMINECTOMY AND MICRODISCECTOMY LUMBAR SPINE  07/21/2011    L5-S1    LEFT HEART CATHETERIZATION Left 8/31/2018    Procedure: Left heart cath;  Surgeon: Rex Chris MD;  Location: [facility] CATH;  Service: Cardiology;  Laterality: Left;    LUMBAR FUSION  11/09/2011    Anterior approach--L5-S1    LUNG BIOPSY Right 07/21/2009    VATS with wedge ofr right lower lobe    LUNG BIOPSY Left 3/16/2020    Procedure: BIOPSY, LUNG;  Surgeon: [facility];  Location: [facility] OR;  Service: General;  Laterality: Left;    PERCUTANEOUS TRANSCATHETER AORTIC VALVE REPLACEMENT (TAVR) Left 10/30/2018    Procedure: REPLACEMENT, AORTIC VALVE, PERCUTANEOUS, TRANSCATHETER;  Surgeon: Rex Chris MD;  Location: [facility] OR;  Service: Cardiology;  Laterality: Left;    PERCUTANEOUS TRANSCATHETER AORTIC VALVE REPLACEMENT (TAVR)  10/30/2018    Procedure: REPLACEMENT, AORTIC VALVE, PERCUTANEOUS, TRANSCATHETER;  Surgeon: Tim Castillo MD;  Location: [facility] OR;  Service: Cardiovascular;;    RIGHT HEART CATHETERIZATION Right 8/31/2018    Procedure: INSERTION, CATHETER, RIGHT HEART;  Surgeon: Rex Chris MD;  Location: [facility] CATH;  Service: Cardiology;  Laterality: Right;    ROTATOR CUFF REPAIR Left     SINUS SURGERY      SPINE SURGERY      back surgery    TENDON RELEASE Bilateral     TONSILLECTOMY         Review of patient's allergies indicates:   Allergen Reactions    Sulfur Itching    Penicillins Swelling and Rash       No current facility-administered medications on file prior to encounter.     Current Outpatient Medications on File Prior to Encounter   Medication Sig    albuterol (VENTOLIN HFA) 90 mcg/actuation inhaler Inhale 1-2 puffs into the lungs every 6 (six) hours as needed for Wheezing or Shortness of Breath. Rescue    aspirin 81 MG Chew Take 1 tablet (81 mg  "total) by mouth once daily.    atorvastatin (LIPITOR) 40 MG tablet Take 1 tablet (40 mg total) by mouth once daily.    clopidogreL (PLAVIX) 75 mg tablet Take 1 tablet (75 mg total) by mouth once daily.    ipratropium (ATROVENT) 42 mcg (0.06 %) nasal spray ipratropium bromide 42 mcg (0.06 %) nasal spray    meclizine (ANTIVERT) 12.5 mg tablet Take 2 tablets (25 mg total) by mouth 3 (three) times daily.    memantine (NAMENDA) 10 MG Tab Take 10 mg by mouth once daily.    metoprolol succinate (TOPROL-XL) 25 MG 24 hr tablet Take 1 tablet (25 mg total) by mouth every evening.    MYRBETRIQ 25 mg Tb24 ER tablet Take 25 mg by mouth once daily. 20    nitroGLYCERIN (NITROSTAT) 0.4 MG SL tablet Place 1 tablet (0.4 mg total) under the tongue every 5 (five) minutes as needed for Chest pain.    ondansetron (ZOFRAN-ODT) 8 MG TbDL Take 1 tablet (8 mg total) by mouth 3 (three) times daily as needed (for nausea).    pen needle, diabetic 32 gauge x 5/32" Ndle BD Adrienne 2nd Gen Pen Needle 32 gauge x 5/32"   USE 1 PEN NEEDLE ONCE DAILY USE AS DIRECTED    SOLIQUA 100/33 100 unit-33 mcg/mL InPn pen INJECT 30 UNITS SUBCUTANEOUSLY ONCE DAILY    tamsulosin (FLOMAX) 0.4 mg Cap Take 0.4 mg by mouth once daily.    torsemide (DEMADEX) 10 MG Tab Take 1 tablet (10 mg total) by mouth once daily.    UNABLE TO FIND medication name: prevagen extra strenght daily     Family History       Problem Relation (Age of Onset)    Cancer Mother    Diabetes Brother    Heart disease Father, Brother          Tobacco Use    Smoking status: Former     Packs/day: 1.50     Years: 20.00     Pack years: 30.00     Types: Cigarettes     Start date:      Quit date:      Years since quittin.0    Smokeless tobacco: Never   Substance and Sexual Activity    Alcohol use: No    Drug use: No    Sexual activity: Not on file     Review of Systems   Unable to perform ROS: Dementia   Objective:     Vital Signs (Most Recent):  Temp: 98.1 °F (36.7 °C) (23 " 0736)  Pulse: 80 (01/07/23 0736)  Resp: 18 (01/07/23 0736)  BP: (!) 128/58 (01/07/23 0736)  SpO2: 96 % (01/07/23 0736)   Vital Signs (24h Range):  Temp:  [97.3 °F (36.3 °C)-99.8 °F (37.7 °C)] 98.1 °F (36.7 °C)  Pulse:  [79-99] 80  Resp:  [16-22] 18  SpO2:  [89 %-98 %] 96 %  BP: (122-156)/(58-70) 128/58     Weight: 66.9 kg (147 lb 7.8 oz)  Body mass index is 23.1 kg/m².    Physical Exam  Vitals and nursing note reviewed.   Constitutional:       General: He is not in acute distress.     Appearance: He is well-developed. He is not toxic-appearing.   HENT:      Head: Normocephalic and atraumatic.      Right Ear: External ear normal.      Left Ear: External ear normal.      Nose:      Comments: O2 in place  Eyes:      Conjunctiva/sclera: Conjunctivae normal.      Pupils: Pupils are equal, round, and reactive to light.   Neck:      Thyroid: No thyromegaly.   Cardiovascular:      Rate and Rhythm: Normal rate and regular rhythm.      Heart sounds: Normal heart sounds. No murmur heard.  Pulmonary:      Effort: Pulmonary effort is normal. No respiratory distress.      Breath sounds: No wheezing or rales (bibasilar).   Abdominal:      General: Bowel sounds are normal. There is no distension.      Palpations: Abdomen is soft.      Tenderness: There is no abdominal tenderness.   Musculoskeletal:         General: Normal range of motion.      Cervical back: Normal range of motion and neck supple.      Right lower leg: No edema.      Left lower leg: No edema.   Lymphadenopathy:      Cervical: No cervical adenopathy.   Skin:     General: Skin is warm and dry.      Findings: No rash.   Neurological:      Mental Status: He is alert. Mental status is at baseline. He is disoriented.      Cranial Nerves: No cranial nerve deficit.      Motor: No weakness.      Comments: Left arm weakness much improved   Psychiatric:         Mood and Affect: Affect is flat.         Speech: Speech is delayed.         Behavior: Behavior is slowed. Behavior  is not agitated or aggressive.         Cognition and Memory: Cognition is impaired. Memory is impaired.         CRANIAL NERVES     CN III, IV, VI   Pupils are equal, round, and reactive to light.     Significant Labs: All pertinent labs within the past 24 hours have been reviewed.  A1C:   Recent Labs   Lab 12/14/22  1552   HGBA1C 6.7*       ABGs: No results for input(s): PH, PCO2, HCO3, POCSATURATED, BE, TOTALHB, COHB, METHB, O2HB, POCFIO2, PO2 in the last 48 hours.  Bilirubin:   Recent Labs   Lab 12/29/22  0554 12/31/22  0356 01/02/23  0621 01/03/23  0554 01/05/23  0604   BILITOT 0.8 0.7 0.6 0.6 0.4       Blood Culture:   No results for input(s): LABBLOO in the last 48 hours.    CBC:   No results for input(s): WBC, HGB, HCT, PLT in the last 48 hours.    CMP:   No results for input(s): NA, K, CL, CO2, GLU, BUN, CREATININE, CALCIUM, PROT, ALBUMIN, BILITOT, ALKPHOS, AST, ALT, ANIONGAP, EGFRNONAA in the last 48 hours.    Invalid input(s): ESTGFAFR[place]    Cardiac Markers: No results for input(s): CKMB, MYOGLOBIN, BNP, TROPISTAT in the last 48 hours.  Lactic Acid:   No results for input(s): LACTATE in the last 48 hours.    Magnesium: No results for input(s): MG in the last 48 hours.  POCT Glucose:   Recent Labs   Lab 01/06/23  1618 01/06/23  1931 01/07/23  0722   POCTGLUCOSE 313* 435* 84       Troponin: No results for input(s): TROPONINI, TROPONINIHS in the last 48 hours.  TSH:   Recent Labs   Lab 12/14/22  1009   TSH 4.401*       Urine Culture: No results for input(s): LABURIN in the last 48 hours.  Urine Studies:   Recent Labs   Lab 01/05/23  1446   COLORU Yellow   APPEARANCEUA Clear   PHUR 6.0   SPECGRAV 1.015   PROTEINUA Trace*   GLUCUA 3+*   KETONESU Negative   BILIRUBINUA Negative   OCCULTUA Trace*   NITRITE Negative   UROBILINOGEN 1.0   LEUKOCYTESUR Negative   RBCUA 0   WBCUA 60*   BACTERIA Few*         Significant Imaging: I have reviewed and interpreted all pertinent imaging results/findings within the past 24  hours.  Consolidative opacity in the left lower lobe consistent with known lipoid pneumonia.  Left greater than right hazy airspace opacities, could be due to multifocal infection, aspiration or edema.

## 2023-01-07 NOTE — ASSESSMENT & PLAN NOTE
Continue PT/OT post CVA   Gait: Minimal Assistance x ~25 feet with RW and O2 supplement . Fatigues easily  Patient will be admitted to the nursing home on Monday.

## 2023-01-07 NOTE — PLAN OF CARE
Problem: Diabetes Comorbidity  Goal: Blood Glucose Level Within Targeted Range  Outcome: Ongoing, Progressing     Problem: Adult Inpatient Plan of Care  Goal: Plan of Care Review  Outcome: Ongoing, Progressing     Problem: Impaired Wound Healing  Goal: Optimal Wound Healing  Outcome: Ongoing, Progressing     Problem: Fall Injury Risk  Goal: Absence of Fall and Fall-Related Injury  Outcome: Ongoing, Progressing     Problem: Skin Injury Risk Increased  Goal: Skin Health and Integrity  Outcome: Ongoing, Progressing

## 2023-01-07 NOTE — PROGRESS NOTES
[facility] ([facility])  [facility] Medicine  Progress Note    Patient Name: Vega Kohler  MRN: [id number]  Patient Class: IP- Inpatient   Admission Date: 1/3/2023  Length of Stay: 4 days  Attending Physician: Destin Jimenez MD  Primary Care Provider: Fabricio Mckeon MD        Subjective:     Principal Problem:COVID-19      HPI:   88yo male patient with extensive medical hx. (Alzheimers, aortic stenosis, arthritis, BPH, CHF, CKD, COPD, CAD/PAD, DM2, HLD/HTN, pulm HTN, S/p TAVR and hypothyroid). He was treated acutely [facility] for ischemic CVA with left hemiplegia s/p TNK 22. Pt was independent at baseline prior to this event. Exam improved following TNK. MRI with R MCA infarct. Etiology likely . Patient stepped down to NPU to await SNF placement. Metoprolol originally started but held given etiology of stroke and risk of expansion with hypoperfusion. Will plan to resume meds this Saturday and then remainder of home BP meds on Monday. Patient was discharged on DAPT with outpatient follow up. Patient remains neurologically unchanged. SBP drop < 120 but no changes on exam. Last BM yesterday. He was brought to [facility] yesterday for continued SKILL therapy with PT / OT. On plavix and statin and asa. VSS- no bp meds 123/64         22 pt tested positive for COVID; staff noting patient more sluggish & Requiring oxygen ; CXR with infiltrates   BC negative, influenza negative  COVID POSITIVE-d/c to acute care    22 day 3 Remdesivir, dexamehtasone and levofloxacin   Afebrile, WBC low, CXR reviewed, procal normal ; all appears viral will stop levofloxacin              Overview/Hospital Course:  23 Vega Kohler is a 87 y.o. male  brought to [facility]  for continued SKILL therapy with PT / OT post CVA . On plavix and statin and asa. VSS- no bp meds w stable BP.   On  noted to require incresed oxygen and more sluggish , testing positive for COVID 19; discharged back to acute for tx;  Day 4 Remdesivir,  dexamethasone   O2 sat % on 2LNC, afebrile, WBC 2.78>3.84 , LFTs stable   Creat 1.5.>1.3    Gait: Minimal Assistance x ~25 feet with RW and O2 supplement . Fatigues easily    1/6/23 Vega Kohler is a 87 y.o. male  brought to [facility]  for continued SKILL therapy with PT / OT post CVA . On plavix and statin and asa. VSS- no bp meds w stable BP.   On 1/2 noted to require incresed oxygen and more sluggish , testing positive for COVID 19; discharged back to acute for tx;  Day 5 Remdesivir, dexamethasone complete today   O2 sat 93-97% on 1 LNC, ( Has home oxygen) afebrile,    Gait: Standby Assistance x ~80 feet( 1 rest period) with O2 supplement using RW. Fatigues at the end distance.  Plan for d/c to nursing home for skilled therapy on Monday with Юлия      1/7 - patient completed therapy for COVID.  He developed some delirium yesterday.  IM Zyprexa was given which seems to help.  He is doing better today.  Sometimes he gets agitated and threatened nursing staff.  Overall he is working with therapy.  I consulted psych and they feel this is delirium.  Since the Zyprexa helped they will sign off on the case.  Patient required extra insulin because his blood sugars spiked to 435.  This is probably from the Decadron.  Decadron has been discontinued.      Past Medical History:   Diagnosis Date    Abnormal barium swallow     Alzheimer's dementia, late onset     Anal stenosis     Anticoagulant long-term use     Aortic stenosis     Arthritis     Aspiration pneumonitis     Benign prostatic hyperplasia     Carotid artery disease     CHF (congestive heart failure)     Chronic diastolic heart failure     Chronic kidney disease (CKD), stage III (moderate)     CKD (chronic kidney disease)     Colon polyp     COPD (chronic obstructive pulmonary disease)     Coronary artery disease     Diabetes mellitus, type 2     Diverticulosis     Dysphagia     Hearing aid worn 01/23/2020    Received bilateral hearing  aides today    Heart disease     History of CEA (carotid endarterectomy)     [place] (hard of hearing)     Hyperlipidemia     Hypertension     Hypertensive heart disease     Hypothyroidism     Joint disease     Memory loss     PAD (peripheral artery disease)     Presence of drug coated stent in right coronary artery     Pulmonary hypertension     S/P TAVR (transcatheter aortic valve replacement)     Small bowel obstruction     Wears dentures     UPPER AND LOWER    Wears glasses        Past Surgical History:   Procedure Laterality Date    anal fissure repair      ANKLE FUSION Left 08/15/2016    X 2    APPENDECTOMY      BACK SURGERY      X 4    CAROTID ENDARTERECTOMY Right     CAROTID ENDARTERECTOMY Left     CAROTID ENDARTERECTOMY Left 12/3/2021    Procedure: ENDARTERECTOMY-CAROTID;  Surgeon: Tim Castillo MD;  Location: [facility] OR;  Service: Cardiology;  Laterality: Left;  pt unsure if he stopped plavix, Dr. Castillo ok to proceed    CARPAL TUNNEL RELEASE Right     CHOLECYSTECTOMY      COLONOSCOPY N/A 7/26/2018    Procedure: HIGH RISK SCREENING COLONOSCOPY;  Surgeon: Connor Murrell MD;  Location: [facility] ENDO;  Service: Endoscopy;  Laterality: N/A;    CORONARY ANGIOPLASTY WITH STENT PLACEMENT      ELBOW SURGERY Bilateral     EXCISIONAL HEMORRHOIDECTOMY      x3    EXPLORATORY LAPAROTOMY W/ BOWEL RESECTION  11/17/2011    EYE SURGERY      cataract bilaterally    history of bowel resection      KNEE SURGERY Left     LAMINECTOMY AND MICRODISCECTOMY LUMBAR SPINE  07/21/2011    L5-S1    LEFT HEART CATHETERIZATION Left 8/31/2018    Procedure: Left heart cath;  Surgeon: Rex Chris MD;  Location: [facility] CATH;  Service: Cardiology;  Laterality: Left;    LUMBAR FUSION  11/09/2011    Anterior approach--L5-S1    LUNG BIOPSY Right 07/21/2009    VATS with wedge ofr right lower lobe    LUNG BIOPSY Left 3/16/2020    Procedure: BIOPSY, LUNG;  Surgeon: [facility] Diagnostic Provider;   Location: [facility] OR;  Service: General;  Laterality: Left;    PERCUTANEOUS TRANSCATHETER AORTIC VALVE REPLACEMENT (TAVR) Left 10/30/2018    Procedure: REPLACEMENT, AORTIC VALVE, PERCUTANEOUS, TRANSCATHETER;  Surgeon: Rex Chris MD;  Location: [facility] OR;  Service: Cardiology;  Laterality: Left;    PERCUTANEOUS TRANSCATHETER AORTIC VALVE REPLACEMENT (TAVR)  10/30/2018    Procedure: REPLACEMENT, AORTIC VALVE, PERCUTANEOUS, TRANSCATHETER;  Surgeon: Tim Castillo MD;  Location: [facility] OR;  Service: Cardiovascular;;    RIGHT HEART CATHETERIZATION Right 8/31/2018    Procedure: INSERTION, CATHETER, RIGHT HEART;  Surgeon: Rex Chris MD;  Location: [facility] CATH;  Service: Cardiology;  Laterality: Right;    ROTATOR CUFF REPAIR Left     SINUS SURGERY      SPINE SURGERY      back surgery    TENDON RELEASE Bilateral     TONSILLECTOMY         Review of patient's allergies indicates:   Allergen Reactions    Sulfur Itching    Penicillins Swelling and Rash       No current facility-administered medications on file prior to encounter.     Current Outpatient Medications on File Prior to Encounter   Medication Sig    albuterol (VENTOLIN HFA) 90 mcg/actuation inhaler Inhale 1-2 puffs into the lungs every 6 (six) hours as needed for Wheezing or Shortness of Breath. Rescue    aspirin 81 MG Chew Take 1 tablet (81 mg total) by mouth once daily.    atorvastatin (LIPITOR) 40 MG tablet Take 1 tablet (40 mg total) by mouth once daily.    clopidogreL (PLAVIX) 75 mg tablet Take 1 tablet (75 mg total) by mouth once daily.    ipratropium (ATROVENT) 42 mcg (0.06 %) nasal spray ipratropium bromide 42 mcg (0.06 %) nasal spray    meclizine (ANTIVERT) 12.5 mg tablet Take 2 tablets (25 mg total) by mouth 3 (three) times daily.    memantine (NAMENDA) 10 MG Tab Take 10 mg by mouth once daily.    metoprolol succinate (TOPROL-XL) 25 MG 24 hr tablet Take 1 tablet (25 mg total) by mouth every evening.    MYRBETRIQ 25 mg Tb24  "ER tablet Take 25 mg by mouth once daily. 20    nitroGLYCERIN (NITROSTAT) 0.4 MG SL tablet Place 1 tablet (0.4 mg total) under the tongue every 5 (five) minutes as needed for Chest pain.    ondansetron (ZOFRAN-ODT) 8 MG TbDL Take 1 tablet (8 mg total) by mouth 3 (three) times daily as needed (for nausea).    pen needle, diabetic 32 gauge x 5/32" Ndle BD Adrienne 2nd Gen Pen Needle 32 gauge x 5/32"   USE 1 PEN NEEDLE ONCE DAILY USE AS DIRECTED    SOLIQUA 100/33 100 unit-33 mcg/mL InPn pen INJECT 30 UNITS SUBCUTANEOUSLY ONCE DAILY    tamsulosin (FLOMAX) 0.4 mg Cap Take 0.4 mg by mouth once daily.    torsemide (DEMADEX) 10 MG Tab Take 1 tablet (10 mg total) by mouth once daily.    UNABLE TO FIND medication name: prevagen extra strenght daily     Family History       Problem Relation (Age of Onset)    Cancer Mother    Diabetes Brother    Heart disease Father, Brother          Tobacco Use    Smoking status: Former     Packs/day: 1.50     Years: 20.00     Pack years: 30.00     Types: Cigarettes     Start date:      Quit date:      Years since quittin.0    Smokeless tobacco: Never   Substance and Sexual Activity    Alcohol use: No    Drug use: No    Sexual activity: Not on file     Review of Systems   Unable to perform ROS: Dementia   Objective:     Vital Signs (Most Recent):  Temp: 98.1 °F (36.7 °C) (23)  Pulse: 80 (23)  Resp: 18 (23)  BP: (!) 128/58 (23)  SpO2: 96 % (23)   Vital Signs (24h Range):  Temp:  [97.3 °F (36.3 °C)-99.8 °F (37.7 °C)] 98.1 °F (36.7 °C)  Pulse:  [79-99] 80  Resp:  [16-22] 18  SpO2:  [89 %-98 %] 96 %  BP: (122-156)/(58-70) 128/58     Weight: 66.9 kg (147 lb 7.8 oz)  Body mass index is 23.1 kg/m².    Physical Exam  Vitals and nursing note reviewed.   Constitutional:       General: He is not in acute distress.     Appearance: He is well-developed. He is not toxic-appearing.   HENT:      Head: Normocephalic and " atraumatic.      Right Ear: External ear normal.      Left Ear: External ear normal.      Nose:      Comments: O2 in place  Eyes:      Conjunctiva/sclera: Conjunctivae normal.      Pupils: Pupils are equal, round, and reactive to light.   Neck:      Thyroid: No thyromegaly.   Cardiovascular:      Rate and Rhythm: Normal rate and regular rhythm.      Heart sounds: Normal heart sounds. No murmur heard.  Pulmonary:      Effort: Pulmonary effort is normal. No respiratory distress.      Breath sounds: No wheezing or rales (bibasilar).   Abdominal:      General: Bowel sounds are normal. There is no distension.      Palpations: Abdomen is soft.      Tenderness: There is no abdominal tenderness.   Musculoskeletal:         General: Normal range of motion.      Cervical back: Normal range of motion and neck supple.      Right lower leg: No edema.      Left lower leg: No edema.   Lymphadenopathy:      Cervical: No cervical adenopathy.   Skin:     General: Skin is warm and dry.      Findings: No rash.   Neurological:      Mental Status: He is alert. Mental status is at baseline. He is disoriented.      Cranial Nerves: No cranial nerve deficit.      Motor: No weakness.      Comments: Left arm weakness much improved   Psychiatric:         Mood and Affect: Affect is flat.         Speech: Speech is delayed.         Behavior: Behavior is slowed. Behavior is not agitated or aggressive.         Cognition and Memory: Cognition is impaired. Memory is impaired.         CRANIAL NERVES     CN III, IV, VI   Pupils are equal, round, and reactive to light.     Significant Labs: All pertinent labs within the past 24 hours have been reviewed.  A1C:   Recent Labs   Lab 12/14/22  1552   HGBA1C 6.7*       ABGs: No results for input(s): PH, PCO2, HCO3, POCSATURATED, BE, TOTALHB, COHB, METHB, O2HB, POCFIO2, PO2 in the last 48 hours.  Bilirubin:   Recent Labs   Lab 12/29/22  0554 12/31/22  0356 01/02/23  0621 01/03/23  0554 01/05/23  0604   BILITOT  0.8 0.7 0.6 0.6 0.4       Blood Culture:   No results for input(s): LABBLOO in the last 48 hours.    CBC:   No results for input(s): WBC, HGB, HCT, PLT in the last 48 hours.    CMP:   No results for input(s): NA, K, CL, CO2, GLU, BUN, CREATININE, CALCIUM, PROT, ALBUMIN, BILITOT, ALKPHOS, AST, ALT, ANIONGAP, EGFRNONAA in the last 48 hours.    Invalid input(s): ESTGFAFRICA    Cardiac Markers: No results for input(s): CKMB, MYOGLOBIN, BNP, TROPISTAT in the last 48 hours.  Lactic Acid:   No results for input(s): LACTATE in the last 48 hours.    Magnesium: No results for input(s): MG in the last 48 hours.  POCT Glucose:   Recent Labs   Lab 01/06/23  1618 01/06/23  1931 01/07/23  0722   POCTGLUCOSE 313* 435* 84       Troponin: No results for input(s): TROPONINI, TROPONINIHS in the last 48 hours.  TSH:   Recent Labs   Lab 12/14/22  1009   TSH 4.401*       Urine Culture: No results for input(s): LABURIN in the last 48 hours.  Urine Studies:   Recent Labs   Lab 01/05/23  1446   COLORU Yellow   APPEARANCEUA Clear   PHUR 6.0   SPECGRAV 1.015   PROTEINUA Trace*   GLUCUA 3+*   KETONESU Negative   BILIRUBINUA Negative   OCCULTUA Trace*   NITRITE Negative   UROBILINOGEN 1.0   LEUKOCYTESUR Negative   RBCUA 0   WBCUA 60*   BACTERIA Few*         Significant Imaging: I have reviewed and interpreted all pertinent imaging results/findings within the past 24 hours.  Consolidative opacity in the left lower lobe consistent with known lipoid pneumonia.  Left greater than right hazy airspace opacities, could be due to multifocal infection, aspiration or edema.           Assessment/Plan:      * COVID-19  Patient is identified as Severe COVID-19 based on hypoxemia with O2 saturations <94% on room air or on ambulation   Initiate standard COVID protocols; COVID-19 testing Collection Date: 1/1/2023 Collection Time:  12:34 PM ,Infection Control notification  and isolation- respiratory, contact and droplet per protocol    Diagnostics: (leukopenia,  hyponatremia, hyperferritinemia, elevated troponin, elevated d-dimer, age, and comorbidities are significant predictors of poor clinical outcome)  CBC, CMP, Ferritin, CRP and Portable CXR    Management: Inhaled bronchodilators as needed for shortness of breath.   Day 4 Remdesivir, dexamehtasone and levofloxacin   Afebrile, WBC low, CXR reviewed, procal normal ; all appears viral  Repeat labs in am     1/6-treatment for COVID completed.  Discontinue Decadron, remdesivir  1/7-no more COVID symptoms.    Debility  Continue PT/OT post CVA   Gait: Minimal Assistance x ~25 feet with RW and O2 supplement . Fatigues easily  Patient will be admitted to the nursing home on Monday.      Severe malnutrition in the context of acute on chronic illness  Nutrition consulted. Most recent weight and BMI monitored-     Malnutrition Type and Energy Intake  Malnutrition Type: acute illness or injury  Energy Intake: moderate energy intake    Malnutrition (Moderate to Severe)  Weight Loss (Malnutrition): greater than 7.5% in 3 months  Energy Intake (Malnutrition): less than 75% for greater than or equal to 1 month  Subcutaneous Fat (Malnutrition): moderate depletion  Muscle Mass (Malnutrition): moderate depletion    Final Summary  Subcutaneous Fat Loss (Final Summary): severe protein-calorie malnutrition  Muscle Loss Evaluation (Final Summary): severe protein-calorie malnutrition    Malnutrition Final Summary  Severe Weight Loss (Malnutrition): greater than 7.5% in 3 months    Measurements:  Wt Readings from Last 1 Encounters:   01/07/23 66.9 kg (147 lb 7.8 oz)   Body mass index is 23.1 kg/m².    Recommendations: Recommendation/Intervention: 1. Rec'd continue diabetic and cardiac diet. 2. Rec'd encouraging Boost Glucose Control Chocolate provided during mealtimes for decreased PO intake, decreased appetite, and increased nutritional needs. 3. Rec'd honoring pt's food preferences within diet order to encourage PO intake. 4. RD to follow and  make rec's accordingly.  Goals: Pt will meet at least 75% ENN by RD follow up.    Patient has been screened and assessed by RD. RD will follow patient.      BPH (benign prostatic hyperplasia)  Continue flomax       Dementia  Continue namenda   Patient having slight case of delirium.  Zyprexa was added which seems to be helping.  This bleed use p.r.n.      Cerebrovascular accident (CVA) due to thrombosis of right middle cerebral artery  [place] [facility] for debility skilled care intially  Continue asa, plavix statin  PT/OT       Follicular lymphoma  H/o follicular lymphoma per chart (grade 3a, stage 1); receiving rituxan (cycle 3 in 11/2022), 10/2022 PET scan positive for 3 cm lymphadenopathy in left axillary region otherwise HUSSEIN.        -Will need follow up visit once discharge (per last hem/onc note follow up indicated around 12/17/22, delayed due to current admission for stroke) .     1/6 Pt with dementia and advance age; will not pursue any cancer treatments going forward     Chronic diastolic heart failure, NYHA class 2  -TTE 12/15/2022 w/ EF 68%  -GDMT when appropriate (will restart slowly with metoprolol Saturday Dec 24th and then loop diuretic on Mon Dec 26th)   -Follow up with PCP / cardiologist in outpatient setting.      Chronic kidney disease, stage 3 (moderate)  Monitor BUN/SCr.  Monitor I/Os.  Monitor electrolytes.    Avoid non-steroidal anti-inflammatory medications.  Stable       Hypertension  Stroke risk factor.  SBP <180, MAP >65; remains at goal   Avoid hypotension in the setting of small vessel disease   Holding lopressor for now given hypotension during this admission in the setting of an acute stroke, will resume on Sat Dec 24th  Bp today 123/64-HR 84.     Torsemide has been on hold due to ^ Creat now stable  1/4 P 111/57- 131/64- HR 60s well controlled   1/5 140/65    Hyperlipidemia  Continue statin       Hypothyroidism  Continue synthroid       Type 2 diabetes  mellitus  Patient's FSGs are controlled on current medication regimen.  Last A1c reviewed-   Lab Results   Component Value Date    HGBA1C 6.7 (H) 12/14/2022     Most recent fingerstick glucose reviewed-   Recent Labs   Lab 01/06/23  1117 01/06/23  1618 01/06/23  1931 01/07/23  0722   POCTGLUCOSE 258* 313* 435* 84     Current correctional scale  Low  Maintain anti-hyperglycemic dose as follows-   Antihyperglycemics (From admission, onward)    Start     Stop Route Frequency Ordered    01/07/23 0715  insulin aspart U-100 pen 5 Units         -- SubQ 3 times daily with meals 01/06/23 2001 01/06/23 2100  insulin detemir U-100 pen 30 Units         -- SubQ Nightly 01/06/23 1959 01/05/23 1339  insulin aspart U-100 pen 0-5 Units         -- SubQ Before meals & nightly PRN 01/05/23 1240        Hold Oral hypoglycemics while patient is in the hospital.    1/6 last dose of dexamethasone today ; getting insulin 20 units nightly   bs 258 this am; expect BS to improve after stoppign dexamethasone   Will watch BS over weekend to calculate needs and adjust once off steroids     1/7 patient had a high glucose of 435 yesterday.  Extra insulin was given.  Now his blood sugars have improved.  We will continue the basal bolus and sliding scale.  The high blood sugar was probably caused by the Decadron.  It has now been stopped.  I will lower the insulin dosage as I do not want hypoglycemia to occur.      VTE Risk Mitigation (From admission, onward)         Ordered     enoxaparin injection 30 mg  Daily         01/03/23 1248                Discharge Planning   JOSE A:      Code Status: Full Code   Is the patient medically ready for discharge?:     Reason for patient still in hospital (select all that apply): Patient new problem and Pending disposition  Discharge Plan A: Skilled Nursing Facility   Discharge Delays: None known at this time              Destin Jimenez MD  Department of Hospital Medicine   [place] [facility] S[facility] (3rd Fl)

## 2023-01-07 NOTE — CONSULTS
PSYCHIATRY INPATIENT CONSULT NOTE       2023 9:05 AM   Vega Kohler   1935   [id number]         DATE OF ADMISSION: 1/3/2023 12:46 PM    SITE: [facility]    CURRENT LEGAL STATUS: voluntary      HISTORY    CHIEF COMPLAINT   Vega Kohler is a 87 y.o. male with a past psychiatric history of dementia currently admitted to the inpatient unit with the following chief complaint: COVID-19 infection.    Psychiatry was consulted for evaluation and treatment of agitation and hallucinations    HPI   The patient was seen and examined. The chart was reviewed.    The patient presented to the ER on 1/3/2023 . Per staff notes:  -88yo male patient with extensive medical hx. (Alzheimers, aortic stenosis, arthritis, BPH, CHF, CKD, COPD, CAD/PAD, DM2, HLD/HTN, pulm HTN, S/p TAVR and hypothyroid). He was treated acutely [facility] for ischemic CVA with left hemiplegia s/p TNK 22. Pt was independent at baseline prior to this event. Exam improved following TNK. MRI with R MCA infarct. Etiology likely . Patient stepped down to NPU to await SNF placement. Metoprolol originally started but held given etiology of stroke and risk of expansion with hypoperfusion. Will plan to resume meds this Saturday and then remainder of home BP meds on Monday. Patient was discharged on DAPT with outpatient follow up. Patient remains neurologically unchanged. SBP drop < 120 but no changes on exam. Last BM yesterday. He was brought to [facility] yesterday for continued SKILL therapy with PT / OT. On plavix and statin and asa. VSS- no bp meds 123/64   22 pt tested positive for COVID; staff noting patient more sluggish & Requiring oxygen ; CXR with infiltrates   BC negative, influenza negative  COVID POSITIVE-d/c to acute care  22 day 3 Remdesivir, dexamehtasone and levofloxacin   Afebrile, WBC low, CXR reviewed, procal normal ; all appears viral will stop levofloxacin   Overview/Hospital Course:  23 Vega Kohler is a 87 y.o. male   brought to [facility]  for continued SKILL therapy with PT / OT post CVA . On plavix and statin and asa. VSS- no bp meds w stable BP.   On 1/2 noted to require incresed oxygen and more sluggish , testing positive for COVID 19; discharged back to acute for tx;  Day 4 Remdesivir, dexamethasone   O2 sat % on 2LNC, afebrile, WBC 2.78>3.84 , LFTs stable   Creat 1.5.>1.3   Gait: Minimal Assistance x ~25 feet with RW and O2 supplement . Fatigues easily  1/6/23 Vega Kohler is a 87 y.o. male  brought to [facility]  for continued SKILL therapy with PT / OT post CVA . On plavix and statin and asa. VSS- no bp meds w stable BP.   On 1/2 noted to require incresed oxygen and more sluggish , testing positive for COVID 19; discharged back to acute for tx;  Day 5 Remdesivir, dexamethasone complete today   O2 sat 93-97% on 1 LNC, ( Has home oxygen) afebrile,    Gait: Standby Assistance x ~80 feet( 1 rest period) with O2 supplement using RW. Fatigues at the end distance.  Plan for d/c to nursing home for skilled therapy on Monday with [facility]    -Patient accepted by [facility] for SNF. They are able to accept on Monday, 1/9/22  -he received 2.5 mg IM at 1627 for psychotic aggression    The patient was a limited historian secondary to dementia.     He denied all psychiatric symptoms as documented below. However, nursing notes +AH (baseline issue stemming from dementia). Patient had some agitation and dangerous behavior which was not baseline. He responded  well to prn zyprexa. Bx changes are likley stemming from delirium superimposed on dementia.    Symptoms of Depression: diminished mood - No, loss of interest/anhedonia - No;  recurrent - No, >14 days - No, diminished energy - No, change in sleep - No, change in appetite - No, diminished concentration or cognition or indecisiveness - No, PMA/R -  No, excessive guilt or hopelessness or worthlessness - No, suicidal ideations - No    Changes in Sleep: trouble with  "initiation- No, maintenance, - No early morning awakening with inability to return to sleep - No, hypersomnolence - No    Suicidal- active/passive ideations - No, organized plans, future intentions - No    Homicidal ideations: active/passive ideations - No, organized plans, future intentions - No    Symptoms of psychosis: hallucinations - No, delusions - No, disorganized speech - No, disorganized behavior or abnormal motor behavior - No, or negative symptoms (diminshed emotional expression, avolition, anhedonia, alogia, asociality) - No, active phase symptoms >1 month - No, continuous signs of illness > 6 months - No, since onset of illness decreased level of functioning present - No    Symptoms of obdulio or hypomania: elevated, expansive, or irritable mood with increased energy or activity - No; > 4 days - No,  >7 days - No; with inflated self-esteem or grandiosity - No, decreased need for sleep - No, increased rate of speech - No, FOI or racing thoughts - No, distractibility - No, increased goal directed activity or PMA - No, risky/disinhibited behavior - No    Symptoms of PAO: excessive anxiety/worry/fear, more days than not, about numerous issues - No, ongoing for >6 months - No, difficult to control - No, with restlessness - No, fatigue - No, poor concentration - No, irritability - No, muscle tension - No, sleep disturbance - No; causes functionally impairing distress - No    Symptoms of Panic Disorder: recurrent panic attacks (palpitations/heart racing, sweating, shakiness, dyspnea, choking, chest pain/discomfort, Gi symptoms, dizzy/lightheadedness, hot/col flashes, paresthesias, derealization, fear of losing control or fear of dying or fear of "going crazy") - No, precipitated - No, un-precipitated - No, source of worry and/or behavioral changes secondary for 1 month or longer- No, agoraphobia - No    Symptoms of PTSD: h/o trauma exposure - No; re-experiencing/intrusive symptoms - No, avoidant behavior - No, 2 " or more negative alterations in cognition or mood - No, 2 or more hyperarousal symptoms - No; with dissociative symptoms - No, ongoing for 1 or more  months - No    Symptoms of OCD: obsessions (recurrent thoughts/urges/images; intrusive and/or unwanted; uses other thoughts/actions to suppress) - No; compulsions (repetitive behaviors used to lower distress/anxiety/obsessions) - No, time-consuming (over 1 hour per day) or cause significant distress/impairment - - No    Symptoms of Anorexia: restriction of caloric intake leading to significantly low body weight - No, intense fear of gaining weight or persistent behavior that interferes with weight gain even thought at a significantly low weight - No, disturbance in the way in which one's body weight or shape is experienced, undue influence of body weight or shape on self evaluation, or persistent lack of recognition of the seriousness of the current low body weight - No    Symptoms of Bulimia: recurrent episodes of binge eating (definitely larger amount  than what others would eat and lack of a sense of control over eating during episode) - No, recurrent inappropriate compensatory behaviors in order to prevent weight gain (fasting, medications, exercise, vomiting) - No, binges and compensatory behaviors both occur on average at least once a week for 3 months - No, self evaluations is unduly influenced by body shape/weight- - No    Symptoms of Binge eating: recurrent episodes of binge eating (definitely larger amount than what others would eat and lack of a sense of control over eating during episode) - No, 3 or more of following (eating much more rapidly, eating until uncomfortably full, large amounts when not hungry, eating alone because of embarrassed by how much,  feeling disgusted with oneself, depressed or very guilty afterward) - No, distress regarding binges - No, binges occur on average at least once a week for 3 months - No      Substance/s:  Taken in larger  amounts or over longer periods than intended: No,  Persistent desire or unsuccessful attempts to cut down or stop: No,  Great deal of time spent seeking, using or recovering from: No,  Craving or strong desire to use: No,  Recurrent use despite failure to meet major role obligation: No,  Continued use despite persistent or recurrent social/interparsonal issues due to use: No,  Important social/work/recreational activities given up due to use: No,  Recurrent use in physically hazardous situations: No,  Continued use despite knowledge of persistent physical or psychological problem: No,  Tolerance (either increased need or diminished effect): No,        PAST PSYCHIATRIC HISTORY  Previous Psychiatric Hospitalizations: No  Previous SI/HI: No,  Previous Suicide Attempts: No,   Previous Medication Trials: No,  Psychiatric Care (current & past): No,  History of Psychotherapy: No,  History of Violence: No,  History of sexual/physical abuse: No,    PAST MEDICAL & SURGICAL HISTORY   Past Medical History:   Diagnosis Date    Abnormal barium swallow     Alzheimer's dementia, late onset     Anal stenosis     Anticoagulant long-term use     Aortic stenosis     Arthritis     Aspiration pneumonitis     Benign prostatic hyperplasia     Carotid artery disease     CHF (congestive heart failure)     Chronic diastolic heart failure     Chronic kidney disease (CKD), stage III (moderate)     CKD (chronic kidney disease)     Colon polyp     COPD (chronic obstructive pulmonary disease)     Coronary artery disease     Diabetes mellitus, type 2     Diverticulosis     Dysphagia     Hearing aid worn 01/23/2020    Received bilateral hearing aides today    Heart disease     History of CEA (carotid endarterectomy)     [place] (hard of hearing)     Hyperlipidemia     Hypertension     Hypertensive heart disease     Hypothyroidism     Joint disease     Memory loss     PAD (peripheral artery disease)     Presence of drug coated stent in right coronary  artery     Pulmonary hypertension     S/P TAVR (transcatheter aortic valve replacement)     Small bowel obstruction     Wears dentures     UPPER AND LOWER    Wears glasses      Past Surgical History:   Procedure Laterality Date    anal fissure repair      ANKLE FUSION Left 08/15/2016    X 2    APPENDECTOMY      BACK SURGERY      X 4    CAROTID ENDARTERECTOMY Right     CAROTID ENDARTERECTOMY Left     CAROTID ENDARTERECTOMY Left 12/3/2021    Procedure: ENDARTERECTOMY-CAROTID;  Surgeon: Tim Castillo MD;  Location: [facility] OR;  Service: Cardiology;  Laterality: Left;  pt unsure if he stopped plavix, Dr. Castillo ok to proceed    CARPAL TUNNEL RELEASE Right     CHOLECYSTECTOMY      COLONOSCOPY N/A 7/26/2018    Procedure: HIGH RISK SCREENING COLONOSCOPY;  Surgeon: Connor Murrell MD;  Location: [facility] ENDO;  Service: Endoscopy;  Laterality: N/A;    CORONARY ANGIOPLASTY WITH STENT PLACEMENT      ELBOW SURGERY Bilateral     EXCISIONAL HEMORRHOIDECTOMY      x3    EXPLORATORY LAPAROTOMY W/ BOWEL RESECTION  11/17/2011    EYE SURGERY      cataract bilaterally    history of bowel resection      KNEE SURGERY Left     LAMINECTOMY AND MICRODISCECTOMY LUMBAR SPINE  07/21/2011    L5-S1    LEFT HEART CATHETERIZATION Left 8/31/2018    Procedure: Left heart cath;  Surgeon: Rex Chris MD;  Location: [facility] CATH;  Service: Cardiology;  Laterality: Left;    LUMBAR FUSION  11/09/2011    Anterior approach--L5-S1    LUNG BIOPSY Right 07/21/2009    VATS with wedge ofr right lower lobe    LUNG BIOPSY Left 3/16/2020    Procedure: BIOPSY, LUNG;  Surgeon: [facility] Diagnostic Provider;  Location: [facility] OR;  Service: General;  Laterality: Left;    PERCUTANEOUS TRANSCATHETER AORTIC VALVE REPLACEMENT (TAVR) Left 10/30/2018    Procedure: REPLACEMENT, AORTIC VALVE, PERCUTANEOUS, TRANSCATHETER;  Surgeon: Rex Chris MD;  Location: [facility] OR;  Service: Cardiology;  Laterality: Left;    PERCUTANEOUS TRANSCATHETER AORTIC VALVE REPLACEMENT  (TAVR)  10/30/2018    Procedure: REPLACEMENT, AORTIC VALVE, PERCUTANEOUS, TRANSCATHETER;  Surgeon: Tim Castillo MD;  [facility];  Service: Cardiovascular;;    RIGHT HEART CATHETERIZATION Right 8/31/2018    Procedure: INSERTION, CATHETER, RIGHT HEART;  Surgeon: Rex Chris MD;  [facility];  Service: Cardiology;  Laterality: Right;    ROTATOR CUFF REPAIR Left     SINUS SURGERY      SPINE SURGERY      back surgery    TENDON RELEASE Bilateral     TONSILLECTOMY           CURRENT PSYCH MEDICATION REGIMEN   denied  Current Medication side effects:  n/a  Current Medication compliance:  n/a    Previous psych meds trials  none    Home Meds:   Prior to Admission medications    Medication Sig Start Date End Date Taking? Authorizing Provider   albuterol (VENTOLIN HFA) 90 mcg/actuation inhaler Inhale 1-2 puffs into the lungs every 6 (six) hours as needed for Wheezing or Shortness of Breath. Rescue 1/18/22   Chela Urbano NP   aspirin 81 MG Chew Take 1 tablet (81 mg total) by mouth once daily. 12/22/22 12/22/23  Campbell De Luna PA-C   atorvastatin (LIPITOR) 40 MG tablet Take 1 tablet (40 mg total) by mouth once daily. 12/22/22 12/22/23  Campbell De Luna PA-C   clopidogreL (PLAVIX) 75 mg tablet Take 1 tablet (75 mg total) by mouth once daily. 12/22/22 12/22/23  Campbell De Luna PA-C   ipratropium (ATROVENT) 42 mcg (0.06 %) nasal spray ipratropium bromide 42 mcg (0.06 %) nasal spray    Historical Provider   meclizine (ANTIVERT) 12.5 mg tablet Take 2 tablets (25 mg total) by mouth 3 (three) times daily. 11/8/21 9/2/22  Javi Sotomayor MD   memantine (NAMENDA) 10 MG Tab Take 10 mg by mouth once daily.    Historical Provider   metoprolol succinate (TOPROL-XL) 25 MG 24 hr tablet Take 1 tablet (25 mg total) by mouth every evening. 12/24/22   Campbell De Luna PA-C   MYRBETRIQ 25 mg Tb24 ER tablet Take 25 mg by mouth once daily. 11/23/20 11/23/20   Historical Provider   nitroGLYCERIN (NITROSTAT) 0.4 MG SL tablet  "Place 1 tablet (0.4 mg total) under the tongue every 5 (five) minutes as needed for Chest pain. 1/8/20   Kathryn Stock NP   ondansetron (ZOFRAN-ODT) 8 MG TbDL Take 1 tablet (8 mg total) by mouth 3 (three) times daily as needed (for nausea). 10/19/22   Jame Dsouza MD   pen needle, diabetic 32 gauge x 5/32" Ndle BD Adrienne 2nd Gen Pen Needle 32 gauge x 5/32"   USE 1 PEN NEEDLE ONCE DAILY USE AS DIRECTED    Historical Provider   SOLIQUA 100/33 100 unit-33 mcg/mL InPn pen INJECT 30 UNITS SUBCUTANEOUSLY ONCE DAILY 3/14/22   Kathryn Stock NP   tamsulosin (FLOMAX) 0.4 mg Cap Take 0.4 mg by mouth once daily. 10/5/21   Historical Provider   torsemide (DEMADEX) 10 MG Tab Take 1 tablet (10 mg total) by mouth once daily. 12/26/22   Campbell De Luna PA-C   UNABLE TO FIND medication name: prevagen extra strenght daily    Historical Provider         OTC Meds: none    Scheduled Meds:    aspirin  81 mg Oral Daily    atorvastatin  40 mg Oral Daily    clopidogreL  75 mg Oral Daily    enoxaparin  30 mg Subcutaneous Daily    hydrocortisone-aloe vera   Topical (Top) BID    insulin aspart U-100  5 Units Subcutaneous TIDWM    insulin detemir U-100  30 Units Subcutaneous QHS    memantine  10 mg Oral Daily    polyethylene glycol  17 g Oral Daily    tamsulosin  0.4 mg Oral Daily      PRN Meds: acetaminophen, dextrose 10%, dextrose 10%, dextrose 10%, dextrose 10%, HYDROcodone-acetaminophen, insulin aspart U-100, OLANZapine, sars-cov-2 (covid-19 omicron), sodium chloride 0.9%   Psychotherapeutics (From admission, onward)      Start     Stop Route Frequency Ordered    01/06/23 1716  OLANZapine injection 2.5 mg        Question:  Is the patient competent?  Answer:  No    -- IM 3 times daily PRN 01/06/23 1616            ALLERGIES   Review of patient's allergies indicates:   Allergen Reactions    Sulfur Itching    Penicillins Swelling and Rash       NEUROLOGIC HISTORY  Seizures: No  Head trauma: No    SOCIAL HISTORY:  Developmental/Childhood:Achieved " all developmental milestones timely  Education:High School Diploma  Employment Status/Finances:Retired   Relationship Status/Sexual Orientation:   Children:  yes  Housing Status: unable to assess    history:  unable to assess   Access to Firearms: NO ;  Locked up? n/a  Congregational:Spiritual without formal affiliation  Recreational activities: unable to assess    SUBSTANCE ABUSE HISTORY   Recreational Drugs:  denied    Use of Alcohol: denied  Rehab History:no   Tobacco Use:no    LEGAL HISTORY:   Past charges/incarcerations: NO  Pending charges:NO    FAMILY PSYCHIATRIC HISTORY   Family History   Problem Relation Age of Onset    Cancer Mother     Heart disease Father     Heart disease Brother     Diabetes Brother        denied       ROS  General ROS: negative  Ophthalmic ROS: negative  ENT ROS: negative  Allergy and Immunology ROS: negative  Hematological and Lymphatic ROS: negative  Endocrine ROS: negative  Respiratory ROS: no cough, shortness of breath, or wheezing  Cardiovascular ROS: no chest pain or dyspnea on exertion  Gastrointestinal ROS: no abdominal pain, change in bowel habits, or black or bloody stools  Genito-Urinary ROS: no dysuria, trouble voiding, or hematuria  Musculoskeletal ROS: positive for - joint pain  Neurological ROS: no TIA or stroke symptoms  Dermatological ROS: negative        EXAMINATION    PHYSICAL EXAM  Reviewed note/exam by Dr. Jimenez from 1/6/22 at 11:49 AM    VITALS   Vitals:    01/07/23 0736   BP: (!) 128/58   Pulse: 80   Resp: 18   Temp: 98.1 °F (36.7 °C)        Body mass index is 23.1 kg/m².        PAIN  1/10  Subjective report of pain matches objective signs and symptoms: Yes    LABORATORY DATA   Recent Results (from the past 72 hour(s))   POCT glucose    Collection Time: 01/04/23 11:13 AM   Result Value Ref Range    POCT Glucose 249 (H) 70 - 110 mg/dL   POCT glucose    Collection Time: 01/04/23  4:23 PM   Result Value Ref Range    POCT Glucose 366 (H) 70 - 110  mg/dL   POCT glucose    Collection Time: 01/04/23  7:57 PM   Result Value Ref Range    POCT Glucose 364 (H) 70 - 110 mg/dL   Comprehensive Metabolic Panel    Collection Time: 01/05/23  6:04 AM   Result Value Ref Range    Sodium 138 136 - 145 mmol/L    Potassium 4.3 3.5 - 5.1 mmol/L    Chloride 103 95 - 110 mmol/L    CO2 27 23 - 29 mmol/L    Glucose 243 (H) 70 - 110 mg/dL    BUN 40 (H) 8 - 23 mg/dL    Creatinine 1.3 0.5 - 1.4 mg/dL    Calcium 9.1 8.7 - 10.5 mg/dL    Total Protein 5.4 (L) 6.0 - 8.4 g/dL    Albumin 2.5 (L) 3.5 - 5.2 g/dL    Total Bilirubin 0.4 0.1 - 1.0 mg/dL    Alkaline Phosphatase 71 55 - 135 U/L    AST 19 10 - 40 U/L    ALT 17 10 - 44 U/L    Anion Gap 8 8 - 16 mmol/L    eGFR 53 (A) >60 mL/min/1.73 m^2   CBC Auto Differential    Collection Time: 01/05/23  6:04 AM   Result Value Ref Range    WBC 3.84 (L) 3.90 - 12.70 K/uL    RBC 3.47 (L) 4.60 - 6.20 M/uL    Hemoglobin 11.4 (L) 14.0 - 18.0 g/dL    Hematocrit 33.9 (L) 40.0 - 54.0 %    MCV 98 82 - 98 fL    MCH 32.9 (H) 27.0 - 31.0 pg    MCHC 33.6 32.0 - 36.0 g/dL    RDW 13.1 11.5 - 14.5 %    Platelets 407 150 - 450 K/uL    MPV 9.5 9.2 - 12.9 fL    Immature Granulocytes 0.8 (H) 0.0 - 0.5 %    Gran # (ANC) 2.7 1.8 - 7.7 K/uL    Immature Grans (Abs) 0.03 0.00 - 0.04 K/uL    Lymph # 0.7 (L) 1.0 - 4.8 K/uL    Mono # 0.4 0.3 - 1.0 K/uL    Eos # 0.0 0.0 - 0.5 K/uL    Baso # 0.00 0.00 - 0.20 K/uL    nRBC 0 0 /100 WBC    Gran % 69.2 38.0 - 73.0 %    Lymph % 18.0 18.0 - 48.0 %    Mono % 11.5 4.0 - 15.0 %    Eosinophil % 0.5 0.0 - 8.0 %    Basophil % 0.0 0.0 - 1.9 %    Differential Method Automated    POCT glucose    Collection Time: 01/05/23  7:12 AM   Result Value Ref Range    POCT Glucose 227 (H) 70 - 110 mg/dL   POCT glucose    Collection Time: 01/05/23 11:30 AM   Result Value Ref Range    POCT Glucose 298 (H) 70 - 110 mg/dL   Urinalysis, Reflex to Urine Culture Urine, Clean Catch    Collection Time: 01/05/23  2:46 PM    Specimen: Urine   Result Value Ref Range     Specimen UA Urine, Clean Catch     Color, UA Yellow Yellow, Straw, Pao    Appearance, UA Clear Clear    pH, UA 6.0 5.0 - 8.0    Specific Gravity, UA 1.015 1.005 - 1.030    Protein, UA Trace (A) Negative    Glucose, UA 3+ (A) Negative    Ketones, UA Negative Negative    Bilirubin (UA) Negative Negative    Occult Blood UA Trace (A) Negative    Nitrite, UA Negative Negative    Urobilinogen, UA 1.0 <2.0 EU/dL    Leukocytes, UA Negative Negative   Urinalysis Microscopic    Collection Time: 01/05/23  2:46 PM   Result Value Ref Range    RBC, UA 0 0 - 4 /hpf    WBC, UA 60 (H) 0 - 5 /hpf    Bacteria Few (A) None-Occ /hpf    Yeast, UA Moderate (A) None    Microscopic Comment SEE COMMENT    POCT glucose    Collection Time: 01/05/23  4:14 PM   Result Value Ref Range    POCT Glucose 399 (H) 70 - 110 mg/dL   POCT glucose    Collection Time: 01/05/23  9:20 PM   Result Value Ref Range    POCT Glucose 267 (H) 70 - 110 mg/dL   POCT glucose    Collection Time: 01/06/23  1:56 AM   Result Value Ref Range    POCT Glucose 219 (H) 70 - 110 mg/dL   POCT glucose    Collection Time: 01/06/23  7:06 AM   Result Value Ref Range    POCT Glucose 187 (H) 70 - 110 mg/dL   POCT glucose    Collection Time: 01/06/23 11:17 AM   Result Value Ref Range    POCT Glucose 258 (H) 70 - 110 mg/dL   POCT glucose    Collection Time: 01/06/23  4:18 PM   Result Value Ref Range    POCT Glucose 313 (H) 70 - 110 mg/dL   POCT glucose    Collection Time: 01/06/23  7:31 PM   Result Value Ref Range    POCT Glucose 435 (H) 70 - 110 mg/dL   POCT glucose    Collection Time: 01/07/23  7:22 AM   Result Value Ref Range    POCT Glucose 84 70 - 110 mg/dL      No results found for: PHENYTOIN, PHENOBARB, VALPROATE, CBMZ        CONSTITUTIONAL  General Appearance: unremarkable, age appropriate, neatly groomed; lying in bed    MUSCULOSKELETAL  Muscle Strength and Tone:no dyskinesia, no tremor, no tic  Abnormal Involuntary Movements: No  Gait and Station:  unable to assess- lying  in bed/fall risk    PSYCHIATRIC   Level of Consciousness: awake and alert   Orientation: person and year  Grooming: Hospital garb and Well groomed  Psychomotor Behavior: normal, cooperative, eye contact normal  Speech: normal tone, normal rate, normal pitch, normal volume, spontaneous  Language: grossly intact, able to name, able to repeat  Mood: fine  Affect: Constricted  Thought Process: linear, logical- some impairement secondary to memory issues  Associations: intact   Thought Content: denies SI, denies HI, and no delusions  Perceptions: denies AH and denies  VH  Memory: Impaired to some degree and Recent impaired- unable to fully assess   Attention:Impaired to some degree- unable to fully assess   Fund of Knowledge: Impaired- 1/4 recent presidents  Estimate if Intelligence:  Average based on work/education history, vocabulary and mental status exam  Insight: limited awareness of illness- impaired by memory issues  Judgment: behavior is adequate to circumstances, impaired due to memory issues and delirium      PSYCHOSOCIAL    PSYCHOSOCIAL STRESSORS   health    FUNCTIONING RELATIONSHIPS   good support system    STRENGTHS AND LIABILITIES   Strength: Patient accepts guidance/feedback, Strength: Patient has positive support network., Liability: Patient has poor health., Liability: Patient has possible cognitive impairment.    Is the patient aware of the biomedical complications associated with substance abuse and mental illness? yes    Does the patient have an Advance Directive for Mental Health treatment? no  (If yes, inform patient to bring copy.)        MEDICAL DECISION MAKING        ASSESSMENT       Delirium, multifactorial in origin  Dementia with behavioral disturbance     Psychosocial stressors    Medical issues:   COVID-19 infection  Debility  Malnutrition  BPH  H/o CVA  CKD  HTN  HLD  Hypothyroidism  DMII            PROBLEM LIST AND MANAGEMENT PLANS    Delirium: pt counseled; case discussed with primary  provider and RNs  -issues are improved today vs yesterday and should improve with improvement in medical issues  Recommend Zyprexa 2.5 mg IM/ PO q8 hours prn severe non-redirectable psychotic agitation posing danger to self/others- limit usage as much as possible given risks of exacerbating co morbidities (CVA). Monitor for QTc prolongation and adverse reactions.  Recommend assessment for delirium every 4 hours while awake with [facility].  Level of agitation and sedation should be monitored frequently with the Burns Agitation-Sedation Scale (RASS). Goal-directed delivery of sedatives is best accomplished by the use of sedation scales to help the medical team agree on a target sedation level for each individual patient.  Assess, prevent and manage pain as lack of treatment can result in delirium.   The Critical-Care Pain Observation Tool (CPOT) is the most valid and reliable behavioral pain scale for assessing pain in adult ICU patients unable to communicate pain.  For CPOT >3, evaluate pain sources and modify/enhance treatment.  Avoid benzodiazepines, antihistamines, anticholinergics, and minimize opiate use as these may worsen delirium.  Recommend consult/continuing PT/OT. Early mobility and exercise has been shown to decrease duration of delirium. Level of activity can be determined by RASS score.  RASS -1/0/1: active ROM, active exercise, sit, stand, walk, ADL training  RASS -3/-2: passive ROM, sit  RASS -5/-4: passive ROM  Provide appropriate lighting and clear signage; a clock and calendar should be easily visible to the patient.  Monitor environmental factors. Reduce light and noise at night (close shades, turn off lights, turn off TV, ect). Correct any alterations in sleep cycle.  Reorient the patient to person, place, time and situation on each encounter.   Correct sensory deficits if possible (replace eye glasses, hearing aids, ect).  Avoid restraints if possible. Severely delirious patients benefit from  constant observation by a sitter.  Do not leave patient unattended.     Dementia with behavioral disturbance:   pt counseled; case discussed with primary provider and RNs  -if bx issues persist after resolution of delirium, then consider trial of an SSRI; this can be initiated as an outpatient    Psychosocial stressors: pt counseled; case discussed with primary provider and RNs  -TU/CARLOS to assist with resources    Medical issues: pt counseled; case discussed with primary provider and RNs  -recent psychiatric exacerbations are secondary to medical issues; continue tx per primary team; improvement in medical issues will result in improvement in mental health issues      PRESCRIPTION DRUG MANAGEMENT  Compliance: yes  Side Effects: no  Regimen Adjustments: see above    Discussed diagnosis, risks and benefits of proposed treatment vs alternative treatments vs no treatment, potential side effects of these treatments and the inherent unpredictability of treatment. The patient expresses understanding of the above and displays the capacity to agree with this treatment given said understanding. Patient also agrees that, currently, the benefits outweigh the risks and would like to pursue/continue treatment at this time.    Any medications being used off-label were discussed with the patient inclusive of the evidence base for the use of the medications and consent was obtained for the off-label use of the medication.         DIAGNOSTIC TESTING  Labs reviewed with patient; follow up pending labs    Disposition:  -Will attempt to obtain outside psychiatric records if available  -[facility]/TU to assist with aftercare planning and collateral  -Once stable medically stable, discharge [facility] for SNF; pt would benefit form with outpatient psychiatric care  -Please call for any further questions or concerns        Thom Riddle MD  Psychiatry

## 2023-01-07 NOTE — ASSESSMENT & PLAN NOTE
Continue namenda   Patient having slight case of delirium.  Zyprexa was added which seems to be helping.  This bleed use p.r.n.

## 2023-01-07 NOTE — ASSESSMENT & PLAN NOTE
Admitting dx post tx at [facility] [place]- admitted here for debility skilled care intially  Continue asa, plavix statin  PT/OT

## 2023-01-07 NOTE — PLAN OF CARE
Problem: Diabetes Comorbidity  Goal: Blood Glucose Level Within Targeted Range  Outcome: Ongoing, Progressing     Problem: Adult Inpatient Plan of Care  Goal: Plan of Care Review  Outcome: Ongoing, Progressing  Goal: Patient-Specific Goal (Individualized)  Outcome: Ongoing, Progressing  Goal: Absence of Hospital-Acquired Illness or Injury  Outcome: Ongoing, Progressing  Goal: Optimal Comfort and Wellbeing  Outcome: Ongoing, Progressing  Goal: Readiness for Transition of Care  Outcome: Ongoing, Progressing     Problem: Infection  Goal: Absence of Infection Signs and Symptoms  Outcome: Ongoing, Progressing     Problem: Adjustment to Illness (Stroke, Ischemic/Transient Ischemic Attack)  Goal: Optimal Coping  Outcome: Ongoing, Progressing     Problem: Bowel Elimination Impaired (Stroke, Ischemic/Transient Ischemic Attack)  Goal: Effective Bowel Elimination  Outcome: Ongoing, Progressing     Problem: Cerebral Tissue Perfusion (Stroke, Ischemic/Transient Ischemic Attack)  Goal: Optimal Cerebral Tissue Perfusion  Outcome: Ongoing, Progressing     Problem: Cognitive Impairment (Stroke, Ischemic/Transient Ischemic Attack)  Goal: Optimal Cognitive Function  Outcome: Ongoing, Progressing     Problem: Communication Impairment (Stroke, Ischemic/Transient Ischemic Attack)  Goal: Improved Communication Skills  Outcome: Ongoing, Progressing     Problem: Functional Ability Impaired (Stroke, Ischemic/Transient Ischemic Attack)  Goal: Optimal Functional Ability  Outcome: Ongoing, Progressing     Problem: Respiratory Compromise (Stroke, Ischemic/Transient Ischemic Attack)  Goal: Effective Oxygenation and Ventilation  Outcome: Ongoing, Progressing     Problem: Sensorimotor Impairment (Stroke, Ischemic/Transient Ischemic Attack)  Goal: Improved Sensorimotor Function  Outcome: Ongoing, Progressing     Problem: Swallowing Impairment (Stroke, Ischemic/Transient Ischemic Attack)  Goal: Optimal Eating and Swallowing without  Aspiration  Outcome: Ongoing, Progressing     Problem: Urinary Elimination Impaired (Stroke, Ischemic/Transient Ischemic Attack)  Goal: Effective Urinary Elimination  Outcome: Ongoing, Progressing     Problem: Impaired Wound Healing  Goal: Optimal Wound Healing  Outcome: Ongoing, Progressing     Problem: Fall Injury Risk  Goal: Absence of Fall and Fall-Related Injury  Outcome: Ongoing, Progressing     Problem: Skin Injury Risk Increased  Goal: Skin Health and Integrity  Outcome: Ongoing, Progressing

## 2023-01-08 NOTE — ASSESSMENT & PLAN NOTE
Nutrition consulted. Most recent weight and BMI monitored-     Malnutrition Type and Energy Intake  Malnutrition Type: acute illness or injury  Energy Intake: moderate energy intake    Malnutrition (Moderate to Severe)  Weight Loss (Malnutrition): greater than 7.5% in 3 months  Energy Intake (Malnutrition): less than 75% for greater than or equal to 1 month  Subcutaneous Fat (Malnutrition): moderate depletion  Muscle Mass (Malnutrition): moderate depletion    Final Summary  Subcutaneous Fat Loss (Final Summary): severe protein-calorie malnutrition  Muscle Loss Evaluation (Final Summary): severe protein-calorie malnutrition    Malnutrition Final Summary  Severe Weight Loss (Malnutrition): greater than 7.5% in 3 months    Measurements:  Wt Readings from Last 1 Encounters:   01/08/23 66.2 kg (145 lb 15.1 oz)   Body mass index is 22.86 kg/m².    Recommendations: Recommendation/Intervention: 1. Rec'd continue diabetic and cardiac diet. 2. Rec'd encouraging Boost Glucose Control Chocolate provided during mealtimes for decreased PO intake, decreased appetite, and increased nutritional needs. 3. Rec'd honoring pt's food preferences within diet order to encourage PO intake. 4. RD to follow and make rec's accordingly.  Goals: Pt will meet at least 75% ENN by RD follow up.    Patient has been screened and assessed by RD. RD will follow patient.

## 2023-01-08 NOTE — PROGRESS NOTES
[facility] ([facility])  [facility]  Progress Note    Patient Name: Vega Kohler  MRN: [id number]  Patient Class: IP- Inpatient   Admission Date: 1/3/2023  Length of Stay: 5 days  Attending Physician: Destin Jimenez MD  Primary Care Provider: Fabricio Mckeon MD        Subjective:     Principal Problem:COVID-19        HPI:    HPI:   86yo male patient with extensive medical hx. (Alzheimers, aortic stenosis, arthritis, BPH, CHF, CKD, COPD, CAD/PAD, DM2, HLD/HTN, pulm HTN, S/p TAVR and hypothyroid). He was treated acutely [facility] for ischemic CVA with left hemiplegia s/p TNK 22. Pt was independent at baseline prior to this event. Exam improved following TNK. MRI with R MCA infarct. Etiology likely . Patient stepped down to NPU to await SNF placement. Metoprolol originally started but held given etiology of stroke and risk of expansion with hypoperfusion. Will plan to resume meds this Saturday and then remainder of home BP meds on Monday. Patient was discharged on DAPT with outpatient follow up. Patient remains neurologically unchanged. SBP drop < 120 but no changes on exam. Last BM yesterday. He was brought to [facility] yesterday for continued SKILL therapy with PT / OT. On plavix and statin and asa. VSS- no bp meds 123/64         22 pt tested positive for COVID; staff noting patient more sluggish & Requiring oxygen ; CXR with infiltrates   BC negative, influenza negative  COVID POSITIVE-d/c to acute care    22 day 3 Remdesivir, dexamehtasone and levofloxacin   Afebrile, WBC low, CXR reviewed, procal normal ; all appears viral will stop levofloxacin              Overview/Hospital Course:  23 Vega Kohler is a 87 y.o. male  brought to [facility]  for continued SKILL therapy with PT / OT post CVA . On plavix and statin and asa. VSS- no bp meds w stable BP.   On  noted to require incresed oxygen and more sluggish , testing positive for COVID 19; discharged back to acute for tx;  Day 4  Remdesivir, dexamethasone   O2 sat % on 2LNC, afebrile, WBC 2.78>3.84 , LFTs stable   Creat 1.5.>1.3    Gait: Minimal Assistance x ~25 feet with RW and O2 supplement . Fatigues easily    1/6/23 Vega Kohler is a 87 y.o. male  brought to [facility]  for continued SKILL therapy with PT / OT post CVA . On plavix and statin and asa. VSS- no bp meds w stable BP.   On 1/2 noted to require incresed oxygen and more sluggish , testing positive for COVID 19; discharged back to acute for tx;  Day 5 Remdesivir, dexamethasone complete today   O2 sat 93-97% on 1 LNC, ( Has home oxygen) afebrile,    Gait: Standby Assistance x ~80 feet( 1 rest period) with O2 supplement using RW. Fatigues at the end distance.  Plan for d/c to nursing home for skilled therapy on Monday with [place]      1/7 - patient completed therapy for COVID.  He developed some delirium yesterday.  IM Zyprexa was given which seems to help.  He is doing better today.  Sometimes he gets agitated and threatened nursing staff.  Overall he is working with therapy.  I consulted psych and they feel this is delirium.  Since the Zyprexa helped they will sign off on the case.  Patient required extra insulin because his blood sugars spiked to 435.  This is probably from the Decadron.  Decadron has been discontinued.    1/8-patient seems more sluggish today and is running a low-grade temperature.  He is very sleepy.  I repeated a lactic acid, CBC, CMP, urinalysis which all appeared normal.  Chest x-ray reveals a persistent left lower lobe infiltrate/atelectasis.  Doubt this is pneumonia.      Past Medical History:   Diagnosis Date    Abnormal barium swallow     Alzheimer's dementia, late onset     Anal stenosis     Anticoagulant long-term use     Aortic stenosis     Arthritis     Aspiration pneumonitis     Benign prostatic hyperplasia     Carotid artery disease     CHF (congestive heart failure)     Chronic diastolic heart failure     Chronic kidney  disease (CKD), stage III (moderate)     CKD (chronic kidney disease)     Colon polyp     COPD (chronic obstructive pulmonary disease)     Coronary artery disease     Diabetes mellitus, type 2     Diverticulosis     Dysphagia     Hearing aid worn 01/23/2020    Received bilateral hearing aides today    Heart disease     History of CEA (carotid endarterectomy)     [place] (hard of hearing)     Hyperlipidemia     Hypertension     Hypertensive heart disease     Hypothyroidism     Joint disease     Memory loss     PAD (peripheral artery disease)     Presence of drug coated stent in right coronary artery     Pulmonary hypertension     S/P TAVR (transcatheter aortic valve replacement)     Small bowel obstruction     Wears dentures     UPPER AND LOWER    Wears glasses        Past Surgical History:   Procedure Laterality Date    anal fissure repair      ANKLE FUSION Left 08/15/2016    X 2    APPENDECTOMY      BACK SURGERY      X 4    CAROTID ENDARTERECTOMY Right     CAROTID ENDARTERECTOMY Left     CAROTID ENDARTERECTOMY Left 12/3/2021    Procedure: ENDARTERECTOMY-CAROTID;  Surgeon: Tim Castillo MD;  Location: [facility] OR;  Service: Cardiology;  Laterality: Left;  pt unsure if he stopped plavix, Dr. Castillo ok to proceed    CARPAL TUNNEL RELEASE Right     CHOLECYSTECTOMY      COLONOSCOPY N/A 7/26/2018    Procedure: HIGH RISK SCREENING COLONOSCOPY;  Surgeon: Connor Murrell MD;  Location: [facility] ENDO;  Service: Endoscopy;  Laterality: N/A;    CORONARY ANGIOPLASTY WITH STENT PLACEMENT      ELBOW SURGERY Bilateral     EXCISIONAL HEMORRHOIDECTOMY      x3    EXPLORATORY LAPAROTOMY W/ BOWEL RESECTION  11/17/2011    EYE SURGERY      cataract bilaterally    history of bowel resection      KNEE SURGERY Left     LAMINECTOMY AND MICRODISCECTOMY LUMBAR SPINE  07/21/2011    L5-S1    LEFT HEART CATHETERIZATION Left 8/31/2018    Procedure: Left heart cath;  Surgeon: Rex Chris MD;   Location: [facility] CATH;  Service: Cardiology;  Laterality: Left;    LUMBAR FUSION  11/09/2011    Anterior approach--L5-S1    LUNG BIOPSY Right 07/21/2009    VATS with wedge ofr right lower lobe    LUNG BIOPSY Left 3/16/2020    Procedure: BIOPSY, LUNG;  Surgeon: [facility] Diagnostic Provider;  Location: [facility] OR;  Service: General;  Laterality: Left;    PERCUTANEOUS TRANSCATHETER AORTIC VALVE REPLACEMENT (TAVR) Left 10/30/2018    Procedure: REPLACEMENT, AORTIC VALVE, PERCUTANEOUS, TRANSCATHETER;  Surgeon: Rex Chris MD;  Location: [facility] OR;  Service: Cardiology;  Laterality: Left;    PERCUTANEOUS TRANSCATHETER AORTIC VALVE REPLACEMENT (TAVR)  10/30/2018    Procedure: REPLACEMENT, AORTIC VALVE, PERCUTANEOUS, TRANSCATHETER;  Surgeon: Tim Castillo MD;  Location: [facility] OR;  Service: Cardiovascular;;    RIGHT HEART CATHETERIZATION Right 8/31/2018    Procedure: INSERTION, CATHETER, RIGHT HEART;  Surgeon: Rex Chris MD;  Location: [facility] CATH;  Service: Cardiology;  Laterality: Right;    ROTATOR CUFF REPAIR Left     SINUS SURGERY      SPINE SURGERY      back surgery    TENDON RELEASE Bilateral     TONSILLECTOMY         Review of patient's allergies indicates:   Allergen Reactions    Sulfur Itching    Penicillins Swelling and Rash       No current facility-administered medications on file prior to encounter.     Current Outpatient Medications on File Prior to Encounter   Medication Sig    albuterol (VENTOLIN HFA) 90 mcg/actuation inhaler Inhale 1-2 puffs into the lungs every 6 (six) hours as needed for Wheezing or Shortness of Breath. Rescue    aspirin 81 MG Chew Take 1 tablet (81 mg total) by mouth once daily.    atorvastatin (LIPITOR) 40 MG tablet Take 1 tablet (40 mg total) by mouth once daily.    clopidogreL (PLAVIX) 75 mg tablet Take 1 tablet (75 mg total) by mouth once daily.    ipratropium (ATROVENT) 42 mcg (0.06 %) nasal spray ipratropium bromide 42 mcg (0.06 %) nasal spray     "meclizine (ANTIVERT) 12.5 mg tablet Take 2 tablets (25 mg total) by mouth 3 (three) times daily.    memantine (NAMENDA) 10 MG Tab Take 10 mg by mouth once daily.    metoprolol succinate (TOPROL-XL) 25 MG 24 hr tablet Take 1 tablet (25 mg total) by mouth every evening.    MYRBETRIQ 25 mg Tb24 ER tablet Take 25 mg by mouth once daily. 20    nitroGLYCERIN (NITROSTAT) 0.4 MG SL tablet Place 1 tablet (0.4 mg total) under the tongue every 5 (five) minutes as needed for Chest pain.    ondansetron (ZOFRAN-ODT) 8 MG TbDL Take 1 tablet (8 mg total) by mouth 3 (three) times daily as needed (for nausea).    pen needle, diabetic 32 gauge x 5/32" Ndle BD Adrienne 2nd Gen Pen Needle 32 gauge x 5/32"   USE 1 PEN NEEDLE ONCE DAILY USE AS DIRECTED    SOLIQUA 100/33 100 unit-33 mcg/mL InPn pen INJECT 30 UNITS SUBCUTANEOUSLY ONCE DAILY    tamsulosin (FLOMAX) 0.4 mg Cap Take 0.4 mg by mouth once daily.    torsemide (DEMADEX) 10 MG Tab Take 1 tablet (10 mg total) by mouth once daily.    UNABLE TO FIND medication name: prevagen extra strenght daily     Family History       Problem Relation (Age of Onset)    Cancer Mother    Diabetes Brother    Heart disease Father, Brother          Tobacco Use    Smoking status: Former     Packs/day: 1.50     Years: 20.00     Pack years: 30.00     Types: Cigarettes     Start date:      Quit date:      Years since quittin.0    Smokeless tobacco: Never   Substance and Sexual Activity    Alcohol use: No    Drug use: No    Sexual activity: Not on file     Review of Systems   Unable to perform ROS: Dementia   Objective:     Vital Signs (Most Recent):  Temp: 99.7 °F (37.6 °C) (23 1330)  Pulse: 81 (23 1135)  Resp: 16 (23 1135)  BP: (!) 105/51 (23 1135)  SpO2: (!) 91 % (23 1135)   Vital Signs (24h Range):  Temp:  [97.7 °F (36.5 °C)-100.4 °F (38 °C)] 99.7 °F (37.6 °C)  Pulse:  [73-84] 81  Resp:  [16-20] 16  SpO2:  [91 %-98 %] 91 %  BP: (105-147)/(51-70) " 105/51     Weight: 66.2 kg (145 lb 15.1 oz)  Body mass index is 22.86 kg/m².    Physical Exam  Vitals and nursing note reviewed.   Constitutional:       General: He is not in acute distress.     Appearance: He is well-developed. He is not toxic-appearing.   HENT:      Head: Normocephalic and atraumatic.      Right Ear: External ear normal.      Left Ear: External ear normal.      Nose:      Comments: O2 in place  Eyes:      Conjunctiva/sclera: Conjunctivae normal.      Pupils: Pupils are equal, round, and reactive to light.   Neck:      Thyroid: No thyromegaly.   Cardiovascular:      Rate and Rhythm: Normal rate and regular rhythm.      Heart sounds: Normal heart sounds. No murmur heard.  Pulmonary:      Effort: Pulmonary effort is normal. No respiratory distress.      Breath sounds: No wheezing or rales (bibasilar).   Abdominal:      General: Bowel sounds are normal. There is no distension.      Palpations: Abdomen is soft.      Tenderness: There is no abdominal tenderness.   Musculoskeletal:         General: Normal range of motion.      Cervical back: Normal range of motion and neck supple.      Right lower leg: No edema.      Left lower leg: No edema.   Lymphadenopathy:      Cervical: No cervical adenopathy.   Skin:     General: Skin is warm and dry.      Findings: No rash.   Neurological:      Mental Status: He is alert. Mental status is at baseline. He is disoriented.      Cranial Nerves: No cranial nerve deficit.      Motor: No weakness.      Comments: Left arm weakness much improved   Psychiatric:         Mood and Affect: Affect is flat.         Speech: Speech is delayed.         Behavior: Behavior is slowed. Behavior is not agitated or aggressive.         Cognition and Memory: Cognition is impaired. Memory is impaired.         CRANIAL NERVES     CN III, IV, VI   Pupils are equal, round, and reactive to light.     Significant Labs: All pertinent labs within the past 24 hours have been reviewed.  A1C:   Recent  Labs   Lab 12/14/22  1552   HGBA1C 6.7*       ABGs: No results for input(s): PH, PCO2, HCO3, POCSATURATED, BE, TOTALHB, COHB, METHB, O2HB, POCFIO2, PO2 in the last 48 hours.  Bilirubin:   Recent Labs   Lab 12/29/22  0554 12/31/22  0356 01/02/23  0621 01/03/23  0554 01/05/23  0604   BILITOT 0.8 0.7 0.6 0.6 0.4       Blood Culture:   No results for input(s): LABBLOO in the last 48 hours.    CBC:   Recent Labs   Lab 01/08/23  0841   WBC 6.06   HGB 11.9*   HCT 35.4*          CMP:   Recent Labs   Lab 01/08/23  0841      K 3.6      CO2 26   GLU 93   BUN 33*   CREATININE 1.2   CALCIUM 9.8   ANIONGAP 10       Cardiac Markers: No results for input(s): CKMB, MYOGLOBIN, BNP, TROPISTAT in the last 48 hours.  Lactic Acid:   Recent Labs   Lab 01/08/23  1243   LACTATE 0.7       Magnesium: No results for input(s): MG in the last 48 hours.  POCT Glucose:   Recent Labs   Lab 01/07/23  2041 01/08/23  0730 01/08/23  1135   POCTGLUCOSE 215* 112* 104       Troponin: No results for input(s): TROPONINI, TROPONINIHS in the last 48 hours.  TSH:   Recent Labs   Lab 12/14/22  1009   TSH 4.401*       Urine Culture: No results for input(s): LABURIN in the last 48 hours.  Urine Studies:   Recent Labs   Lab 01/08/23  1245   COLORU Yellow   APPEARANCEUA Clear   PHUR 5.0   SPECGRAV 1.020   PROTEINUA Trace*   GLUCUA Negative   KETONESU Negative   BILIRUBINUA Negative   OCCULTUA Negative   NITRITE Negative   UROBILINOGEN Negative   LEUKOCYTESUR Trace*   WBCUA 1   BACTERIA Few*         Significant Imaging: I have reviewed and interpreted all pertinent imaging results/findings within the past 24 hours.  Consolidative opacity in the left lower lobe consistent with known lipoid pneumonia.  Left greater than right hazy airspace opacities, could be due to multifocal infection, aspiration or edema.     01/08/2023-chest x-ray-Suggestion of some persistent left basal atelectasis versus airspace disease      Assessment/Plan:      *  COVID-19  Patient is identified as Severe COVID-19 based on hypoxemia with O2 saturations <94% on room air or on ambulation   Initiate standard COVID protocols; COVID-19 testing Collection Date: 1/1/2023 Collection Time:  12:34 PM ,Infection Control notification  and isolation- respiratory, contact and droplet per protocol    Diagnostics: (leukopenia, hyponatremia, hyperferritinemia, elevated troponin, elevated d-dimer, age, and comorbidities are significant predictors of poor clinical outcome)  CBC, CMP, Ferritin, CRP and Portable CXR    Management: Inhaled bronchodilators as needed for shortness of breath.   Day 4 Remdesivir, dexamehtasone and levofloxacin   Afebrile, WBC low, CXR reviewed, procal normal ; all appears viral  Repeat labs in am     1/6-treatment for COVID completed.  Discontinue Decadron, remdesivir  1/7-no more COVID symptoms.    Debility  Continue PT/OT post CVA   Gait: Minimal Assistance x ~25 feet with RW and O2 supplement . Fatigues easily  Patient will be admitted to the nursing home on Monday.      Severe malnutrition in the context of acute on chronic illness  Nutrition consulted. Most recent weight and BMI monitored-     Malnutrition Type and Energy Intake  Malnutrition Type: acute illness or injury  Energy Intake: moderate energy intake    Malnutrition (Moderate to Severe)  Weight Loss (Malnutrition): greater than 7.5% in 3 months  Energy Intake (Malnutrition): less than 75% for greater than or equal to 1 month  Subcutaneous Fat (Malnutrition): moderate depletion  Muscle Mass (Malnutrition): moderate depletion    Final Summary  Subcutaneous Fat Loss (Final Summary): severe protein-calorie malnutrition  Muscle Loss Evaluation (Final Summary): severe protein-calorie malnutrition    Malnutrition Final Summary  Severe Weight Loss (Malnutrition): greater than 7.5% in 3 months    Measurements:  Wt Readings from Last 1 Encounters:   01/08/23 66.2 kg (145 lb 15.1 oz)   Body mass index is 22.86  kg/m².    Recommendations: Recommendation/Intervention: 1. Rec'd continue diabetic and cardiac diet. 2. Rec'd encouraging Boost Glucose Control Chocolate provided during mealtimes for decreased PO intake, decreased appetite, and increased nutritional needs. 3. Rec'd honoring pt's food preferences within diet order to encourage PO intake. 4. RD to follow and make rec's accordingly.  Goals: Pt will meet at least 75% ENN by RD follow up.    Patient has been screened and assessed by RD. RD will follow patient.      BPH (benign prostatic hyperplasia)  Continue flomax       Dementia  Continue namenda   Patient having slight case of delirium.  Zyprexa was added which seems to be helping.  This bleed use p.r.n.  Patient is a little more lethargic today.  Septic workup done.  I see no source of infection for now.  We will restart antibiotics if he gets worse.      Cerebrovascular accident (CVA) due to thrombosis of right middle cerebral artery  [facility]- admitted here for debility skilled care intially  Continue asa, plavix statin  PT/OT       Follicular lymphoma  H/o follicular lymphoma per chart (grade 3a, stage 1); receiving rituxan (cycle 3 in 11/2022), 10/2022 PET scan positive for 3 cm lymphadenopathy in left axillary region otherwise HUSSEIN.        -Will need follow up visit once discharge (per last hem/onc note follow up indicated around 12/17/22, delayed due to current admission for stroke) .     1/6 Pt with dementia and advance age; will not pursue any cancer treatments going forward     Chronic diastolic heart failure, NYHA class 2  -TTE 12/15/2022 w/ EF 68%  -GDMT when appropriate (will restart slowly with metoprolol Saturday Dec 24th and then loop diuretic on Mon Dec 26th)   -Follow up with PCP / cardiologist in outpatient setting.      Chronic kidney disease, stage 3 (moderate)  Monitor BUN/SCr.  Monitor I/Os.  Monitor electrolytes.    Avoid non-steroidal anti-inflammatory medications.  Stable        Hypertension  Stroke risk factor.  SBP <180, MAP >65; remains at goal   Avoid hypotension in the setting of small vessel disease   Holding lopressor for now given hypotension during this admission in the setting of an acute stroke, will resume on Sat Dec 24th  Bp today 123/64-HR 84.     Torsemide has been on hold due to ^ Creat now stable  1/4 P 111/57- 131/64- HR 60s well controlled   1/5 140/65    Hyperlipidemia  Continue statin       Hypothyroidism  Continue synthroid       Type 2 diabetes mellitus  Patient's FSGs are controlled on current medication regimen.  Last A1c reviewed-   Lab Results   Component Value Date    HGBA1C 6.7 (H) 12/14/2022     Most recent fingerstick glucose reviewed-   Recent Labs   Lab 01/07/23  1647 01/07/23  2041 01/08/23  0730 01/08/23  1135   POCTGLUCOSE 201* 215* 112* 104     Current correctional scale  Low  Maintain anti-hyperglycemic dose as follows-   Antihyperglycemics (From admission, onward)    Start     Stop Route Frequency Ordered    01/06/23 2100  insulin detemir U-100 pen 30 Units         -- SubQ Nightly 01/06/23 1959    01/05/23 1339  insulin aspart U-100 pen 0-5 Units         -- SubQ Before meals & nightly PRN 01/05/23 1240        Hold Oral hypoglycemics while patient is in the hospital.    1/6 last dose of dexamethasone today ; getting insulin 20 units nightly   bs 258 this am; expect BS to improve after stoppign dexamethasone   Will watch BS over weekend to calculate needs and adjust once off steroids     1/7 patient had a high glucose of 435 yesterday.  Extra insulin was given.  Now his blood sugars have improved.  We will continue the basal bolus and sliding scale.  The high blood sugar was probably caused by the Decadron.  It has now been stopped.  I will lower the insulin dosage as I do not want hypoglycemia to occur.    1/8 glucose seems to be better controlled.  Watch out for hypoglycemia.  I will back off on his insulin.      VTE Risk Mitigation (From  admission, onward)         Ordered     enoxaparin injection 40 mg  Daily         01/08/23 0945                Discharge Planning   JOSE A:      Code Status: Full Code   Is the patient medically ready for discharge?:     Reason for patient still in hospital (select all that apply): Patient trending condition, Treatment and Pending disposition  Discharge Plan A: Skilled Nursing Facility   Discharge Delays: None known at this time              Destin Jimenez MD  Department of Hospital Medicine   [place] [facility] Surg (3rd Fl)

## 2023-01-08 NOTE — PROGRESS NOTES
Pharmacist Renal Dose Adjustment Note    Vega Kohlre is a 87 y.o. male being treated with the medication Lovenox    Patient Data:    Vital Signs (Most Recent):  Temp: 97.8 °F (36.6 °C) (01/08/23 0800)  Pulse: 84 (01/08/23 0800)  Resp: 20 (01/08/23 0800)  BP: (!) 142/61 (01/08/23 0800)  SpO2: (!) 94 % (01/08/23 0800)   Vital Signs (72h Range):  Temp:  [97 °F (36.1 °C)-99.8 °F (37.7 °C)]   Pulse:  [73-99]   Resp:  [16-22]   BP: (107-161)/(57-70)   SpO2:  [89 %-98 %]      Recent Labs   Lab 01/03/23  0554 01/05/23  0604 01/08/23  0841   CREATININE 1.5* 1.3 1.2     Serum creatinine: 1.2 mg/dL 01/08/23 0841  Estimated creatinine clearance: 40.5 mL/min    Medication:Lovenox dose: 30mg frequency Daily will be changed to   Medication:Lovenox dose:40mg frequency:Daily     Pharmacist's Name: Yesi Perry  Pharmacist's Extension: [phone]

## 2023-01-08 NOTE — SUBJECTIVE & OBJECTIVE
Past Medical History:   Diagnosis Date    Abnormal barium swallow     Alzheimer's dementia, late onset     Anal stenosis     Anticoagulant long-term use     Aortic stenosis     Arthritis     Aspiration pneumonitis     Benign prostatic hyperplasia     Carotid artery disease     CHF (congestive heart failure)     Chronic diastolic heart failure     Chronic kidney disease (CKD), stage III (moderate)     CKD (chronic kidney disease)     Colon polyp     COPD (chronic obstructive pulmonary disease)     Coronary artery disease     Diabetes mellitus, type 2     Diverticulosis     Dysphagia     Hearing aid worn 01/23/2020    Received bilateral hearing aides today    Heart disease     History of CEA (carotid endarterectomy)     [place] (hard of hearing)     Hyperlipidemia     Hypertension     Hypertensive heart disease     Hypothyroidism     Joint disease     Memory loss     PAD (peripheral artery disease)     Presence of drug coated stent in right coronary artery     Pulmonary hypertension     S/P TAVR (transcatheter aortic valve replacement)     Small bowel obstruction     Wears dentures     UPPER AND LOWER    Wears glasses        Past Surgical History:   Procedure Laterality Date    anal fissure repair      ANKLE FUSION Left 08/15/2016    X 2    APPENDECTOMY      BACK SURGERY      X 4    CAROTID ENDARTERECTOMY Right     CAROTID ENDARTERECTOMY Left     CAROTID ENDARTERECTOMY Left 12/3/2021    Procedure: ENDARTERECTOMY-CAROTID;  Surgeon: Tim Castillo MD;  Location: [facility] OR;  Service: Cardiology;  Laterality: Left;  pt unsure if he stopped plavix, Dr. Castillo ok to proceed    CARPAL TUNNEL RELEASE Right     CHOLECYSTECTOMY      COLONOSCOPY N/A 7/26/2018    Procedure: HIGH RISK SCREENING COLONOSCOPY;  Surgeon: Connor Murrell MD;  Location: [facility] ENDO;  Service: Endoscopy;  Laterality: N/A;    CORONARY ANGIOPLASTY WITH STENT PLACEMENT      ELBOW SURGERY Bilateral     EXCISIONAL HEMORRHOIDECTOMY      x3     EXPLORATORY LAPAROTOMY W/ BOWEL RESECTION  11/17/2011    EYE SURGERY      cataract bilaterally    history of bowel resection      KNEE SURGERY Left     LAMINECTOMY AND MICRODISCECTOMY LUMBAR SPINE  07/21/2011    L5-S1    LEFT HEART CATHETERIZATION Left 8/31/2018    Procedure: Left heart cath;  Surgeon: Rex Chris MD;  Location: [facility] CATH;  Service: Cardiology;  Laterality: Left;    LUMBAR FUSION  11/09/2011    Anterior approach--L5-S1    LUNG BIOPSY Right 07/21/2009    VATS with wedge ofr right lower lobe    LUNG BIOPSY Left 3/16/2020    Procedure: BIOPSY, LUNG;  Surgeon: [facility] Provider;  Location: [facility] OR;  Service: General;  Laterality: Left;    PERCUTANEOUS TRANSCATHETER AORTIC VALVE REPLACEMENT (TAVR) Left 10/30/2018    Procedure: REPLACEMENT, AORTIC VALVE, PERCUTANEOUS, TRANSCATHETER;  Surgeon: Rex Chris MD;  Location: [facility] OR;  Service: Cardiology;  Laterality: Left;    PERCUTANEOUS TRANSCATHETER AORTIC VALVE REPLACEMENT (TAVR)  10/30/2018    Procedure: REPLACEMENT, AORTIC VALVE, PERCUTANEOUS, TRANSCATHETER;  Surgeon: Tim Castillo MD;  Location: [facility] OR;  Service: Cardiovascular;;    RIGHT HEART CATHETERIZATION Right 8/31/2018    Procedure: INSERTION, CATHETER, RIGHT HEART;  Surgeon: Rex Chris MD;  Location: [facility] CATH;  Service: Cardiology;  Laterality: Right;    ROTATOR CUFF REPAIR Left     SINUS SURGERY      SPINE SURGERY      back surgery    TENDON RELEASE Bilateral     TONSILLECTOMY         Review of patient's allergies indicates:   Allergen Reactions    Sulfur Itching    Penicillins Swelling and Rash       No current facility-administered medications on file prior to encounter.     Current Outpatient Medications on File Prior to Encounter   Medication Sig    albuterol (VENTOLIN HFA) 90 mcg/actuation inhaler Inhale 1-2 puffs into the lungs every 6 (six) hours as needed for Wheezing or Shortness of Breath. Rescue    aspirin 81 MG Chew Take 1 tablet (81 mg  "total) by mouth once daily.    atorvastatin (LIPITOR) 40 MG tablet Take 1 tablet (40 mg total) by mouth once daily.    clopidogreL (PLAVIX) 75 mg tablet Take 1 tablet (75 mg total) by mouth once daily.    ipratropium (ATROVENT) 42 mcg (0.06 %) nasal spray ipratropium bromide 42 mcg (0.06 %) nasal spray    meclizine (ANTIVERT) 12.5 mg tablet Take 2 tablets (25 mg total) by mouth 3 (three) times daily.    memantine (NAMENDA) 10 MG Tab Take 10 mg by mouth once daily.    metoprolol succinate (TOPROL-XL) 25 MG 24 hr tablet Take 1 tablet (25 mg total) by mouth every evening.    MYRBETRIQ 25 mg Tb24 ER tablet Take 25 mg by mouth once daily. 20    nitroGLYCERIN (NITROSTAT) 0.4 MG SL tablet Place 1 tablet (0.4 mg total) under the tongue every 5 (five) minutes as needed for Chest pain.    ondansetron (ZOFRAN-ODT) 8 MG TbDL Take 1 tablet (8 mg total) by mouth 3 (three) times daily as needed (for nausea).    pen needle, diabetic 32 gauge x 5/32" Ndle BD Adrienne 2nd Gen Pen Needle 32 gauge x 5/32"   USE 1 PEN NEEDLE ONCE DAILY USE AS DIRECTED    SOLIQUA 100/33 100 unit-33 mcg/mL InPn pen INJECT 30 UNITS SUBCUTANEOUSLY ONCE DAILY    tamsulosin (FLOMAX) 0.4 mg Cap Take 0.4 mg by mouth once daily.    torsemide (DEMADEX) 10 MG Tab Take 1 tablet (10 mg total) by mouth once daily.    UNABLE TO FIND medication name: prevagen extra strenght daily     Family History       Problem Relation (Age of Onset)    Cancer Mother    Diabetes Brother    Heart disease Father, Brother          Tobacco Use    Smoking status: Former     Packs/day: 1.50     Years: 20.00     Pack years: 30.00     Types: Cigarettes     Start date:      Quit date:      Years since quittin.0    Smokeless tobacco: Never   Substance and Sexual Activity    Alcohol use: No    Drug use: No    Sexual activity: Not on file     Review of Systems   Unable to perform ROS: Dementia   Objective:     Vital Signs (Most Recent):  Temp: 99.7 °F (37.6 °C) (23 " 1330)  Pulse: 81 (01/08/23 1135)  Resp: 16 (01/08/23 1135)  BP: (!) 105/51 (01/08/23 1135)  SpO2: (!) 91 % (01/08/23 1135)   Vital Signs (24h Range):  Temp:  [97.7 °F (36.5 °C)-100.4 °F (38 °C)] 99.7 °F (37.6 °C)  Pulse:  [73-84] 81  Resp:  [16-20] 16  SpO2:  [91 %-98 %] 91 %  BP: (105-147)/(51-70) 105/51     Weight: 66.2 kg (145 lb 15.1 oz)  Body mass index is 22.86 kg/m².    Physical Exam  Vitals and nursing note reviewed.   Constitutional:       General: He is not in acute distress.     Appearance: He is well-developed. He is not toxic-appearing.   HENT:      Head: Normocephalic and atraumatic.      Right Ear: External ear normal.      Left Ear: External ear normal.      Nose:      Comments: O2 in place  Eyes:      Conjunctiva/sclera: Conjunctivae normal.      Pupils: Pupils are equal, round, and reactive to light.   Neck:      Thyroid: No thyromegaly.   Cardiovascular:      Rate and Rhythm: Normal rate and regular rhythm.      Heart sounds: Normal heart sounds. No murmur heard.  Pulmonary:      Effort: Pulmonary effort is normal. No respiratory distress.      Breath sounds: No wheezing or rales (bibasilar).   Abdominal:      General: Bowel sounds are normal. There is no distension.      Palpations: Abdomen is soft.      Tenderness: There is no abdominal tenderness.   Musculoskeletal:         General: Normal range of motion.      Cervical back: Normal range of motion and neck supple.      Right lower leg: No edema.      Left lower leg: No edema.   Lymphadenopathy:      Cervical: No cervical adenopathy.   Skin:     General: Skin is warm and dry.      Findings: No rash.   Neurological:      Mental Status: He is alert. Mental status is at baseline. He is disoriented.      Cranial Nerves: No cranial nerve deficit.      Motor: No weakness.      Comments: Left arm weakness much improved   Psychiatric:         Mood and Affect: Affect is flat.         Speech: Speech is delayed.         Behavior: Behavior is slowed.  Behavior is not agitated or aggressive.         Cognition and Memory: Cognition is impaired. Memory is impaired.         CRANIAL NERVES     CN III, IV, VI   Pupils are equal, round, and reactive to light.     Significant Labs: All pertinent labs within the past 24 hours have been reviewed.  A1C:   Recent Labs   Lab 12/14/22  1552   HGBA1C 6.7*       ABGs: No results for input(s): PH, PCO2, HCO3, POCSATURATED, BE, TOTALHB, COHB, METHB, O2HB, POCFIO2, PO2 in the last 48 hours.  Bilirubin:   Recent Labs   Lab 12/29/22  0554 12/31/22  0356 01/02/23  0621 01/03/23  0554 01/05/23  0604   BILITOT 0.8 0.7 0.6 0.6 0.4       Blood Culture:   No results for input(s): LABBLOO in the last 48 hours.    CBC:   Recent Labs   Lab 01/08/23  0841   WBC 6.06   HGB 11.9*   HCT 35.4*          CMP:   Recent Labs   Lab 01/08/23  0841      K 3.6      CO2 26   GLU 93   BUN 33*   CREATININE 1.2   CALCIUM 9.8   ANIONGAP 10       Cardiac Markers: No results for input(s): CKMB, MYOGLOBIN, BNP, TROPISTAT in the last 48 hours.  Lactic Acid:   Recent Labs   Lab 01/08/23  1243   LACTATE 0.7       Magnesium: No results for input(s): MG in the last 48 hours.  POCT Glucose:   Recent Labs   Lab 01/07/23  2041 01/08/23  0730 01/08/23  1135   POCTGLUCOSE 215* 112* 104       Troponin: No results for input(s): TROPONINI, TROPONINIHS in the last 48 hours.  TSH:   Recent Labs   Lab 12/14/22  1009   TSH 4.401*       Urine Culture: No results for input(s): LABURIN in the last 48 hours.  Urine Studies:   Recent Labs   Lab 01/08/23  1245   COLORU Yellow   APPEARANCEUA Clear   PHUR 5.0   SPECGRAV 1.020   PROTEINUA Trace*   GLUCUA Negative   KETONESU Negative   BILIRUBINUA Negative   OCCULTUA Negative   NITRITE Negative   UROBILINOGEN Negative   LEUKOCYTESUR Trace*   WBCUA 1   BACTERIA Few*         Significant Imaging: I have reviewed and interpreted all pertinent imaging results/findings within the past 24 hours.  Consolidative opacity in the  left lower lobe consistent with known lipoid pneumonia.  Left greater than right hazy airspace opacities, could be due to multifocal infection, aspiration or edema.     01/08/2023-chest x-ray-Suggestion of some persistent left basal atelectasis versus airspace disease

## 2023-01-08 NOTE — PLAN OF CARE
Problem: Adult Inpatient Plan of Care  Goal: Plan of Care Review  1/8/2023 1615 by Ruma Esrtada RN  Outcome: Ongoing, Progressing  1/8/2023 1614 by Ruma Estrada RN  Outcome: Ongoing, Progressing  Goal: Optimal Comfort and Wellbeing  1/8/2023 1615 by Ruma Estrada RN  Outcome: Ongoing, Progressing  1/8/2023 1614 by Ruma Estrada RN  Outcome: Ongoing, Progressing     Problem: Adjustment to Illness (Stroke, Ischemic/Transient Ischemic Attack)  Goal: Optimal Coping  Outcome: Ongoing, Progressing     Problem: Cognitive Impairment (Stroke, Ischemic/Transient Ischemic Attack)  Goal: Optimal Cognitive Function  1/8/2023 1615 by Ruma Estrada RN  Outcome: Ongoing, Progressing  1/8/2023 1614 by Ruma Estrada RN  Outcome: Ongoing, Progressing     Problem: Communication Impairment (Stroke, Ischemic/Transient Ischemic Attack)  Goal: Improved Communication Skills  Outcome: Ongoing, Progressing     Problem: Urinary Elimination Impaired (Stroke, Ischemic/Transient Ischemic Attack)  Goal: Effective Urinary Elimination  1/8/2023 1615 by Ruma Estrada RN  Outcome: Ongoing, Progressing  1/8/2023 1614 by Ruma Estrada RN  Outcome: Ongoing, Progressing     Problem: Impaired Wound Healing  Goal: Optimal Wound Healing  Outcome: Ongoing, Progressing     Problem: Fall Injury Risk  Goal: Absence of Fall and Fall-Related Injury  1/8/2023 1615 by Ruma Estrada RN  Outcome: Ongoing, Progressing  1/8/2023 1614 by Ruma Estrada RN  Outcome: Ongoing, Progressing     Problem: Skin Injury Risk Increased  Goal: Skin Health and Integrity  1/8/2023 1615 by Ruma Estrada RN  Outcome: Ongoing, Progressing  1/8/2023 1614 by Ruma Estrada RN  Outcome: Ongoing, Progressing

## 2023-01-08 NOTE — ASSESSMENT & PLAN NOTE
Continue namenda   Patient having slight case of delirium.  Zyprexa was added which seems to be helping.  This bleed use p.r.n.  Patient is a little more lethargic today.  Septic workup done.  I see no source of infection for now.  We will restart antibiotics if he gets worse.

## 2023-01-08 NOTE — ASSESSMENT & PLAN NOTE
Patient's FSGs are controlled on current medication regimen.  Last A1c reviewed-   Lab Results   Component Value Date    HGBA1C 6.7 (H) 12/14/2022     Most recent fingerstick glucose reviewed-   Recent Labs   Lab 01/07/23  1647 01/07/23  2041 01/08/23  0730 01/08/23  1135   POCTGLUCOSE 201* 215* 112* 104     Current correctional scale  Low  Maintain anti-hyperglycemic dose as follows-   Antihyperglycemics (From admission, onward)    Start     Stop Route Frequency Ordered    01/06/23 2100  insulin detemir U-100 pen 30 Units         -- SubQ Nightly 01/06/23 1959    01/05/23 1339  insulin aspart U-100 pen 0-5 Units         -- SubQ Before meals & nightly PRN 01/05/23 1240        Hold Oral hypoglycemics while patient is in the hospital.    1/6 last dose of dexamethasone today ; getting insulin 20 units nightly   bs 258 this am; expect BS to improve after stoppign dexamethasone   Will watch BS over weekend to calculate needs and adjust once off steroids     1/7 patient had a high glucose of 435 yesterday.  Extra insulin was given.  Now his blood sugars have improved.  We will continue the basal bolus and sliding scale.  The high blood sugar was probably caused by the Decadron.  It has now been stopped.  I will lower the insulin dosage as I do not want hypoglycemia to occur.    1/8 glucose seems to be better controlled.  Watch out for hypoglycemia.  I will back off on his insulin.

## 2023-01-08 NOTE — PLAN OF CARE
Problem: Adult Inpatient Plan of Care  Goal: Plan of Care Review  Outcome: Ongoing, Progressing  Goal: Optimal Comfort and Wellbeing  Outcome: Ongoing, Progressing     Problem: Cognitive Impairment (Stroke, Ischemic/Transient Ischemic Attack)  Goal: Optimal Cognitive Function  Outcome: Ongoing, Progressing     Problem: Communication Impairment (Stroke, Ischemic/Transient Ischemic Attack)  Goal: Improved Communication Skills  Outcome: Ongoing, Progressing     Problem: Urinary Elimination Impaired (Stroke, Ischemic/Transient Ischemic Attack)  Goal: Effective Urinary Elimination  Outcome: Ongoing, Progressing     Problem: Fall Injury Risk  Goal: Absence of Fall and Fall-Related Injury  Outcome: Ongoing, Progressing     Problem: Skin Injury Risk Increased  Goal: Skin Health and Integrity  Outcome: Ongoing, Progressing

## 2023-01-08 NOTE — ASSESSMENT & PLAN NOTE
Admitting dx post tx at [facility] [place]- admitted here for debility skilled care intially  Continue asa, plavix statin  PT/OT

## 2023-01-09 NOTE — ASSESSMENT & PLAN NOTE
Continue PT/OT post CVA   Gait: stand by assist 85ft with RW  and O2 supplement . Fatigues easily  Patient will be admitted to the nursing home on Monday.

## 2023-01-09 NOTE — DISCHARGE SUMMARY
[facility] ([facility])  [facility] Medicine  Discharge Summary      Patient Name: Vega Kohler  MRN: [id number]  TESSA: [id number]  Patient Class: IP- Inpatient  Admission Date: 1/3/2023  Hospital Length of Stay: 6 days  Discharge Date and Time:  2023 11:29 AM  Attending Physician: Destin Jimenez MD   Discharging Provider: Alejandra Tran NP  Primary Care Provider: Fabricio Mckeon MD    Primary Care Team: Networked reference to record PCT     HPI:     HPI:   86yo male patient with extensive medical hx. (Alzheimers, aortic stenosis, arthritis, BPH, CHF, CKD, COPD, CAD/PAD, DM2, HLD/HTN, pulm HTN, S/p TAVR and hypothyroid). He was treated acutely [facility] for ischemic CVA with left hemiplegia s/p TNK 22. Pt was independent at baseline prior to this event. Exam improved following TNK. MRI with R MCA infarct. Etiology likely . Patient stepped down to NPU to await SNF placement. Metoprolol originally started but held given etiology of stroke and risk of expansion with hypoperfusion. Will plan to resume meds this Saturday and then remainder of home BP meds on Monday. Patient was discharged on DAPT with outpatient follow up. Patient remains neurologically unchanged. SBP drop < 120 but no changes on exam. Last BM yesterday. He was brought to [facility] yesterday for continued SKILL therapy with PT / OT. On plavix and statin and asa. VSS- no bp meds 123/64         22 pt tested positive for COVID; staff noting patient more sluggish & Requiring oxygen ; CXR with infiltrates   BC negative, influenza negative  COVID POSITIVE-d/c to acute care    22 day 3 Remdesivir, dexamehtasone and levofloxacin   Afebrile, WBC low, CXR reviewed, procal normal ; all appears viral will stop levofloxacin              * No surgery found *      Hospital Course:   23 Vega Kohler is a 87 y.o. male  brought to [facility]  for continued SKILL therapy with PT / OT post CVA . On plavix and statin and asa. VSS- no bp meds w  stable BP.   On 1/2 noted to require incresed oxygen and more sluggish , testing positive for COVID 19; discharged back to acute for tx;  Day 4 Remdesivir, dexamethasone   O2 sat % on 2LNC, afebrile, WBC 2.78>3.84 , LFTs stable   Creat 1.5.>1.3    Gait: Minimal Assistance x ~25 feet with RW and O2 supplement . Fatigues easily    1/6/23 Vega Kohler is a 87 y.o. male  brought to [facility]  for continued SKILL therapy with PT / OT post CVA . On plavix and statin and asa. VSS- no bp meds w stable BP.   On 1/2 noted to require incresed oxygen and more sluggish , testing positive for COVID 19; discharged back to acute for tx;  Day 5 Remdesivir, dexamethasone complete today   O2 sat 93-97% on 1 LNC, ( Has home oxygen) afebrile,    Gait: Standby Assistance x ~80 feet( 1 rest period) with O2 supplement using RW. Fatigues at the end distance.  Plan for d/c to nursing home for skilled therapy on Monday with [place]      1/7 - patient completed therapy for COVID.  He developed some delirium yesterday.  IM Zyprexa was given which seems to help.  He is doing better today.  Sometimes he gets agitated and threatened nursing staff.  Overall he is working with therapy.  I consulted psych and they feel this is delirium.  Since the Zyprexa helped they will sign off on the case.  Patient required extra insulin because his blood sugars spiked to 435.  This is probably from the Decadron.  Decadron has been discontinued.    1/8-patient seems more sluggish today and is running a low-grade temperature.  He is very sleepy.  I repeated a lactic acid, CBC, CMP, urinalysis which all appeared normal.  Chest x-ray reveals a persistent left lower lobe infiltrate/atelectasis.  Doubt this is pneumonia.    1/9 PT was here for skill therapy after acute CVA 12/14 treated at [facility]. He contracted covid 1/2 and was treated 3 days remdesivir and dexamethasone. VSS/afebrile. Sats 91-95% on 1L NC. CXR with left basilar atelectasis.PT has hoim  ambulating 85ft with stand by assist. He plans to transfer to [facility] today       Goals of Care Treatment Preferences:  Code Status: Full Code      Consults:   Consults (From admission, onward)        Status Ordering Provider     Inpatient consult to Psychiatry  Once        Provider:  Thom Riddle MD    Completed ANITRA KNOX     IP consult to case management  Once        Provider:  (Not yet assigned)    Completed PINA JARRELL     Inpatient consult to Registered Dietitian/Nutritionist  Once        Provider:  (Not yet assigned)    Completed GILES ROMERO          * COVID-19  Patient is identified as Severe COVID-19 based on hypoxemia with O2 saturations <94% on room air or on ambulation   Initiate standard COVID protocols; COVID-19 testing Collection Date: 1/1/2023 Collection Time:  12:34 PM ,Infection Control notification  and isolation- respiratory, contact and droplet per protocol    Diagnostics: (leukopenia, hyponatremia, hyperferritinemia, elevated troponin, elevated d-dimer, age, and comorbidities are significant predictors of poor clinical outcome)  CBC, CMP, Ferritin, CRP and Portable CXR    Management: Inhaled bronchodilators as needed for shortness of breath.   Day 4 Remdesivir, dexamehtasone and levofloxacin   Afebrile, WBC low, CXR reviewed, procal normal ; all appears viral  Repeat labs in am     1/6-treatment for COVID completed.  Discontinue Decadron, remdesivir  1/7-no more COVID symptoms.    Debility  Continue PT/OT post CVA   Gait: stand by assist 85ft with RW  and O2 supplement . Fatigues easily  Patient will be admitted to the nursing home on Monday.      Severe malnutrition in the context of acute on chronic illness  Nutrition consulted. Most recent weight and BMI monitored-     Malnutrition Type and Energy Intake  Malnutrition Type: acute illness or injury  Energy Intake: moderate energy intake    Malnutrition (Moderate to Severe)  Weight Loss  (Malnutrition): greater than 7.5% in 3 months  Energy Intake (Malnutrition): less than 75% for greater than or equal to 1 month  Subcutaneous Fat (Malnutrition): moderate depletion  Muscle Mass (Malnutrition): moderate depletion    Final Summary  Subcutaneous Fat Loss (Final Summary): severe protein-calorie malnutrition  Muscle Loss Evaluation (Final Summary): severe protein-calorie malnutrition    Malnutrition Final Summary  Severe Weight Loss (Malnutrition): greater than 7.5% in 3 months    Measurements:  Wt Readings from Last 1 Encounters:   01/09/23 66.7 kg (147 lb 0.8 oz)   Body mass index is 23.03 kg/m².    Recommendations: Recommendation/Intervention: 1. Rec'd continue diabetic and cardiac diet. 2. Rec'd encouraging Boost Glucose Control Chocolate provided during mealtimes for decreased PO intake, decreased appetite, and increased nutritional needs. 3. Rec'd honoring pt's food preferences within diet order to encourage PO intake. 4. RD to follow and make rec's accordingly.  Goals: Pt will meet at least 75% ENN by RD follow up.    Patient has been screened and assessed by RD. RD will follow patient.      BPH (benign prostatic hyperplasia)  Continue flomax       Dementia  Continue namenda   Patient having slight case of delirium.  Zyprexa was added which seems to be helping.  This bleed use p.r.n.  Patient is a little more lethargic today.  Septic workup done.  I see no source of infection for now.  We will restart antibiotics if he gets worse.      Cerebrovascular accident (CVA) due to thrombosis of right middle cerebral artery  Admitting dx post tx at [facility]- admitted here for debility skilled care intially  Continue asa, plavix statin  PT/OT > he is doing well  D/c to nursing home today       Follicular lymphoma  H/o follicular lymphoma per chart (grade 3a, stage 1); receiving rituxan (cycle 3 in 11/2022), 10/2022 PET scan positive for 3 cm lymphadenopathy in left axillary region otherwise HUSSEIN.        -Will  need follow up visit once discharge (per last hem/onc note follow up indicated around 12/17/22, delayed due to current admission for stroke) .     1/6 Pt with dementia and advance age; will not pursue any cancer treatments going forward     Chronic diastolic heart failure, NYHA class 2  -TTE 12/15/2022 w/ EF 68%  -GDMT when appropriate (will restart slowly with metoprolol Saturday Dec 24th and then loop diuretic on Mon Dec 26th)   -Follow up with PCP / cardiologist in outpatient setting.      Chronic kidney disease, stage 3 (moderate)  Monitor BUN/SCr.  Monitor I/Os.  Monitor electrolytes.    Avoid non-steroidal anti-inflammatory medications.  Stable       Hypertension  Stroke risk factor.  SBP <180, MAP >65; remains at goal   Avoid hypotension in the setting of small vessel disease   Holding lopressor for now given hypotension during this admission in the setting of an acute stroke, will resume on Sat Dec 24th  Bp today 123/64-HR 84.     Torsemide has been on hold due to ^ Creat now stable  1/4 P 111/57- 131/64- HR 60s well controlled   1/5 140/65  1/9 bp 143/93 no meds    Hyperlipidemia  Continue statin       Hypothyroidism  Continue synthroid       Type 2 diabetes mellitus  Patient's FSGs are controlled on current medication regimen.  Last A1c reviewed-   Lab Results   Component Value Date    HGBA1C 6.7 (H) 12/14/2022     Most recent fingerstick glucose reviewed-   Recent Labs   Lab 01/08/23  1135 01/08/23  1633 01/08/23 2002 01/09/23  0715   POCTGLUCOSE 104 121* 106 78     Current correctional scale  Low  Maintain anti-hyperglycemic dose as follows-   Antihyperglycemics (From admission, onward)    Start     Stop Route Frequency Ordered    01/08/23 2100  insulin detemir U-100 pen 20 Units         -- SubQ Nightly 01/08/23 1529    01/05/23 1339  insulin aspart U-100 pen 0-5 Units         -- SubQ Before meals & nightly PRN 01/05/23 1240        Hold Oral hypoglycemics while patient is in the hospital.    1/6 last  dose of dexamethasone today ; getting insulin 20 units nightly   bs 258 this am; expect BS to improve after stoppign dexamethasone   Will watch BS over weekend to calculate needs and adjust once off steroids     1/7 patient had a high glucose of 435 yesterday.  Extra insulin was given.  Now his blood sugars have improved.  We will continue the basal bolus and sliding scale.  The high blood sugar was probably caused by the Decadron.  It has now been stopped.  I will lower the insulin dosage as I do not want hypoglycemia to occur.    1/8 glucose seems to be better controlled.  Watch out for hypoglycemia.  I will back off on his insulin.    bs this am, 78>home today on lowered dose levemir 20 units daily and SSi as needed       Final Active Diagnoses:    Diagnosis Date Noted POA    PRINCIPAL PROBLEM:  COVID-19 [U07.1] 01/02/2023 Yes    Debility [R53.81] 01/04/2023 Yes    Severe malnutrition in the context of acute on chronic illness [E43] 12/29/2022 Yes    Dementia [F03.90] 12/14/2022 Yes     Chronic    BPH (benign prostatic hyperplasia) [N40.0] 12/14/2022 Yes     Chronic    Cerebrovascular accident (CVA) due to thrombosis of right middle cerebral artery [I63.311] 12/14/2022 Yes    Follicular lymphoma [C82.90] 10/06/2022 Yes     Chronic    Chronic diastolic heart failure, NYHA class 2 [I50.32] 08/31/2018 Yes     Chronic    Hyperlipidemia [E78.5]  Yes     Chronic    Hypothyroidism [E03.9] 02/09/2017 Yes     Chronic    Type 2 diabetes mellitus [E11.9] 02/09/2017 Yes     Chronic    Chronic kidney disease, stage 3 (moderate) [N18.30] 11/09/2016 Yes     Chronic    Hypertension [I10] 11/09/2016 Yes     Chronic      Problems Resolved During this Admission:       Discharged Condition: stable    Disposition: Skilled Nursing Facility    Follow Up:   Follow-up Information     [facility] Follow up.    Contact information:  [address]  [address]  [phone]                     Patient Instructions:   No discharge procedures on file.    Significant Diagnostic Studies:     A1C:   Recent Labs   Lab 22  1552   HGBA1C 6.7*            Bilirubin:           Recent Labs   Lab 22  0356 23  0621 23  0554 23  0604 23  0600   BILITOT 0.7 0.6 0.6 0.4 0.7               CBC:        Recent Labs   Lab 23  0841 23  0600   WBC 6.06 6.11   HGB 11.9* 12.8*   HCT 35.4* 34.7*    372         CMP:        Recent Labs   Lab 23  0841 23  0600    144   K 3.6 4.0    106   CO2 26 29   GLU 93 88   BUN 33* 31*   CREATININE 1.2 1.2   CALCIUM 9.8 9.9   PROT  --  6.1   ALBUMIN  --  2.9*   BILITOT  --  0.7   ALKPHOS  --  83   AST  --  30   ALT  --  29   ANIONGAP 10 9         Lactic Acid:       Recent Labs   Lab 23  1243   LACTATE 0.7            POCT Glucose:         Recent Labs   Lab 23  1633 23  2002 23  0715   POCTGLUCOSE 121* 106 78         TSH:       Recent Labs   Lab 22  1009   TSH 4.401*            Urine Studies:       Recent Labs   Lab 23  1245   COLORU Yellow   APPEARANCEUA Clear   PHUR 5.0   SPECGRAV 1.020   PROTEINUA Trace*   GLUCUA Negative   KETONESU Negative   BILIRUBINUA Negative   OCCULTUA Negative   NITRITE Negative   UROBILINOGEN Negative   LEUKOCYTESUR Trace*   WBCUA 1   BACTERIA Few*            Significant Imagin/08/2023-chest x-ray-Suggestion of some persistent left basal atelectasis versus airspace disease      Pending Diagnostic Studies:     None         Medications:  Reconciled Home Medications:      Medication List      START taking these medications    OLANZapine injection  Commonly known as: ZyPREXA  Inject 2.5 mg into the muscle 3 (three) times daily as needed for Agitation.     polyethylene glycol 17 gram Pwpk  Commonly known as: GLYCOLAX  Take 17 g by mouth once daily.  Start taking on: January 10, 2023        CHANGE how you  "take these medications    SOLIQUA 100/33 100 unit-33 mcg/mL Inpn pen  Generic drug: insulin glargine-lixisenatide  Inject 15 Units into the skin daily with breakfast.  What changed: See the new instructions.        CONTINUE taking these medications    albuterol 90 mcg/actuation inhaler  Commonly known as: VENTOLIN HFA  Inhale 1-2 puffs into the lungs every 6 (six) hours as needed for Wheezing or Shortness of Breath. Rescue     aspirin 81 MG Chew  Take 1 tablet (81 mg total) by mouth once daily.     atorvastatin 40 MG tablet  Commonly known as: LIPITOR  Take 1 tablet (40 mg total) by mouth once daily.     clopidogreL 75 mg tablet  Commonly known as: PLAVIX  Take 1 tablet (75 mg total) by mouth once daily.     memantine 10 MG Tab  Commonly known as: NAMENDA  Take 10 mg by mouth once daily.     tamsulosin 0.4 mg Cap  Commonly known as: FLOMAX  Take 0.4 mg by mouth once daily.        STOP taking these medications    ipratropium 42 mcg (0.06 %) nasal spray  Commonly known as: ATROVENT     meclizine 12.5 mg tablet  Commonly known as: ANTIVERT     metoprolol succinate 25 MG 24 hr tablet  Commonly known as: TOPROL-XL     MYRBETRIQ 25 mg Tb24 ER tablet  Generic drug: mirabegron     nitroGLYCERIN 0.4 MG SL tablet  Commonly known as: NITROSTAT     ondansetron 8 MG Tbdl  Commonly known as: ZOFRAN-ODT     pen needle, diabetic 32 gauge x 5/32" Ndle     torsemide 10 MG Tab  Commonly known as: DEMADEX     UNABLE TO FIND            Indwelling Lines/Drains at time of discharge:   Lines/Drains/Airways     None                 Time spent on the discharge of patient: 30 minutes         Alejandra Tran NP  Department of Hospital Medicine  [facility] (3rd Fl)  "

## 2023-01-09 NOTE — ASSESSMENT & PLAN NOTE
Admitting dx post tx at [facility]- admitted here for debility skilled care intially  Continue asa, plavix statin  PT/OT > he is doing well  D/c to nursing home today

## 2023-01-09 NOTE — PLAN OF CARE
01/09/23 1428   Medicare Message   Important Message from Medicare regarding Discharge Appeal Rights Explained to patient/caregiver;Other (comments)  (Completed via phone with Son due to covid 19 precautions.)   Date IMM was signed 01/09/23   Time IMM was signed 1102

## 2023-01-09 NOTE — PLAN OF CARE
01/09/23 1225   Post-Acute Status   Post-Acute Authorization Placement   Post-Acute Placement Status Pending payor review/awaiting authorization (if required)   Coverage Medicare   Discharge Delays (!) Post-Acute Set-up   Discharge Plan   Discharge Plan A Skilled Nursing Facility       CM attempted to contact Karin at [facility], to see if patient can discharge there today or not. CM also sent message via referral portal, [employer]. CM also had to leave message via phone.   Awaiting response.

## 2023-01-09 NOTE — ASSESSMENT & PLAN NOTE
Admitting dx post tx at [facility]- admitted here for debility skilled care intially  Continue asa, plavix statin  PT/OT > he is doing well  D/c to nursing home today

## 2023-01-09 NOTE — ASSESSMENT & PLAN NOTE
Nutrition consulted. Most recent weight and BMI monitored-     Malnutrition Type and Energy Intake  Malnutrition Type: acute illness or injury  Energy Intake: moderate energy intake    Malnutrition (Moderate to Severe)  Weight Loss (Malnutrition): greater than 7.5% in 3 months  Energy Intake (Malnutrition): less than 75% for greater than or equal to 1 month  Subcutaneous Fat (Malnutrition): moderate depletion  Muscle Mass (Malnutrition): moderate depletion    Final Summary  Subcutaneous Fat Loss (Final Summary): severe protein-calorie malnutrition  Muscle Loss Evaluation (Final Summary): severe protein-calorie malnutrition    Malnutrition Final Summary  Severe Weight Loss (Malnutrition): greater than 7.5% in 3 months    Measurements:  Wt Readings from Last 1 Encounters:   01/09/23 66.7 kg (147 lb 0.8 oz)   Body mass index is 23.03 kg/m².    Recommendations: Recommendation/Intervention: 1. Rec'd continue diabetic and cardiac diet. 2. Rec'd encouraging Boost Glucose Control Chocolate provided during mealtimes for decreased PO intake, decreased appetite, and increased nutritional needs. 3. Rec'd honoring pt's food preferences within diet order to encourage PO intake. 4. RD to follow and make rec's accordingly.  Goals: Pt will meet at least 75% ENN by RD follow up.    Patient has been screened and assessed by RD. RD will follow patient.

## 2023-01-09 NOTE — PLAN OF CARE
Recommendation/Intervention:   1. Rec'd continue diabetic and cardiac diet.   2. Rec'd encouraging Boost Glucose Control Chocolate provided during mealtimes for decreased PO intake, decreased appetite, and increased nutritional needs.   3. Rec'd honoring pt's food preferences within diet order to encourage PO intake.  4. Rec'd continue frequent weight measurements to assess for acute weight loss.    5. RD to follow and make rec's accordingly.    Goals: Pt will meet at least 75% ENN by RD follow up.  Nutrition Goal Status: goal not met

## 2023-01-09 NOTE — PLAN OF CARE
Problem: Physical Therapy  Goal: Physical Therapy Goal  Description:   Patient will increase functional independence with mobility by performin. Supine <> sit with Modified Independent  2. Sit <> Stand  with Supervision or Set-up Assistance with RW  3. Bed to chair transfer with Supervision or Set-up Assistancewith or without rolling walker using Step Transfer TECHNIQUE  4. Gait  x ~100  feet with Standby Assistance with or without rolling walker  5. Lower extremity exercise program x10 reps per handout, with assistance as needed     Outcome: Ongoing, Progressing

## 2023-01-09 NOTE — PLAN OF CARE
Increased lethargy noted.  Pt arouses to voice, but provides delayed responses and kept his eyes closed during his responses . He did not eat lunch . Boost offered to pt, but he coughed and spit it out all over himself. Dr. Young notified. ABG ordered.         Problem: Urinary Elimination Impaired (Stroke, Ischemic/Transient Ischemic Attack)  Goal: Effective Urinary Elimination  Outcome: Ongoing, Progressing     Problem: Sensorimotor Impairment (Stroke, Ischemic/Transient Ischemic Attack)  Goal: Improved Sensorimotor Function  Outcome: Ongoing, Progressing     Problem: Swallowing Impairment (Stroke, Ischemic/Transient Ischemic Attack)  Goal: Optimal Eating and Swallowing without Aspiration  Outcome: Ongoing, Progressing

## 2023-01-09 NOTE — PLAN OF CARE
01/09/23 1248   Post-Acute Status   Post-Acute Authorization Placement   Post-Acute Placement Status Set-up Complete/Auth obtained   Coverage Medicare with suppliment   Discharge Delays (!) Post-Acute Set-up   Discharge Plan   Discharge Plan A Skilled Nursing Facility         [facility] called and states patient will have to wait until tomorrow. Their policy is 10 days quarantine for covid, and they are almost at copacity for today and they need to figure out room situation.   Updated staff and family.

## 2023-01-09 NOTE — PROGRESS NOTES
[facility] ([facility])  [facility] Medicine  Progress Note    Patient Name: Vega Kohler  MRN: [id number]  Patient Class: IP- Inpatient   Admission Date: 1/3/2023  Length of Stay: 6 days  Attending Physician: Destin Jimenez MD  Primary Care Provider: Fbaricio Mckeon MD        Subjective:     Principal Problem:COVID-19        HPI:    HPI:   88yo male patient with extensive medical hx. (Alzheimers, aortic stenosis, arthritis, BPH, CHF, CKD, COPD, CAD/PAD, DM2, HLD/HTN, pulm HTN, S/p TAVR and hypothyroid). He was [place] [facility] [place] with left hemiplegia s/p TNK 22. Pt was independent at baseline prior to this event. Exam improved following TNK. MRI with R MCA infarct. Etiology likely . Patient stepped down to NPU to await SNF placement. Metoprolol originally started but held given etiology of stroke and risk of expansion with hypoperfusion. Will plan to resume meds this Saturday and then remainder of home BP meds on Monday. Patient was discharged on DAPT with outpatient follow up. Patient remains neurologically unchanged. SBP drop < 120 but no changes on exam. Last BM yesterday. He was brought to [facility] yesterday for continued SKILL therapy with PT / OT. On plavix and statin and asa. VSS- no bp meds 123/64         22 pt tested positive for COVID; staff noting patient more sluggish & Requiring oxygen ; CXR with infiltrates   BC negative, influenza negative  COVID POSITIVE-d/c to acute care    22 day 3 Remdesivir, dexamehtasone and levofloxacin   Afebrile, WBC low, CXR reviewed, procal normal ; all appears viral will stop levofloxacin              Overview/Hospital Course:  23 Vega Kohler is a 87 y.o. male  brought to [facility]  for continued SKILL therapy with PT / OT post CVA . On plavix and statin and asa. VSS- no bp meds w stable BP.   On  noted to require incresed oxygen and more sluggish , testing positive for COVID 19; discharged back to acute for tx;  Day 4  Remdesivir, dexamethasone   O2 sat % on 2LNC, afebrile, WBC 2.78>3.84 , LFTs stable   Creat 1.5.>1.3    Gait: Minimal Assistance x ~25 feet with RW and O2 supplement . Fatigues easily    1/6/23 Vega Kohler is a 87 y.o. male  brought to [facility]  for continued SKILL therapy with PT / OT post CVA . On plavix and statin and asa. VSS- no bp meds w stable BP.   On 1/2 noted to require incresed oxygen and more sluggish , testing positive for COVID 19; discharged back to acute for tx;  Day 5 Remdesivir, dexamethasone complete today   O2 sat 93-97% on 1 LNC, ( Has home oxygen) afebrile,    Gait: Standby Assistance x ~80 feet( 1 rest period) with O2 supplement using RW. Fatigues at the end distance.  Plan for d/c to nursing home for skilled therapy on Monday with [facility]      1/7 - patient completed therapy for COVID.  He developed some delirium yesterday.  IM Zyprexa was given which seems to help.  He is doing better today.  Sometimes he gets agitated and threatened nursing staff.  Overall he is working with therapy.  I consulted psych and they feel this is delirium.  Since the Zyprexa helped they will sign off on the case.  Patient required extra insulin because his blood sugars spiked to 435.  This is probably from the Decadron.  Decadron has been discontinued.    1/8-patient seems more sluggish today and is running a low-grade temperature.  He is very sleepy.  I repeated a lactic acid, CBC, CMP, urinalysis which all appeared normal.  Chest x-ray reveals a persistent left lower lobe infiltrate/atelectasis.  Doubt this is pneumonia.    1/9 PT was here for skill therapy after acute CVA 12/14 treated at [facility]. He contracted covid 1/2 and was treated 3 days remdesivir and dexamethasone. VSS/afebrile. Sats 91-95% on 1L NC. CXR with left basilar atelectasis.PT has hoim ambulating 85ft with stand by assist. He plans to transfer to [facility] nursing home today      Past Medical History:   Diagnosis Date    Abnormal barium  swallow     Alzheimer's dementia, late onset     Anal stenosis     Anticoagulant long-term use     Aortic stenosis     Arthritis     Aspiration pneumonitis     Benign prostatic hyperplasia     Carotid artery disease     CHF (congestive heart failure)     Chronic diastolic heart failure     Chronic kidney disease (CKD), stage III (moderate)     CKD (chronic kidney disease)     Colon polyp     COPD (chronic obstructive pulmonary disease)     Coronary artery disease     Diabetes mellitus, type 2     Diverticulosis     Dysphagia     Hearing aid worn 01/23/2020    Received bilateral hearing aides today    Heart disease     History of CEA (carotid endarterectomy)     [place] (hard of hearing)     Hyperlipidemia     Hypertension     Hypertensive heart disease     Hypothyroidism     Joint disease     Memory loss     PAD (peripheral artery disease)     Presence of drug coated stent in right coronary artery     Pulmonary hypertension     S/P TAVR (transcatheter aortic valve replacement)     Small bowel obstruction     Wears dentures     UPPER AND LOWER    Wears glasses        Past Surgical History:   Procedure Laterality Date    anal fissure repair      ANKLE FUSION Left 08/15/2016    X 2    APPENDECTOMY      BACK SURGERY      X 4    CAROTID ENDARTERECTOMY Right     CAROTID ENDARTERECTOMY Left     CAROTID ENDARTERECTOMY Left 12/3/2021    Procedure: ENDARTERECTOMY-CAROTID;  Surgeon: Tim Castillo MD;  Location: [facility] OR;  Service: Cardiology;  Laterality: Left;  pt unsure if he stopped plavix, Dr. Castillo ok to proceed    CARPAL TUNNEL RELEASE Right     CHOLECYSTECTOMY      COLONOSCOPY N/A 7/26/2018    Procedure: HIGH RISK SCREENING COLONOSCOPY;  Surgeon: Connor Murrell MD;  Location: [facility] ENDO;  Service: Endoscopy;  Laterality: N/A;    CORONARY ANGIOPLASTY WITH STENT PLACEMENT      ELBOW SURGERY Bilateral     EXCISIONAL HEMORRHOIDECTOMY      x3    EXPLORATORY  LAPAROTOMY W/ BOWEL RESECTION  11/17/2011    EYE SURGERY      cataract bilaterally    history of bowel resection      KNEE SURGERY Left     LAMINECTOMY AND MICRODISCECTOMY LUMBAR SPINE  07/21/2011    L5-S1    LEFT HEART CATHETERIZATION Left 8/31/2018    Procedure: Left heart cath;  Surgeon: Rex Chris MD;  Location: [facility] CATH;  Service: Cardiology;  Laterality: Left;    LUMBAR FUSION  11/09/2011    Anterior approach--L5-S1    LUNG BIOPSY Right 07/21/2009    VATS with wedge ofr right lower lobe    LUNG BIOPSY Left 3/16/2020    Procedure: BIOPSY, LUNG;  Surgeon: [facility] Diagnostic Provider;  Location: [facility] OR;  Service: General;  Laterality: Left;    PERCUTANEOUS TRANSCATHETER AORTIC VALVE REPLACEMENT (TAVR) Left 10/30/2018    Procedure: REPLACEMENT, AORTIC VALVE, PERCUTANEOUS, TRANSCATHETER;  Surgeon: Rex Chris MD;  Location: [facility] OR;  Service: Cardiology;  Laterality: Left;    PERCUTANEOUS TRANSCATHETER AORTIC VALVE REPLACEMENT (TAVR)  10/30/2018    Procedure: REPLACEMENT, AORTIC VALVE, PERCUTANEOUS, TRANSCATHETER;  Surgeon: Tim Castillo MD;  Location: [facility] OR;  Service: Cardiovascular;;    RIGHT HEART CATHETERIZATION Right 8/31/2018    Procedure: INSERTION, CATHETER, RIGHT HEART;  Surgeon: Rex Chris MD;  Location: [facility] CATH;  Service: Cardiology;  Laterality: Right;    ROTATOR CUFF REPAIR Left     SINUS SURGERY      SPINE SURGERY      back surgery    TENDON RELEASE Bilateral     TONSILLECTOMY         Review of patient's allergies indicates:   Allergen Reactions    Sulfur Itching    Penicillins Swelling and Rash       No current facility-administered medications on file prior to encounter.     Current Outpatient Medications on File Prior to Encounter   Medication Sig    albuterol (VENTOLIN HFA) 90 mcg/actuation inhaler Inhale 1-2 puffs into the lungs every 6 (six) hours as needed for Wheezing or Shortness of Breath. Rescue    aspirin 81 MG Chew Take 1 tablet (81  "mg total) by mouth once daily.    atorvastatin (LIPITOR) 40 MG tablet Take 1 tablet (40 mg total) by mouth once daily.    clopidogreL (PLAVIX) 75 mg tablet Take 1 tablet (75 mg total) by mouth once daily.    ipratropium (ATROVENT) 42 mcg (0.06 %) nasal spray ipratropium bromide 42 mcg (0.06 %) nasal spray    meclizine (ANTIVERT) 12.5 mg tablet Take 2 tablets (25 mg total) by mouth 3 (three) times daily.    memantine (NAMENDA) 10 MG Tab Take 10 mg by mouth once daily.    metoprolol succinate (TOPROL-XL) 25 MG 24 hr tablet Take 1 tablet (25 mg total) by mouth every evening.    MYRBETRIQ 25 mg Tb24 ER tablet Take 25 mg by mouth once daily. 20    nitroGLYCERIN (NITROSTAT) 0.4 MG SL tablet Place 1 tablet (0.4 mg total) under the tongue every 5 (five) minutes as needed for Chest pain.    ondansetron (ZOFRAN-ODT) 8 MG TbDL Take 1 tablet (8 mg total) by mouth 3 (three) times daily as needed (for nausea).    pen needle, diabetic 32 gauge x 5/32" Ndle BD Adrienne 2nd Gen Pen Needle 32 gauge x 5/32"   USE 1 PEN NEEDLE ONCE DAILY USE AS DIRECTED    SOLIQUA 100/33 100 unit-33 mcg/mL InPn pen INJECT 30 UNITS SUBCUTANEOUSLY ONCE DAILY    tamsulosin (FLOMAX) 0.4 mg Cap Take 0.4 mg by mouth once daily.    torsemide (DEMADEX) 10 MG Tab Take 1 tablet (10 mg total) by mouth once daily.    UNABLE TO FIND medication name: prevagen extra strenght daily     Family History       Problem Relation (Age of Onset)    Cancer Mother    Diabetes Brother    Heart disease Father, Brother          Tobacco Use    Smoking status: Former     Packs/day: 1.50     Years: 20.00     Pack years: 30.00     Types: Cigarettes     Start date:      Quit date:      Years since quittin.0    Smokeless tobacco: Never   Substance and Sexual Activity    Alcohol use: No    Drug use: No    Sexual activity: Not on file     Review of Systems   Unable to perform ROS: Dementia   Objective:     Vital Signs (Most Recent):  Temp: 98.4 °F (36.9 " °C) (01/09/23 0741)  Pulse: 88 (01/09/23 0741)  Resp: 16 (01/09/23 0843)  BP: (!) 143/63 (01/09/23 0741)  SpO2: 95 % (01/09/23 0741)   Vital Signs (24h Range):  Temp:  [96.9 °F (36.1 °C)-100.4 °F (38 °C)] 98.4 °F (36.9 °C)  Pulse:  [] 88  Resp:  [16-20] 16  SpO2:  [91 %-95 %] 95 %  BP: (105-147)/(51-65) 143/63     Weight: 66.7 kg (147 lb 0.8 oz)  Body mass index is 23.03 kg/m².    Physical Exam  Vitals and nursing note reviewed.   Constitutional:       General: He is not in acute distress.     Appearance: He is well-developed. He is not toxic-appearing.   HENT:      Head: Normocephalic and atraumatic.      Right Ear: External ear normal.      Left Ear: External ear normal.      Nose:      Comments: O2 in place  Eyes:      Conjunctiva/sclera: Conjunctivae normal.      Pupils: Pupils are equal, round, and reactive to light.   Neck:      Thyroid: No thyromegaly.   Cardiovascular:      Rate and Rhythm: Normal rate and regular rhythm.      Heart sounds: Normal heart sounds. No murmur heard.  Pulmonary:      Effort: Pulmonary effort is normal. No respiratory distress.      Breath sounds: No wheezing.   Abdominal:      General: Bowel sounds are normal. There is no distension.      Palpations: Abdomen is soft.      Tenderness: There is no abdominal tenderness.   Musculoskeletal:         General: Normal range of motion.      Cervical back: Normal range of motion and neck supple.      Right lower leg: No edema.      Left lower leg: No edema.   Lymphadenopathy:      Cervical: No cervical adenopathy.   Skin:     General: Skin is warm and dry.      Findings: No rash.   Neurological:      Mental Status: He is alert. Mental status is at baseline. He is disoriented.      Cranial Nerves: No cranial nerve deficit.      Motor: No weakness.      Comments: Left arm weakness much improved   Psychiatric:         Mood and Affect: Affect is flat.         Speech: Speech is delayed.         Behavior: Behavior is slowed. Behavior is not  agitated or aggressive.         Cognition and Memory: Cognition is impaired. Memory is impaired.         CRANIAL NERVES     CN III, IV, VI   Pupils are equal, round, and reactive to light.     Significant Labs: All pertinent labs within the past 24 hours have been reviewed.  A1C:   Recent Labs   Lab 22  1552   HGBA1C 6.7*         Bilirubin:   Recent Labs   Lab 22  0356 23  0621 23  0554 23  0604 23  0600   BILITOT 0.7 0.6 0.6 0.4 0.7           CBC:   Recent Labs   Lab 23  0841 23  0600   WBC 6.06 6.11   HGB 11.9* 12.8*   HCT 35.4* 34.7*    372       CMP:   Recent Labs   Lab 23  0841 23  0600    144   K 3.6 4.0    106   CO2 26 29   GLU 93 88   BUN 33* 31*   CREATININE 1.2 1.2   CALCIUM 9.8 9.9   PROT  --  6.1   ALBUMIN  --  2.9*   BILITOT  --  0.7   ALKPHOS  --  83   AST  --  30   ALT  --  29   ANIONGAP 10 9       Lactic Acid:   Recent Labs   Lab 23  1243   LACTATE 0.7         POCT Glucose:   Recent Labs   Lab 23  1633 23  2002 23  0715   POCTGLUCOSE 121* 106 78       TSH:   Recent Labs   Lab 22  1009   TSH 4.401*         Urine Studies:   Recent Labs   Lab 23  1245   COLORU Yellow   APPEARANCEUA Clear   PHUR 5.0   SPECGRAV 1.020   PROTEINUA Trace*   GLUCUA Negative   KETONESU Negative   BILIRUBINUA Negative   OCCULTUA Negative   NITRITE Negative   UROBILINOGEN Negative   LEUKOCYTESUR Trace*   WBCUA 1   BACTERIA Few*         Significant Imagin/08/2023-chest x-ray-Suggestion of some persistent left basal atelectasis versus airspace disease      Assessment/Plan:      * COVID-19  Patient is identified as Severe COVID-19 based on hypoxemia with O2 saturations <94% on room air or on ambulation   Initiate standard COVID protocols; COVID-19 testing Collection Date: 2023 Collection Time:  12:34 PM ,Infection Control notification  and isolation- respiratory, contact and droplet per  protocol    Diagnostics: (leukopenia, hyponatremia, hyperferritinemia, elevated troponin, elevated d-dimer, age, and comorbidities are significant predictors of poor clinical outcome)  CBC, CMP, Ferritin, CRP and Portable CXR    Management: Inhaled bronchodilators as needed for shortness of breath.   Day 4 Remdesivir, dexamehtasone and levofloxacin   Afebrile, WBC low, CXR reviewed, procal normal ; all appears viral  Repeat labs in am     1/6-treatment for COVID completed.  Discontinue Decadron, remdesivir  1/7-no more COVID symptoms.    Debility  Continue PT/OT post CVA   Gait: stand by assist 85ft with RW  and O2 supplement . Fatigues easily  Patient will be admitted to the nursing home on Monday.      Severe malnutrition in the context of acute on chronic illness  Nutrition consulted. Most recent weight and BMI monitored-     Malnutrition Type and Energy Intake  Malnutrition Type: acute illness or injury  Energy Intake: moderate energy intake    Malnutrition (Moderate to Severe)  Weight Loss (Malnutrition): greater than 7.5% in 3 months  Energy Intake (Malnutrition): less than 75% for greater than or equal to 1 month  Subcutaneous Fat (Malnutrition): moderate depletion  Muscle Mass (Malnutrition): moderate depletion    Final Summary  Subcutaneous Fat Loss (Final Summary): severe protein-calorie malnutrition  Muscle Loss Evaluation (Final Summary): severe protein-calorie malnutrition    Malnutrition Final Summary  Severe Weight Loss (Malnutrition): greater than 7.5% in 3 months    Measurements:  Wt Readings from Last 1 Encounters:   01/09/23 66.7 kg (147 lb 0.8 oz)   Body mass index is 23.03 kg/m².    Recommendations: Recommendation/Intervention: 1. Rec'd continue diabetic and cardiac diet. 2. Rec'd encouraging Boost Glucose Control Chocolate provided during mealtimes for decreased PO intake, decreased appetite, and increased nutritional needs. 3. Rec'd honoring pt's food preferences within diet order to encourage  PO intake. 4. RD to follow and make rec's accordingly.  Goals: Pt will meet at least 75% ENN by RD follow up.    Patient has been screened and assessed by RD. RD will follow patient.      BPH (benign prostatic hyperplasia)  Continue flomax       Dementia  Continue namenda   Patient having slight case of delirium.  Zyprexa was added which seems to be helping.  This bleed use p.r.n.  Patient is a little more lethargic today.  Septic workup done.  I see no source of infection for now.  We will restart antibiotics if he gets worse.      Cerebrovascular accident (CVA) due to thrombosis of right middle cerebral artery  [place] [facility] [place] for debility skilled care intially  Continue asa, plavix statin  PT/OT > he is doing well  D/c to nursing home today       Follicular lymphoma  H/o follicular lymphoma per chart (grade 3a, stage 1); receiving rituxan (cycle 3 in 11/2022), 10/2022 PET scan positive for 3 cm lymphadenopathy in left axillary region otherwise HUSSEIN.        -Will need follow up visit once discharge (per last hem/onc note follow up indicated around 12/17/22, delayed due to current admission for stroke) .     1/6 Pt with dementia and advance age; will not pursue any cancer treatments going forward     Chronic diastolic heart failure, NYHA class 2  -TTE 12/15/2022 w/ EF 68%  -GDMT when appropriate (will restart slowly with metoprolol Saturday Dec 24th and then loop diuretic on Mon Dec 26th)   -Follow up with PCP / cardiologist in outpatient setting.      Chronic kidney disease, stage 3 (moderate)  Monitor BUN/SCr.  Monitor I/Os.  Monitor electrolytes.    Avoid non-steroidal anti-inflammatory medications.  Stable       Hypertension  Stroke risk factor.  SBP <180, MAP >65; remains at goal   Avoid hypotension in the setting of small vessel disease   Holding lopressor for now given hypotension during this admission in the setting of an acute stroke, will resume on Sat Dec 24th  Bp today  123/64-HR 84.     Torsemide has been on hold due to ^ Creat now stable  1/4 P 111/57- 131/64- HR 60s well controlled   1/5 140/65  1/9 bp 143/93 no meds    Hyperlipidemia  Continue statin       Hypothyroidism  Continue synthroid       Type 2 diabetes mellitus  Patient's FSGs are controlled on current medication regimen.  Last A1c reviewed-   Lab Results   Component Value Date    HGBA1C 6.7 (H) 12/14/2022     Most recent fingerstick glucose reviewed-   Recent Labs   Lab 01/08/23  1135 01/08/23  1633 01/08/23 2002 01/09/23  0715   POCTGLUCOSE 104 121* 106 78     Current correctional scale  Low  Maintain anti-hyperglycemic dose as follows-   Antihyperglycemics (From admission, onward)    Start     Stop Route Frequency Ordered    01/08/23 2100  insulin detemir U-100 pen 20 Units         -- SubQ Nightly 01/08/23 1529    01/05/23 1339  insulin aspart U-100 pen 0-5 Units         -- SubQ Before meals & nightly PRN 01/05/23 1240        Hold Oral hypoglycemics while patient is in the hospital.    1/6 last dose of dexamethasone today ; getting insulin 20 units nightly   bs 258 this am; expect BS to improve after stoppign dexamethasone   Will watch BS over weekend to calculate needs and adjust once off steroids     1/7 patient had a high glucose of 435 yesterday.  Extra insulin was given.  Now his blood sugars have improved.  We will continue the basal bolus and sliding scale.  The high blood sugar was probably caused by the Decadron.  It has now been stopped.  I will lower the insulin dosage as I do not want hypoglycemia to occur.    1/8 glucose seems to be better controlled.  Watch out for hypoglycemia.  I will back off on his insulin.    bs this am, 78>home today on lowered dose levemir 20 units daily and SSi as needed       VTE Risk Mitigation (From admission, onward)         Ordered     enoxaparin injection 40 mg  Daily         01/08/23 0945                Discharge Planning   JOSE A: 1/9/2023     Code Status: Full Code   Is  the patient medically ready for discharge?:     Reason for patient still in hospital (select all that apply): Pending disposition  Discharge Plan A: Skilled Nursing Facility   Discharge Delays: (!) Post-Acute Set-up              Cindy Young MD  Department of Hospital Medicine   [place] [facility] Surg (3rd Fl)

## 2023-01-09 NOTE — PROGRESS NOTES
[facility] - Med Surg (3rd Fl)  Adult Nutrition  Progress Note    SUMMARY       Recommendations    Recommendation/Intervention:   1. Rec'd continue diabetic and cardiac diet.   2. Rec'd encouraging Boost Glucose Control Chocolate provided during mealtimes for decreased PO intake, decreased appetite, and increased nutritional needs.   3. Rec'd honoring pt's food preferences within diet order to encourage PO intake.  4. Rec'd continue frequent weight measurements to assess for acute weight loss.    5. RD to follow and make rec's accordingly.    Goals: Pt will meet at least 75% ENN by RD follow up.  Nutrition Goal Status: goal not met  Communication of RD Recs: reviewed with RN (POC; SONIA Diaz)    Assessment and Plan    Severe malnutrition in the context of acute on chronic illness  Nutrition Problem  Severe malnutrition in the context of acute on chronic illness     Related to (etiology):   Inadequate oral intake, decreased appetite, and increased nutritional needs s/t follicular lymphoma and radiation tx     Signs and Symptoms (as evidenced by):   < 75% EER in > 1 month, 9.4% weight loss in 3 months, moderate fat loss, and moderate muscle wasting     Interventions/Recommendations (treatment strategy):  Recommendation/Intervention:   1. Rec'd continue diabetic and cardiac diet.   2. Rec'd encouraging Boost Glucose Control Chocolate provided during mealtimes for decreased PO intake, decreased appetite, and increased nutritional needs.   3. Rec'd honoring pt's food preferences within diet order to encourage PO intake.   4. RD to follow and make rec's accordingly.     Goals: Pt will meet at least 75% ENN by RD follow up.  Nutrition Goal Status: not met        Nutrition Diagnosis Status:   Continues           Malnutrition Assessment  Malnutrition Type: acute illness or injury  Energy Intake: moderate energy intake  Eyes (Micronutrient): eyelid(s) pallor  Teeth (Micronutrient): none  Tongue (Micronutrient): fissured    Micronutrient Evaluation: suspected deficiency  Micronutrient Evaluation Comments: anemia of neoplastic disease noted in non-problem hospital list   Weight Loss (Malnutrition): greater than 7.5% in 3 months  Energy Intake (Malnutrition): less than 75% for greater than or equal to 1 month  Subcutaneous Fat (Malnutrition): moderate depletion  Muscle Mass (Malnutrition): moderate depletion   Orbital Region (Subcutaneous Fat Loss): moderate depletion  Upper Arm Region (Subcutaneous Fat Loss): moderate depletion   [place] Region (Muscle Loss): moderate depletion  Clavicle Bone Region (Muscle Loss): mild depletion  Clavicle and Acromion Bone Region (Muscle Loss): mild depletion  Dorsal Hand (Muscle Loss): moderate depletion       Subcutaneous Fat Loss (Final Summary): severe protein-calorie malnutrition  Muscle Loss Evaluation (Final Summary): severe protein-calorie malnutrition    Severe Weight Loss (Malnutrition): greater than 7.5% in 3 months    Reason for Assessment    Reason For Assessment: RD follow-up  Diagnosis: infection/sepsis (COVID-19)  Relevant Medical History: alzheimer's disease, CHF, CKD3, diverticulosis, dysphagia, [place], HTN, HLD, COPD, CAD, anal stenosis  Interdisciplinary Rounds: did not attend  General Information Comments: Pt continues with cardiac and DM diet. PO intake continues to vary at this time. Pt often sleeps during mealtimes. Pt did eat 75% of breakfast today, but minimal lunch intake. No PO intake reported per chart yesterday. Noted improvement in glucose levels since RD consult. Encouraged pt to consume 3 Boost Glucose Control ONS per day that come on meal trays. Denies N/V/D/C. Noted LBM 1/8/2023. Will continue to monitor pt for any nutrition-related changes.  Nutrition Discharge Planning: Continue diabetic and cardiac diet with Boost Glucose Control Chocolate TID upon discharge.    Nutrition Risk Screen    Nutrition Risk Screen: reduced oral intake over the last month, unintentional loss  "of 10 lbs or more in the past 2 months    Nutrition/Diet History    Spiritual, Cultural Beliefs, Mandaeism Practices, Values that Affect Care: no  Food Allergies: NKFA  Factors Affecting Nutritional Intake: impaired cognitive status/motor control    Anthropometrics    Temp: 97.9 °F (36.6 °C)  Height: 5' 7" (170.2 cm)  Height (inches): 67 in  Weight Method: Bed Scale  Weight: 66.7 kg (147 lb 0.8 oz)  Weight (lb): 147.05 lb  Ideal Body Weight (IBW), Male: 148 lb  % Ideal Body Weight, Male (lb): 107.4 %  BMI (Calculated): 23  BMI Grade: 18.5-24.9 - normal  Weight Loss: unintentional (9.4% in 3 months (10/6/2022))  Usual Body Weight (UBW), k.66 kg  Weight Change Amount: 15 lb 5 oz  % Usual Body Weight: 91.83       Lab/Procedures/Meds    Pertinent Labs Reviewed: reviewed  Pertinent Labs Comments: MCH: 39.3 (H), MCHC: 36.9 (H), RDW: 16.3 (H), RBC: 3.26 (L), H&H: 12.8/34.7 (L), BUN: 31 (H), glucose:  (H), GFR: 59 (L)  Pertinent Medications Reviewed: reviewed  Pertinent Medications Comments: insulin, polyethylene glycol, statin    Physical Findings/Assessment         Estimated/Assessed Needs    Weight Used For Calorie Calculations: 66.7 kg (147 lb 0.8 oz)  Energy Calorie Requirements (kcal): 4002-3632  Energy Need Method: Kcal/kg (25-30 for cancer)  Protein Requirements: 67-83 (1-1.25 g/kg/day for OA and cancer)  Weight Used For Protein Calculations: 66.7 kg (147 lb 0.8 oz)  Fluid Requirements (mL): 8  Estimated Fluid Requirement Method: RDA Method (or per MD rec's)  RDA Method (mL):   CHO Requirement: 187-229 kcals (45-55% of kcals)      Nutrition Prescription Ordered    Current Diet Order: diabetic; cardiac  Current Nutrition Support Frequency Ordered: TID  Oral Nutrition Supplement: Boost Glucose Control Chocolate    Evaluation of Received Nutrient/Fluid Intake    % Kcal Needs: 25-50%  % Protein Needs: 25-50%  IV Fluid (mL): 0  Energy Calories Required: not meeting needs  Protein Required: not meeting " needs  Tolerance: tolerating  % Intake of Estimated Energy Needs: 25 - 50 %  % Meal Intake: 25 - 50 %    Nutrition Risk    Level of Risk/Frequency of Follow-up: moderate - high (2 x per week)     Monitor and Evaluation    Food and Nutrient Intake: energy intake, food and beverage intake, enteral nutrition intake  Food and Nutrient Adminstration: diet order, enteral and parenteral nutrition administration  Knowledge/Beliefs/Attitudes: food and nutrition knowledge/skill, beliefs and attitudes  Physical Activity and Function: nutrition-related ADLs and IADLs, factors affecting access to physical activity  Anthropometric Measurements: height/length, weight, weight change, body mass index  Biochemical Data, Medical Tests and Procedures: electrolyte and renal panel, gastrointestinal profile, glucose/endocrine profile, inflammatory profile, lipid profile  Nutrition-Focused Physical Findings: overall appearance, extremities, muscles and bones, head and eyes, skin     Nutrition Follow-Up    RD Follow-up?: Yes

## 2023-01-09 NOTE — ASSESSMENT & PLAN NOTE
Severe malnutrition in the context of acute on chronic illness  Nutrition Problem  Severe malnutrition in the context of acute on chronic illness     Related to (etiology):   Inadequate oral intake, decreased appetite, and increased nutritional needs s/t follicular lymphoma and radiation tx     Signs and Symptoms (as evidenced by):   < 75% EER in > 1 month, 9.4% weight loss in 3 months, moderate fat loss, and moderate muscle wasting     Interventions/Recommendations (treatment strategy):  Recommendation/Intervention:   1. Rec'd continue diabetic and cardiac diet.   2. Rec'd encouraging Boost Glucose Control Chocolate provided during mealtimes for decreased PO intake, decreased appetite, and increased nutritional needs.   3. Rec'd honoring pt's food preferences within diet order to encourage PO intake.   4. RD to follow and make rec's accordingly.     Goals: Pt will meet at least 75% ENN by RD follow up.  Nutrition Goal Status: not met        Nutrition Diagnosis Status:   Continues

## 2023-01-09 NOTE — ASSESSMENT & PLAN NOTE
Patient's FSGs are controlled on current medication regimen.  Last A1c reviewed-   Lab Results   Component Value Date    HGBA1C 6.7 (H) 12/14/2022     Most recent fingerstick glucose reviewed-   Recent Labs   Lab 01/08/23  1135 01/08/23  1633 01/08/23  2002 01/09/23  0715   POCTGLUCOSE 104 121* 106 78     Current correctional scale  Low  Maintain anti-hyperglycemic dose as follows-   Antihyperglycemics (From admission, onward)    Start     Stop Route Frequency Ordered    01/08/23 2100  insulin detemir U-100 pen 20 Units         -- SubQ Nightly 01/08/23 1529    01/05/23 1339  insulin aspart U-100 pen 0-5 Units         -- SubQ Before meals & nightly PRN 01/05/23 1240        Hold Oral hypoglycemics while patient is in the hospital.    1/6 last dose of dexamethasone today ; getting insulin 20 units nightly   bs 258 this am; expect BS to improve after stoppign dexamethasone   Will watch BS over weekend to calculate needs and adjust once off steroids     1/7 patient had a high glucose of 435 yesterday.  Extra insulin was given.  Now his blood sugars have improved.  We will continue the basal bolus and sliding scale.  The high blood sugar was probably caused by the Decadron.  It has now been stopped.  I will lower the insulin dosage as I do not want hypoglycemia to occur.    1/8 glucose seems to be better controlled.  Watch out for hypoglycemia.  I will back off on his insulin.    bs this am, 78>home today on lowered dose levemir 20 units daily and SSi as needed

## 2023-01-09 NOTE — SUBJECTIVE & OBJECTIVE
Past Medical History:   Diagnosis Date    Abnormal barium swallow     Alzheimer's dementia, late onset     Anal stenosis     Anticoagulant long-term use     Aortic stenosis     Arthritis     Aspiration pneumonitis     Benign prostatic hyperplasia     Carotid artery disease     CHF (congestive heart failure)     Chronic diastolic heart failure     Chronic kidney disease (CKD), stage III (moderate)     CKD (chronic kidney disease)     Colon polyp     COPD (chronic obstructive pulmonary disease)     Coronary artery disease     Diabetes mellitus, type 2     Diverticulosis     Dysphagia     Hearing aid worn 01/23/2020    Received bilateral hearing aides today    Heart disease     History of CEA (carotid endarterectomy)     [place] (hard of hearing)     Hyperlipidemia     Hypertension     Hypertensive heart disease     Hypothyroidism     Joint disease     Memory loss     PAD (peripheral artery disease)     Presence of drug coated stent in right coronary artery     Pulmonary hypertension     S/P TAVR (transcatheter aortic valve replacement)     Small bowel obstruction     Wears dentures     UPPER AND LOWER    Wears glasses        Past Surgical History:   Procedure Laterality Date    anal fissure repair      ANKLE FUSION Left 08/15/2016    X 2    APPENDECTOMY      BACK SURGERY      X 4    CAROTID ENDARTERECTOMY Right     CAROTID ENDARTERECTOMY Left     CAROTID ENDARTERECTOMY Left 12/3/2021    Procedure: ENDARTERECTOMY-CAROTID;  Surgeon: Tim Castillo MD;  Location: [facility] OR;  Service: Cardiology;  Laterality: Left;  pt unsure if he stopped plavix, Dr. Castillo ok to proceed    CARPAL TUNNEL RELEASE Right     CHOLECYSTECTOMY      COLONOSCOPY N/A 7/26/2018    Procedure: HIGH RISK SCREENING COLONOSCOPY;  Surgeon: Connor Murrell MD;  Location: [facility] ENDO;  Service: Endoscopy;  Laterality: N/A;    CORONARY ANGIOPLASTY WITH STENT PLACEMENT      ELBOW SURGERY Bilateral     EXCISIONAL HEMORRHOIDECTOMY      x3     EXPLORATORY LAPAROTOMY W/ BOWEL RESECTION  11/17/2011    EYE SURGERY      cataract bilaterally    history of bowel resection      KNEE SURGERY Left     LAMINECTOMY AND MICRODISCECTOMY LUMBAR SPINE  07/21/2011    L5-S1    LEFT HEART CATHETERIZATION Left 8/31/2018    Procedure: Left heart cath;  Surgeon: Rex Chris MD;  Location: [facility] CATH;  Service: Cardiology;  Laterality: Left;    LUMBAR FUSION  11/09/2011    Anterior approach--L5-S1    LUNG BIOPSY Right 07/21/2009    VATS with wedge ofr right lower lobe    LUNG BIOPSY Left 3/16/2020    Procedure: BIOPSY, LUNG;  Surgeon: [facility];  Location: [facility] OR;  Service: General;  Laterality: Left;    PERCUTANEOUS TRANSCATHETER AORTIC VALVE REPLACEMENT (TAVR) Left 10/30/2018    Procedure: REPLACEMENT, AORTIC VALVE, PERCUTANEOUS, TRANSCATHETER;  Surgeon: Rex Chris MD;  Location: [facility] OR;  Service: Cardiology;  Laterality: Left;    PERCUTANEOUS TRANSCATHETER AORTIC VALVE REPLACEMENT (TAVR)  10/30/2018    Procedure: REPLACEMENT, AORTIC VALVE, PERCUTANEOUS, TRANSCATHETER;  Surgeon: Tim Castillo MD;  Location: [facility] OR;  Service: Cardiovascular;;    RIGHT HEART CATHETERIZATION Right 8/31/2018    Procedure: INSERTION, CATHETER, RIGHT HEART;  Surgeon: Rex Chris MD;  Location: [facility] CATH;  Service: Cardiology;  Laterality: Right;    ROTATOR CUFF REPAIR Left     SINUS SURGERY      SPINE SURGERY      back surgery    TENDON RELEASE Bilateral     TONSILLECTOMY         Review of patient's allergies indicates:   Allergen Reactions    Sulfur Itching    Penicillins Swelling and Rash       No current facility-administered medications on file prior to encounter.     Current Outpatient Medications on File Prior to Encounter   Medication Sig    albuterol (VENTOLIN HFA) 90 mcg/actuation inhaler Inhale 1-2 puffs into the lungs every 6 (six) hours as needed for Wheezing or Shortness of Breath. Rescue    aspirin 81 MG Chew Take 1 tablet (81 mg  "total) by mouth once daily.    atorvastatin (LIPITOR) 40 MG tablet Take 1 tablet (40 mg total) by mouth once daily.    clopidogreL (PLAVIX) 75 mg tablet Take 1 tablet (75 mg total) by mouth once daily.    ipratropium (ATROVENT) 42 mcg (0.06 %) nasal spray ipratropium bromide 42 mcg (0.06 %) nasal spray    meclizine (ANTIVERT) 12.5 mg tablet Take 2 tablets (25 mg total) by mouth 3 (three) times daily.    memantine (NAMENDA) 10 MG Tab Take 10 mg by mouth once daily.    metoprolol succinate (TOPROL-XL) 25 MG 24 hr tablet Take 1 tablet (25 mg total) by mouth every evening.    MYRBETRIQ 25 mg Tb24 ER tablet Take 25 mg by mouth once daily. 20    nitroGLYCERIN (NITROSTAT) 0.4 MG SL tablet Place 1 tablet (0.4 mg total) under the tongue every 5 (five) minutes as needed for Chest pain.    ondansetron (ZOFRAN-ODT) 8 MG TbDL Take 1 tablet (8 mg total) by mouth 3 (three) times daily as needed (for nausea).    pen needle, diabetic 32 gauge x 5/32" Ndle BD Adrienne 2nd Gen Pen Needle 32 gauge x 5/32"   USE 1 PEN NEEDLE ONCE DAILY USE AS DIRECTED    SOLIQUA 100/33 100 unit-33 mcg/mL InPn pen INJECT 30 UNITS SUBCUTANEOUSLY ONCE DAILY    tamsulosin (FLOMAX) 0.4 mg Cap Take 0.4 mg by mouth once daily.    torsemide (DEMADEX) 10 MG Tab Take 1 tablet (10 mg total) by mouth once daily.    UNABLE TO FIND medication name: prevagen extra strenght daily     Family History       Problem Relation (Age of Onset)    Cancer Mother    Diabetes Brother    Heart disease Father, Brother          Tobacco Use    Smoking status: Former     Packs/day: 1.50     Years: 20.00     Pack years: 30.00     Types: Cigarettes     Start date:      Quit date:      Years since quittin.0    Smokeless tobacco: Never   Substance and Sexual Activity    Alcohol use: No    Drug use: No    Sexual activity: Not on file     Review of Systems   Unable to perform ROS: Dementia   Objective:     Vital Signs (Most Recent):  Temp: 98.4 °F (36.9 °C) (23 " 0741)  Pulse: 88 (01/09/23 0741)  Resp: 16 (01/09/23 0843)  BP: (!) 143/63 (01/09/23 0741)  SpO2: 95 % (01/09/23 0741)   Vital Signs (24h Range):  Temp:  [96.9 °F (36.1 °C)-100.4 °F (38 °C)] 98.4 °F (36.9 °C)  Pulse:  [] 88  Resp:  [16-20] 16  SpO2:  [91 %-95 %] 95 %  BP: (105-147)/(51-65) 143/63     Weight: 66.7 kg (147 lb 0.8 oz)  Body mass index is 23.03 kg/m².    Physical Exam  Vitals and nursing note reviewed.   Constitutional:       General: He is not in acute distress.     Appearance: He is well-developed. He is not toxic-appearing.   HENT:      Head: Normocephalic and atraumatic.      Right Ear: External ear normal.      Left Ear: External ear normal.      Nose:      Comments: O2 in place  Eyes:      Conjunctiva/sclera: Conjunctivae normal.      Pupils: Pupils are equal, round, and reactive to light.   Neck:      Thyroid: No thyromegaly.   Cardiovascular:      Rate and Rhythm: Normal rate and regular rhythm.      Heart sounds: Normal heart sounds. No murmur heard.  Pulmonary:      Effort: Pulmonary effort is normal. No respiratory distress.      Breath sounds: No wheezing.   Abdominal:      General: Bowel sounds are normal. There is no distension.      Palpations: Abdomen is soft.      Tenderness: There is no abdominal tenderness.   Musculoskeletal:         General: Normal range of motion.      Cervical back: Normal range of motion and neck supple.      Right lower leg: No edema.      Left lower leg: No edema.   Lymphadenopathy:      Cervical: No cervical adenopathy.   Skin:     General: Skin is warm and dry.      Findings: No rash.   Neurological:      Mental Status: He is alert. Mental status is at baseline. He is disoriented.      Cranial Nerves: No cranial nerve deficit.      Motor: No weakness.      Comments: Left arm weakness much improved   Psychiatric:         Mood and Affect: Affect is flat.         Speech: Speech is delayed.         Behavior: Behavior is slowed. Behavior is not agitated or  aggressive.         Cognition and Memory: Cognition is impaired. Memory is impaired.         CRANIAL NERVES     CN III, IV, VI   Pupils are equal, round, and reactive to light.     Significant Labs: All pertinent labs within the past 24 hours have been reviewed.  A1C:   Recent Labs   Lab 22  1552   HGBA1C 6.7*         Bilirubin:   Recent Labs   Lab 22  0356 23  0621 23  0554 23  0604 23  0600   BILITOT 0.7 0.6 0.6 0.4 0.7           CBC:   Recent Labs   Lab 23  0841 23  0600   WBC 6.06 6.11   HGB 11.9* 12.8*   HCT 35.4* 34.7*    372       CMP:   Recent Labs   Lab 23  0841 23  0600    144   K 3.6 4.0    106   CO2 26 29   GLU 93 88   BUN 33* 31*   CREATININE 1.2 1.2   CALCIUM 9.8 9.9   PROT  --  6.1   ALBUMIN  --  2.9*   BILITOT  --  0.7   ALKPHOS  --  83   AST  --  30   ALT  --  29   ANIONGAP 10 9       Lactic Acid:   Recent Labs   Lab 23  1243   LACTATE 0.7         POCT Glucose:   Recent Labs   Lab 23  1633 23  2002 23  0715   POCTGLUCOSE 121* 106 78       TSH:   Recent Labs   Lab 22  1009   TSH 4.401*         Urine Studies:   Recent Labs   Lab 23  1245   COLORU Yellow   APPEARANCEUA Clear   PHUR 5.0   SPECGRAV 1.020   PROTEINUA Trace*   GLUCUA Negative   KETONESU Negative   BILIRUBINUA Negative   OCCULTUA Negative   NITRITE Negative   UROBILINOGEN Negative   LEUKOCYTESUR Trace*   WBCUA 1   BACTERIA Few*         Significant Imagin/08/2023-chest x-ray-Suggestion of some persistent left basal atelectasis versus airspace disease

## 2023-01-09 NOTE — PT/OT/SLP PROGRESS
Physical Therapy Treatment    Patient Name:  Vega Kohler   MRN:  [id number]    Recommendations:     Discharge Recommendations: nursing facility, skilled  Discharge Equipment Recommendations: other (see comments) ([facility] will provide)  Barriers to discharge: Decreased caregiver support    Assessment:     Vega Kohler is a 87 y.o. male admitted with a medical diagnosis of COVID-19.  He presents with the following impairments/functional limitations: weakness, impaired endurance, impaired self care skills, impaired functional mobility, gait instability, impaired balance, impaired cognition, decreased safety awareness, decreased upper extremity function, decreased lower extremity function, impaired cardiopulmonary response to activity. Patient tolerated increased gait distance using RW  O2 supplement x ~100 feet(1 rest period) with Standby Assistance. Monitored O2 SAT from 96% to 99%. No sign of fatigue and distress.    Rehab Prognosis: Fair; patient would benefit from acute skilled PT services to address these deficits and reach maximum level of function.    Recent Surgery: * No surgery found *      Plan:     During this hospitalization, patient to be seen 5 x/week to address the identified rehab impairments via gait training, therapeutic activities, therapeutic exercises and progress toward the following goals:    Plan of Care Expires:       Subjective     Chief Complaint: No new complain  Patient/Family Comments/goals: none stated  Pain/Comfort:  Pain Rating 1: 0/10      Objective:     Communicated with patient prior to session.  Patient found HOB elevated with oxygen, peripheral IV, Other (comments) (avasys camera) upon PT entry to room.     General Precautions: Standard, airborne, contact, droplet, fall  Orthopedic Precautions: N/A  Braces: N/A  Respiratory Status: Nasal cannula, flow 1 L/min     Functional Mobility:  Bed Mobility:     Rolling Left:  independence  Rolling Right: independence  Scooting: modified  independence  Supine to Sit: modified independence  Sit to Supine: modified independence  Transfers:     Sit to Stand:  modified independence with rolling walker  Bed to Chair: supervision with  rolling walker  using  Step Transfer  Gait: Supervision x ~100 feet(1 rest period) with RW and O2 supplement. Slow pace.      AM-PAC 6 CLICK MOBILITY  Turning over in bed (including adjusting bedclothes, sheets and blankets)?: 4  Sitting down on and standing up from a chair with arms (e.g., wheelchair, bedside commode, etc.): 4  Moving from lying on back to sitting on the side of the bed?: 4  Moving to and from a bed to a chair (including a wheelchair)?: 3  Need to walk in hospital room?: 3  Climbing 3-5 steps with a railing?: 3  Basic Mobility Total Score: 21       Treatment & Education:  Performed B LE strengthening ROM ex/mgt on bed and sitting on edge of the bed x 10 minutes; Sit <>Stand ex x 3; transfer trng at bedside chair and gait trng x ~100 feet(1 rest period) with RW and O2 supplement with supervision.    Patient left up in chair with all lines intact, call button in reach, and nursing notified..    GOALS:   Multidisciplinary Problems       Physical Therapy Goals          Problem: Physical Therapy    Goal Priority Disciplines Outcome Goal Variances Interventions   Physical Therapy Goal     PT, PT/OT Ongoing, Progressing     Description:   Patient will increase functional independence with mobility by performin. Supine <> sit with Modified Independent  2. Sit <> Stand  with Supervision or Set-up Assistance with RW  3. Bed to chair transfer with Supervision or Set-up Assistancewith or without rolling walker using Step Transfer TECHNIQUE  4. Gait  x ~100  feet with Standby Assistance with or without rolling walker  5. Lower extremity exercise program x10 reps per handout, with assistance as needed                          Time Tracking:     PT Received On: 23  PT Start Time: 0810     PT Stop Time:  0840  PT Total Time (min): 30 min     Billable Minutes: Gait Training 15 and Therapeutic Activity 15    Treatment Type: Treatment  PT/PTA: PT           01/09/2023

## 2023-01-09 NOTE — ASSESSMENT & PLAN NOTE
Stroke risk factor.  SBP <180, MAP >65; remains at goal   Avoid hypotension in the setting of small vessel disease   Holding lopressor for now given hypotension during this admission in the setting of an acute stroke, will resume on Sat Dec 24th  Bp today 123/64-HR 84.     Torsemide has been on hold due to ^ Creat now stable  1/4 P 111/57- 131/64- HR 60s well controlled   1/5 140/65  1/9 bp 143/93 no meds

## 2023-01-09 NOTE — PT/OT/SLP PROGRESS
Occupational Therapy      Patient Name:  Vega Kohler   MRN:  [id number]    Patient not seen today secondary to Patient unwilling to participate. Pt stated he was very drowsy and fatigued with Pt falling asleep as therapist was trying to speak with them. Will follow-up 1/10/2023.    1/9/2023

## 2023-01-09 NOTE — PLAN OF CARE
01/09/23 1100   Post-Acute Status   Post-Acute Authorization Placement   Post-Acute Placement Status Pending post-acute provider review/more information requested   Coverage Medicare   Discharge Delays (!) Post-Acute Set-up   Discharge Plan   Discharge Plan A Skilled Nursing Facility         CM contacted the  [facility] to get approval for definite transition to skilled care there today. TU spoke with Karin who states she has to get approval from the DON as patient is only 9 days into Covid quarantine, and their policy is usually 10 days. CM waiting on response.

## 2023-01-10 NOTE — ASSESSMENT & PLAN NOTE
Patient is identified as Severe COVID-19 based on hypoxemia with O2 saturations <94% on room air or on ambulation   Initiate standard COVID protocols; COVID-19 testing Collection Date: 1/1/2023 Collection Time:  12:34 PM ,Infection Control notification  and isolation- respiratory, contact and droplet per protocol    Diagnostics: (leukopenia, hyponatremia, hyperferritinemia, elevated troponin, elevated d-dimer, age, and comorbidities are significant predictors of poor clinical outcome)  CBC, CMP, Ferritin, CRP and Portable CXR    Management: Inhaled bronchodilators as needed for shortness of breath.   Day 4 Remdesivir, dexamehtasone and levofloxacin   Afebrile, WBC low, CXR reviewed, procal normal ; all appears viral  Repeat labs in am     1/6-treatment for COVID completed.  Discontinue Decadron, remdesivir  1/7-no more COVID symptoms.      1/10  Completed treatment

## 2023-01-10 NOTE — ASSESSMENT & PLAN NOTE
Monitor BUN/SCr.  Monitor I/Os.  Monitor electrolytes.    Avoid non-steroidal anti-inflammatory medications.  Stable .

## 2023-01-10 NOTE — ASSESSMENT & PLAN NOTE
Stroke risk factor.  SBP <180, MAP >65; remains at goal   Avoid hypotension in the setting of small vessel disease   Holding lopressor for now given hypotension during this admission in the setting of an acute stroke, will resume on Sat Dec 24th  Bp today 123/64-HR 84.     Torsemide has been on hold due to ^ Creat now stable  1/4 P 111/57- 131/64- HR 60s well controlled   1/5 140/65  1/9 bp 143/93 no meds.

## 2023-01-10 NOTE — PLAN OF CARE
Patient is safe and free from falls. Being turned Q2. V/S stable.  Problem: Adult Inpatient Plan of Care  Goal: Optimal Comfort and Wellbeing  Outcome: Ongoing, Progressing  Goal: Readiness for Transition of Care  Outcome: Ongoing, Progressing     Problem: Adjustment to Illness (Stroke, Ischemic/Transient Ischemic Attack)  Goal: Optimal Coping  Outcome: Ongoing, Progressing     Problem: Bowel Elimination Impaired (Stroke, Ischemic/Transient Ischemic Attack)  Goal: Effective Bowel Elimination  Outcome: Ongoing, Progressing     Problem: Communication Impairment (Stroke, Ischemic/Transient Ischemic Attack)  Goal: Improved Communication Skills  Outcome: Ongoing, Progressing

## 2023-01-10 NOTE — PLAN OF CARE
[facility] - Med Surg (3rd Fl)  Discharge Final Note    Primary Care Provider: Fabricio Mckeon MD    Expected Discharge Date: 1/9/2023    Final Discharge Note (most recent)       Final Note - 01/10/23 1405          Final Note    Assessment Type Final Discharge Note     Anticipated Discharge Disposition Skilled Nursing Facility        Post-Acute Status    Post-Acute Authorization Placement     Post-Acute Placement Status Set-up Complete/Auth obtained     Discharge Delays None known at this time                     Important Message from Medicare  Important Message from Medicare regarding Discharge Appeal Rights: Explained to patient/caregiver, Other (comments) (Completed via phone with Son due to covid 19 precautions.)     Date IMM was signed: 01/09/23  Time IMM was signed: 1102    Contact Info       [facility]    [address] [place] [id number]   Phone: [phone]       Next Steps: Follow up              Patient discharging to [facility] for SNF.

## 2023-01-10 NOTE — ASSESSMENT & PLAN NOTE
Patient's FSGs are controlled on current medication regimen.  Last A1c reviewed-   Lab Results   Component Value Date    HGBA1C 6.7 (H) 12/14/2022     Most recent fingerstick glucose reviewed-   Recent Labs   Lab 01/09/23  1633 01/09/23  1940 01/10/23  0719   POCTGLUCOSE 217* 271* 189*     Current correctional scale  Low  Maintain anti-hyperglycemic dose as follows-   Antihyperglycemics (From admission, onward)    Start     Stop Route Frequency Ordered    01/08/23 2100  insulin detemir U-100 pen 20 Units         -- SubQ Nightly 01/08/23 1529    01/05/23 1339  insulin aspart U-100 pen 0-5 Units         -- SubQ Before meals & nightly PRN 01/05/23 1240        Hold Oral hypoglycemics while patient is in the hospital.    1/6 last dose of dexamethasone today ; getting insulin 20 units nightly   bs 258 this am; expect BS to improve after stoppign dexamethasone   Will watch BS over weekend to calculate needs and adjust once off steroids     1/7 patient had a high glucose of 435 yesterday.  Extra insulin was given.  Now his blood sugars have improved.  We will continue the basal bolus and sliding scale.  The high blood sugar was probably caused by the Decadron.  It has now been stopped.  I will lower the insulin dosage as I do not want hypoglycemia to occur.    1/8 glucose seems to be better controlled.  Watch out for hypoglycemia.  I will back off on his insulin.    bs this am, 78>home today on lowered dose levemir 20 units daily and SSi as needed .

## 2023-01-10 NOTE — PT/OT/SLP PROGRESS
Physical Therapy Treatment    Patient Name:  Vega Kohler   MRN:  [id number]    Recommendations:     Discharge Recommendations: nursing facility, skilled  Discharge Equipment Recommendations: other (see comments) ([facility] will provide)  Barriers to discharge: Decreased caregiver support    Assessment:     Vega Kohler is a 87 y.o. male admitted with a medical diagnosis of COVID-19.  He presents with the following impairments/functional limitations: weakness, impaired endurance, impaired functional mobility, impaired self care skills, gait instability, impaired balance, decreased safety awareness, impaired cognition, impaired cardiopulmonary response to activity. Patient tolerated PT session today.    Rehab Prognosis: Fair; patient would benefit from acute skilled PT services to address these deficits and reach maximum level of function.    Recent Surgery: * No surgery found *      Plan:     During this hospitalization, patient to be seen 5 x/week to address the identified rehab impairments via gait training, therapeutic activities, therapeutic exercises and progress toward the following goals:    Plan of Care Expires:       Subjective     Chief Complaint: No new complain  Patient/Family Comments/goals: none stated  Pain/Comfort:  Pain Rating 1: 0/10      Objective:     Communicated with patient  prior to session.  Patient found supine with oxygen, peripheral IV, Other (comments) (avasys camera) upon PT entry to room.     General Precautions: Standard, airborne, contact, droplet, fall  Orthopedic Precautions: N/A  Braces: N/A  Respiratory Status: Nasal cannula, flow 1 L/min     Functional Mobility:  Bed Mobility:     Rolling Left:  independence  Rolling Right: independence  Scooting: modified independence  Supine to Sit: modified independence  Transfers:     Sit to Stand:  modified independence with rolling walker  Bed to Chair: supervision with  rolling walker  using  Step Transfer  Gait: Supervision X ~85 feet with  RW      AM-PAC 6 CLICK MOBILITY  Turning over in bed (including adjusting bedclothes, sheets and blankets)?: 4  Sitting down on and standing up from a chair with arms (e.g., wheelchair, bedside commode, etc.): 4  Moving from lying on back to sitting on the side of the bed?: 4  Moving to and from a bed to a chair (including a wheelchair)?: 3  Need to walk in hospital room?: 3  Climbing 3-5 steps with a railing?: 3  Basic Mobility Total Score: 21       Treatment & Education:  Pewrformed B LE strengthehning ROM ex x 10 reps on each at supne and sitting position, rolling to sides ex x 5 reps on each, sit <>stand x 3; bedside transfers trng  and gait trng x ~85 feet with RW and with supervision.    Patient left up in chair with all lines intact, call button in reach, nursing  notified, and avasys camera present..    GOALS:   Multidisciplinary Problems       Physical Therapy Goals          Problem: Physical Therapy    Goal Priority Disciplines Outcome Goal Variances Interventions   Physical Therapy Goal     PT, PT/OT Ongoing, Progressing     Description:   Patient will increase functional independence with mobility by performin. Supine <> sit with Modified Independent  2. Sit <> Stand  with Supervision or Set-up Assistance with RW  3. Bed to chair transfer with Supervision or Set-up Assistancewith or without rolling walker using Step Transfer TECHNIQUE  4. Gait  x ~100  feet with Standby Assistance with or without rolling walker  5. Lower extremity exercise program x10 reps per handout, with assistance as needed                          Time Tracking:     PT Received On: 01/10/23  PT Start Time: 855     PT Stop Time: 925  PT Total Time (min): 30 min     Billable Minutes: Gait Training 15 and Therapeutic Activity 15    Treatment Type: Treatment  PT/PTA: PT           01/10/2023

## 2023-01-10 NOTE — PT/OT/SLP PROGRESS
"Occupational Therapy      Patient Name:  Vega Kohler   MRN:  [id number]    Patient not seen today secondary to Patient unwilling to participate. Pt presented upright in chair sleeping. After awake Pt asked if he would be willing to participate in therapy today with Pt stated "No." Therapist then presented several options for session including ambulating to bathroom or perform standing tasks at chair side when Pt stated "Just leave me alone, I don't want to do anything." Will follow-up 1/11/2023.    1/10/2023  "

## 2023-01-10 NOTE — ASSESSMENT & PLAN NOTE
Continue namenda .  Patient having slight case of delirium.  Zyprexa was added which seems to be helping.  This bleed use p.r.n.  Patient is a little more lethargic today.  Septic workup done.  I see no source of infection for now.  We will restart antibiotics if he gets worse.    1/10  Sitting in chair

## 2023-01-10 NOTE — SUBJECTIVE & OBJECTIVE
Review of Systems   Respiratory:  Positive for shortness of breath.         Wearing oxygen    Objective:     Vital Signs (Most Recent):  Temp: 97.8 °F (36.6 °C) (01/10/23 0744)  Pulse: 81 (01/10/23 0744)  Resp: 12 (01/10/23 0744)  BP: (!) 104/51 (01/10/23 0744)  SpO2: (!) 94 % (01/10/23 0800)   Vital Signs (24h Range):  Temp:  [97.8 °F (36.6 °C)-99.7 °F (37.6 °C)] 97.8 °F (36.6 °C)  Pulse:  [64-83] 81  Resp:  [12-20] 12  SpO2:  [94 %-98 %] 94 %  BP: (103-130)/(51-60) 104/51     Weight: 66.7 kg (147 lb 0.8 oz)  Body mass index is 23.03 kg/m².    Intake/Output Summary (Last 24 hours) at 1/10/2023 1129  Last data filed at 1/10/2023 0921  Gross per 24 hour   Intake 709 ml   Output --   Net 709 ml      Physical Exam  Vitals and nursing note reviewed.   Constitutional:       General: He is not in acute distress.     Appearance: He is well-developed. He is not toxic-appearing.   HENT:      Head: Normocephalic and atraumatic.      Right Ear: External ear normal.      Left Ear: External ear normal.      Nose:      Comments: O2 in place  Eyes:      Conjunctiva/sclera: Conjunctivae normal.      Pupils: Pupils are equal, round, and reactive to light.   Neck:      Thyroid: No thyromegaly.   Cardiovascular:      Rate and Rhythm: Normal rate and regular rhythm.      Heart sounds: Normal heart sounds. No murmur heard.  Pulmonary:      Effort: Pulmonary effort is normal. No respiratory distress.      Breath sounds: No wheezing.   Abdominal:      General: Bowel sounds are normal. There is no distension.      Palpations: Abdomen is soft.      Tenderness: There is no abdominal tenderness.   Musculoskeletal:         General: Normal range of motion.      Cervical back: Normal range of motion and neck supple.      Right lower leg: No edema.      Left lower leg: No edema.   Lymphadenopathy:      Cervical: No cervical adenopathy.   Skin:     General: Skin is warm and dry.      Findings: No rash.   Neurological:      Mental Status: He is  alert. Mental status is at baseline. He is disoriented.      Cranial Nerves: No cranial nerve deficit.      Motor: No weakness.      Comments: Left arm weakness much improved   Psychiatric:         Mood and Affect: Affect is flat.         Speech: Speech is delayed.         Behavior: Behavior is slowed. Behavior is not agitated or aggressive.         Cognition and Memory: Cognition is impaired. Memory is impaired.

## 2023-01-10 NOTE — ASSESSMENT & PLAN NOTE
Continue PT/OT post CVA   Gait: stand by assist 85ft with RW  and O2 supplement . Fatigues easily  Patient will be admitted to the nursing home on Monday.    1/10  Waiting for placement

## 2023-01-10 NOTE — ASSESSMENT & PLAN NOTE
H/o follicular lymphoma per chart (grade 3a, stage 1); receiving rituxan (cycle 3 in 11/2022), 10/2022 PET scan positive for 3 cm lymphadenopathy in left axillary region otherwise HUSSEIN.        -Will need follow up visit once discharge (per last hem/onc note follow up indicated around 12/17/22, delayed due to current admission for stroke)      1/6 Pt with dementia and advance age; will not pursue any cancer treatments going forward

## 2023-01-10 NOTE — PLAN OF CARE
Discharge education provided to pt and family. Report given to Tamara at the [facility].     Problem: Bowel Elimination Impaired (Stroke, Ischemic/Transient Ischemic Attack)  Goal: Effective Bowel Elimination  Outcome: Met     Problem: Skin Injury Risk Increased  Goal: Skin Health and Integrity  Outcome: Met     Problem: Fall Injury Risk  Goal: Absence of Fall and Fall-Related Injury  Outcome: Met

## 2023-01-10 NOTE — PROGRESS NOTES
[facility] ([facility])  [facility] Medicine  Progress Note    Patient Name: Vega Kohler  MRN: [id number]  Patient Class: IP- Inpatient   Admission Date: 1/3/2023  Length of Stay: 7 days  Attending Physician: Destin Jimenez MD  Primary Care Provider: Fabricio Mckeon MD        Subjective:     Principal Problem:COVID-19        HPI:    HPI:   88yo male patient with extensive medical hx. (Alzheimers, aortic stenosis, arthritis, BPH, CHF, CKD, COPD, CAD/PAD, DM2, HLD/HTN, pulm HTN, S/p TAVR and hypothyroid). He was treated acutely [facility] for ischemic CVA with left hemiplegia s/p TNK 22. Pt was independent at baseline prior to this event. Exam improved following TNK. MRI with R MCA infarct. Etiology likely . Patient stepped down to NPU to await SNF placement. Metoprolol originally started but held given etiology of stroke and risk of expansion with hypoperfusion. Will plan to resume meds this Saturday and then remainder of home BP meds on Monday. Patient was discharged on DAPT with outpatient follow up. Patient remains neurologically unchanged. SBP drop < 120 but no changes on exam. Last BM yesterday. He was brought to [facility] yesterday for continued SKILL therapy with PT / OT. On plavix and statin and asa. VSS- no bp meds 123/64         22 pt tested positive for COVID; staff noting patient more sluggish & Requiring oxygen ; CXR with infiltrates   BC negative, influenza negative  COVID POSITIVE-d/c to acute care    22 day 3 Remdesivir, dexamehtasone and levofloxacin   Afebrile, WBC low, CXR reviewed, procal normal ; all appears viral will stop levofloxacin              Overview/Hospital Course:  23 Vega Kohler is a 87 y.o. male  brought to [facility]  for continued SKILL therapy with PT / OT post CVA . On plavix and statin and asa. VSS- no bp meds w stable BP.   On  noted to require incresed oxygen and more sluggish , testing positive for COVID 19; discharged back to acute for tx;  Day 4  Remdesivir, dexamethasone   O2 sat % on 2LNC, afebrile, WBC 2.78>3.84 , LFTs stable   Creat 1.5.>1.3    Gait: Minimal Assistance x ~25 feet with RW and O2 supplement . Fatigues easily    1/6/23 Vega Kohler is a 87 y.o. male  brought to [facility]  for continued SKILL therapy with PT / OT post CVA . On plavix and statin and asa. VSS- no bp meds w stable BP.   On 1/2 noted to require incresed oxygen and more sluggish , testing positive for COVID 19; discharged back to acute for tx;  Day 5 Remdesivir, dexamethasone complete today   O2 sat 93-97% on 1 LNC, ( Has home oxygen) afebrile,    Gait: Standby Assistance x ~80 feet( 1 rest period) with O2 supplement using RW. Fatigues at the end distance.  Plan for d/c to nursing home for skilled therapy on Monday with [place]      1/7 - patient completed therapy for COVID.  He developed some delirium yesterday.  IM Zyprexa was given which seems to help.  He is doing better today.  Sometimes he gets agitated and threatened nursing staff.  Overall he is working with therapy.  I consulted psych and they feel this is delirium.  Since the Zyprexa helped they will sign off on the case.  Patient required extra insulin because his blood sugars spiked to 435.  This is probably from the Decadron.  Decadron has been discontinued.    1/8-patient seems more sluggish today and is running a low-grade temperature.  He is very sleepy.  I repeated a lactic acid, CBC, CMP, urinalysis which all appeared normal.  Chest x-ray reveals a persistent left lower lobe infiltrate/atelectasis.  Doubt this is pneumonia.    1/9 PT was here for skill therapy after acute CVA 12/14 treated at [facility]. He contracted covid 1/2 and was treated 3 days remdesivir and dexamethasone. VSS/afebrile. Sats 91-95% on 1L NC. CXR with left basilar atelectasis.PT has hoim ambulating 85ft with stand by assist. He plans to transfer to [facility] today    1/10/22  Waiting for NH placement   Case management working  on it   Patient has no complaints           Review of Systems   Respiratory:  Positive for shortness of breath.         Wearing oxygen    Objective:     Vital Signs (Most Recent):  Temp: 97.8 °F (36.6 °C) (01/10/23 0744)  Pulse: 81 (01/10/23 0744)  Resp: 12 (01/10/23 0744)  BP: (!) 104/51 (01/10/23 0744)  SpO2: (!) 94 % (01/10/23 0800)   Vital Signs (24h Range):  Temp:  [97.8 °F (36.6 °C)-99.7 °F (37.6 °C)] 97.8 °F (36.6 °C)  Pulse:  [64-83] 81  Resp:  [12-20] 12  SpO2:  [94 %-98 %] 94 %  BP: (103-130)/(51-60) 104/51     Weight: 66.7 kg (147 lb 0.8 oz)  Body mass index is 23.03 kg/m².    Intake/Output Summary (Last 24 hours) at 1/10/2023 1129  Last data filed at 1/10/2023 0921  Gross per 24 hour   Intake 709 ml   Output --   Net 709 ml      Physical Exam  Vitals and nursing note reviewed.   Constitutional:       General: He is not in acute distress.     Appearance: He is well-developed. He is not toxic-appearing.   HENT:      Head: Normocephalic and atraumatic.      Right Ear: External ear normal.      Left Ear: External ear normal.      Nose:      Comments: O2 in place  Eyes:      Conjunctiva/sclera: Conjunctivae normal.      Pupils: Pupils are equal, round, and reactive to light.   Neck:      Thyroid: No thyromegaly.   Cardiovascular:      Rate and Rhythm: Normal rate and regular rhythm.      Heart sounds: Normal heart sounds. No murmur heard.  Pulmonary:      Effort: Pulmonary effort is normal. No respiratory distress.      Breath sounds: No wheezing.   Abdominal:      General: Bowel sounds are normal. There is no distension.      Palpations: Abdomen is soft.      Tenderness: There is no abdominal tenderness.   Musculoskeletal:         General: Normal range of motion.      Cervical back: Normal range of motion and neck supple.      Right lower leg: No edema.      Left lower leg: No edema.   Lymphadenopathy:      Cervical: No cervical adenopathy.   Skin:     General: Skin is warm and dry.      Findings: No rash.    Neurological:      Mental Status: He is alert. Mental status is at baseline. He is disoriented.      Cranial Nerves: No cranial nerve deficit.      Motor: No weakness.      Comments: Left arm weakness much improved   Psychiatric:         Mood and Affect: Affect is flat.         Speech: Speech is delayed.         Behavior: Behavior is slowed. Behavior is not agitated or aggressive.         Cognition and Memory: Cognition is impaired. Memory is impaired.         Assessment/Plan:      * COVID-19  Patient is identified as Severe COVID-19 based on hypoxemia with O2 saturations <94% on room air or on ambulation   Initiate standard COVID protocols; COVID-19 testing Collection Date: 1/1/2023 Collection Time:  12:34 PM ,Infection Control notification  and isolation- respiratory, contact and droplet per protocol    Diagnostics: (leukopenia, hyponatremia, hyperferritinemia, elevated troponin, elevated d-dimer, age, and comorbidities are significant predictors of poor clinical outcome)  CBC, CMP, Ferritin, CRP and Portable CXR    Management: Inhaled bronchodilators as needed for shortness of breath.   Day 4 Remdesivir, dexamehtasone and levofloxacin   Afebrile, WBC low, CXR reviewed, procal normal ; all appears viral  Repeat labs in am     1/6-treatment for COVID completed.  Discontinue Decadron, remdesivir  1/7-no more COVID symptoms.      1/10  Completed treatment     Debility  Continue PT/OT post CVA   Gait: stand by assist 85ft with RW  and O2 supplement . Fatigues easily  Patient will be admitted to the nursing home on Monday.    1/10  Waiting for placement       Severe malnutrition in the context of acute on chronic illness  Nutrition consulted. Most recent weight and BMI monitored-     Malnutrition Type and Energy Intake  Malnutrition Type: acute illness or injury  Energy Intake: moderate energy intake    Malnutrition (Moderate to Severe)  Weight Loss (Malnutrition): greater than 7.5% in 3 months  Energy Intake  (Malnutrition): less than 75% for greater than or equal to 1 month  Subcutaneous Fat (Malnutrition): moderate depletion  Muscle Mass (Malnutrition): moderate depletion    Final Summary  Subcutaneous Fat Loss (Final Summary): severe protein-calorie malnutrition  Muscle Loss Evaluation (Final Summary): severe protein-calorie malnutrition    Malnutrition Final Summary  Severe Weight Loss (Malnutrition): greater than 7.5% in 3 months    Measurements:  Wt Readings from Last 1 Encounters:   01/09/23 66.7 kg (147 lb 0.8 oz)   Body mass index is 23.03 kg/m².    Recommendations: Recommendation/Intervention: 1. Rec'd continue diabetic and cardiac diet. 2. Rec'd encouraging Boost Glucose Control Chocolate provided during mealtimes for decreased PO intake, decreased appetite, and increased nutritional needs. 3. Rec'd honoring pt's food preferences within diet order to encourage PO intake. 4. RD to follow and make rec's accordingly.  Goals: Pt will meet at least 75% ENN by RD follow up.    Patient has been screened and assessed by RD. RD will follow patient.      BPH (benign prostatic hyperplasia)  Continue flomax .      Dementia  Continue namenda .  Patient having slight case of delirium.  Zyprexa was added which seems to be helping.  This bleed use p.r.n.  Patient is a little more lethargic today.  Septic workup done.  I see no source of infection for now.  We will restart antibiotics if he gets worse.    1/10  Sitting in chair       Cerebrovascular accident (CVA) due to thrombosis of right middle cerebral artery  Admitting dx post tx at [facility]- admitted here for debility skilled care intially  Continue asa, plavix statin  PT/OT > he is doing well  D/c to nursing home today .      Follicular lymphoma  H/o follicular lymphoma per chart (grade 3a, stage 1); receiving rituxan (cycle 3 in 11/2022), 10/2022 PET scan positive for 3 cm lymphadenopathy in left axillary region otherwise HUSSEIN.        -Will need follow up visit once  discharge (per last hem/onc note follow up indicated around 12/17/22, delayed due to current admission for stroke)      1/6 Pt with dementia and advance age; will not pursue any cancer treatments going forward     Chronic diastolic heart failure, NYHA class 2  -TTE 12/15/2022 w/ EF 68%.  -GDMT when appropriate (will restart slowly with metoprolol Saturday Dec 24th and then loop diuretic on Mon Dec 26th)   -Follow up with PCP / cardiologist in outpatient setting.      Chronic kidney disease, stage 3 (moderate)  Monitor BUN/SCr.  Monitor I/Os.  Monitor electrolytes.    Avoid non-steroidal anti-inflammatory medications.  Stable .      Hypertension  Stroke risk factor.  SBP <180, MAP >65; remains at goal   Avoid hypotension in the setting of small vessel disease   Holding lopressor for now given hypotension during this admission in the setting of an acute stroke, will resume on Sat Dec 24th  Bp today 123/64-HR 84.     Torsemide has been on hold due to ^ Creat now stable  1/4 P 111/57- 131/64- HR 60s well controlled   1/5 140/65  1/9 bp 143/93 no meds.    Hyperlipidemia  Continue statin .      Hypothyroidism  Continue synthroid .      Type 2 diabetes mellitus  Patient's FSGs are controlled on current medication regimen.  Last A1c reviewed-   Lab Results   Component Value Date    HGBA1C 6.7 (H) 12/14/2022     Most recent fingerstick glucose reviewed-   Recent Labs   Lab 01/09/23  1633 01/09/23  1940 01/10/23  0719   POCTGLUCOSE 217* 271* 189*     Current correctional scale  Low  Maintain anti-hyperglycemic dose as follows-   Antihyperglycemics (From admission, onward)    Start     Stop Route Frequency Ordered    01/08/23 2100  insulin detemir U-100 pen 20 Units         -- SubQ Nightly 01/08/23 1529    01/05/23 1339  insulin aspart U-100 pen 0-5 Units         -- SubQ Before meals & nightly PRN 01/05/23 1240        Hold Oral hypoglycemics while patient is in the hospital.    1/6 last dose of dexamethasone today ; getting  insulin 20 units nightly   bs 258 this am; expect BS to improve after stoppign dexamethasone   Will watch BS over weekend to calculate needs and adjust once off steroids     1/7 patient had a high glucose of 435 yesterday.  Extra insulin was given.  Now his blood sugars have improved.  We will continue the basal bolus and sliding scale.  The high blood sugar was probably caused by the Decadron.  It has now been stopped.  I will lower the insulin dosage as I do not want hypoglycemia to occur.    1/8 glucose seems to be better controlled.  Watch out for hypoglycemia.  I will back off on his insulin.    bs this am, 78>home today on lowered dose levemir 20 units daily and SSi as needed .      VTE Risk Mitigation (From admission, onward)         Ordered     enoxaparin injection 40 mg  Daily         01/08/23 0945                Discharge Planning   JOSE A: 1/9/2023     Code Status: Full Code   Is the patient medically ready for discharge?:     Reason for patient still in hospital (select all that apply): Pending disposition  Discharge Plan A: Skilled Nursing Facility   Discharge Delays: (!) Post-Acute Set-up              Jennifer Yoon MD  Department of Hospital Medicine   [place] - Med Surg (3rd Fl)

## 2023-01-10 NOTE — ASSESSMENT & PLAN NOTE
Admitting dx post tx at [facility]- admitted here for debility skilled care intially  Continue asa, plavix statin  PT/OT > he is doing well  D/c to nursing home today .

## 2023-01-10 NOTE — PLAN OF CARE
01/10/23 1239   Post-Acute Status   Post-Acute Authorization Placement   Post-Acute Placement Status Set-up Complete/Auth obtained   Coverage Medicare with suppliment   Patient choice form signed by patient/caregiver List from System Post-Acute Care   Discharge Delays None known at this time   Discharge Plan   Discharge Plan A Skilled Nursing Facility       Patient cleared to go to [facility] today.   Nurse can call report to Tamara at [phone].  Staff nurse, kendall Diaz.

## 2023-01-10 NOTE — ASSESSMENT & PLAN NOTE
-TTE 12/15/2022 w/ EF 68%.  -GDMT when appropriate (will restart slowly with metoprolol Saturday Dec 24th and then loop diuretic on Mon Dec 26th)   -Follow up with PCP / cardiologist in outpatient setting.

## 2023-01-17 NOTE — ED TRIAGE NOTES
87 y.o. male presents to ER ED 01/ED 01B   Chief Complaint   Patient presents with    Weakness   .   Here per GYPSY from [facility]  with c/o generalized weakness and fever

## 2023-01-17 NOTE — ED PROVIDER NOTES
Encounter Date: 1/17/2023       History     Chief Complaint   Patient presents with    Weakness     Vega Kohler is a 87 y.o. male who presents with generalized weakness today  Slurred speech at nursing home, generalized weakness, decline since yesterday  Reported that he is no longer doing ADLs, discharged from hospital 10 days ago  Patient was in the hospital rehabbing after a stroke; COVID DX also January 1st  Ambulance thought the patient might be febrile, no fever on ER triage this afternoon  I do find the patient's speech slightly off, generalized debility with no focal deficits    Review of patient's allergies indicates:   Allergen Reactions    Sulfur Itching    Penicillins Swelling and Rash     Past Medical History:   Diagnosis Date    Abnormal barium swallow     Alzheimer's dementia, late onset     Anal stenosis     Anticoagulant long-term use     Aortic stenosis     Arthritis     Aspiration pneumonitis     Benign prostatic hyperplasia     Carotid artery disease     CHF (congestive heart failure)     Chronic diastolic heart failure     Chronic kidney disease (CKD), stage III (moderate)     CKD (chronic kidney disease)     Colon polyp     COPD (chronic obstructive pulmonary disease)     Coronary artery disease     Diabetes mellitus, type 2     Diverticulosis     Dysphagia     Hearing aid worn 01/23/2020    Received bilateral hearing aides today    Heart disease     History of CEA (carotid endarterectomy)     [place] (hard of hearing)     Hyperlipidemia     Hypertension     Hypertensive heart disease     Hypothyroidism     Joint disease     Memory loss     PAD (peripheral artery disease)     Presence of drug coated stent in right coronary artery     Pulmonary hypertension     S/P TAVR (transcatheter aortic valve replacement)     Small bowel obstruction     Wears dentures     UPPER AND LOWER    Wears glasses      Past Surgical History:   Procedure Laterality Date    anal fissure repair      ANKLE FUSION Left  08/15/2016    X 2    APPENDECTOMY      BACK SURGERY      X 4    CAROTID ENDARTERECTOMY Right     CAROTID ENDARTERECTOMY Left     CAROTID ENDARTERECTOMY Left 12/3/2021    Procedure: ENDARTERECTOMY-CAROTID;  Surgeon: Tim Castillo MD;  Location: [facility] OR;  Service: Cardiology;  Laterality: Left;  pt unsure if he stopped plavix, Dr. Castillo ok to proceed    CARPAL TUNNEL RELEASE Right     CHOLECYSTECTOMY      COLONOSCOPY N/A 7/26/2018    Procedure: HIGH RISK SCREENING COLONOSCOPY;  Surgeon: Connor Murrell MD;  Location: [facility] ENDO;  Service: Endoscopy;  Laterality: N/A;    CORONARY ANGIOPLASTY WITH STENT PLACEMENT      ELBOW SURGERY Bilateral     EXCISIONAL HEMORRHOIDECTOMY      x3    EXPLORATORY LAPAROTOMY W/ BOWEL RESECTION  11/17/2011    EYE SURGERY      cataract bilaterally    history of bowel resection      KNEE SURGERY Left     LAMINECTOMY AND MICRODISCECTOMY LUMBAR SPINE  07/21/2011    L5-S1    LEFT HEART CATHETERIZATION Left 8/31/2018    Procedure: Left heart cath;  Surgeon: Rex Chris MD;  Location: [facility] CATH;  Service: Cardiology;  Laterality: Left;    LUMBAR FUSION  11/09/2011    Anterior approach--L5-S1    LUNG BIOPSY Right 07/21/2009    VATS with wedge ofr right lower lobe    LUNG BIOPSY Left 3/16/2020    Procedure: BIOPSY, LUNG;  Surgeon: [facility];  Location: [facility] OR;  Service: General;  Laterality: Left;    PERCUTANEOUS TRANSCATHETER AORTIC VALVE REPLACEMENT (TAVR) Left 10/30/2018    Procedure: REPLACEMENT, AORTIC VALVE, PERCUTANEOUS, TRANSCATHETER;  Surgeon: Rex Chris MD;  Location: [facility] OR;  Service: Cardiology;  Laterality: Left;    PERCUTANEOUS TRANSCATHETER AORTIC VALVE REPLACEMENT (TAVR)  10/30/2018    Procedure: REPLACEMENT, AORTIC VALVE, PERCUTANEOUS, TRANSCATHETER;  Surgeon: Tim Castillo MD;  Location: [facility] OR;  Service: Cardiovascular;;    RIGHT HEART CATHETERIZATION Right 8/31/2018    Procedure: INSERTION, CATHETER, RIGHT HEART;   Surgeon: Rex Chris MD;  Location: [facility] CATH;  Service: Cardiology;  Laterality: Right;    ROTATOR CUFF REPAIR Left     SINUS SURGERY      SPINE SURGERY      back surgery    TENDON RELEASE Bilateral     TONSILLECTOMY       Family History   Problem Relation Age of Onset    Cancer Mother     Heart disease Father     Heart disease Brother     Diabetes Brother      Social History     Tobacco Use    Smoking status: Former     Packs/day: 1.50     Years: 20.00     Pack years: 30.00     Types: Cigarettes     Start date:      Quit date:      Years since quittin.0    Smokeless tobacco: Never   Substance Use Topics    Alcohol use: No    Drug use: No     Review of Systems   Constitutional:  Positive for fatigue. Negative for activity change, appetite change, fever and unexpected weight change.   HENT:  Negative for congestion, ear pain, mouth sores, nosebleeds, rhinorrhea, sinus pressure, sneezing and sore throat.    Eyes:  Negative for pain, discharge, redness and itching.   Respiratory:  Negative for apnea, cough, chest tightness and shortness of breath.    Cardiovascular:  Negative for chest pain, palpitations and leg swelling.   Gastrointestinal:  Negative for abdominal distention, abdominal pain, anal bleeding, constipation, diarrhea, nausea and vomiting.   Endocrine: Negative.    Genitourinary:  Negative for dysuria, enuresis, flank pain and frequency.   Musculoskeletal:  Negative for arthralgias, back pain, neck pain and neck stiffness.   Skin:  Negative for color change and wound.   Allergic/Immunologic: Negative.    Neurological:  Positive for weakness. Negative for dizziness, tremors, syncope, facial asymmetry, speech difficulty, light-headedness, numbness and headaches.   Hematological:  Negative for adenopathy. Does not bruise/bleed easily.   Psychiatric/Behavioral:  Negative for agitation, behavioral problems, hallucinations, self-injury and suicidal ideas. The patient is not nervous/anxious.       Physical Exam     Initial Vitals   BP Pulse Resp Temp SpO2   01/17/23 1123 01/17/23 1123 01/17/23 1314 01/17/23 1123 01/17/23 1123   135/63 82 16 99.3 °F (37.4 °C) 97 %      MAP       --                Physical Exam    Nursing note and vitals reviewed.  Constitutional: Vital signs are normal. He appears well-developed and well-nourished. He is cooperative.   HENT:   Head: Normocephalic and atraumatic.   Right Ear: Hearing, tympanic membrane, external ear and ear canal normal.   Left Ear: Hearing, tympanic membrane, external ear and ear canal normal.   Nose: Nose normal.   Mouth/Throat: Uvula is midline, oropharynx is clear and moist and mucous membranes are normal.   Eyes: Conjunctivae, EOM and lids are normal. Pupils are equal, round, and reactive to light.   Neck: Neck supple. No JVD present.   Normal range of motion.   Full passive range of motion without pain.     Cardiovascular:  Normal rate, regular rhythm, S1 normal, S2 normal, normal heart sounds, intact distal pulses and normal pulses.           Pulmonary/Chest: Effort normal and breath sounds normal.   Abdominal: Abdomen is soft and flat. Bowel sounds are normal.   Musculoskeletal:         General: Normal range of motion.      Cervical back: Full passive range of motion without pain, normal range of motion and neck supple.     Neurological: He is alert and oriented to person, place, and time. He has normal strength.   Skin: Skin is warm, dry and intact. Capillary refill takes less than 2 seconds.       ED Course   Procedures  Labs Reviewed   SARS-COV-2 RNA AMPLIFICATION, QUAL - Abnormal; Notable for the following components:       Result Value    SARS-CoV-2 RNA, Amplification, Qual Positive (*)     All other components within normal limits   CBC W/ AUTO DIFFERENTIAL - Abnormal; Notable for the following components:    RBC 3.18 (*)     Hemoglobin 11.0 (*)     Hematocrit 32.5 (*)      (*)     MCH 34.6 (*)     RDW 14.6 (*)     Lymph # 0.7 (*)      Gran % 74.8 (*)     Lymph % 15.2 (*)     All other components within normal limits   COMPREHENSIVE METABOLIC PANEL - Abnormal; Notable for the following components:    BUN 30 (*)     Creatinine 1.6 (*)     Total Protein 5.7 (*)     Albumin 2.5 (*)     eGFR 41 (*)     All other components within normal limits   TROPONIN I - Abnormal; Notable for the following components:    Troponin I 0.072 (*)     All other components within normal limits   B-TYPE NATRIURETIC PEPTIDE - Abnormal; Notable for the following components:     (*)     All other components within normal limits   URINALYSIS, REFLEX TO URINE CULTURE - Abnormal; Notable for the following components:    Protein, UA 1+ (*)     All other components within normal limits    Narrative:     Specimen Source->Urine   INFLUENZA A & B BY MOLECULAR   GROUP A STREP, MOLECULAR   CULTURE, BLOOD   CULTURE, BLOOD   CK   CK-MB   LACTIC ACID, PLASMA   PROCALCITONIN   URINALYSIS MICROSCOPIC    Narrative:     Specimen Source->Urine     EKG Readings: (Independently Interpreted)   No STEMI  Normal sinus rhythm  No ectopy  Normal conduction  Normal ST segments  Normal T-wave  Normal axis  Heart rate in the 70s     Imaging Results              CT Head Without Contrast (Final result)  Result time 01/17/23 12:58:20      Final result by Julian Mcpherson Jr., MD (01/17/23 12:58:20)                   Impression:      1. No evidence of acute intracranial process.  2. Chronic cerebral atrophy with superimposed chronic deep white matter ischemia.  3. Old right cerebellar infarct.      Electronically signed by: Julian Mcpherson MD  Date:    01/17/2023  Time:    12:58               Narrative:    EXAMINATION:  CT HEAD WITHOUT CONTRAST    CLINICAL HISTORY:  Mental status change, unknown cause;    TECHNIQUE:  Low dose axial images were obtained through the head.  Coronal and sagittal reformations were also performed. Contrast was not administered.    COMPARISON:  Comparison made with the  patient's previous imaging study of 12/15/2022 as well as the accompanying CT scan of the head reproduced 12/05/2022.    FINDINGS:  Examination is limited by the lack of clarity given the immense motion artifact during scanning limiting diagnostic inspection.  Old chronic right cerebellar hemisphere infarct is noted.  Chronic cerebral atrophy and superimposed chronic deep white matter ischemia with deviating of the cortical sulci and widening of the intracranial fissures.  Chronic deep white matter infarct near the right lateral ventricle less conspicuous upon comparison relative to prior MRI.  This is better status based on the accompanying MR examination.  There is no evidence of recurrent acute vascular insult, intraparenchymal hemorrhage, mass lesion, or midline shift.  Ventricles are generous in size in volume that is congruent with degree of cerebral atrophy.  Prominence of the 3rd ventricle.  Prominent atheromatous calcification of the cavernous and supraclinoid segments of the ICAs bilaterally.  Mastoids and visualized paranasal sinuses are clear.  Patient has undergone previous lens replacement surgery on the right.                                       X-Ray Chest 1 View (Final result)  Result time 01/17/23 12:26:21      Final result by Julian Mcpherson Jr., MD (01/17/23 12:26:21)                   Impression:      1. No evidence of acute cardiopulmonary findings.  2. 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  3. Mild perihilar and left upper lobe pulmonary scar.  4. No appreciable change upon comparison.      Electronically signed by: Julian Mcpherson MD  Date:    01/17/2023  Time:    12:26               Narrative:    EXAMINATION:  XR CHEST 1 VIEW    CLINICAL HISTORY:  Fever;    TECHNIQUE:  Erect AP view of the chest.    COMPARISON:  01/08/2023    FINDINGS:  Diminished inspiratory effort magnifying the cardiomediastinal silhouette and resulting in crowding of vascular lung markings.  Minimal  perihilar left upper lobe pulmonary scarring is stable appearing.    Lungs without evidence of pneumonic infiltrate, pleural effusion, nor atelectasis.  Soft tissue anchor screws imbedded within left humeral head.  Bilateral AC joint osteoarthritis.  Postop changes of previous bilateral carotid endarterectomy and operative changes of lower cervical spine noted with anterior fusion plate spanning C4/C5.  Upper abdominal cavity unremarkable.  There is evidence of deployment of a trans mitral valve prosthesis.                                       Medications   sodium chloride 0.9% bolus 1,000 mL 1,000 mL (0 mLs Intravenous Stopped 23 1230)   0.9%  NaCl infusion ( Intravenous New Bag 23 1245)     Medical Decision Makin y.o. male presents from the nursing home with weakness & fatigue  Some NH staff said today, other state he was not while yesterday p.m.  Was discharge to nursing home 10 days ago at least walking per note  Not doing any ADLs, alert to person & place but not time in the ER today  I do find his speech slightly slurred, he is certainly not febrile this afternoon    Head CT stable, white count normal, lactic & procalcitonin normal with (-) UA  I do not see any signs of infection, stable chest x-ray, cath urine normal now  However the patient has had a decline with a recent CVA, looks dehydrated    Will hydrate him with repeat lab work in the morning, does have an ANTHONY   Minimal troponin elevation will be trended out on observation on the floor  Additionally, MRI of the brain ordered to make sure no recurrent stroke  The patient is still COVID (+) from his original diagnosis 2023                           Clinical Impression:   Final diagnoses:  [E86.0] Dehydration (Primary)  [R41.82] Altered mental status, unspecified altered mental status type        ED Disposition Condition    Observation Stable                Alexei Blackwell MD  23 [id number]

## 2023-01-18 PROBLEM — N17.9 AKI (ACUTE KIDNEY INJURY): Status: ACTIVE | Noted: 2023-01-01

## 2023-01-18 PROBLEM — Z86.73 HX OF ISCHEMIC RIGHT MCA STROKE: Status: ACTIVE | Noted: 2022-01-01

## 2023-01-18 NOTE — ASSESSMENT & PLAN NOTE
Can not tolerate daily diuretics   The left ventricle is normal in size with concentric remodeling and normal systolic function.   The estimated ejection fraction is 68%.   Grade I left ventricular diastolic dysfunction.   Normal right ventricular size with normal right ventricular systolic function.   Mild right atrial enlargement.   There is a transcutaneously-placed aortic bioprosthesis present. There is no aortic insufficiency present.   The aortic valve mean gradient is 8 mmHg with a dimensionless index of 0.52.   Mild tricuspid regurgitation.   Normal central venous pressure (3 mmHg).   The estimated PA systolic pressure is 55 mmHg.   There is moderate pulmonary hypertension.

## 2023-01-18 NOTE — ASSESSMENT & PLAN NOTE
Has home O2- cont nebs as needed  CXR >1. No evidence of acute cardiopulmonary findings.  2. 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  3. Mild perihilar and left upper lobe pulmonary scar.  4. No appreciable change upon comparison.

## 2023-01-18 NOTE — ASSESSMENT & PLAN NOTE
H/o follicular lymphoma per chart (grade 3a, stage 1); receiving rituxan (cycle 3 in 11/2022), 10/2022 PET scan positive for 3 cm lymphadenopathy in left axillary region otherwise HUSSEIN.        -Will need follow up visit once discharge (per last hem/onc note follow up indicated around 12/17/22, delayed due to current admission for stroke)      1/6/23 Pt with dementia and advance age; will not pursue any cancer treatments going forward     Anemia is stable

## 2023-01-18 NOTE — H&P
[facility] ([address] [place] [address] Medicine  History & Physical    Patient Name: Vega Kohler  MRN: [id number]  Patient Class: OP- Observation  Admission Date: 1/17/2023  Attending Physician: Destin iJmenez MD   Primary Care Provider: Fabricio Mckeon MD         Patient information was obtained from past medical records, ER records and primary team.     Subjective:     Principal Problem:ANTHONY (acute kidney injury)    Chief Complaint:   Chief Complaint   Patient presents with    Weakness        HPI: 88yo male patient with history of Alzheimer's, aortic stenosis s/p TAVR, BPH, CAD/CHF/CKD3, /DM/HTN/HLD but most recent hx of stroke in 12/2022, was here at [facility] for rehab and contracted covid in Jan. He was sent to [facility] for continued therapy but yesterday became weak, slurred speech noted. He was no longer doing his ADLS. Sent to [facility] for eval. When he left here he was ambulating >> last PT note stand by assist ambulated 85ft with RW  and O2 supplement. He has more weakness to left upper ext (residual from CVA but seems a little worse than when d/justyna.) bruising mote to right lower abd and c/o left hip pain. Re ports he had a fall but unable to say when. Not oriented. He was dehydrated on admission. Creat 1.6> was given 1500ml bolus in ER then maintained on NS at 125ml/hr. Creat improved today 1.2. covid remains positive but original positive test 1/1/23. Ct head/MRI no new strokes. Troponin slightly elebated but has been stable over night 0.07. No chest pain. VSS. Afebrile. Placed in observation for speech/OT/PT evals. Plavix statin and namenda resumed.       Past Medical History:   Diagnosis Date    Abnormal barium swallow     Alzheimer's dementia, late onset     Anal stenosis     Anticoagulant long-term use     Aortic stenosis     Arthritis     Aspiration pneumonitis     Benign prostatic hyperplasia     Carotid artery disease     CHF (congestive heart failure)     Chronic diastolic heart  failure     Chronic kidney disease (CKD), stage III (moderate)     CKD (chronic kidney disease)     Colon polyp     COPD (chronic obstructive pulmonary disease)     Coronary artery disease     Diabetes mellitus, type 2     Diverticulosis     Dysphagia     Hearing aid worn 01/23/2020    Received bilateral hearing aides today    Heart disease     History of CEA (carotid endarterectomy)     [place] (hard of hearing)     Hyperlipidemia     Hypertension     Hypertensive heart disease     Hypothyroidism     Joint disease     Memory loss     PAD (peripheral artery disease)     Presence of drug coated stent in right coronary artery     Pulmonary hypertension     S/P TAVR (transcatheter aortic valve replacement)     Small bowel obstruction     Wears dentures     UPPER AND LOWER    Wears glasses        Past Surgical History:   Procedure Laterality Date    anal fissure repair      ANKLE FUSION Left 08/15/2016    X 2    APPENDECTOMY      BACK SURGERY      X 4    CAROTID ENDARTERECTOMY Right     CAROTID ENDARTERECTOMY Left     CAROTID ENDARTERECTOMY Left 12/3/2021    Procedure: ENDARTERECTOMY-CAROTID;  Surgeon: Tim Castillo MD;  Location: [facility] OR;  Service: Cardiology;  Laterality: Left;  pt unsure if he stopped plavix, Dr. Castillo ok to proceed    CARPAL TUNNEL RELEASE Right     CHOLECYSTECTOMY      COLONOSCOPY N/A 7/26/2018    Procedure: HIGH RISK SCREENING COLONOSCOPY;  Surgeon: Connor Murrell MD;  Location: [facility] ENDO;  Service: Endoscopy;  Laterality: N/A;    CORONARY ANGIOPLASTY WITH STENT PLACEMENT      ELBOW SURGERY Bilateral     EXCISIONAL HEMORRHOIDECTOMY      x3    EXPLORATORY LAPAROTOMY W/ BOWEL RESECTION  11/17/2011    EYE SURGERY      cataract bilaterally    history of bowel resection      KNEE SURGERY Left     LAMINECTOMY AND MICRODISCECTOMY LUMBAR SPINE  07/21/2011    L5-S1    LEFT HEART CATHETERIZATION Left 8/31/2018    Procedure: Left heart cath;  Surgeon:  Rex Chris MD;  Location: [facility] CATH;  Service: Cardiology;  Laterality: Left;    LUMBAR FUSION  11/09/2011    Anterior approach--L5-S1    LUNG BIOPSY Right 07/21/2009    VATS with wedge ofr right lower lobe    LUNG BIOPSY Left 3/16/2020    Procedure: BIOPSY, LUNG;  Surgeon: Everett Diagnostic Provider;  Location: [facility] OR;  Service: General;  Laterality: Left;    PERCUTANEOUS TRANSCATHETER AORTIC VALVE REPLACEMENT (TAVR) Left 10/30/2018    Procedure: REPLACEMENT, AORTIC VALVE, PERCUTANEOUS, TRANSCATHETER;  Surgeon: Rex Chris MD;  Location: [facility] OR;  Service: Cardiology;  Laterality: Left;    PERCUTANEOUS TRANSCATHETER AORTIC VALVE REPLACEMENT (TAVR)  10/30/2018    Procedure: REPLACEMENT, AORTIC VALVE, PERCUTANEOUS, TRANSCATHETER;  Surgeon: Tim Castillo MD;  Location: [facility] OR;  Service: Cardiovascular;;    RIGHT HEART CATHETERIZATION Right 8/31/2018    Procedure: INSERTION, CATHETER, RIGHT HEART;  Surgeon: Rex Chris MD;  Location: [facility] CATH;  Service: Cardiology;  Laterality: Right;    ROTATOR CUFF REPAIR Left     SINUS SURGERY      SPINE SURGERY      back surgery    TENDON RELEASE Bilateral     TONSILLECTOMY         Review of patient's allergies indicates:   Allergen Reactions    Sulfur Itching    Penicillins Swelling and Rash       No current facility-administered medications on file prior to encounter.     Current Outpatient Medications on File Prior to Encounter   Medication Sig    albuterol (VENTOLIN HFA) 90 mcg/actuation inhaler Inhale 1-2 puffs into the lungs every 6 (six) hours as needed for Wheezing or Shortness of Breath. Rescue    aspirin 81 MG Chew Take 1 tablet (81 mg total) by mouth once daily.    atorvastatin (LIPITOR) 40 MG tablet Take 1 tablet (40 mg total) by mouth once daily.    clopidogreL (PLAVIX) 75 mg tablet Take 1 tablet (75 mg total) by mouth once daily.    insulin glargine-lixisenatide (SOLIQUA 100/33) 100 unit-33 mcg/mL InPn pen Inject 15  Units into the skin daily with breakfast.    memantine (NAMENDA) 10 MG Tab Take 10 mg by mouth once daily.    OLANZapine (ZYPREXA) injection Inject 2.5 mg into the muscle 3 (three) times daily as needed for Agitation.    polyethylene glycol (GLYCOLAX) 17 gram PwPk Take 17 g by mouth once daily.    tamsulosin (FLOMAX) 0.4 mg Cap Take 0.4 mg by mouth once daily.     Family History       Problem Relation (Age of Onset)    Cancer Mother    Diabetes Brother    Heart disease Father, Brother          Tobacco Use    Smoking status: Former     Packs/day: 1.50     Years: 20.00     Pack years: 30.00     Types: Cigarettes     Start date:      Quit date:      Years since quittin.0    Smokeless tobacco: Never   Substance and Sexual Activity    Alcohol use: No    Drug use: No    Sexual activity: Not on file     Review of Systems   Unable to perform ROS: Dementia   Objective:     Vital Signs (Most Recent):  Temp: 97.6 °F (36.4 °C) (23 08)  Pulse: 72 (23 08)  Resp: 18 (23 08)  BP: 122/60 (23 08)  SpO2: (!) 94 % (23 08)   Vital Signs (24h Range):  Temp:  [96.8 °F (36 °C)-99.3 °F (37.4 °C)] 97.6 °F (36.4 °C)  Pulse:  [64-82] 72  Resp:  [14-22] 18  SpO2:  [94 %-98 %] 94 %  BP: (107-139)/(54-73) 122/60     Weight: 64 kg (141 lb 1.5 oz)  Body mass index is 22.1 kg/m².    Physical Exam  Constitutional:       Appearance: He is well-developed.   HENT:      Head: Normocephalic and atraumatic.      Right Ear: Tympanic membrane, ear canal and external ear normal.      Left Ear: Tympanic membrane, ear canal and external ear normal.      Nose: Nose normal.   Eyes:      Conjunctiva/sclera: Conjunctivae normal.      Pupils: Pupils are equal, round, and reactive to light.   Neck:      Thyroid: No thyromegaly.      Trachea: No tracheal deviation.   Cardiovascular:      Rate and Rhythm: Normal rate and regular rhythm.      Heart sounds: Normal heart sounds. No murmur heard.    No gallop.    Pulmonary:      Effort: Pulmonary effort is normal.      Breath sounds: Normal breath sounds.   Abdominal:      General: Bowel sounds are normal. There is no distension.      Palpations: Abdomen is soft. There is no mass.      Tenderness: There is no abdominal tenderness. There is no guarding or rebound.      Hernia: There is no hernia in the left inguinal area.   Genitourinary:     Prostate: Normal.      Rectum: Normal.   Musculoskeletal:         General: Tenderness present.      Cervical back: Normal range of motion and neck supple.      Right lower leg: No edema.      Left lower leg: No edema.      Comments: Bruise over right lateral pelvis   Left hip tender with decreased range of motion   Skin:     General: Skin is warm and dry.   Neurological:      General: No focal deficit present.      Mental Status: He is alert. Mental status is at baseline. He is disoriented.      Deep Tendon Reflexes: Reflexes are normal and symmetric.   Psychiatric:         Mood and Affect: Affect is blunt.         Speech: He is noncommunicative.         Behavior: Behavior is slowed and withdrawn.         Cognition and Memory: Cognition is impaired. Memory is impaired. He exhibits impaired recent memory and impaired remote memory.         CRANIAL NERVES     CN III, IV, VI   Pupils are equal, round, and reactive to light.     Significant Labs: All pertinent labs within the past 24 hours have been reviewed.  CBC:   Recent Labs   Lab 01/17/23  1133 01/18/23  0617   WBC 4.47 4.16   HGB 11.0* 10.5*   HCT 32.5* 31.6*    228     CMP:   Recent Labs   Lab 01/17/23  1133 01/18/23  0617    142   K 4.1 4.5    111*   CO2 27 24   GLU 88 62*   BUN 30* 25*   CREATININE 1.6* 1.2   CALCIUM 8.8 8.3*   PROT 5.7* 5.1*   ALBUMIN 2.5* 2.2*   BILITOT 0.8 1.1*   ALKPHOS 71 65   AST 24 26   ALT 14 13   ANIONGAP 10 7*     Lactic Acid:   Recent Labs   Lab 01/17/23  1133   LACTATE 0.8   Procal 0.08  Troponin:   Recent Labs   Lab 01/17/23  1805  01/18/23  0028 01/18/23  0617   TROPONINI 0.070* 0.070* 0.073*   BNP  Recent Labs   Lab 01/17/23  1133   *       Urine Studies:   Recent Labs   Lab 01/17/23  1147   COLORU Yellow   APPEARANCEUA Clear   PHUR 5.0   SPECGRAV 1.020   PROTEINUA 1+*   GLUCUA Negative   KETONESU Negative   BILIRUBINUA Negative   OCCULTUA Negative   NITRITE Negative   UROBILINOGEN Negative   LEUKOCYTESUR Negative   RBCUA 1   WBCUA 5   BACTERIA Rare   HYALINECASTS 0     Covid still positive but originally dx 1/1/23    Blood cultures in process x 2   Flu and strept negative     Significant Imaging:     CXR   1. No evidence of acute cardiopulmonary findings.  2. 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  3. Mild perihilar and left upper lobe pulmonary scar.  4. No appreciable change upon comparison.    CT head   1. No evidence of acute intracranial process.  2. Chronic cerebral atrophy with superimposed chronic deep white matter ischemia.  3. Old right cerebellar infarct.    MRI   No definite acute infarct and no significant change relative to December 15, 2022    Assessment/Plan:     * ANTHONY (acute kidney injury)  Patient with acute kidney injury likely due to IVVD/dehydration ANTHONY is currently improving. Labs reviewed- Renal function/electrolytes with Estimated Creatinine Clearance: 39.3 mL/min (based on SCr of 1.2 mg/dL). according to latest data. Monitor urine output and serial BMP and adjust therapy as needed. Avoid nephrotoxins and renally dose meds for GFR listed above.   Pt was given 1.5L NS bolus in ER, then stayed on NS at 125ml/hr overnight. Creat improved 1.6>1.2 this am.     Debility  Seemst o have declined since discharge. Will have speech/OT/PT re eval today       COVID-19  Dx 1/1/2023    BPH (benign prostatic hyperplasia)  Resumed flomax      Dementia  Not oriented today- cont namenda      Hx of ischemic right MCA stroke  Cont plavix and lipitor      Anemia in neoplastic disease  H/o follicular lymphoma per chart  (grade 3a, stage 1); receiving rituxan (cycle 3 in 11/2022), 10/2022 PET scan positive for 3 cm lymphadenopathy in left axillary region otherwise HUSSEIN.        -Will need follow up visit once discharge (per last hem/onc note follow up indicated around 12/17/22, delayed due to current admission for stroke)      1/6/23 Pt with dementia and advance age; will not pursue any cancer treatments going forward     Anemia is stable      Alteration in nutrition  Low albumin- reduced appetite- consult nutrition       Aortic stenosis s/p TAVR  stable      Chronic diastolic heart failure, NYHA class 2  Can not tolerate daily diuretics   The left ventricle is normal in size with concentric remodeling and normal systolic function.   The estimated ejection fraction is 68%.   Grade I left ventricular diastolic dysfunction.   Normal right ventricular size with normal right ventricular systolic function.   Mild right atrial enlargement.   There is a transcutaneously-placed aortic bioprosthesis present. There is no aortic insufficiency present.   The aortic valve mean gradient is 8 mmHg with a dimensionless index of 0.52.   Mild tricuspid regurgitation.   Normal central venous pressure (3 mmHg).   The estimated PA systolic pressure is 55 mmHg.   There is moderate pulmonary hypertension.      Chronic kidney disease, stage 3 (moderate)  After IV hydration he is back to baseline this am       COPD, moderate  Has home O2- cont nebs as needed  CXR >1. No evidence of acute cardiopulmonary findings.  2. 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  3. Mild perihilar and left upper lobe pulmonary scar.  4. No appreciable change upon comparison.      Diabetes mellitus, type 2  Patient's FSGs are controlled on current medication regimen.  Last A1c reviewed-   Lab Results   Component Value Date    HGBA1C 6.7 (H) 12/14/2022     Most recent fingerstick glucose reviewed-   Recent Labs   Lab 01/17/23  1715 01/17/23  1955 01/18/23  0715  01/18/23  0751   POCTGLUCOSE 76 100 59* 91     Current correctional scale  Low  Maintain anti-hyperglycemic dose as follows-   Antihyperglycemics (From admission, onward)    Start     Stop Route Frequency Ordered    01/17/23 1745  insulin aspart U-100 pen 0-5 Units         -- SubQ Before meals & nightly PRN 01/17/23 1648        Hold Oral hypoglycemics while patient is in the hospital.  Pt was last on 20 units levemir here. BS this am 62- hold insulin until appetite picks up- nutrition consult       VTE Risk Mitigation (From admission, onward)         Ordered     IP VTE HIGH RISK PATIENT  Once         01/17/23 1531     Place sequential compression device  Until discontinued         01/17/23 1531                   Destin Jimenez MD  Department of Hospital Medicine   [facility] [facility] Surg (3rd Fl)

## 2023-01-18 NOTE — PLAN OF CARE
Pt A&O to self, has moments of clarity and is forgetful, PT/OT working with pt, pain to left hip 2L NC, BC no growth so far. 20g IV RAC SL    Problem: Adult Inpatient Plan of Care  Goal: Plan of Care Review  Outcome: Ongoing, Progressing  Goal: Patient-Specific Goal (Individualized)  Outcome: Ongoing, Progressing  Goal: Absence of Hospital-Acquired Illness or Injury  Outcome: Ongoing, Progressing  Goal: Optimal Comfort and Wellbeing  Outcome: Ongoing, Progressing  Goal: Readiness for Transition of Care  Outcome: Ongoing, Progressing     Problem: Diabetes Comorbidity  Goal: Blood Glucose Level Within Targeted Range  Outcome: Ongoing, Progressing     Problem: Impaired Wound Healing  Goal: Optimal Wound Healing  Outcome: Ongoing, Progressing     Problem: Fall Injury Risk  Goal: Absence of Fall and Fall-Related Injury  Outcome: Ongoing, Progressing     Problem: Skin Injury Risk Increased  Goal: Skin Health and Integrity  Outcome: Ongoing, Progressing     Problem: Fluid and Electrolyte Imbalance (Acute Kidney Injury/Impairment)  Goal: Fluid and Electrolyte Balance  Outcome: Ongoing, Progressing     Problem: Oral Intake Inadequate (Acute Kidney Injury/Impairment)  Goal: Optimal Nutrition Intake  Outcome: Ongoing, Progressing     Problem: Renal Function Impairment (Acute Kidney Injury/Impairment)  Goal: Effective Renal Function  Outcome: Ongoing, Progressing

## 2023-01-18 NOTE — PLAN OF CARE
01/18/23 1252   YSUUF Message   Medicare Outpatient and Observation Notification regarding financial responsibility Explained to patient/caregiver;Other (comments)  (Completed via phone with son.)   Date YUSUF was signed 01/18/23   Time YUSUF was signed [id number]

## 2023-01-18 NOTE — ASSESSMENT & PLAN NOTE
Patient's FSGs are controlled on current medication regimen.  Last A1c reviewed-   Lab Results   Component Value Date    HGBA1C 6.7 (H) 12/14/2022     Most recent fingerstick glucose reviewed-   Recent Labs   Lab 01/17/23  1715 01/17/23  1955 01/18/23  0715 01/18/23  0751   POCTGLUCOSE 76 100 59* 91     Current correctional scale  Low  Maintain anti-hyperglycemic dose as follows-   Antihyperglycemics (From admission, onward)    Start     Stop Route Frequency Ordered    01/17/23 1745  insulin aspart U-100 pen 0-5 Units         -- SubQ Before meals & nightly PRN 01/17/23 1648        Hold Oral hypoglycemics while patient is in the hospital.  Pt was last on 20 units levemir here. BS this am 62- hold insulin until appetite picks up- nutrition consult

## 2023-01-18 NOTE — PT/OT/SLP PROGRESS
Physical Therapy      Patient Name:  Vega Kohler   MRN:  [id number]    Patient not seen today secondary to Nursing care. Will follow-up later date/time when appropriate..

## 2023-01-18 NOTE — PLAN OF CARE
[facility] - Med Surg (3rd Fl)  Initial Discharge Assessment       Primary Care Provider: Fabricio Mckeon MD    Admission Diagnosis: Dehydration [E86.0]  Slurred speech [R47.81]  Altered mental status, unspecified altered mental status type [R41.82]    Admission Date: 1/17/2023  Expected Discharge Date:     Discharge Barriers Identified: None    Payor: MEDICARE / Plan: MEDICARE PART A & B / Product Type: Government /     Extended Emergency Contact Information  Primary Emergency Contact: Eliseo Kohler   [place]  Mobile Phone: [phone]  Relation: Son  Secondary Emergency Contact: Sandie Kohler  Mobile Phone: [phone]  Relation: Relative    Discharge Plan A: Skilled Nursing Facility         [facility] [address]  [place] [address] [address] [facility] [address] [address] [facility] [address]  [place] [address]  Phone: [phone] Fax: [phone]      Initial Assessment (most recent)       Adult Discharge Assessment - 01/18/23 1154          Discharge Assessment    Assessment Type Discharge Planning Assessment     Confirmed/corrected address, phone number and insurance Yes     Confirmed Demographics Correct on Facesheet     Source of Information family;health record     Communicated JOSE A with patient/caregiver Date not available/Unable to determine     Reason For Admission ANTHONY     People in Home facility resident     Facility Arrived From: [facility]     Do you expect to return to your current living situation? Yes     Do you have help at home or someone to help you manage your care at home? Yes     Who are your caregiver(s) and their phone number(s)? Zay Kohler (Son) [phone]     Prior to hospitilization cognitive status: Unable to Assess     Current cognitive status: Unable to Assess     Walking or Climbing Stairs ambulation difficulty, assistance 1 person;ambulation difficulty, dependent     Dressing/Bathing bathing difficulty, assistance 1 person;dressing difficulty, assistance 1 person     Home Accessibility wheelchair  accessible     Home Layout Able to live on 1st floor     Equipment Currently Used at Home oxygen;walker, rolling;hospital bed;wheelchair     Readmission within 30 days? Yes     Do you currently have service(s) that help you manage your care at home? Yes     Name and Contact number of agency [facility]     Is the pt/caregiver preference to resume services with current agency Yes     Do you take prescription medications? Yes     Do you have prescription coverage? Yes     Coverage BCBS Supplement     How do you get to doctors appointments? agency     Are you on dialysis? No     Discharge Plan A Skilled Nursing Facility     DME Needed Upon Discharge  none     Discharge Plan discussed with: Adult children     Discharge Barriers Identified None                        Discharge assessment completed via the medical record and with patient's son, Zay. Patient currently residing at [facility] for SNF. Plan is to return to complete therapy upon discharge. CARLOS did confirm with [facility] that the patient can return within 30 days of his admission to [facility]. MD anticipates discharge tomorrow, 1/19/22. There are no anticipated discharge needs at this time. SW to remain available.

## 2023-01-18 NOTE — PT/OT/SLP EVAL
Occupational Therapy   Evaluation    Name: Vega Kohler  MRN: [id number]  Admitting Diagnosis: ANTHONY (acute kidney injury)  Recent Surgery: * No surgery found *      Recommendations:     Discharge Recommendations: nursing facility, skilled  Discharge Equipment Recommendations:  other (see comments) (Will be provided by SNF)  Barriers to discharge:   None    Assessment:     Vega Kholer is a 87 y.o. male with a medical diagnosis of ANTHONY (acute kidney injury).  He presents with performance deficits affecting function: weakness, impaired endurance, gait instability, impaired functional mobility, impaired self care skills, impaired balance, decreased upper extremity function, decreased lower extremity function, pain, impaired skin, impaired cardiopulmonary response to activity. Pt cooperative to OT evaluation today. Continued weakness in left upper extremity noted during shoulder flexion MMT but left upper extremity remains functional during ADL tasks. Moderate assist required for bed mobility tasks with Pt demonstrating fair+ seated static and dynamic balance at EOB. Unable to perform LE dressing this session to don socks with Max assistance required for upper extremity dressing seated EOB. CGA for sit > stand with Pt tolerating ~2 minutes standing before requiring seated rest break. Standing ADL completed at EOB to wash face. Pt requested rest break and completed oral care seated at EOB. Good bilateral upper extremity use noted during both activities with fair+ static standing observed without support from RW during face washing. Pt with Max assist to scoot to comfortable position at HOB once supine.    Rehab Prognosis: Fair; patient would benefit from acute skilled OT services to address these deficits and reach maximum level of function.       Plan:     Patient to be seen 5 x/week to address the above listed problems via self-care/home management, therapeutic activities, therapeutic exercises  Plan of Care Expires:  "02/01/23  Plan of Care Reviewed with: patient    Subjective     Chief Complaint: "I feel weak"  Patient/Family Comments/goals: "Leave here."    Occupational Profile:  Living Environment: Pt currently residing at [facility].  Previous level of function: Pr reports independence with feeding and oral care/basic hygiene. He states that he requires total assist for bathing, receiving sponge baths in bed with Max assist for dressing requiring help for LE dressing being able to complete most of the upper extremity dressing. When asked about ambulation he stated that he does not walk much at the moment.   Equipment Used at Home: oxygen, walker, rolling, hospital bed, wheelchair  Assistance upon Discharge: Staff at [facility]    Pain/Comfort:  Pain Rating 1: other (see comments) (Reported pain but did not quantify.)  Location - Side 1: Left  Location - Orientation 1: generalized  Location 1: back  Pain Addressed 1: Reposition, Cessation of Activity  Pain Rating Post-Intervention 1: other (see comments) (Did not quantify.)    Patients cultural, spiritual, Sabianist conflicts given the current situation: no    Objective:     Communicated with: Nursing prior to session.  Patient found HOB elevated with bed alarm, oxygen, peripheral IV, SCD, telemetry upon OT entry to room.    General Precautions: Standard, airborne, fall, contact  Orthopedic Precautions: N/A  Braces: N/A  Respiratory Status: Nasal cannula, flow 2 L/min    Occupational Performance:    Bed Mobility:    Patient completed Rolling/Turning to Left with  stand by assistance  Patient completed Scooting/Bridging with maximal assistance  Patient completed Supine to Sit with moderate assistance  Patient completed Sit to Supine with stand by assistance    Functional Mobility/Transfers:  Patient completed Sit <> Stand Transfer with contact guard assistance  with  rolling walker     Activities of Daily Living:  Grooming: modified independence for set up to perform oral care " and face washing.  Upper Body Dressing: maximal assistance to don gown as robe seated EOB.  Lower Body Dressing: total assistance to don/doff socks seated EOB.    Cognitive/Visual Perceptual:  Cognitive/Psychosocial Skills:     -       Oriented to: Person, Place, Time, and Situation   -       Follows Commands/attention:Follows multistep  commands  -       Communication: Slight slurred speech.  -       Memory: No Deficits noted    Physical Exam:  Balance:    -       Fair+ dynamic/static seated and standing balance.  Postural examination/scapula alignment:    -       Rounded shoulders  -       Forward head  Upper Extremity Range of Motion:     -       Right Upper Extremity: WFL  -       Left Upper Extremity: WFL  Upper Extremity Strength:    -       Right Upper Extremity: WFL  -       Left Upper Extremity: WFL except shoulder flexion -4/5   Strength:    -       Right Upper Extremity: WFL  -       Left Upper Extremity: WFL    AMPAC 6 Click ADL:  AMPAC Total Score: 13    Treatment & Education:  OT evaluation completed with Pt. Pt educated on OT role and POC.    Patient left HOB elevated with all lines intact, call button in reach, bed alarm on, and nursing notified    GOALS:   Multidisciplinary Problems       Occupational Therapy Goals          Problem: Occupational Therapy    Goal Priority Disciplines Outcome Interventions   Occupational Therapy Goal     OT, PT/OT     Description: Pt to perform UB dressing with Min A seated EOB.  Pt to perform LB dressing with Min A seated EOB.  Pt to perform self toileting with Min A using standard commode.  Pt to perform level functional transfers required for ADL's with SBA, RW level.  Pt to participate in upper extremity strengthening routine as educated by OT for increased upper extremity strength and functional use during daily activity.                         History:     Past Medical History:   Diagnosis Date    Abnormal barium swallow     Alzheimer's dementia, late onset      Anal stenosis     Anticoagulant long-term use     Aortic stenosis     Arthritis     Aspiration pneumonitis     Benign prostatic hyperplasia     Carotid artery disease     CHF (congestive heart failure)     Chronic diastolic heart failure     Chronic kidney disease (CKD), stage III (moderate)     CKD (chronic kidney disease)     Colon polyp     COPD (chronic obstructive pulmonary disease)     Coronary artery disease     Diabetes mellitus, type 2     Diverticulosis     Dysphagia     Hearing aid worn 01/23/2020    Received bilateral hearing aides today    Heart disease     History of CEA (carotid endarterectomy)     [place] (hard of hearing)     Hyperlipidemia     Hypertension     Hypertensive heart disease     Hypothyroidism     Joint disease     Memory loss     PAD (peripheral artery disease)     Presence of drug coated stent in right coronary artery     Pulmonary hypertension     S/P TAVR (transcatheter aortic valve replacement)     Small bowel obstruction     Wears dentures     UPPER AND LOWER    Wears glasses          Past Surgical History:   Procedure Laterality Date    anal fissure repair      ANKLE FUSION Left 08/15/2016    X 2    APPENDECTOMY      BACK SURGERY      X 4    CAROTID ENDARTERECTOMY Right     CAROTID ENDARTERECTOMY Left     CAROTID ENDARTERECTOMY Left 12/3/2021    Procedure: ENDARTERECTOMY-CAROTID;  Surgeon: Tim Castillo MD;  Location: [facility] OR;  Service: Cardiology;  Laterality: Left;  pt unsure if he stopped plavix, Dr. Castillo ok to proceed    CARPAL TUNNEL RELEASE Right     CHOLECYSTECTOMY      COLONOSCOPY N/A 7/26/2018    Procedure: HIGH RISK SCREENING COLONOSCOPY;  Surgeon: Connor Murrell MD;  Location: [facility] ENDO;  Service: Endoscopy;  Laterality: N/A;    CORONARY ANGIOPLASTY WITH STENT PLACEMENT      ELBOW SURGERY Bilateral     EXCISIONAL HEMORRHOIDECTOMY      x3    EXPLORATORY LAPAROTOMY W/ BOWEL RESECTION  11/17/2011    EYE SURGERY      cataract bilaterally    history of  bowel resection      KNEE SURGERY Left     LAMINECTOMY AND MICRODISCECTOMY LUMBAR SPINE  07/21/2011    L5-S1    LEFT HEART CATHETERIZATION Left 8/31/2018    Procedure: Left heart cath;  Surgeon: Rex Chris MD;  Location: [facility] CATH;  Service: Cardiology;  Laterality: Left;    LUMBAR FUSION  11/09/2011    Anterior approach--L5-S1    LUNG BIOPSY Right 07/21/2009    VATS with wedge ofr right lower lobe    LUNG BIOPSY Left 3/16/2020    Procedure: BIOPSY, LUNG;  Surgeon: [facility] Diagnostic Provider;  Location: [facility] OR;  Service: General;  Laterality: Left;    PERCUTANEOUS TRANSCATHETER AORTIC VALVE REPLACEMENT (TAVR) Left 10/30/2018    Procedure: REPLACEMENT, AORTIC VALVE, PERCUTANEOUS, TRANSCATHETER;  Surgeon: Rex Chris MD;  Location: [facility] OR;  Service: Cardiology;  Laterality: Left;    PERCUTANEOUS TRANSCATHETER AORTIC VALVE REPLACEMENT (TAVR)  10/30/2018    Procedure: REPLACEMENT, AORTIC VALVE, PERCUTANEOUS, TRANSCATHETER;  Surgeon: Tim Castillo MD;  Location: [facility] OR;  Service: Cardiovascular;;    RIGHT HEART CATHETERIZATION Right 8/31/2018    Procedure: INSERTION, CATHETER, RIGHT HEART;  Surgeon: Rex Chris MD;  Location: [facility] CATH;  Service: Cardiology;  Laterality: Right;    ROTATOR CUFF REPAIR Left     SINUS SURGERY      SPINE SURGERY      back surgery    TENDON RELEASE Bilateral     TONSILLECTOMY         Time Tracking:     OT Date of Treatment: 01/18/23  OT Start Time: 1327  OT Stop Time: 1350  OT Total Time (min): 23 min    Billable Minutes:Evaluation 23 1/18/2023

## 2023-01-18 NOTE — SUBJECTIVE & OBJECTIVE
Past Medical History:   Diagnosis Date    Abnormal barium swallow     Alzheimer's dementia, late onset     Anal stenosis     Anticoagulant long-term use     Aortic stenosis     Arthritis     Aspiration pneumonitis     Benign prostatic hyperplasia     Carotid artery disease     CHF (congestive heart failure)     Chronic diastolic heart failure     Chronic kidney disease (CKD), stage III (moderate)     CKD (chronic kidney disease)     Colon polyp     COPD (chronic obstructive pulmonary disease)     Coronary artery disease     Diabetes mellitus, type 2     Diverticulosis     Dysphagia     Hearing aid worn 01/23/2020    Received bilateral hearing aides today    Heart disease     History of CEA (carotid endarterectomy)     [place] (hard of hearing)     Hyperlipidemia     Hypertension     Hypertensive heart disease     Hypothyroidism     Joint disease     Memory loss     PAD (peripheral artery disease)     Presence of drug coated stent in right coronary artery     Pulmonary hypertension     S/P TAVR (transcatheter aortic valve replacement)     Small bowel obstruction     Wears dentures     UPPER AND LOWER    Wears glasses        Past Surgical History:   Procedure Laterality Date    anal fissure repair      ANKLE FUSION Left 08/15/2016    X 2    APPENDECTOMY      BACK SURGERY      X 4    CAROTID ENDARTERECTOMY Right     CAROTID ENDARTERECTOMY Left     CAROTID ENDARTERECTOMY Left 12/3/2021    Procedure: ENDARTERECTOMY-CAROTID;  Surgeon: Tim Castillo MD;  Location: [facility] OR;  Service: Cardiology;  Laterality: Left;  pt unsure if he stopped plavix, Dr. Castillo ok to proceed    CARPAL TUNNEL RELEASE Right     CHOLECYSTECTOMY      COLONOSCOPY N/A 7/26/2018    Procedure: HIGH RISK SCREENING COLONOSCOPY;  Surgeon: Connor Murrell MD;  Location: [facility] ENDO;  Service: Endoscopy;  Laterality: N/A;    CORONARY ANGIOPLASTY WITH STENT PLACEMENT      ELBOW SURGERY Bilateral     EXCISIONAL HEMORRHOIDECTOMY      x3     EXPLORATORY LAPAROTOMY W/ BOWEL RESECTION  11/17/2011    EYE SURGERY      cataract bilaterally    history of bowel resection      KNEE SURGERY Left     LAMINECTOMY AND MICRODISCECTOMY LUMBAR SPINE  07/21/2011    L5-S1    LEFT HEART CATHETERIZATION Left 8/31/2018    Procedure: Left heart cath;  Surgeon: Rex Chris MD;  Location: [facility] CATH;  Service: Cardiology;  Laterality: Left;    LUMBAR FUSION  11/09/2011    Anterior approach--L5-S1    LUNG BIOPSY Right 07/21/2009    VATS with wedge ofr right lower lobe    LUNG BIOPSY Left 3/16/2020    Procedure: BIOPSY, LUNG;  Surgeon: [facility] Diagnostic Provider;  Location: [facility] OR;  Service: General;  Laterality: Left;    PERCUTANEOUS TRANSCATHETER AORTIC VALVE REPLACEMENT (TAVR) Left 10/30/2018    Procedure: REPLACEMENT, AORTIC VALVE, PERCUTANEOUS, TRANSCATHETER;  Surgeon: Rex Chris MD;  Location: [facility] OR;  Service: Cardiology;  Laterality: Left;    PERCUTANEOUS TRANSCATHETER AORTIC VALVE REPLACEMENT (TAVR)  10/30/2018    Procedure: REPLACEMENT, AORTIC VALVE, PERCUTANEOUS, TRANSCATHETER;  Surgeon: Tim Castillo MD;  Location: [facility] OR;  Service: Cardiovascular;;    RIGHT HEART CATHETERIZATION Right 8/31/2018    Procedure: INSERTION, CATHETER, RIGHT HEART;  Surgeon: Rex Chris MD;  Location: [facility] CATH;  Service: Cardiology;  Laterality: Right;    ROTATOR CUFF REPAIR Left     SINUS SURGERY      SPINE SURGERY      back surgery    TENDON RELEASE Bilateral     TONSILLECTOMY         Review of patient's allergies indicates:   Allergen Reactions    Sulfur Itching    Penicillins Swelling and Rash       No current facility-administered medications on file prior to encounter.     Current Outpatient Medications on File Prior to Encounter   Medication Sig    albuterol (VENTOLIN HFA) 90 mcg/actuation inhaler Inhale 1-2 puffs into the lungs every 6 (six) hours as needed for Wheezing or Shortness of Breath. Rescue    aspirin 81 MG Chew Take 1 tablet (81 mg  total) by mouth once daily.    atorvastatin (LIPITOR) 40 MG tablet Take 1 tablet (40 mg total) by mouth once daily.    clopidogreL (PLAVIX) 75 mg tablet Take 1 tablet (75 mg total) by mouth once daily.    insulin glargine-lixisenatide (SOLIQUA 100/33) 100 unit-33 mcg/mL InPn pen Inject 15 Units into the skin daily with breakfast.    memantine (NAMENDA) 10 MG Tab Take 10 mg by mouth once daily.    OLANZapine (ZYPREXA) injection Inject 2.5 mg into the muscle 3 (three) times daily as needed for Agitation.    polyethylene glycol (GLYCOLAX) 17 gram PwPk Take 17 g by mouth once daily.    tamsulosin (FLOMAX) 0.4 mg Cap Take 0.4 mg by mouth once daily.     Family History       Problem Relation (Age of Onset)    Cancer Mother    Diabetes Brother    Heart disease Father, Brother          Tobacco Use    Smoking status: Former     Packs/day: 1.50     Years: 20.00     Pack years: 30.00     Types: Cigarettes     Start date:      Quit date:      Years since quittin.0    Smokeless tobacco: Never   Substance and Sexual Activity    Alcohol use: No    Drug use: No    Sexual activity: Not on file     Review of Systems   Unable to perform ROS: Dementia   Objective:     Vital Signs (Most Recent):  Temp: 97.6 °F (36.4 °C) (23)  Pulse: 72 (23)  Resp: 18 (23)  BP: 122/60 (23)  SpO2: (!) 94 % (23)   Vital Signs (24h Range):  Temp:  [96.8 °F (36 °C)-99.3 °F (37.4 °C)] 97.6 °F (36.4 °C)  Pulse:  [64-82] 72  Resp:  [14-22] 18  SpO2:  [94 %-98 %] 94 %  BP: (107-139)/(54-73) 122/60     Weight: 64 kg (141 lb 1.5 oz)  Body mass index is 22.1 kg/m².    Physical Exam  Constitutional:       Appearance: He is well-developed.   HENT:      Head: Normocephalic and atraumatic.      Right Ear: Tympanic membrane, ear canal and external ear normal.      Left Ear: Tympanic membrane, ear canal and external ear normal.      Nose: Nose normal.   Eyes:      Conjunctiva/sclera: Conjunctivae normal.       Pupils: Pupils are equal, round, and reactive to light.   Neck:      Thyroid: No thyromegaly.      Trachea: No tracheal deviation.   Cardiovascular:      Rate and Rhythm: Normal rate and regular rhythm.      Heart sounds: Normal heart sounds. No murmur heard.    No gallop.   Pulmonary:      Effort: Pulmonary effort is normal.      Breath sounds: Normal breath sounds.   Abdominal:      General: Bowel sounds are normal. There is no distension.      Palpations: Abdomen is soft. There is no mass.      Tenderness: There is no abdominal tenderness. There is no guarding or rebound.      Hernia: There is no hernia in the left inguinal area.   Genitourinary:     Prostate: Normal.      Rectum: Normal.   Musculoskeletal:         General: Tenderness present.      Cervical back: Normal range of motion and neck supple.      Right lower leg: No edema.      Left lower leg: No edema.      Comments: Bruise over right lateral pelvis   Left hip tender with decreased range of motion   Skin:     General: Skin is warm and dry.   Neurological:      General: No focal deficit present.      Mental Status: He is alert. Mental status is at baseline. He is disoriented.      Deep Tendon Reflexes: Reflexes are normal and symmetric.   Psychiatric:         Mood and Affect: Affect is blunt.         Speech: He is noncommunicative.         Behavior: Behavior is slowed and withdrawn.         Cognition and Memory: Cognition is impaired. Memory is impaired. He exhibits impaired recent memory and impaired remote memory.         CRANIAL NERVES     CN III, IV, VI   Pupils are equal, round, and reactive to light.     Significant Labs: All pertinent labs within the past 24 hours have been reviewed.  CBC:   Recent Labs   Lab 01/17/23  1133 01/18/23  0617   WBC 4.47 4.16   HGB 11.0* 10.5*   HCT 32.5* 31.6*    228     CMP:   Recent Labs   Lab 01/17/23  1133 01/18/23  0617    142   K 4.1 4.5    111*   CO2 27 24   GLU 88 62*   BUN 30* 25*    CREATININE 1.6* 1.2   CALCIUM 8.8 8.3*   PROT 5.7* 5.1*   ALBUMIN 2.5* 2.2*   BILITOT 0.8 1.1*   ALKPHOS 71 65   AST 24 26   ALT 14 13   ANIONGAP 10 7*     Lactic Acid:   Recent Labs   Lab 01/17/23  1133   LACTATE 0.8   Procal 0.08  Troponin:   Recent Labs   Lab 01/17/23  1805 01/18/23  0028 01/18/23  0617   TROPONINI 0.070* 0.070* 0.073*   BNP  Recent Labs   Lab 01/17/23  1133   *       Urine Studies:   Recent Labs   Lab 01/17/23  1147   COLORU Yellow   APPEARANCEUA Clear   PHUR 5.0   SPECGRAV 1.020   PROTEINUA 1+*   GLUCUA Negative   KETONESU Negative   BILIRUBINUA Negative   OCCULTUA Negative   NITRITE Negative   UROBILINOGEN Negative   LEUKOCYTESUR Negative   RBCUA 1   WBCUA 5   BACTERIA Rare   HYALINECASTS 0     Covid still positive but originally dx 1/1/23    Blood cultures in process x 2   Flu and strept negative     Significant Imaging:     CXR   1. No evidence of acute cardiopulmonary findings.  2. 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  3. Mild perihilar and left upper lobe pulmonary scar.  4. No appreciable change upon comparison.    CT head   1. No evidence of acute intracranial process.  2. Chronic cerebral atrophy with superimposed chronic deep white matter ischemia.  3. Old right cerebellar infarct.    MRI   No definite acute infarct and no significant change relative to December 15, 2022

## 2023-01-18 NOTE — ASSESSMENT & PLAN NOTE
Patient with acute kidney injury likely due to IVVD/dehydration ANTHONY is currently improving. Labs reviewed- Renal function/electrolytes with Estimated Creatinine Clearance: 39.3 mL/min (based on SCr of 1.2 mg/dL). according to latest data. Monitor urine output and serial BMP and adjust therapy as needed. Avoid nephrotoxins and renally dose meds for GFR listed above.   Pt was given 1.5L NS bolus in ER, then stayed on NS at 125ml/hr overnight. Creat improved 1.6>1.2 this am.

## 2023-01-18 NOTE — PLAN OF CARE
Pt A&Ox3, disoriented to time. Pt VSS, 2L NC and not on oxygen at home [place], Incontinent, Q2turn, skin intact but bruising from what he says was a fall at some point but can't tell me when and bruising to left flank and right lower abdomen, pt say he has pain to left hip also, Pt falls asleep when talking with him. Completed a bedside swallow and no troubles at this time swallowing ice, water, jello, soft food, then chicken. No coughing or choking. Lungs diminished. Tele NS with PVCs.    Problem: Adult Inpatient Plan of Care  Goal: Plan of Care Review  Outcome: Ongoing, Progressing  Goal: Patient-Specific Goal (Individualized)  Outcome: Ongoing, Progressing  Goal: Absence of Hospital-Acquired Illness or Injury  Outcome: Ongoing, Progressing  Goal: Optimal Comfort and Wellbeing  Outcome: Ongoing, Progressing  Goal: Readiness for Transition of Care  Outcome: Ongoing, Progressing     Problem: Diabetes Comorbidity  Goal: Blood Glucose Level Within Targeted Range  Outcome: Ongoing, Progressing     Problem: Impaired Wound Healing  Goal: Optimal Wound Healing  Outcome: Ongoing, Progressing     Problem: Fall Injury Risk  Goal: Absence of Fall and Fall-Related Injury  Outcome: Ongoing, Progressing     Problem: Skin Injury Risk Increased  Goal: Skin Health and Integrity  Outcome: Ongoing, Progressing

## 2023-01-18 NOTE — HPI
86yo male patient with history of Alzheimer's, aortic stenosis s/p TAVR, BPH, CAD/CHF/CKD3, /DM/HTN/HLD but most recent hx of stroke in 12/2022, was here at [facility] for rehab and contracted covid in Jan. He was sent to [place] for continued therapy but yesterday became weak, slurred speech noted. He was no longer doing his ADLS. Sent to [facility] [place] for eval. When he left here he was ambulating >> last PT note stand by assist ambulated 85ft with RW  and O2 supplement. He has more weakness to left upper ext (residual from CVA but seems a little worse than when d/justyna.) bruising mote to right lower abd and c/o left hip pain. Re ports he had a fall but unable to say when. Not oriented. He was dehydrated on admission. Creat 1.6> was given 1500ml bolus in ER then maintained on NS at 125ml/hr. Creat improved today 1.2. covid remains positive but original positive test 1/1/23. Ct head/MRI no new strokes. Troponin slightly elebated but has been stable over night 0.07. No chest pain. VSS. Afebrile. Placed in observation for speech/OT/PT evals. Plavix statin and namenda resumed.

## 2023-01-19 NOTE — ASSESSMENT & PLAN NOTE
Patient with acute kidney injury likely due to IVVD/dehydration ANTHONY is currently improving. Labs reviewed- Renal function/electrolytes with Estimated Creatinine Clearance: 39.3 mL/min (based on SCr of 1.2 mg/dL). according to latest data. Monitor urine output and serial BMP and adjust therapy as needed. Avoid nephrotoxins and renally dose meds for GFR listed above.   Pt was given 1.5L NS bolus in ER, then stayed on NS at 125ml/hr overnight. Creat improved 1.6>1.2 this am. > stable

## 2023-01-19 NOTE — HOSPITAL COURSE
1/19 doing better today./ Sitting up- ate breakfast. More alert. VSS/afebrile. Renal function stable without IVF stopped yesterday. Ot eval yesterday modified independence with grooming- total assist to dress. OPT to eval mobility today- was at[facility] [place] for SKILL therapy and plan to return there.

## 2023-01-19 NOTE — PT/OT/SLP PROGRESS
Occupational Therapy   Treatment    Name: Vega Kohler  MRN: [id number]  Admitting Diagnosis:  ANTHONY (acute kidney injury)       Recommendations:     Discharge Recommendations: nursing facility, skilled  Discharge Equipment Recommendations:  other (see comments) (Will be provided by SNF)  Barriers to discharge:  None    Assessment:     Vega Kohler is a 87 y.o. male with a medical diagnosis of ANTHONY (acute kidney injury).  Performance deficits affecting function are weakness, impaired endurance, impaired self care skills, impaired functional mobility, gait instability, impaired balance, decreased upper extremity function, decreased lower extremity function, pain, impaired skin, impaired cardiopulmonary response to activity.     Rehab Prognosis:  Fair; patient would benefit from acute skilled OT services to address these deficits and reach maximum level of function.       Plan:     Patient to be seen 5 x/week to address the above listed problems via self-care/home management, therapeutic activities, therapeutic exercises  Plan of Care Expires: 02/01/23  Plan of Care Reviewed with: patient    Subjective     Pain/Comfort:  Pain Rating 1: 0/10    Objective:     Communicated with: RN prior to session.  Patient found HOB elevated with bed alarm, peripheral IV, SCD, telemetry upon OT entry to room.    General Precautions: Standard, airborne, fall, contact    Orthopedic Precautions:N/A  Braces: N/A  Respiratory Status: Nasal cannula, flow 2 L/min     Occupational Performance:     Activities of Daily Living:  Feeding:  independence post set up   Grooming: independence post set up      [facility] 6 Click ADL: 14    Treatment & Education:  Pt was sitting in bed upon arrival with breakfast tray on bedside table in front of him. Pt was able to feed self as well as open containers independently which is an improvement since last hospital stay. Pt was also using L hand to steady items and bring drink to his mouth without cues. Pt washed  face and cleaned mouth independently post set up using bilateral hands. Pt declined getting up stating that he was too full after eating breakfast.     Patient left HOB elevated with all lines intact, call button in reach, and RN notified    GOALS:   Multidisciplinary Problems       Occupational Therapy Goals          Problem: Occupational Therapy    Goal Priority Disciplines Outcome Interventions   Occupational Therapy Goal     OT, PT/OT Ongoing, Progressing    Description: Pt to perform UB dressing with Min A seated EOB.  Pt to perform LB dressing with Min A seated EOB.  Pt to perform self toileting with Min A using standard commode.  Pt to perform level functional transfers required for ADL's with SBA, RW level.  Pt to participate in upper extremity strengthening routine as educated by OT for increased upper extremity strength and functional use during daily activity.                         Time Tracking:     OT Date of Treatment: 01/19/23  OT Start Time: 0837  OT Stop Time: 0855  OT Total Time (min): 18 min    Billable Minutes:Self Care/Home Management 18    OT/PEDRO: PEDRO     PEDRO Visit Number: 1 1/19/2023

## 2023-01-19 NOTE — PLAN OF CARE
Problem: Adult Inpatient Plan of Care  Goal: Plan of Care Review  Outcome: Ongoing, Progressing     Problem: Adult Inpatient Plan of Care  Goal: Patient-Specific Goal (Individualized)  Outcome: Ongoing, Progressing     Problem: Adult Inpatient Plan of Care  Goal: Optimal Comfort and Wellbeing  Outcome: Ongoing, Progressing     Problem: Diabetes Comorbidity  Goal: Blood Glucose Level Within Targeted Range  Outcome: Ongoing, Progressing     Problem: Fall Injury Risk  Goal: Absence of Fall and Fall-Related Injury  Outcome: Ongoing, Progressing

## 2023-01-19 NOTE — PLAN OF CARE
Problem: Occupational Therapy  Goal: Occupational Therapy Goal  Description: Pt to perform UB dressing with Min A seated EOB.  Pt to perform LB dressing with Min A seated EOB.  Pt to perform self toileting with Min A using standard commode.  Pt to perform level functional transfers required for ADL's with SBA, RW level.  Pt to participate in upper extremity strengthening routine as educated by OT for increased upper extremity strength and functional use during daily activity.    Outcome: Ongoing, Progressing   Cont with OT POC

## 2023-01-19 NOTE — PLAN OF CARE
Problem: Occupational Therapy  Goal: Occupational Therapy Goal  Description: Pt to perform UB dressing with Min A seated EOB.  Pt to perform LB dressing with Min A seated EOB.  Pt to perform self toileting with Min A using standard commode.  Pt to perform level functional transfers required for ADL's with SBA, RW level.  Pt to participate in upper extremity strengthening routine as educated by OT for increased upper extremity strength and functional use during daily activity.    1/19/2023 1707 by PEDRO Jackson  Outcome: Ongoing, Progressing  1/19/2023 0940 by PEDRO Jackson  Outcome: Ongoing, Progressing  Cont with OT POC

## 2023-01-19 NOTE — SUBJECTIVE & OBJECTIVE
Past Medical History:   Diagnosis Date    Abnormal barium swallow     Alzheimer's dementia, late onset     Anal stenosis     Anticoagulant long-term use     Aortic stenosis     Arthritis     Aspiration pneumonitis     Benign prostatic hyperplasia     Carotid artery disease     CHF (congestive heart failure)     Chronic diastolic heart failure     Chronic kidney disease (CKD), stage III (moderate)     CKD (chronic kidney disease)     Colon polyp     COPD (chronic obstructive pulmonary disease)     Coronary artery disease     Diabetes mellitus, type 2     Diverticulosis     Dysphagia     Hearing aid worn 01/23/2020    Received bilateral hearing aides today    Heart disease     History of CEA (carotid endarterectomy)     [place] (hard of hearing)     Hyperlipidemia     Hypertension     Hypertensive heart disease     Hypothyroidism     Joint disease     Memory loss     PAD (peripheral artery disease)     Presence of drug coated stent in right coronary artery     Pulmonary hypertension     S/P TAVR (transcatheter aortic valve replacement)     Small bowel obstruction     Wears dentures     UPPER AND LOWER    Wears glasses        Past Surgical History:   Procedure Laterality Date    anal fissure repair      ANKLE FUSION Left 08/15/2016    X 2    APPENDECTOMY      BACK SURGERY      X 4    CAROTID ENDARTERECTOMY Right     CAROTID ENDARTERECTOMY Left     CAROTID ENDARTERECTOMY Left 12/3/2021    Procedure: ENDARTERECTOMY-CAROTID;  Surgeon: Tim Castillo MD;  [facility]: [facility];  Service: Cardiology;  Laterality: Left;  pt unsure if he stopped plavix, Dr. Castillo ok to proceed    CARPAL TUNNEL RELEASE Right     CHOLECYSTECTOMY      COLONOSCOPY N/A 7/26/2018    Procedure: HIGH RISK SCREENING COLONOSCOPY;  Surgeon: Connor Murrell MD;  Location: [facility] ENDO;  Service: Endoscopy;  Laterality: N/A;    CORONARY ANGIOPLASTY WITH STENT PLACEMENT      ELBOW SURGERY Bilateral     EXCISIONAL HEMORRHOIDECTOMY      x3     EXPLORATORY LAPAROTOMY W/ BOWEL RESECTION  11/17/2011    EYE SURGERY      cataract bilaterally    history of bowel resection      KNEE SURGERY Left     LAMINECTOMY AND MICRODISCECTOMY LUMBAR SPINE  07/21/2011    L5-S1    LEFT HEART CATHETERIZATION Left 8/31/2018    Procedure: Left heart cath;  Surgeon: Rex Chris MD;  Location: [facility] CATH;  Service: Cardiology;  Laterality: Left;    LUMBAR FUSION  11/09/2011    Anterior approach--L5-S1    LUNG BIOPSY Right 07/21/2009    VATS with wedge ofr right lower lobe    LUNG BIOPSY Left 3/16/2020    Procedure: BIOPSY, LUNG;  Surgeon: [facility] Diagnostic Provider;  Location: [facility] OR;  Service: General;  Laterality: Left;    PERCUTANEOUS TRANSCATHETER AORTIC VALVE REPLACEMENT (TAVR) Left 10/30/2018    Procedure: REPLACEMENT, AORTIC VALVE, PERCUTANEOUS, TRANSCATHETER;  Surgeon: Rex Chris MD;  Location: [facility] OR;  Service: Cardiology;  Laterality: Left;    PERCUTANEOUS TRANSCATHETER AORTIC VALVE REPLACEMENT (TAVR)  10/30/2018    Procedure: REPLACEMENT, AORTIC VALVE, PERCUTANEOUS, TRANSCATHETER;  Surgeon: Tim Castillo MD;  Location: [facility] OR;  Service: Cardiovascular;;    RIGHT HEART CATHETERIZATION Right 8/31/2018    Procedure: INSERTION, CATHETER, RIGHT HEART;  Surgeon: Rex Chris MD;  Location: [facility] CATH;  Service: Cardiology;  Laterality: Right;    ROTATOR CUFF REPAIR Left     SINUS SURGERY      SPINE SURGERY      back surgery    TENDON RELEASE Bilateral     TONSILLECTOMY         Review of patient's allergies indicates:   Allergen Reactions    Sulfur Itching    Penicillins Swelling and Rash       No current facility-administered medications on file prior to encounter.     Current Outpatient Medications on File Prior to Encounter   Medication Sig    albuterol (VENTOLIN HFA) 90 mcg/actuation inhaler Inhale 1-2 puffs into the lungs every 6 (six) hours as needed for Wheezing or Shortness of Breath. Rescue    aspirin 81 MG Chew Take 1 tablet (81 mg  total) by mouth once daily.    atorvastatin (LIPITOR) 40 MG tablet Take 1 tablet (40 mg total) by mouth once daily.    clopidogreL (PLAVIX) 75 mg tablet Take 1 tablet (75 mg total) by mouth once daily.    insulin glargine-lixisenatide (SOLIQUA 100/33) 100 unit-33 mcg/mL InPn pen Inject 15 Units into the skin daily with breakfast.    memantine (NAMENDA) 10 MG Tab Take 10 mg by mouth once daily.    OLANZapine (ZYPREXA) injection Inject 2.5 mg into the muscle 3 (three) times daily as needed for Agitation.    polyethylene glycol (GLYCOLAX) 17 gram PwPk Take 17 g by mouth once daily.    tamsulosin (FLOMAX) 0.4 mg Cap Take 0.4 mg by mouth once daily.     Family History       Problem Relation (Age of Onset)    Cancer Mother    Diabetes Brother    Heart disease Father, Brother          Tobacco Use    Smoking status: Former     Packs/day: 1.50     Years: 20.00     Pack years: 30.00     Types: Cigarettes     Start date:      Quit date:      Years since quittin.0    Smokeless tobacco: Never   Substance and Sexual Activity    Alcohol use: No    Drug use: No    Sexual activity: Not on file     Review of Systems   Unable to perform ROS: Dementia   Objective:     Vital Signs (Most Recent):  Temp: 98.6 °F (37 °C) (23 0836)  Pulse: 70 (23 1000)  Resp: 20 (23 0836)  BP: (!) 143/65 (23 0836)  SpO2: 97 % (23 0836)   Vital Signs (24h Range):  Temp:  [96.7 °F (35.9 °C)-98.6 °F (37 °C)] 98.6 °F (37 °C)  Pulse:  [55-78] 70  Resp:  [19-22] 20  SpO2:  [90 %-97 %] 97 %  BP: (119-143)/(58-70) 143/65     Weight: 64 kg (141 lb 1.5 oz)  Body mass index is 22.1 kg/m².    Physical Exam  Vitals and nursing note reviewed.   Constitutional:       General: He is not in acute distress.     Appearance: He is well-developed.   HENT:      Head: Normocephalic and atraumatic.      Nose: Nose normal.   Eyes:      Conjunctiva/sclera: Conjunctivae normal.      Pupils: Pupils are equal, round, and reactive to light.    Neck:      Thyroid: No thyromegaly.      Trachea: No tracheal deviation.   Cardiovascular:      Rate and Rhythm: Normal rate and regular rhythm.      Heart sounds: Normal heart sounds. No murmur heard.  Pulmonary:      Effort: Pulmonary effort is normal.      Breath sounds: Normal breath sounds.   Abdominal:      General: Bowel sounds are normal. There is no distension.      Palpations: Abdomen is soft.      Tenderness: There is no abdominal tenderness.      Hernia: There is no hernia in the left inguinal area.   Musculoskeletal:      Cervical back: Neck supple.      Right lower leg: No edema.      Left lower leg: No edema.   Skin:     General: Skin is warm and dry.   Neurological:      General: No focal deficit present.      Mental Status: He is alert. Mental status is at baseline.      Deep Tendon Reflexes: Reflexes are normal and symmetric.   Psychiatric:         Mood and Affect: Affect is blunt.         Speech: He is noncommunicative.         Behavior: Behavior is slowed and withdrawn.         Cognition and Memory: Cognition is impaired. Memory is impaired. He exhibits impaired recent memory and impaired remote memory.         CRANIAL NERVES     CN III, IV, VI   Pupils are equal, round, and reactive to light.     Significant Labs: All pertinent labs within the past 24 hours have been reviewed.  CBC:   Recent Labs   Lab 01/17/23  1133 01/18/23  0617   WBC 4.47 4.16   HGB 11.0* 10.5*   HCT 32.5* 31.6*    228       CMP:   Recent Labs   Lab 01/17/23  1133 01/18/23  0617 01/19/23  0548    142 141   K 4.1 4.5 4.6    111* 109   CO2 27 24 26   GLU 88 62* 132*   BUN 30* 25* 18   CREATININE 1.6* 1.2 1.2   CALCIUM 8.8 8.3* 8.1*   PROT 5.7* 5.1*  --    ALBUMIN 2.5* 2.2*  --    BILITOT 0.8 1.1*  --    ALKPHOS 71 65  --    AST 24 26  --    ALT 14 13  --    ANIONGAP 10 7* 6*       Lactic Acid:   Recent Labs   Lab 01/17/23  1133   LACTATE 0.8     Procal 0.08  Troponin:   Recent Labs   Lab 01/18/23  0028  01/18/23  0617 01/18/23  1205   TROPONINI 0.070* 0.073* 0.062*     BNP  Recent Labs   Lab 01/17/23  1133   *         Urine Studies:   Recent Labs   Lab 01/17/23  1147   COLORU Yellow   APPEARANCEUA Clear   PHUR 5.0   SPECGRAV 1.020   PROTEINUA 1+*   GLUCUA Negative   KETONESU Negative   BILIRUBINUA Negative   OCCULTUA Negative   NITRITE Negative   UROBILINOGEN Negative   LEUKOCYTESUR Negative   RBCUA 1   WBCUA 5   BACTERIA Rare   HYALINECASTS 0       Covid still positive but originally dx 1/1/23    Blood cultures in process x 2   Flu and strept negative     Significant Imaging:     X ray pelvis Postoperative changes of the lumbosacral spine.  Bones are osteopenic.  There are mild degenerative changes of the bilateral sacroiliac joints, hip joints and pubic symphysis.  No fracture or dislocation is seen.    CXR   1. No evidence of acute cardiopulmonary findings.  2. 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  3. Mild perihilar and left upper lobe pulmonary scar.  4. No appreciable change upon comparison.    CT head   1. No evidence of acute intracranial process.  2. Chronic cerebral atrophy with superimposed chronic deep white matter ischemia.  3. Old right cerebellar infarct.    MRI   No definite acute infarct and no significant change relative to December 15, 2022    1/17 EKG Sinus rhythm with Premature atrial complexes with Aberrant conduction   Right bundle branch block   Low voltage QRS   Abnormal ECG   When compared with ECG of 14-DEC-2022 10:05,   Aberrant conduction is now Present

## 2023-01-19 NOTE — PT/OT/SLP EVAL
Speech Language Pathology Evaluation  Bedside Swallow    Patient Name:  Vega Kohler   MRN:  [id number]  Admitting Diagnosis: ANTHONY (acute kidney injury)    Assessment:   Vega Kohler is an 86 y/o M who presents w/ likely acute-on-chronic oropharyngeal dysphagia in this pt w/ precipitating (i.e., generalized weakness in the setting of acute illness) & predisposing (i.e., CHF, COPD, Dementia, severe GERD, Hx of stroke, T2DM) dysphagia risk factors. Diagnostic swallow tx is indicated. Given pt's risk factors for aspiration PNA (i.e. dependence for oral hygiene, multiple medical comorbidities, COPD, CHF, number of medications, reduced physical mobility, dysphagia, and age,), pt appears safe for modified oral diet w/ aspiration precautions + control of risk factors for aspiration PNA at this current time.    Recommendations:                 General Recommendations: SLP to f/u for dysphagia management.  Diet recommendations:  Soft & Bite Sized Diet - IDDSI Level 6 / Thin liquids - IDDSI Level 0. PO meds w/ thin liquids.  Aspiration Precautions: HOB upright w/ PO intake, Monitor for s/s of aspiration, Remain upright 30 minutes post meal, Small bites/sips, and Standard aspiration precautions. Control risk factors for aspiration PNA via (a) oral hygiene q4h, (b) increase physical mobility as medically feasible, (c) HOB upright as tolerated  General Precautions: Standard, aspiration, fall    History:     Past Medical History:   Diagnosis Date    Abnormal barium swallow     Alzheimer's dementia, late onset     Anal stenosis     Anticoagulant long-term use     Aortic stenosis     Arthritis     Aspiration pneumonitis     Benign prostatic hyperplasia     Carotid artery disease     CHF (congestive heart failure)     Chronic diastolic heart failure     Chronic kidney disease (CKD), stage III (moderate)     CKD (chronic kidney disease)     Colon polyp     COPD (chronic obstructive pulmonary disease)     Coronary artery disease      Diabetes mellitus, type 2     Diverticulosis     Dysphagia     Hearing aid worn 01/23/2020    Received bilateral hearing aides today    Heart disease     History of CEA (carotid endarterectomy)     [place] (hard of hearing)     Hyperlipidemia     Hypertension     Hypertensive heart disease     Hypothyroidism     Joint disease     Memory loss     PAD (peripheral artery disease)     Presence of drug coated stent in right coronary artery     Pulmonary hypertension     S/P TAVR (transcatheter aortic valve replacement)     Small bowel obstruction     Wears dentures     UPPER AND LOWER    Wears glasses        Past Surgical History:   Procedure Laterality Date    anal fissure repair      ANKLE FUSION Left 08/15/2016    X 2    APPENDECTOMY      BACK SURGERY      X 4    CAROTID ENDARTERECTOMY Right     CAROTID ENDARTERECTOMY Left     CAROTID ENDARTERECTOMY Left 12/3/2021    Procedure: ENDARTERECTOMY-CAROTID;  Surgeon: Tim Castillo MD;  Location: [facility] OR;  Service: Cardiology;  Laterality: Left;  pt unsure if he stopped plavix, Dr. Castillo ok to proceed    CARPAL TUNNEL RELEASE Right     CHOLECYSTECTOMY      COLONOSCOPY N/A 7/26/2018    Procedure: HIGH RISK SCREENING COLONOSCOPY;  Surgeon: Connor Murrell MD;  Location: [facility] ENDO;  Service: Endoscopy;  Laterality: N/A;    CORONARY ANGIOPLASTY WITH STENT PLACEMENT      ELBOW SURGERY Bilateral     EXCISIONAL HEMORRHOIDECTOMY      x3    EXPLORATORY LAPAROTOMY W/ BOWEL RESECTION  11/17/2011    EYE SURGERY      cataract bilaterally    history of bowel resection      KNEE SURGERY Left     LAMINECTOMY AND MICRODISCECTOMY LUMBAR SPINE  07/21/2011    L5-S1    LEFT HEART CATHETERIZATION Left 8/31/2018    Procedure: Left heart cath;  Surgeon: Rex Chris MD;  Location: [facility] CATH;  Service: Cardiology;  Laterality: Left;    LUMBAR FUSION  11/09/2011    Anterior approach--L5-S1    LUNG BIOPSY Right 07/21/2009    VATS with wedge ofr right lower lobe    LUNG BIOPSY Left  3/16/2020    Procedure: BIOPSY, LUNG;  Surgeon: [facility] [facility] Provider;  Location: [facility] OR;  Service: General;  Laterality: Left;    PERCUTANEOUS TRANSCATHETER AORTIC VALVE REPLACEMENT (TAVR) Left 10/30/2018    Procedure: REPLACEMENT, AORTIC VALVE, PERCUTANEOUS, TRANSCATHETER;  Surgeon: Rex Chris MD;  Location: [facility] OR;  Service: Cardiology;  Laterality: Left;    PERCUTANEOUS TRANSCATHETER AORTIC VALVE REPLACEMENT (TAVR)  10/30/2018    Procedure: REPLACEMENT, AORTIC VALVE, PERCUTANEOUS, TRANSCATHETER;  Surgeon: Tim Castillo MD;  Location: [facility] OR;  Service: Cardiovascular;;    RIGHT HEART CATHETERIZATION Right 8/31/2018    Procedure: INSERTION, CATHETER, RIGHT HEART;  Surgeon: Rex Chris MD;  Location: [facility] CATH;  Service: Cardiology;  Laterality: Right;    ROTATOR CUFF REPAIR Left     SINUS SURGERY      SPINE SURGERY      back surgery    TENDON RELEASE Bilateral     TONSILLECTOMY         Prior Intubation HX:  n/a    Modified Barium Swallow: Last MBSS - 10/2017 See SLP note for details.     Chest X-Rays:  01/17/23 - FINDINGS:  Diminished inspiratory effort magnifying the cardiomediastinal silhouette and resulting in crowding of vascular lung markings.  Minimal perihilar left upper lobe pulmonary scarring is stable appearing.     Lungs without evidence of pneumonic infiltrate, pleural effusion, nor atelectasis.  Soft tissue anchor screws imbedded within left humeral head.  Bilateral AC joint osteoarthritis.  Postop changes of previous bilateral carotid endarterectomy and operative changes of lower cervical spine noted with anterior fusion plate spanning C4/C5.  Upper abdominal cavity unremarkable.  There is evidence of deployment of a trans mitral valve prosthesis.     Impression:     1. No evidence of acute cardiopulmonary findings.  2. 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  3. Mild perihilar and left upper lobe pulmonary scar.  4. No appreciable change upon  comparison.    Prior diet: Regular solids / Thin liquids      Subjective     Pt received in bed, appeared to be in no pain/distress and breathing comfortably on NC, and demonstrated good motivation & participation in exam. Pt following all commands and communicating effectively during communication exchanges during evaluation.  Patient goals: none stated     Pain/Comfort:  Pain Rating 1: 0/10    Respiratory Status: Nasal cannula, flow 2 L/min    Objective:     Oral Musculature Evaluation  Oral Musculature: WFL  Secretion Management: coughing on secretions  Mucosal Quality: adequate  Mandibular Strength and Mobility: WFL  Oral Labial Strength and Mobility: functional pursing, functional retraction  Lingual Strength and Mobility: functional protrusion, functional anterior elevation  Velar Elevation: WFL  Buccal Strength and Mobility: WFL  Volitional Cough: perceptually WFL  Voice Prior to PO Intake: perceptually WFL  Upper dentures in place only. (Pt verbalized not knowing where lower dentures are and that he is unable to chew regular solids without them.)    Bedside Swallow Eval:   Consistencies Assessed:  Thin liquids -6 oz via sequential open cup and sequential straw - all self regulated - self fed  Solids via courtney crackers x2 - self fed      Oral Phase:   Lingual residue - cleared w/ dry swallow  Spitting out of food/liquid 2/2 larger regular solid presentation too hard to chew    Pharyngeal Phase:   multiple spontaneous swallows  Inconsistent delayed throat clearing/cough noted throughout trials, not specific to consistency, w/ no change in vocal quality. Of note, pt w/ coughing noted outside of PO intake, in this pt w/ recent COVID-19 diagnosis. Pt reporting a lot of coughing at night, in this pt w/ severe GERD.      Goals:   Multidisciplinary Problems       SLP Goals          Problem: SLP    Goal Priority Disciplines Outcome   SLP Goal     SLP    Description: GOALS:    (1) Pt will tolerate least-restrictive  oral diet w/o dysphagia-related aspiration PNA                       Plan:     Patient to be seen:  5 x/week   Plan of Care expires:  02/02/23  Plan of Care reviewed with:  patient   SLP Follow-Up:  Yes       Discharge recommendations:  nursing facility, skilled, nursing facility, basic   Barriers to Discharge:  Level of Skilled Assistance Needed      Time Tracking:     SLP Treatment Date:   01/19/23  Speech Start Time:  1638  Speech Stop Time:  1702     Speech Total Time (min):  24 min    Billable Minutes: Eval Swallow and Oral Function 24 minutes    01/18/2023

## 2023-01-19 NOTE — PT/OT/SLP EVAL
Physical Therapy Evaluation    Patient Name:  Vega Kohler   MRN:  [id number]    Recommendations:     Discharge Recommendations: nursing facility, skilled, nursing facility, basic   Discharge Equipment Recommendations: other (see comments) (SNF will provide)   Barriers to discharge: None and [facility] resident    Assessment:     Vega Kohler is a 87 y.o. male admitted with a medical diagnosis of ANTHONY (acute kidney injury).  He presents with the following impairments/functional limitations: weakness, impaired endurance, impaired self care skills, impaired functional mobility, gait instability, impaired balance, decreased safety awareness, impaired cognition, impaired cardiopulmonary response to activity .    Rehab Prognosis: Fair; patient would benefit from acute skilled PT services to address these deficits and reach maximum level of function.    Recent Surgery: * No surgery found *      Plan:     During this hospitalization, patient to be seen   to address the identified rehab impairments via gait training, therapeutic activities, therapeutic exercises and progress toward the following goals:    Plan of Care Expires:  01/25/23    Subjective     Chief Complaint: weakness  Patient/Family Comments/goals: gain strength  Pain/Comfort:  Pain Rating 1: 0/10    Patients cultural, spiritual, Christianity conflicts given the current situation:      Living Environment:  [facility] resident  Prior to admission, patients level of function was out of bed and gait functions using RW with assistance.  Equipment used at home: oxygen, walker, rolling, wheelchair, hospital bed.  DME owned (not currently used): none.  Upon discharge, patient will have assistance from NH staffs.    Objective:     Communicated with patient prior to session.  Patient found HOB elevated with bed alarm, oxygen, peripheral IV, telemetry, SCD  upon PT entry to room.    General Precautions: Standard, fall  Orthopedic Precautions:N/A   Braces:  N/A  Respiratory Status: Nasal cannula, flow 2 L/min    Exams:  Cognitive Exam:  Patient is oriented to Person, Place, and Time  Fine Motor Coordination:    -       Intact  Gross Motor Coordination:  WFL  Postural Exam:  Patient presented with the following abnormalities:    -       Rounded shoulders  -       Forward head  RLE ROM: WFL  RLE Strength: 3/5  LLE ROM: WFL  LLE Strength: 3/5    Functional Mobility:  Bed Mobility:     Rolling Left:  contact guard assistance  Rolling Right: contact guard assistance  Scooting: contact guard assistance  Supine to Sit: contact guard assistance  Sit to Supine: minimum assistance  Transfers:  Sit to Stand:  contact guard assistance with rolling walker  Gait: contact guard assistance x ~40 feet with RW. Dec foot/floor clearance. Fatigues easily. O2 SAT based line from 94% to 78%. After 2 mins rest back to 90%.      AM-PAC 6 CLICK MOBILITY  Total Score:16       Treatment & Education:  PT eval. Imitated out of bed activity with body sequence and gait trng with RW x ~40 feet with contact guard assistance.    Patient left HOB elevated with all lines intact, call button in reach, bed alarm on, and nursing notified.    GOALS:   Multidisciplinary Problems       Physical Therapy Goals          Problem: Physical Therapy    Goal Priority Disciplines Outcome Goal Variances Interventions   Physical Therapy Goal     PT, PT/OT      Description:   Patient will increase functional independence with mobility by performin. Supine <>sit with Supervision or Set-up Assistance  2. Sit <>stand with Supervision or Set-up Assistance with RW  3. Bed to chair transfer with Standby Assistancewith or without rolling walker using Step Transfer TECHNIQUE  4. Gait  x ~75  feet with Standby Assistance with or without rolling walker  5. Lower extremity exercise program x10 reps per handout, with assistance as needed                          History:     Past Medical History:   Diagnosis Date    Abnormal  barium swallow     Alzheimer's dementia, late onset     Anal stenosis     Anticoagulant long-term use     Aortic stenosis     Arthritis     Aspiration pneumonitis     Benign prostatic hyperplasia     Carotid artery disease     CHF (congestive heart failure)     Chronic diastolic heart failure     Chronic kidney disease (CKD), stage III (moderate)     CKD (chronic kidney disease)     Colon polyp     COPD (chronic obstructive pulmonary disease)     Coronary artery disease     Diabetes mellitus, type 2     Diverticulosis     Dysphagia     Hearing aid worn 01/23/2020    Received bilateral hearing aides today    Heart disease     History of CEA (carotid endarterectomy)     [place] (hard of hearing)     Hyperlipidemia     Hypertension     Hypertensive heart disease     Hypothyroidism     Joint disease     Memory loss     PAD (peripheral artery disease)     Presence of drug coated stent in right coronary artery     Pulmonary hypertension     S/P TAVR (transcatheter aortic valve replacement)     Small bowel obstruction     Wears dentures     UPPER AND LOWER    Wears glasses        Past Surgical History:   Procedure Laterality Date    anal fissure repair      ANKLE FUSION Left 08/15/2016    X 2    APPENDECTOMY      BACK SURGERY      X 4    CAROTID ENDARTERECTOMY Right     CAROTID ENDARTERECTOMY Left     CAROTID ENDARTERECTOMY Left 12/3/2021    Procedure: ENDARTERECTOMY-CAROTID;  Surgeon: Tim Castillo MD;  [facility];  Service: Cardiology;  Laterality: Left;  pt unsure if he stopped plavix, Dr. Castillo ok to proceed    CARPAL TUNNEL RELEASE Right     CHOLECYSTECTOMY      COLONOSCOPY N/A 7/26/2018    Procedure: HIGH RISK SCREENING COLONOSCOPY;  Surgeon: Connor Murrell MD;  [facility];  Service: Endoscopy;  Laterality: N/A;    CORONARY ANGIOPLASTY WITH STENT PLACEMENT      ELBOW SURGERY Bilateral     EXCISIONAL HEMORRHOIDECTOMY      x3    EXPLORATORY LAPAROTOMY W/ BOWEL RESECTION  11/17/2011    EYE  SURGERY      cataract bilaterally    history of bowel resection      KNEE SURGERY Left     LAMINECTOMY AND MICRODISCECTOMY LUMBAR SPINE  07/21/2011    L5-S1    LEFT HEART CATHETERIZATION Left 8/31/2018    Procedure: Left heart cath;  Surgeon: Rex Chris MD;  Location: [facility] CATH;  Service: Cardiology;  Laterality: Left;    LUMBAR FUSION  11/09/2011    Anterior approach--L5-S1    LUNG BIOPSY Right 07/21/2009    VATS with wedge ofr right lower lobe    LUNG BIOPSY Left 3/16/2020    Procedure: BIOPSY, LUNG;  Surgeon: [facility] Diagnostic Provider;  Location: [facility] OR;  Service: General;  Laterality: Left;    PERCUTANEOUS TRANSCATHETER AORTIC VALVE REPLACEMENT (TAVR) Left 10/30/2018    Procedure: REPLACEMENT, AORTIC VALVE, PERCUTANEOUS, TRANSCATHETER;  Surgeon: Rex Chris MD;  Location: [facility] OR;  Service: Cardiology;  Laterality: Left;    PERCUTANEOUS TRANSCATHETER AORTIC VALVE REPLACEMENT (TAVR)  10/30/2018    Procedure: REPLACEMENT, AORTIC VALVE, PERCUTANEOUS, TRANSCATHETER;  Surgeon: Tim Castillo MD;  Location: [facility] OR;  Service: Cardiovascular;;    RIGHT HEART CATHETERIZATION Right 8/31/2018    Procedure: INSERTION, CATHETER, RIGHT HEART;  Surgeon: Rex Chris MD;  Location: [facility] CATH;  Service: Cardiology;  Laterality: Right;    ROTATOR CUFF REPAIR Left     SINUS SURGERY      SPINE SURGERY      back surgery    TENDON RELEASE Bilateral     TONSILLECTOMY         Time Tracking:     PT Received On: 01/19/23  PT Start Time: 0855     PT Stop Time: 0920  PT Total Time (min): 25 min     Billable Minutes: Evaluation 10 and Therapeutic Activity 15      01/19/2023

## 2023-01-19 NOTE — ASSESSMENT & PLAN NOTE
Has home O2- cont nebs as needed  CXR >1. No evidence of acute cardiopulmonary findings.  2. 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  3. Mild perihilar and left upper lobe pulmonary scar.  4. No appreciable change upon comparison.  Add mucinex BID

## 2023-01-19 NOTE — ASSESSMENT & PLAN NOTE
Seems to have declined since discharge. Will have speech/OT/PT re eval today   Plan to go back to [place] for SKILL today

## 2023-01-19 NOTE — DISCHARGE SUMMARY
[facility] ([facility])  [facility] Medicine  Discharge Summary      Patient Name: Vega Kohler  MRN: [id number]  TESSA: [id number]  Patient Class: OP- Observation  Admission Date: 1/17/2023  Hospital Length of Stay: 0 days  Discharge Date and Time:  01/19/2023 10:34 AM  Attending Physician: Destin Jimenez MD   Discharging Provider: Alejandra Tran NP  Primary Care Provider: Fabricio Mckeon MD    Primary Care Team: Networked reference to record PCT     HPI:   88yo male patient with history of Alzheimer's, aortic stenosis s/p TAVR, BPH, CAD/CHF/CKD3, /DM/HTN/HLD but most recent hx of stroke in 12/2022, was here at [facility] for rehab and contracted covid in Jan. He was sent to [place] for continued therapy but yesterday became weak, slurred speech noted. He was no longer doing his ADLS. Sent to [facility] for eval. When he left here he was ambulating >> last PT note stand by assist ambulated 85ft with RW  and O2 supplement. He has more weakness to left upper ext (residual from CVA but seems a little worse than when d/justyna.) bruising mote to right lower abd and c/o left hip pain. Re ports he had a fall but unable to say when. Not oriented. He was dehydrated on admission. Creat 1.6> was given 1500ml bolus in ER then maintained on NS at 125ml/hr. Creat improved today 1.2. covid remains positive but original positive test 1/1/23. Ct head/MRI no new strokes. Troponin slightly elebated but has been stable over night 0.07. No chest pain. VSS. Afebrile. Placed in observation for speech/OT/PT evals. Plavix statin and namenda resumed.       * No surgery found *      Hospital Course:   1/19 doing better today./ Sitting up- ate breakfast. More alert. VSS/afebrile. Renal function stable without IVF stopped yesterday. Ot eval yesterday modified independence with grooming- total assist to dress. [facility] eval [facility] today[facility]        Goals of Care Treatment  Preferences:  Code Status: Full Code      Consults:   Consults (From admission, onward)          Status Ordering Provider     Inpatient consult to Registered Dietitian/Nutritionist  Once        Provider:  (Not yet assigned)    Acknowledged YESICA GONSALES     IP consult to case management  Once        Provider:  (Not yet assigned)    Completed PINA JARRELL            * ANTHONY (acute kidney injury)  Patient with acute kidney injury likely due to IVVD/dehydration ANTHONY is currently improving. Labs reviewed- Renal function/electrolytes with Estimated Creatinine Clearance: 39.3 mL/min (based on SCr of 1.2 mg/dL). according to latest data. Monitor urine output and serial BMP and adjust therapy as needed. Avoid nephrotoxins and renally dose meds for GFR listed above.   Pt was given 1.5L NS bolus in ER, then stayed on NS at 125ml/hr overnight. Creat improved 1.6>1.2 this am. > stable     Debility  Seems to have declined since discharge. Will have speech/OT/PT re eval today   Plan to go back to [place] for SKILL today       COVID-19  Dx 1/1/2023    BPH (benign prostatic hyperplasia)  Resumed flomax      Dementia  Not oriented today- cont namenda  More alert today     Hx of ischemic right MCA stroke  Cont plavix and lipitor      Anemia in neoplastic disease  H/o follicular lymphoma per chart (grade 3a, stage 1); receiving rituxan (cycle 3 in 11/2022), 10/2022 PET scan positive for 3 cm lymphadenopathy in left axillary region otherwise HUSSEIN.        -Will need follow up visit once discharge (per last hem/onc note follow up indicated around 12/17/22, delayed due to current admission for stroke)      1/6/23 Pt with dementia and advance age; will not pursue any cancer treatments going forward     Anemia is stable      Alteration in nutrition  Low albumin- reduced appetite- consult nutrition       Aortic stenosis s/p TAVR  stable      Chronic diastolic heart failure, NYHA class 2  Can not tolerate daily diuretics  The left ventricle  is normal in size with concentric remodeling and normal systolic function.  The estimated ejection fraction is 68%.  Grade I left ventricular diastolic dysfunction.  Normal right ventricular size with normal right ventricular systolic function.  Mild right atrial enlargement.  There is a transcutaneously-placed aortic bioprosthesis present. There is no aortic insufficiency present.  The aortic valve mean gradient is 8 mmHg with a dimensionless index of 0.52.  Mild tricuspid regurgitation.  Normal central venous pressure (3 mmHg).  The estimated PA systolic pressure is 55 mmHg.  There is moderate pulmonary hypertension.      Chronic kidney disease, stage 3 (moderate)  After IV hydration he is back to baseline this am       COPD, moderate  Has home O2- cont nebs as needed  CXR >1. No evidence of acute cardiopulmonary findings.  2. 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  3. Mild perihilar and left upper lobe pulmonary scar.  4. No appreciable change upon comparison.  Add mucinex BID    Diabetes mellitus, type 2  Patient's FSGs are controlled on current medication regimen.  Last A1c reviewed-   Lab Results   Component Value Date    HGBA1C 6.7 (H) 12/14/2022     Most recent fingerstick glucose reviewed-   Recent Labs   Lab 01/18/23  1152 01/18/23  1615 01/18/23  2054 01/19/23  0723   POCTGLUCOSE 122* 150* 213* 152*     Current correctional scale  Low  Maintain anti-hyperglycemic dose as follows-   Antihyperglycemics (From admission, onward)      Start     Stop Route Frequency Ordered    01/17/23 1745  insulin aspart U-100 pen 0-5 Units         -- SubQ Before meals & nightly PRN 01/17/23 1648          Hold Oral hypoglycemics while patient is in the hospital.  Pt was last on 20 units levemir here. BS this am 62- hold insulin until appetite picks up- nutrition consult      using very low dose sliding scale       Final Active Diagnoses:    Diagnosis Date Noted POA    PRINCIPAL PROBLEM:  ANTHONY (acute kidney  injury) [N17.9] 01/18/2023 Yes    Debility [R53.81] 01/04/2023 Yes    COVID-19 [U07.1] 01/02/2023 Yes    BPH (benign prostatic hyperplasia) [N40.0] 12/14/2022 Yes     Chronic    Hx of ischemic right MCA stroke [Z86.73] 12/14/2022 Not Applicable    Dementia [F03.90] 12/14/2022 Yes     Chronic    Anemia in neoplastic disease [D63.0] 10/19/2022 Yes    Alteration in nutrition [R63.8] 12/04/2021 Yes    Aortic stenosis s/p TAVR [Z95.2]  Not Applicable     Chronic    Chronic diastolic heart failure, NYHA class 2 [I50.32] 08/31/2018 Yes     Chronic    Diabetes mellitus, type 2 [E11.9]  Yes    Chronic kidney disease, stage 3 (moderate) [N18.30] 11/09/2016 Yes     Chronic    COPD, moderate [J44.9] 11/09/2016 Yes     Chronic      Problems Resolved During this Admission:       Discharged Condition: stable    Disposition: Skilled Nursing Facility    Follow Up:   Follow-up Information       [facility] Follow up in 1 week(s).    Contact information:  [address] [place] [id number] [phone]                         Patient Instructions:   No discharge procedures on file.    Significant Diagnostic Studies:     CBC:   Recent Labs   Lab 01/17/23  1133 01/18/23  0617   WBC 4.47 4.16   HGB 11.0* 10.5*   HCT 32.5* 31.6*    228       CMP:   Recent Labs   Lab 01/17/23  1133 01/18/23  0617 01/19/23  0548    142 141   K 4.1 4.5 4.6    111* 109   CO2 27 24 26   GLU 88 62* 132*   BUN 30* 25* 18   CREATININE 1.6* 1.2 1.2   CALCIUM 8.8 8.3* 8.1*   PROT 5.7* 5.1*  --    ALBUMIN 2.5* 2.2*  --    BILITOT 0.8 1.1*  --    ALKPHOS 71 65  --    AST 24 26  --    ALT 14 13  --    ANIONGAP 10 7* 6*       Lactic Acid:   Recent Labs   Lab 01/17/23  1133   LACTATE 0.8     Procal 0.08  Troponin:   Recent Labs   Lab 01/18/23  0028 01/18/23  0617 01/18/23  1205   TROPONINI 0.070* 0.073* 0.062*     BNP  Recent Labs   Lab 01/17/23  1133   *         Urine Studies:   Recent Labs   Lab  01/17/23  1147   COLORU Yellow   APPEARANCEUA Clear   PHUR 5.0   SPECGRAV 1.020   PROTEINUA 1+*   GLUCUA Negative   KETONESU Negative   BILIRUBINUA Negative   OCCULTUA Negative   NITRITE Negative   UROBILINOGEN Negative   LEUKOCYTESUR Negative   RBCUA 1   WBCUA 5   BACTERIA Rare   HYALINECASTS 0       Covid still positive but originally dx 1/1/23    Blood cultures in process x 2   Flu and strept negative     Significant Imaging:     X ray pelvis Postoperative changes of the lumbosacral spine.  Bones are osteopenic.  There are mild degenerative changes of the bilateral sacroiliac joints, hip joints and pubic symphysis.  No fracture or dislocation is seen.    CXR   1. No evidence of acute cardiopulmonary findings.  2. 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  3. Mild perihilar and left upper lobe pulmonary scar.  4. No appreciable change upon comparison.    CT head   1. No evidence of acute intracranial process.  2. Chronic cerebral atrophy with superimposed chronic deep white matter ischemia.  3. Old right cerebellar infarct.    MRI   No definite acute infarct and no significant change relative to December 15, 2022    1/17 EKG Sinus rhythm with Premature atrial complexes with Aberrant conduction   Right bundle branch block   Low voltage QRS   Abnormal ECG   When compared with ECG of 14-DEC-2022 10:05,   Aberrant conduction is now Present     Pending Diagnostic Studies:       None           Medications:  Reconciled Home Medications:      Medication List        START taking these medications      guaiFENesin 600 mg 12 hr tablet  Commonly known as: MUCINEX  Take 2 tablets (1,200 mg total) by mouth 2 (two) times daily.            CONTINUE taking these medications      albuterol 90 mcg/actuation inhaler  Commonly known as: VENTOLIN HFA  Inhale 1-2 puffs into the lungs every 6 (six) hours as needed for Wheezing or Shortness of Breath. Rescue     aspirin 81 MG Chew  Take 1 tablet (81 mg total) by mouth once daily.      atorvastatin 40 MG tablet  Commonly known as: LIPITOR  Take 1 tablet (40 mg total) by mouth once daily.     clopidogreL 75 mg tablet  Commonly known as: PLAVIX  Take 1 tablet (75 mg total) by mouth once daily.     memantine 10 MG Tab  Commonly known as: NAMENDA  Take 10 mg by mouth once daily.     OLANZapine injection  Commonly known as: ZyPREXA  Inject 2.5 mg into the muscle 3 (three) times daily as needed for Agitation.     polyethylene glycol 17 gram Pwpk  Commonly known as: GLYCOLAX  Take 17 g by mouth once daily.     SOLIQUA 100/33 100 unit-33 mcg/mL Inpn pen  Generic drug: insulin glargine-lixisenatide  Inject 15 Units into the skin daily with breakfast.     tamsulosin 0.4 mg Cap  Commonly known as: FLOMAX  Take 0.4 mg by mouth once daily.              Indwelling Lines/Drains at time of discharge:   Lines/Drains/Airways       None                   Time spent on the discharge of patient: 25 minutes         Alejandra Tran NP  Department of Hospital Medicine  [place] [facility] (3rd Fl)

## 2023-01-19 NOTE — PROGRESS NOTES
[facility] ([address] [place] [address] Medicine  Progress Note    Patient Name: Vega Kohler  MRN: [id number]  Patient Class: OP- Observation   Admission Date: 1/17/2023  Length of Stay: 0 days  Attending Physician: No att. providers found  Primary Care Provider: Fabricio Mckeon MD        Subjective:     Principal Problem:ANTHONY (acute kidney injury)        HPI:  88yo male patient with history of Alzheimer's, aortic stenosis s/p TAVR, BPH, CAD/CHF/CKD3, /DM/HTN/HLD but most recent hx of stroke in 12/2022, was here at [facility] for rehab and contracted covid in Jan. He was sent to [place] for continued therapy but yesterday became weak, slurred speech noted. He was no longer doing his ADLS. Sent to [facility] for eval. When he left here he was ambulating >> last PT note stand by assist ambulated 85ft with RW  and O2 supplement. He has more weakness to left upper ext (residual from CVA but seems a little worse than when d/justyna.) bruising mote to right lower abd and c/o left hip pain. Re ports he had a fall but unable to say when. Not oriented. He was dehydrated on admission. Creat 1.6> was given 1500ml bolus in ER then maintained on NS at 125ml/hr. Creat improved today 1.2. covid remains positive but original positive test 1/1/23. Ct head/MRI no new strokes. Troponin slightly elebated but has been stable over night 0.07. No chest pain. VSS. Afebrile. Placed in observation for speech/OT/PT evals. Plavix statin and namenda resumed.       Overview/Hospital Course:  1/19 doing better today./ Sitting up- ate breakfast. More alert. VSS/afebrile. Renal function stable without IVF stopped yesterday. Ot eval yesterday modified independence with grooming- total assist to dress. OPT to eval mobility today- [facility] plan to return [facility].       Past Medical History:   Diagnosis Date    Abnormal barium swallow     Alzheimer's dementia, late onset     Anal stenosis     Anticoagulant long-term use     Aortic  stenosis     Arthritis     Aspiration pneumonitis     Benign prostatic hyperplasia     Carotid artery disease     CHF (congestive heart failure)     Chronic diastolic heart failure     Chronic kidney disease (CKD), stage III (moderate)     CKD (chronic kidney disease)     Colon polyp     COPD (chronic obstructive pulmonary disease)     Coronary artery disease     Diabetes mellitus, type 2     Diverticulosis     Dysphagia     Hearing aid worn 01/23/2020    Received bilateral hearing aides today    Heart disease     History of CEA (carotid endarterectomy)     [place] (hard of hearing)     Hyperlipidemia     Hypertension     Hypertensive heart disease     Hypothyroidism     Joint disease     Memory loss     PAD (peripheral artery disease)     Presence of drug coated stent in right coronary artery     Pulmonary hypertension     S/P TAVR (transcatheter aortic valve replacement)     Small bowel obstruction     Wears dentures     UPPER AND LOWER    Wears glasses        Past Surgical History:   Procedure Laterality Date    anal fissure repair      ANKLE FUSION Left 08/15/2016    X 2    APPENDECTOMY      BACK SURGERY      X 4    CAROTID ENDARTERECTOMY Right     CAROTID ENDARTERECTOMY Left     CAROTID ENDARTERECTOMY Left 12/3/2021    Procedure: ENDARTERECTOMY-CAROTID;  Surgeon: Tim Castillo MD;  Location: [facility] OR;  Service: Cardiology;  Laterality: Left;  pt unsure if he stopped plavix, Dr. Castillo ok to proceed    CARPAL TUNNEL RELEASE Right     CHOLECYSTECTOMY      COLONOSCOPY N/A 7/26/2018    Procedure: HIGH RISK SCREENING COLONOSCOPY;  Surgeon: Connor Murrell MD;  Location: [facility] ENDO;  Service: Endoscopy;  Laterality: N/A;    CORONARY ANGIOPLASTY WITH STENT PLACEMENT      ELBOW SURGERY Bilateral     EXCISIONAL HEMORRHOIDECTOMY      x3    EXPLORATORY LAPAROTOMY W/ BOWEL RESECTION  11/17/2011    EYE SURGERY      cataract bilaterally    history of bowel resection       KNEE SURGERY Left     LAMINECTOMY AND MICRODISCECTOMY LUMBAR SPINE  07/21/2011    L5-S1    LEFT HEART CATHETERIZATION Left 8/31/2018    Procedure: Left heart cath;  Surgeon: Rex Chris MD;  Location: [facility] CATH;  Service: Cardiology;  Laterality: Left;    LUMBAR FUSION  11/09/2011    Anterior approach--L5-S1    LUNG BIOPSY Right 07/21/2009    VATS with wedge ofr right lower lobe    LUNG BIOPSY Left 3/16/2020    Procedure: BIOPSY, LUNG;  Surgeon: [facility] Diagnostic Provider;  Location: [facility] OR;  Service: General;  Laterality: Left;    PERCUTANEOUS TRANSCATHETER AORTIC VALVE REPLACEMENT (TAVR) Left 10/30/2018    Procedure: REPLACEMENT, AORTIC VALVE, PERCUTANEOUS, TRANSCATHETER;  Surgeon: Rex Chris MD;  Location: [facility] OR;  Service: Cardiology;  Laterality: Left;    PERCUTANEOUS TRANSCATHETER AORTIC VALVE REPLACEMENT (TAVR)  10/30/2018    Procedure: REPLACEMENT, AORTIC VALVE, PERCUTANEOUS, TRANSCATHETER;  Surgeon: Tim Castillo MD;  Location: [facility] OR;  Service: Cardiovascular;;    RIGHT HEART CATHETERIZATION Right 8/31/2018    Procedure: INSERTION, CATHETER, RIGHT HEART;  Surgeon: Rex Chris MD;  Location: [facility] CATH;  Service: Cardiology;  Laterality: Right;    ROTATOR CUFF REPAIR Left     SINUS SURGERY      SPINE SURGERY      back surgery    TENDON RELEASE Bilateral     TONSILLECTOMY         Review of patient's allergies indicates:   Allergen Reactions    Sulfur Itching    Penicillins Swelling and Rash       No current facility-administered medications on file prior to encounter.     Current Outpatient Medications on File Prior to Encounter   Medication Sig    albuterol (VENTOLIN HFA) 90 mcg/actuation inhaler Inhale 1-2 puffs into the lungs every 6 (six) hours as needed for Wheezing or Shortness of Breath. Rescue    aspirin 81 MG Chew Take 1 tablet (81 mg total) by mouth once daily.    atorvastatin (LIPITOR) 40 MG tablet Take 1 tablet (40 mg total) by mouth once  daily.    clopidogreL (PLAVIX) 75 mg tablet Take 1 tablet (75 mg total) by mouth once daily.    insulin glargine-lixisenatide (SOLIQUA 100/33) 100 unit-33 mcg/mL InPn pen Inject 15 Units into the skin daily with breakfast.    memantine (NAMENDA) 10 MG Tab Take 10 mg by mouth once daily.    OLANZapine (ZYPREXA) injection Inject 2.5 mg into the muscle 3 (three) times daily as needed for Agitation.    polyethylene glycol (GLYCOLAX) 17 gram PwPk Take 17 g by mouth once daily.    tamsulosin (FLOMAX) 0.4 mg Cap Take 0.4 mg by mouth once daily.     Family History       Problem Relation (Age of Onset)    Cancer Mother    Diabetes Brother    Heart disease Father, Brother          Tobacco Use    Smoking status: Former     Packs/day: 1.50     Years: 20.00     Pack years: 30.00     Types: Cigarettes     Start date:      Quit date:      Years since quittin.0    Smokeless tobacco: Never   Substance and Sexual Activity    Alcohol use: No    Drug use: No    Sexual activity: Not on file     Review of Systems   Unable to perform ROS: Dementia   Objective:     Vital Signs (Most Recent):  Temp: 98.6 °F (37 °C) (23 0836)  Pulse: 70 (23 1000)  Resp: 20 (23 0836)  BP: (!) 143/65 (23 0836)  SpO2: 97 % (23 0836)   Vital Signs (24h Range):  Temp:  [96.7 °F (35.9 °C)-98.6 °F (37 °C)] 98.6 °F (37 °C)  Pulse:  [55-78] 70  Resp:  [19-22] 20  SpO2:  [90 %-97 %] 97 %  BP: (119-143)/(58-70) 143/65     Weight: 64 kg (141 lb 1.5 oz)  Body mass index is 22.1 kg/m².    Physical Exam  Vitals and nursing note reviewed.   Constitutional:       General: He is not in acute distress.     Appearance: He is well-developed.   HENT:      Head: Normocephalic and atraumatic.      Nose: Nose normal.   Eyes:      Conjunctiva/sclera: Conjunctivae normal.      Pupils: Pupils are equal, round, and reactive to light.   Neck:      Thyroid: No thyromegaly.      Trachea: No tracheal deviation.   Cardiovascular:       Rate and Rhythm: Normal rate and regular rhythm.      Heart sounds: Normal heart sounds. No murmur heard.  Pulmonary:      Effort: Pulmonary effort is normal.      Breath sounds: Normal breath sounds.   Abdominal:      General: Bowel sounds are normal. There is no distension.      Palpations: Abdomen is soft.      Tenderness: There is no abdominal tenderness.      Hernia: There is no hernia in the left inguinal area.   Musculoskeletal:      Cervical back: Neck supple.      Right lower leg: No edema.      Left lower leg: No edema.   Skin:     General: Skin is warm and dry.   Neurological:      General: No focal deficit present.      Mental Status: He is alert. Mental status is at baseline.      Deep Tendon Reflexes: Reflexes are normal and symmetric.   Psychiatric:         Mood and Affect: Affect is blunt.         Speech: He is noncommunicative.         Behavior: Behavior is slowed and withdrawn.         Cognition and Memory: Cognition is impaired. Memory is impaired. He exhibits impaired recent memory and impaired remote memory.         CRANIAL NERVES     CN III, IV, VI   Pupils are equal, round, and reactive to light.     Significant Labs: All pertinent labs within the past 24 hours have been reviewed.  CBC:   Recent Labs   Lab 01/17/23  1133 01/18/23  0617   WBC 4.47 4.16   HGB 11.0* 10.5*   HCT 32.5* 31.6*    228       CMP:   Recent Labs   Lab 01/17/23  1133 01/18/23  0617 01/19/23  0548    142 141   K 4.1 4.5 4.6    111* 109   CO2 27 24 26   GLU 88 62* 132*   BUN 30* 25* 18   CREATININE 1.6* 1.2 1.2   CALCIUM 8.8 8.3* 8.1*   PROT 5.7* 5.1*  --    ALBUMIN 2.5* 2.2*  --    BILITOT 0.8 1.1*  --    ALKPHOS 71 65  --    AST 24 26  --    ALT 14 13  --    ANIONGAP 10 7* 6*       Lactic Acid:   Recent Labs   Lab 01/17/23  1133   LACTATE 0.8     Procal 0.08  Troponin:   Recent Labs   Lab 01/18/23  0028 01/18/23  0617 01/18/23  1205   TROPONINI 0.070* 0.073* 0.062*     BNP  Recent Labs   Lab  01/17/23  1133   *         Urine Studies:   Recent Labs   Lab 01/17/23  1147   COLORU Yellow   APPEARANCEUA Clear   PHUR 5.0   SPECGRAV 1.020   PROTEINUA 1+*   GLUCUA Negative   KETONESU Negative   BILIRUBINUA Negative   OCCULTUA Negative   NITRITE Negative   UROBILINOGEN Negative   LEUKOCYTESUR Negative   RBCUA 1   WBCUA 5   BACTERIA Rare   HYALINECASTS 0       Covid still positive but originally dx 1/1/23    Blood cultures in process x 2   Flu and strept negative     Significant Imaging:     X ray pelvis Postoperative changes of the lumbosacral spine.  Bones are osteopenic.  There are mild degenerative changes of the bilateral sacroiliac joints, hip joints and pubic symphysis.  No fracture or dislocation is seen.    CXR   1. No evidence of acute cardiopulmonary findings.  2. 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  3. Mild perihilar and left upper lobe pulmonary scar.  4. No appreciable change upon comparison.    CT head   1. No evidence of acute intracranial process.  2. Chronic cerebral atrophy with superimposed chronic deep white matter ischemia.  3. Old right cerebellar infarct.    MRI   No definite acute infarct and no significant change relative to December 15, 2022    1/17 EKG Sinus rhythm with Premature atrial complexes with Aberrant conduction   Right bundle branch block   Low voltage QRS   Abnormal ECG   When compared with ECG of 14-DEC-2022 10:05,   Aberrant conduction is now Present       Assessment/Plan:      * ANTHONY (acute kidney injury)  Patient with acute kidney injury likely due to IVVD/dehydration ANTHONY is currently improving. Labs reviewed- Renal function/electrolytes with Estimated Creatinine Clearance: 39.3 mL/min (based on SCr of 1.2 mg/dL). according to latest data. Monitor urine output and serial BMP and adjust therapy as needed. Avoid nephrotoxins and renally dose meds for GFR listed above.   Pt was given 1.5L NS bolus in ER, then stayed on NS at 125ml/hr overnight. Creat  improved 1.6>1.2 this am. > stable     Debility  Seems to have declined since discharge. Will have speech/OT/PT re eval today   Plan to go back to [place] for SKILL today       COVID-19  Dx 1/1/2023    BPH (benign prostatic hyperplasia)  Resumed flomax      Dementia  Not oriented today- cont namenda  More alert today     Hx of ischemic right MCA stroke  Cont plavix and lipitor      Anemia in neoplastic disease  H/o follicular lymphoma per chart (grade 3a, stage 1); receiving rituxan (cycle 3 in 11/2022), 10/2022 PET scan positive for 3 cm lymphadenopathy in left axillary region otherwise HUSSEIN.        -Will need follow up visit once discharge (per last hem/onc note follow up indicated around 12/17/22, delayed due to current admission for stroke)      1/6/23 Pt with dementia and advance age; will not pursue any cancer treatments going forward     Anemia is stable      Alteration in nutrition  Low albumin- reduced appetite- consult nutrition       Aortic stenosis s/p TAVR  stable      Chronic diastolic heart failure, NYHA class 2  Can not tolerate daily diuretics   The left ventricle is normal in size with concentric remodeling and normal systolic function.   The estimated ejection fraction is 68%.   Grade I left ventricular diastolic dysfunction.   Normal right ventricular size with normal right ventricular systolic function.   Mild right atrial enlargement.   There is a transcutaneously-placed aortic bioprosthesis present. There is no aortic insufficiency present.   The aortic valve mean gradient is 8 mmHg with a dimensionless index of 0.52.   Mild tricuspid regurgitation.   Normal central venous pressure (3 mmHg).   The estimated PA systolic pressure is 55 mmHg.   There is moderate pulmonary hypertension.      Chronic kidney disease, stage 3 (moderate)  After IV hydration he is back to baseline this am       COPD, moderate  Has home O2- cont nebs as needed  CXR >1. No evidence of acute cardiopulmonary  findings.  2. 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  3. Mild perihilar and left upper lobe pulmonary scar.  4. No appreciable change upon comparison.  Add mucinex BID    Diabetes mellitus, type 2  Patient's FSGs are controlled on current medication regimen.  Last A1c reviewed-   Lab Results   Component Value Date    HGBA1C 6.7 (H) 12/14/2022     Most recent fingerstick glucose reviewed-   Recent Labs   Lab 01/18/23  1152 01/18/23  1615 01/18/23  2054 01/19/23  0723   POCTGLUCOSE 122* 150* 213* 152*     Current correctional scale  Low  Maintain anti-hyperglycemic dose as follows-   Antihyperglycemics (From admission, onward)    Start     Stop Route Frequency Ordered    01/17/23 1745  insulin aspart U-100 pen 0-5 Units         -- SubQ Before meals & nightly PRN 01/17/23 1648        Hold Oral hypoglycemics while patient is in the hospital.  Pt was last on 20 units levemir here. BS this am 62- hold insulin until appetite picks up- nutrition consult      using very low dose sliding scale       VTE Risk Mitigation (From admission, onward)         Ordered     IP VTE HIGH RISK PATIENT  Once         01/17/23 1531     Place sequential compression device  Until discontinued         01/17/23 1531                Discharge Planning   JOSE A: 1/19/2023     Code Status: Full Code   Is the patient medically ready for discharge?:     yes  Discharge Plan A: Skilled Nursing Facility                  Thom Callejas MD  Department of Hospital Medicine   [place] [facility] (3rd Fl)

## 2023-01-19 NOTE — ASSESSMENT & PLAN NOTE
Patient's FSGs are controlled on current medication regimen.  Last A1c reviewed-   Lab Results   Component Value Date    HGBA1C 6.7 (H) 12/14/2022     Most recent fingerstick glucose reviewed-   Recent Labs   Lab 01/18/23  1152 01/18/23  1615 01/18/23 2054 01/19/23  0723   POCTGLUCOSE 122* 150* 213* 152*     Current correctional scale  Low  Maintain anti-hyperglycemic dose as follows-   Antihyperglycemics (From admission, onward)    Start     Stop Route Frequency Ordered    01/17/23 1745  insulin aspart U-100 pen 0-5 Units         -- SubQ Before meals & nightly PRN 01/17/23 1648        Hold Oral hypoglycemics while patient is in the hospital.  Pt was last on 20 units levemir here. BS this am 62- hold insulin until appetite picks up- nutrition consult      using very low dose sliding scale

## 2023-01-19 NOTE — PLAN OF CARE
[place] - Med Surg (3rd Fl)  Discharge Final Note    Primary Care Provider: Fabricio Mckeon MD    Expected Discharge Date: 1/19/2023    Final Discharge Note (most recent)       Final Note - 01/19/23 1233          Final Note    Assessment Type Final Discharge Note     Anticipated Discharge Disposition Skilled Nursing Facility        Post-Acute Status    Post-Acute Authorization Placement     Post-Acute Placement Status Set-up Complete/Auth obtained     Discharge Delays None known at this time                     Important Message from Medicare             Contact Info       [facility]    [address] [place] [address]  [facility] [address]   Phone: [phone]       Next Steps: Follow up in 1 week(s)              Patient returning to [facility] for SNF where he resided prior to discharge.

## 2023-01-19 NOTE — PT/OT/SLP PROGRESS
Occupational Therapy   Treatment    Name: Vega Kohler  MRN: [id number]  Admitting Diagnosis:  ANTHONY (acute kidney injury)       Recommendations:     Discharge Recommendations: nursing facility, skilled, nursing facility, basic  Discharge Equipment Recommendations:  other (see comments)  Barriers to discharge:  None    Assessment:     Vega Kohler is a 87 y.o. male with a medical diagnosis of ANTHONY (acute kidney injury).  Performance deficits affecting function are weakness, impaired endurance, impaired self care skills, impaired functional mobility, gait instability, impaired balance, decreased safety awareness, impaired coordination, impaired cardiopulmonary response to activity.     Rehab Prognosis:  Good; patient would benefit from acute skilled OT services to address these deficits and reach maximum level of function.       Plan:     Patient to be seen 5 x/week to address the above listed problems via self-care/home management, therapeutic activities, therapeutic exercises  Plan of Care Expires: 02/01/23  Plan of Care Reviewed with: patient    Subjective     Pain/Comfort:  Pain Rating 1: 0/10  Pain Addressed 1: Reposition, Cessation of Activity    Objective:     Communicated with: RN prior to session.  Patient found HOB elevated with bed alarm, oxygen, peripheral IV, telemetry upon OT entry to room.    General Precautions: Standard, fall    Orthopedic Precautions:N/A  Braces: N/A  Respiratory Status: Nasal cannula, flow 2 L/min     Occupational Performance:     Activities of Daily Living:  Feeding:  independence Feeding self and opening packages  Grooming: independence washing face and cleaning gums      [facility] 6 Click ADL: 14    Treatment & Education:  Pt was feeding self independently, opening packages independently and using both hands for activities with no verbal or tactile prompting. Pt was able to wash face and clean gums with sponge swap independently post set up. Pt declined to get up from bed or sit edge  of bed.     Patient left HOB elevated with all lines intact, call button in reach, bed alarm on, and RN notified    GOALS:   Multidisciplinary Problems       Occupational Therapy Goals          Problem: Occupational Therapy    Goal Priority Disciplines Outcome Interventions   Occupational Therapy Goal     OT, PT/OT Ongoing, Progressing    Description: Pt to perform UB dressing with Min A seated EOB.  Pt to perform LB dressing with Min A seated EOB.  Pt to perform self toileting with Min A using standard commode.  Pt to perform level functional transfers required for ADL's with SBA, RW level.  Pt to participate in upper extremity strengthening routine as educated by OT for increased upper extremity strength and functional use during daily activity.                         Time Tracking:     OT Date of Treatment: 01/19/23  OT Start Time: 0837  OT Stop Time: 0855  OT Total Time (min): 18 min    Billable Minutes:Self Care/Home Management 18  18  OT/PEDRO: PEDRO VASQUEZ Visit Number: 1 1/19/2023

## 2023-01-19 NOTE — PLAN OF CARE
Pt alert to person. VSS. Patient on baseline 1-2L nasal canula. Called [facility] and gave report to SONIA Mcdonald.  Transport arrived and transported pt with discharge instructions and belongings in room.       Problem: Adult Inpatient Plan of Care  Goal: Plan of Care Review  Outcome: Met  Goal: Patient-Specific Goal (Individualized)  Outcome: Adequate for Care Transition  Goal: Absence of Hospital-Acquired Illness or Injury  Outcome: Adequate for Care Transition  Goal: Optimal Comfort and Wellbeing  Outcome: Adequate for Care Transition  Goal: Readiness for Transition of Care  Outcome: Adequate for Care Transition     Problem: Diabetes Comorbidity  Goal: Blood Glucose Level Within Targeted Range  Outcome: Adequate for Care Transition     Problem: Impaired Wound Healing  Goal: Optimal Wound Healing  Outcome: Adequate for Care Transition     Problem: Fall Injury Risk  Goal: Absence of Fall and Fall-Related Injury  Outcome: Adequate for Care Transition     Problem: Skin Injury Risk Increased  Goal: Skin Health and Integrity  Outcome: Adequate for Care Transition     Problem: Fluid and Electrolyte Imbalance (Acute Kidney Injury/Impairment)  Goal: Fluid and Electrolyte Balance  Outcome: Adequate for Care Transition     Problem: Oral Intake Inadequate (Acute Kidney Injury/Impairment)  Goal: Optimal Nutrition Intake  Outcome: Adequate for Care Transition     Problem: Renal Function Impairment (Acute Kidney Injury/Impairment)  Goal: Effective Renal Function  Outcome: Adequate for Care Transition

## 2023-01-19 NOTE — SUBJECTIVE & OBJECTIVE
Past Medical History:   Diagnosis Date    Abnormal barium swallow     Alzheimer's dementia, late onset     Anal stenosis     Anticoagulant long-term use     Aortic stenosis     Arthritis     Aspiration pneumonitis     Benign prostatic hyperplasia     Carotid artery disease     CHF (congestive heart failure)     Chronic diastolic heart failure     Chronic kidney disease (CKD), stage III (moderate)     CKD (chronic kidney disease)     Colon polyp     COPD (chronic obstructive pulmonary disease)     Coronary artery disease     Diabetes mellitus, type 2     Diverticulosis     Dysphagia     Hearing aid worn 01/23/2020    Received bilateral hearing aides today    Heart disease     History of CEA (carotid endarterectomy)     [place] (hard of hearing)     Hyperlipidemia     Hypertension     Hypertensive heart disease     Hypothyroidism     Joint disease     Memory loss     PAD (peripheral artery disease)     Presence of drug coated stent in right coronary artery     Pulmonary hypertension     S/P TAVR (transcatheter aortic valve replacement)     Small bowel obstruction     Wears dentures     UPPER AND LOWER    Wears glasses        Past Surgical History:   Procedure Laterality Date    anal fissure repair      ANKLE FUSION Left 08/15/2016    X 2    APPENDECTOMY      BACK SURGERY      X 4    CAROTID ENDARTERECTOMY Right     CAROTID ENDARTERECTOMY Left     CAROTID ENDARTERECTOMY Left 12/3/2021    Procedure: ENDARTERECTOMY-CAROTID;  Surgeon: Tim Castillo MD;  Location: [facility] OR;  Service: Cardiology;  Laterality: Left;  pt unsure if he stopped plavix, Dr. Castillo ok to proceed    CARPAL TUNNEL RELEASE Right     CHOLECYSTECTOMY      COLONOSCOPY N/A 7/26/2018    Procedure: HIGH RISK SCREENING COLONOSCOPY;  Surgeon: Connor Murrell MD;  Location: [facility] ENDO;  Service: Endoscopy;  Laterality: N/A;    CORONARY ANGIOPLASTY WITH STENT PLACEMENT      ELBOW SURGERY Bilateral     EXCISIONAL HEMORRHOIDECTOMY      x3     EXPLORATORY LAPAROTOMY W/ BOWEL RESECTION  11/17/2011    EYE SURGERY      cataract bilaterally    history of bowel resection      KNEE SURGERY Left     LAMINECTOMY AND MICRODISCECTOMY LUMBAR SPINE  07/21/2011    L5-S1    LEFT HEART CATHETERIZATION Left 8/31/2018    Procedure: Left heart cath;  Surgeon: Rex Chris MD;  Location: [facility] CATH;  Service: Cardiology;  Laterality: Left;    LUMBAR FUSION  11/09/2011    Anterior approach--L5-S1    LUNG BIOPSY Right 07/21/2009    VATS with wedge ofr right lower lobe    LUNG BIOPSY Left 3/16/2020    Procedure: BIOPSY, LUNG;  Surgeon: [facility] Diagnostic Provider;  Location: [facility] OR;  Service: General;  Laterality: Left;    PERCUTANEOUS TRANSCATHETER AORTIC VALVE REPLACEMENT (TAVR) Left 10/30/2018    Procedure: REPLACEMENT, AORTIC VALVE, PERCUTANEOUS, TRANSCATHETER;  Surgeon: Rex Chris MD;  Location: [facility] OR;  Service: Cardiology;  Laterality: Left;    PERCUTANEOUS TRANSCATHETER AORTIC VALVE REPLACEMENT (TAVR)  10/30/2018    Procedure: REPLACEMENT, AORTIC VALVE, PERCUTANEOUS, TRANSCATHETER;  Surgeon: Tim Castillo MD;  Location: [facility] OR;  Service: Cardiovascular;;    RIGHT HEART CATHETERIZATION Right 8/31/2018    Procedure: INSERTION, CATHETER, RIGHT HEART;  Surgeon: Rex Chris MD;  Location: [facility] CATH;  Service: Cardiology;  Laterality: Right;    ROTATOR CUFF REPAIR Left     SINUS SURGERY      SPINE SURGERY      back surgery    TENDON RELEASE Bilateral     TONSILLECTOMY         Review of patient's allergies indicates:   Allergen Reactions    Sulfur Itching    Penicillins Swelling and Rash       No current facility-administered medications on file prior to encounter.     Current Outpatient Medications on File Prior to Encounter   Medication Sig    albuterol (VENTOLIN HFA) 90 mcg/actuation inhaler Inhale 1-2 puffs into the lungs every 6 (six) hours as needed for Wheezing or Shortness of Breath. Rescue    aspirin 81 MG Chew Take 1 tablet (81 mg  total) by mouth once daily.    atorvastatin (LIPITOR) 40 MG tablet Take 1 tablet (40 mg total) by mouth once daily.    clopidogreL (PLAVIX) 75 mg tablet Take 1 tablet (75 mg total) by mouth once daily.    insulin glargine-lixisenatide (SOLIQUA 100/33) 100 unit-33 mcg/mL InPn pen Inject 15 Units into the skin daily with breakfast.    memantine (NAMENDA) 10 MG Tab Take 10 mg by mouth once daily.    OLANZapine (ZYPREXA) injection Inject 2.5 mg into the muscle 3 (three) times daily as needed for Agitation.    polyethylene glycol (GLYCOLAX) 17 gram PwPk Take 17 g by mouth once daily.    tamsulosin (FLOMAX) 0.4 mg Cap Take 0.4 mg by mouth once daily.     Family History       Problem Relation (Age of Onset)    Cancer Mother    Diabetes Brother    Heart disease Father, Brother          Tobacco Use    Smoking status: Former     Packs/day: 1.50     Years: 20.00     Pack years: 30.00     Types: Cigarettes     Start date:      Quit date:      Years since quittin.0    Smokeless tobacco: Never   Substance and Sexual Activity    Alcohol use: No    Drug use: No    Sexual activity: Not on file     Review of Systems   Unable to perform ROS: Dementia   Objective:     Vital Signs (Most Recent):  Temp: 98.6 °F (37 °C) (23)  Pulse: 71 (23 08)  Resp: 20 (23)  BP: (!) 143/65 (23)  SpO2: 97 % (23)   Vital Signs (24h Range):  Temp:  [96.7 °F (35.9 °C)-98.6 °F (37 °C)] 98.6 °F (37 °C)  Pulse:  [55-78] 71  Resp:  [19-22] 20  SpO2:  [90 %-97 %] 97 %  BP: (119-143)/(58-70) 143/65     Weight: 64 kg (141 lb 1.5 oz)  Body mass index is 22.1 kg/m².    Physical Exam  Constitutional:       Appearance: He is well-developed.   HENT:      Head: Normocephalic and atraumatic.      Right Ear: Tympanic membrane, ear canal and external ear normal.      Left Ear: Tympanic membrane, ear canal and external ear normal.      Nose: Nose normal.   Eyes:      Conjunctiva/sclera: Conjunctivae normal.       Pupils: Pupils are equal, round, and reactive to light.   Neck:      Thyroid: No thyromegaly.      Trachea: No tracheal deviation.   Cardiovascular:      Rate and Rhythm: Normal rate and regular rhythm.      Heart sounds: Normal heart sounds. No murmur heard.    No gallop.   Pulmonary:      Effort: Pulmonary effort is normal.      Breath sounds: Normal breath sounds.   Abdominal:      General: Bowel sounds are normal. There is no distension.      Palpations: Abdomen is soft. There is no mass.      Tenderness: There is no abdominal tenderness. There is no guarding or rebound.      Hernia: There is no hernia in the left inguinal area.   Genitourinary:     Prostate: Normal.      Rectum: Normal.   Musculoskeletal:         General: Tenderness present.      Cervical back: Normal range of motion and neck supple.      Right lower leg: No edema.      Left lower leg: No edema.      Comments: Bruise over right lateral pelvis   Left hip tender with decreased range of motion   Skin:     General: Skin is warm and dry.   Neurological:      General: No focal deficit present.      Mental Status: He is alert. Mental status is at baseline. He is disoriented.      Deep Tendon Reflexes: Reflexes are normal and symmetric.   Psychiatric:         Mood and Affect: Affect is blunt.         Speech: He is noncommunicative.         Behavior: Behavior is slowed and withdrawn.         Cognition and Memory: Cognition is impaired. Memory is impaired. He exhibits impaired recent memory and impaired remote memory.         CRANIAL NERVES     CN III, IV, VI   Pupils are equal, round, and reactive to light.     Significant Labs: All pertinent labs within the past 24 hours have been reviewed.  CBC:   Recent Labs   Lab 01/17/23  1133 01/18/23  0617   WBC 4.47 4.16   HGB 11.0* 10.5*   HCT 32.5* 31.6*    228       CMP:   Recent Labs   Lab 01/17/23  1133 01/18/23  0617 01/19/23  0548    142 141   K 4.1 4.5 4.6    111* 109   CO2 27 24 26    GLU 88 62* 132*   BUN 30* 25* 18   CREATININE 1.6* 1.2 1.2   CALCIUM 8.8 8.3* 8.1*   PROT 5.7* 5.1*  --    ALBUMIN 2.5* 2.2*  --    BILITOT 0.8 1.1*  --    ALKPHOS 71 65  --    AST 24 26  --    ALT 14 13  --    ANIONGAP 10 7* 6*       Lactic Acid:   Recent Labs   Lab 01/17/23  1133   LACTATE 0.8     Procal 0.08  Troponin:   Recent Labs   Lab 01/18/23  0028 01/18/23  0617 01/18/23  1205   TROPONINI 0.070* 0.073* 0.062*     BNP  Recent Labs   Lab 01/17/23  1133   *         Urine Studies:   Recent Labs   Lab 01/17/23  1147   COLORU Yellow   APPEARANCEUA Clear   PHUR 5.0   SPECGRAV 1.020   PROTEINUA 1+*   GLUCUA Negative   KETONESU Negative   BILIRUBINUA Negative   OCCULTUA Negative   NITRITE Negative   UROBILINOGEN Negative   LEUKOCYTESUR Negative   RBCUA 1   WBCUA 5   BACTERIA Rare   HYALINECASTS 0       Covid still positive but originally dx 1/1/23    Blood cultures in process x 2   Flu and strept negative     Significant Imaging:     X ray pelvis Postoperative changes of the lumbosacral spine.  Bones are osteopenic.  There are mild degenerative changes of the bilateral sacroiliac joints, hip joints and pubic symphysis.  No fracture or dislocation is seen.    CXR   1. No evidence of acute cardiopulmonary findings.  2. 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  3. Mild perihilar and left upper lobe pulmonary scar.  4. No appreciable change upon comparison.    CT head   1. No evidence of acute intracranial process.  2. Chronic cerebral atrophy with superimposed chronic deep white matter ischemia.  3. Old right cerebellar infarct.    MRI   No definite acute infarct and no significant change relative to December 15, 2022    1/17 EKG Sinus rhythm with Premature atrial complexes with Aberrant conduction   Right bundle branch block   Low voltage QRS   Abnormal ECG   When compared with ECG of 14-DEC-2022 10:05,   Aberrant conduction is now Present

## 2023-01-20 NOTE — PT/OT/SLP DISCHARGE
Physical Therapy Discharge Summary    Name: Vega Kohler  MRN: [id number]   Principal Problem: ANTHONY (acute kidney injury)     Patient Discharged from acute Physical Therapy on 23.  Please refer to prior PT noted date on 23 for functional status.     Assessment:     Patient was discharged unexpectedly.  Information required to complete an accurate discharge summary is unknown.  Refer to therapy initial evaluation and last progress note for initial and most recent functional status and goal achievement.  Recommendations made may be found in medical record.    Objective:     GOALS:   Multidisciplinary Problems       Physical Therapy Goals          Problem: Physical Therapy    Goal Priority Disciplines Outcome Goal Variances Interventions   Physical Therapy Goal     PT, PT/OT      Description:   Patient will increase functional independence with mobility by performin. Supine <>sit with Supervision or Set-up Assistance  2. Sit <>stand with Supervision or Set-up Assistance with RW  3. Bed to chair transfer with Standby Assistancewith or without rolling walker using Step Transfer TECHNIQUE  4. Gait  x ~75  feet with Standby Assistance with or without rolling walker  5. Lower extremity exercise program x10 reps per handout, with assistance as needed                          Reasons for Discontinuation of Therapy Services  Transfer to alternate level of care.      Plan:     Patient Discharged to: Skilled Nursing Facility at [facility].      2023

## 2023-02-04 NOTE — ED NOTES
Julian with [facility]  Home arrived to transport patient.  Notified pt's son, Zay, of pt's departure from ED.   Notified pt's son he can call the  home whenever he is ready to make arrangements. DUNCAN

## 2023-02-04 NOTE — ED NOTES
Family in lobby; updated with pt condition. No changes with plan of care. Only request from fly is to keep pt comfortable with medications.   Pt remains on monitor. See vital spreadsheet.

## 2023-02-04 NOTE — ED PROVIDER NOTES
Encounter Date: 2/4/2023       History     Chief Complaint   Patient presents with    Cardiac Arrest     Nurse from [facility] reports pt was sitting in chair when he leaned forward, pt was agonal and pulseless. CPR initiated with 3 epi injections. ROSC. Advanced I-Jel airway in place PTA.      87-year-old male presents after cardiac arrest local nursing home  Patient went unresponsive with CPR initiated immediately per HX  EMS got there put any noninvasive Arron airway with code on going  After epinephrine, patient's pulse was regained but nonresponsive  Anoxic brain injury noted, brought into the ER with Arron tube in place  Unfortunately, this patient has a DNR status, son called immediately    Review of patient's allergies indicates:   Allergen Reactions    Sulfur Itching    Penicillins Swelling and Rash     Past Medical History:   Diagnosis Date    Abnormal barium swallow     Alzheimer's dementia, late onset     Anal stenosis     Anticoagulant long-term use     Aortic stenosis     Arthritis     Aspiration pneumonitis     Benign prostatic hyperplasia     Carotid artery disease     CHF (congestive heart failure)     Chronic diastolic heart failure     Chronic kidney disease (CKD), stage III (moderate)     CKD (chronic kidney disease)     Colon polyp     COPD (chronic obstructive pulmonary disease)     Coronary artery disease     Diabetes mellitus, type 2     Diverticulosis     Dysphagia     Hearing aid worn 01/23/2020    Received bilateral hearing aides today    Heart disease     History of CEA (carotid endarterectomy)     [place] (hard of hearing)     Hyperlipidemia     Hypertension     Hypertensive heart disease     Hypothyroidism     Joint disease     Memory loss     PAD (peripheral artery disease)     Presence of drug coated stent in right coronary artery     Pulmonary hypertension     S/P TAVR (transcatheter aortic valve replacement)     Small bowel obstruction     Wears dentures     UPPER AND LOWER    Wears  glasses      Past Surgical History:   Procedure Laterality Date    anal fissure repair      ANKLE FUSION Left 08/15/2016    X 2    APPENDECTOMY      BACK SURGERY      X 4    CAROTID ENDARTERECTOMY Right     CAROTID ENDARTERECTOMY Left     CAROTID ENDARTERECTOMY Left 12/3/2021    Procedure: ENDARTERECTOMY-CAROTID;  Surgeon: Tim Castillo MD;  Location: [facility] OR;  Service: Cardiology;  Laterality: Left;  pt unsure if he stopped plavix, Dr. Castillo ok to proceed    CARPAL TUNNEL RELEASE Right     CHOLECYSTECTOMY      COLONOSCOPY N/A 7/26/2018    Procedure: HIGH RISK SCREENING COLONOSCOPY;  Surgeon: Connor Murrell MD;  Location: [facility] ENDO;  Service: Endoscopy;  Laterality: N/A;    CORONARY ANGIOPLASTY WITH STENT PLACEMENT      ELBOW SURGERY Bilateral     EXCISIONAL HEMORRHOIDECTOMY      x3    EXPLORATORY LAPAROTOMY W/ BOWEL RESECTION  11/17/2011    EYE SURGERY      cataract bilaterally    history of bowel resection      KNEE SURGERY Left     LAMINECTOMY AND MICRODISCECTOMY LUMBAR SPINE  07/21/2011    L5-S1    LEFT HEART CATHETERIZATION Left 8/31/2018    Procedure: Left heart cath;  Surgeon: Rex Chris MD;  Location: [facility] CATH;  Service: Cardiology;  Laterality: Left;    LUMBAR FUSION  11/09/2011    Anterior approach--L5-S1    LUNG BIOPSY Right 07/21/2009    VATS with wedge ofr right lower lobe    LUNG BIOPSY Left 3/16/2020    Procedure: BIOPSY, LUNG;  Surgeon: [facility] Diagnostic Provider;  Location: [facility] OR;  Service: General;  Laterality: Left;    PERCUTANEOUS TRANSCATHETER AORTIC VALVE REPLACEMENT (TAVR) Left 10/30/2018    Procedure: REPLACEMENT, AORTIC VALVE, PERCUTANEOUS, TRANSCATHETER;  Surgeon: Rex Chris MD;  Location: [facility] OR;  Service: Cardiology;  Laterality: Left;    PERCUTANEOUS TRANSCATHETER AORTIC VALVE REPLACEMENT (TAVR)  10/30/2018    Procedure: REPLACEMENT, AORTIC VALVE, PERCUTANEOUS, TRANSCATHETER;  Surgeon: Tim Castillo MD;  Location: [facility] OR;  Service:  Cardiovascular;;    RIGHT HEART CATHETERIZATION Right 2018    Procedure: INSERTION, CATHETER, RIGHT HEART;  Surgeon: Rex Chris MD;  Location: [facility] CATH;  Service: Cardiology;  Laterality: Right;    ROTATOR CUFF REPAIR Left     SINUS SURGERY      SPINE SURGERY      back surgery    TENDON RELEASE Bilateral     TONSILLECTOMY       Family History   Problem Relation Age of Onset    Cancer Mother     Heart disease Father     Heart disease Brother     Diabetes Brother      Social History     Tobacco Use    Smoking status: Former     Packs/day: 1.50     Years: 20.00     Pack years: 30.00     Types: Cigarettes     Start date:      Quit date:      Years since quittin.1    Smokeless tobacco: Never   Substance Use Topics    Alcohol use: No    Drug use: No     Review of Systems   Unable to perform ROS: Acuity of condition     Physical Exam     Initial Vitals   BP Pulse Resp Temp SpO2   23 0922 23 0922 23 0922 23 0955 23 0922   (!) 112/55 (!) 116 20 96 °F (35.6 °C) 100 %      MAP       --                Physical Exam  -- Nursing note and vitals reviewed.  -- Constitutional: pt is a code patient  -- Head: Atraumatic. Normocephalic  -- Eyes: fixed and dilated bilaterally  -- Ears: external ears and TM normal bilaterally. Normal hearing and no drainage  -- Nose: Nose normal in appearance, nares grossly normal. No discharge  -- Cardiac: no heart sounds noted  -- Vascular: no pulse noted peripherally or centrally  -- Pulmonary: no respirations noted  -- Chest: Chest not tender to palpation, sternum and ribs symmetrical.   -- Abdominal: Soft & flat, no tenderness. Normal bowel sounds. Normal liver and spleen, no hernia  -- Musculoskeletal: flaccid with no tone. Normal digits and nails, No clubbing or digits  -- Neurological: no neurologic activity with a GCS of 3  -- Skin: Skin is cool and dry. No evidence of rash or cellulitis. No abnormalities palpated     ED Course    Procedures  Labs Reviewed   LACTIC ACID, PLASMA - Abnormal; Notable for the following components:       Result Value    Lactate (Lactic Acid) 7.5 (*)     All other components within normal limits    Narrative:      LA  critical result(s) called and verbal readback obtained from   Olivia Dykes RN by AT6 02/04/2023 10:03   COMPREHENSIVE METABOLIC PANEL - Abnormal; Notable for the following components:    CO2 17 (*)     Glucose 229 (*)     Total Protein 5.3 (*)     Albumin 2.3 (*)     AST 42 (*)     eGFR 53 (*)     All other components within normal limits   CBC W/ AUTO DIFFERENTIAL - Abnormal; Notable for the following components:    RBC 3.43 (*)     Hemoglobin 10.6 (*)     Hematocrit 34.6 (*)      (*)     MCHC 30.6 (*)     RDW 15.3 (*)     Immature Granulocytes 2.1 (*)     Immature Grans (Abs) 0.19 (*)     Lymph % 15.7 (*)     All other components within normal limits   TROPONIN I - Abnormal; Notable for the following components:    Troponin I 0.076 (*)     All other components within normal limits   B-TYPE NATRIURETIC PEPTIDE - Abnormal; Notable for the following components:     (*)     All other components within normal limits   CULTURE, BLOOD   CULTURE, BLOOD   CK   CK-MB     EKG Readings: (Independently Interpreted)   No STEMI  Nonspecific sinus rhythm  No ectopy  Normal conduction  Normal ST segments  Normal T-wave  Normal axis  Heart rate in the 90s     Imaging Results              X-Ray Chest 1 View (Final result)  Result time 02/04/23 10:30:21      Final result by Alexei Yeh MD (02/04/23 10:30:21)                   Impression:      Cardiomediastinal silhouette enlargement with worsening bilateral interstitial opacities and lower lung airspace consolidation, with suspected right greater than left pleural effusions.      Electronically signed by: Alexei Yeh  Date:    02/04/2023  Time:    10:30               Narrative:    EXAMINATION:  XR CHEST 1 VIEW    CLINICAL HISTORY:  cardiac  arrest;    TECHNIQUE:  Single frontal view of the chest was performed.    COMPARISON:  2023    FINDINGS:  Cardiac monitor wires over the of the chest.  Cardiomediastinal silhouette enlargement.  Cardiac valve replacement.  Worsening bilateral interstitial lung opacities with right greater than left lower lung airspace opacification with silhouetting of the costophrenic angles and hemidiaphragm, which may reflect right greater than left pleural effusions with pulmonary edema, atelectasis or pneumonia.  No pneumothorax.                                       Medications   HYDROmorphone injection 2 mg (2 mg Intravenous Given 23 1034)   ondansetron injection 4 mg (4 mg Intravenous Given 23 1034)     Medical Decision Makin-year-old presents after cardiac arrest of the local nursing home today  CPR, epinephrine with a Arron tube placed by EMS further report given today  Pulse present, patient however is a DNR after paperwork was reviewed   Patient's son presents at bedside after called, rule out paperwork as well   Patient does not intubation or airway, he was DNI per the paperwork here  Son request that the Arron airway be removed & comfort measures started  States his father would wish for no further resuscitation, hospice, comfort     Patient was monitored in the ER for several hours with comfort care by RN  Time of death was 2:08 p.m., family notified by the RN,  notified    Critical Care ED Physician Time (minutes):  -- Performed by: Alexei Blackwell M.D.  -- Date/Time: 11:14 AM 2023   -- Direct Patient Care (Face Time): 5  -- Additional History from Records or Taking Additional History: 5  -- Ordering, Reviewing, and Interpreting Diagnostic Studies: 5  -- Total Time in Documentation: 11  -- Consultation with Other Physicians: 5  -- Consultation with Family Related to Condition: 0  -- Total Critical Care Time: 31  -- Critical care was necessary to treat Respiratory/cardiac failure  --  Critical care was time spent personally by me on the following activities:   -- examination of patient, ordering and performing treatments   -- review of radiographic studies, re-evaluation of pt's condition  -- review of labs and evaluation of response to treatment                           Clinical Impression:   Final diagnoses:  [I46.9] Cardiac arrest  [G93.1] Anoxic brain injury (Primary)  [Z51.5] Hospice care patient        ED Disposition Condition                     Alexei Blackwell MD  [phone]

## 2023-02-04 NOTE — ED NOTES
Pt's son, Zay, at bedside speaking with Dr. Blackwell to remove advanced airway, matthew catheter and 1 IV. Son requested for pain medications to be given for comfort. New orders to come.

## 2023-02-04 NOTE — ED NOTES
Pt's son, Zay Kohler, arrived to ED. Zay updated with pt condition. Zay requesting to read pt's will prior to making decisions.   Zay brought to bedside to be with pt.

## 2023-02-04 NOTE — ED NOTES
Pt's family notified of pt's expiration. Emotional support given. Brought to bedside to visit with patient.  Family requesting for [facility] to be contacted.

## 2023-02-04 NOTE — ED NOTES
House Supervisor, Doug SCOTT, notified of patient's expiration.     Wendy SCOTT with [facility] notified of patient's expiration.

## 2023-02-09 LAB
BACTERIA BLD CULT: NORMAL
BACTERIA BLD CULT: NORMAL

## 2023-09-19 NOTE — ASSESSMENT & PLAN NOTE
H/o follicular lymphoma per chart (grade 3a, stage 1); receiving rituxan (cycle 3 in 11/2022), 10/2022 PET scan positive for 3 cm lymphadenopathy in left axillary region otherwise HUSSEIN.        -Will need follow up visit once discharge (per last hem/onc note follow up indicated around 12/17/22, delayed due to current admission for stroke) .   Detail Level: Detailed Hide Additional Notes?: No

## 2024-09-26 NOTE — ASSESSMENT & PLAN NOTE
Detail Level: Zone Not on levothyroxine prior to admission. TSH 4.4 with fT4 WNL.   Detail Level: Detailed Detail Level: Simple